# Patient Record
Sex: MALE | Race: BLACK OR AFRICAN AMERICAN | NOT HISPANIC OR LATINO | URBAN - METROPOLITAN AREA
[De-identification: names, ages, dates, MRNs, and addresses within clinical notes are randomized per-mention and may not be internally consistent; named-entity substitution may affect disease eponyms.]

---

## 2020-12-24 ENCOUNTER — INPATIENT (INPATIENT)
Facility: HOSPITAL | Age: 75
LOS: 19 days | Discharge: NURSING, REHAB OR LONG-TERM CARE | End: 2021-01-13
Attending: INTERNAL MEDICINE | Admitting: INTERNAL MEDICINE
Payer: MEDICARE

## 2020-12-24 VITALS
DIASTOLIC BLOOD PRESSURE: 70 MMHG | OXYGEN SATURATION: 96 % | SYSTOLIC BLOOD PRESSURE: 127 MMHG | RESPIRATION RATE: 20 BRPM | HEART RATE: 86 BPM | TEMPERATURE: 98 F

## 2020-12-24 DIAGNOSIS — R77.8 OTHER SPECIFIED ABNORMALITIES OF PLASMA PROTEINS: ICD-10-CM

## 2020-12-24 DIAGNOSIS — U07.1 COVID-19: ICD-10-CM

## 2020-12-24 DIAGNOSIS — E11.9 TYPE 2 DIABETES MELLITUS WITHOUT COMPLICATIONS: ICD-10-CM

## 2020-12-24 DIAGNOSIS — Z02.9 ENCOUNTER FOR ADMINISTRATIVE EXAMINATIONS, UNSPECIFIED: ICD-10-CM

## 2020-12-24 DIAGNOSIS — I10 ESSENTIAL (PRIMARY) HYPERTENSION: ICD-10-CM

## 2020-12-24 DIAGNOSIS — Z86.79 PERSONAL HISTORY OF OTHER DISEASES OF THE CIRCULATORY SYSTEM: ICD-10-CM

## 2020-12-24 DIAGNOSIS — Z29.9 ENCOUNTER FOR PROPHYLACTIC MEASURES, UNSPECIFIED: ICD-10-CM

## 2020-12-24 DIAGNOSIS — R41.0 DISORIENTATION, UNSPECIFIED: ICD-10-CM

## 2020-12-24 DIAGNOSIS — E78.5 HYPERLIPIDEMIA, UNSPECIFIED: ICD-10-CM

## 2020-12-24 DIAGNOSIS — J44.9 CHRONIC OBSTRUCTIVE PULMONARY DISEASE, UNSPECIFIED: ICD-10-CM

## 2020-12-24 DIAGNOSIS — D64.9 ANEMIA, UNSPECIFIED: ICD-10-CM

## 2020-12-24 LAB
ALBUMIN SERPL ELPH-MCNC: 3.5 G/DL — SIGNIFICANT CHANGE UP (ref 3.3–5)
ALP SERPL-CCNC: 155 U/L — HIGH (ref 40–120)
ALT FLD-CCNC: 115 U/L — HIGH (ref 4–41)
ANION GAP SERPL CALC-SCNC: 15 MMOL/L — HIGH (ref 7–14)
AST SERPL-CCNC: 76 U/L — HIGH (ref 4–40)
BASOPHILS # BLD AUTO: 0 K/UL — SIGNIFICANT CHANGE UP (ref 0–0.2)
BASOPHILS NFR BLD AUTO: 0 % — SIGNIFICANT CHANGE UP (ref 0–2)
BILIRUB SERPL-MCNC: 0.3 MG/DL — SIGNIFICANT CHANGE UP (ref 0.2–1.2)
BLOOD GAS VENOUS COMPREHENSIVE RESULT: SIGNIFICANT CHANGE UP
BUN SERPL-MCNC: 24 MG/DL — HIGH (ref 7–23)
CALCIUM SERPL-MCNC: 9 MG/DL — SIGNIFICANT CHANGE UP (ref 8.4–10.5)
CHLORIDE SERPL-SCNC: 96 MMOL/L — LOW (ref 98–107)
CK MB BLD-MCNC: 1.7 % — SIGNIFICANT CHANGE UP (ref 0–2.5)
CK MB CFR SERPL CALC: 1.5 NG/ML — SIGNIFICANT CHANGE UP
CK SERPL-CCNC: 88 U/L — SIGNIFICANT CHANGE UP (ref 30–200)
CO2 SERPL-SCNC: 25 MMOL/L — SIGNIFICANT CHANGE UP (ref 22–31)
CREAT SERPL-MCNC: 0.92 MG/DL — SIGNIFICANT CHANGE UP (ref 0.5–1.3)
CRP SERPL-MCNC: 95.8 MG/L — HIGH
D DIMER BLD IA.RAPID-MCNC: 550 NG/ML DDU — HIGH
EOSINOPHIL # BLD AUTO: 0 K/UL — SIGNIFICANT CHANGE UP (ref 0–0.5)
EOSINOPHIL NFR BLD AUTO: 0 % — SIGNIFICANT CHANGE UP (ref 0–6)
FERRITIN SERPL-MCNC: 1583 NG/ML — HIGH (ref 30–400)
GLUCOSE SERPL-MCNC: 152 MG/DL — HIGH (ref 70–99)
HCT VFR BLD CALC: 32.3 % — LOW (ref 39–50)
HGB BLD-MCNC: 9.9 G/DL — LOW (ref 13–17)
IANC: 8.2 K/UL — SIGNIFICANT CHANGE UP (ref 1.5–8.5)
LYMPHOCYTES # BLD AUTO: 0.21 K/UL — LOW (ref 1–3.3)
LYMPHOCYTES # BLD AUTO: 2.4 % — LOW (ref 13–44)
MCHC RBC-ENTMCNC: 21.3 PG — LOW (ref 27–34)
MCHC RBC-ENTMCNC: 30.7 GM/DL — LOW (ref 32–36)
MCV RBC AUTO: 69.6 FL — LOW (ref 80–100)
MONOCYTES # BLD AUTO: 0.07 K/UL — SIGNIFICANT CHANGE UP (ref 0–0.9)
MONOCYTES NFR BLD AUTO: 0.8 % — LOW (ref 2–14)
NEUTROPHILS # BLD AUTO: 8.41 K/UL — HIGH (ref 1.8–7.4)
NEUTROPHILS NFR BLD AUTO: 96.8 % — HIGH (ref 43–77)
NT-PROBNP SERPL-SCNC: 274 PG/ML — SIGNIFICANT CHANGE UP
NT-PROBNP SERPL-SCNC: 285 PG/ML — SIGNIFICANT CHANGE UP
PLATELET # BLD AUTO: 263 K/UL — SIGNIFICANT CHANGE UP (ref 150–400)
POTASSIUM SERPL-MCNC: 4.6 MMOL/L — SIGNIFICANT CHANGE UP (ref 3.5–5.3)
POTASSIUM SERPL-SCNC: 4.6 MMOL/L — SIGNIFICANT CHANGE UP (ref 3.5–5.3)
PROCALCITONIN SERPL-MCNC: 0.1 NG/ML — SIGNIFICANT CHANGE UP (ref 0.02–0.1)
PROT SERPL-MCNC: 7.2 G/DL — SIGNIFICANT CHANGE UP (ref 6–8.3)
RBC # BLD: 4.64 M/UL — SIGNIFICANT CHANGE UP (ref 4.2–5.8)
RBC # FLD: 19 % — HIGH (ref 10.3–14.5)
SARS-COV-2 RNA SPEC QL NAA+PROBE: DETECTED
SODIUM SERPL-SCNC: 136 MMOL/L — SIGNIFICANT CHANGE UP (ref 135–145)
TROPONIN T, HIGH SENSITIVITY RESULT: 46 NG/L — SIGNIFICANT CHANGE UP
TROPONIN T, HIGH SENSITIVITY RESULT: 56 NG/L — CRITICAL HIGH
WBC # BLD: 8.69 K/UL — SIGNIFICANT CHANGE UP (ref 3.8–10.5)
WBC # FLD AUTO: 8.69 K/UL — SIGNIFICANT CHANGE UP (ref 3.8–10.5)

## 2020-12-24 PROCEDURE — 99285 EMERGENCY DEPT VISIT HI MDM: CPT | Mod: CS

## 2020-12-24 PROCEDURE — 71045 X-RAY EXAM CHEST 1 VIEW: CPT | Mod: 26

## 2020-12-24 PROCEDURE — 71250 CT THORAX DX C-: CPT | Mod: 26

## 2020-12-24 RX ORDER — QUETIAPINE FUMARATE 200 MG/1
25 TABLET, FILM COATED ORAL DAILY
Refills: 0 | Status: DISCONTINUED | OUTPATIENT
Start: 2020-12-24 | End: 2021-01-13

## 2020-12-24 RX ORDER — BUDESONIDE AND FORMOTEROL FUMARATE DIHYDRATE 160; 4.5 UG/1; UG/1
2 AEROSOL RESPIRATORY (INHALATION)
Refills: 0 | Status: DISCONTINUED | OUTPATIENT
Start: 2020-12-24 | End: 2021-01-13

## 2020-12-24 RX ORDER — INSULIN LISPRO 100/ML
VIAL (ML) SUBCUTANEOUS AT BEDTIME
Refills: 0 | Status: DISCONTINUED | OUTPATIENT
Start: 2020-12-24 | End: 2021-01-13

## 2020-12-24 RX ORDER — METOPROLOL TARTRATE 50 MG
25 TABLET ORAL DAILY
Refills: 0 | Status: DISCONTINUED | OUTPATIENT
Start: 2020-12-24 | End: 2021-01-02

## 2020-12-24 RX ORDER — SENNA PLUS 8.6 MG/1
2 TABLET ORAL AT BEDTIME
Refills: 0 | Status: DISCONTINUED | OUTPATIENT
Start: 2020-12-24 | End: 2021-01-13

## 2020-12-24 RX ORDER — INFLUENZA VIRUS VACCINE 15; 15; 15; 15 UG/.5ML; UG/.5ML; UG/.5ML; UG/.5ML
0.5 SUSPENSION INTRAMUSCULAR ONCE
Refills: 0 | Status: DISCONTINUED | OUTPATIENT
Start: 2020-12-24 | End: 2020-12-25

## 2020-12-24 RX ORDER — LOSARTAN POTASSIUM 100 MG/1
25 TABLET, FILM COATED ORAL DAILY
Refills: 0 | Status: DISCONTINUED | OUTPATIENT
Start: 2020-12-24 | End: 2021-01-13

## 2020-12-24 RX ORDER — DEXTROSE 50 % IN WATER 50 %
15 SYRINGE (ML) INTRAVENOUS ONCE
Refills: 0 | Status: DISCONTINUED | OUTPATIENT
Start: 2020-12-24 | End: 2021-01-13

## 2020-12-24 RX ORDER — QUETIAPINE FUMARATE 200 MG/1
50 TABLET, FILM COATED ORAL AT BEDTIME
Refills: 0 | Status: DISCONTINUED | OUTPATIENT
Start: 2020-12-24 | End: 2021-01-13

## 2020-12-24 RX ORDER — SODIUM CHLORIDE 9 MG/ML
1000 INJECTION, SOLUTION INTRAVENOUS
Refills: 0 | Status: DISCONTINUED | OUTPATIENT
Start: 2020-12-24 | End: 2021-01-13

## 2020-12-24 RX ORDER — FUROSEMIDE 40 MG
40 TABLET ORAL DAILY
Refills: 0 | Status: DISCONTINUED | OUTPATIENT
Start: 2020-12-24 | End: 2021-01-13

## 2020-12-24 RX ORDER — SODIUM CHLORIDE 9 MG/ML
500 INJECTION INTRAMUSCULAR; INTRAVENOUS; SUBCUTANEOUS ONCE
Refills: 0 | Status: COMPLETED | OUTPATIENT
Start: 2020-12-24 | End: 2020-12-24

## 2020-12-24 RX ORDER — GLUCAGON INJECTION, SOLUTION 0.5 MG/.1ML
1 INJECTION, SOLUTION SUBCUTANEOUS ONCE
Refills: 0 | Status: DISCONTINUED | OUTPATIENT
Start: 2020-12-24 | End: 2021-01-13

## 2020-12-24 RX ORDER — ACETAMINOPHEN 500 MG
650 TABLET ORAL EVERY 4 HOURS
Refills: 0 | Status: DISCONTINUED | OUTPATIENT
Start: 2020-12-24 | End: 2021-01-02

## 2020-12-24 RX ORDER — FERROUS SULFATE 325(65) MG
325 TABLET ORAL DAILY
Refills: 0 | Status: DISCONTINUED | OUTPATIENT
Start: 2020-12-24 | End: 2021-01-13

## 2020-12-24 RX ORDER — ASCORBIC ACID 60 MG
250 TABLET,CHEWABLE ORAL DAILY
Refills: 0 | Status: DISCONTINUED | OUTPATIENT
Start: 2020-12-24 | End: 2021-01-13

## 2020-12-24 RX ORDER — LANOLIN ALCOHOL/MO/W.PET/CERES
3 CREAM (GRAM) TOPICAL AT BEDTIME
Refills: 0 | Status: DISCONTINUED | OUTPATIENT
Start: 2020-12-24 | End: 2021-01-13

## 2020-12-24 RX ORDER — DEXTROSE 50 % IN WATER 50 %
25 SYRINGE (ML) INTRAVENOUS ONCE
Refills: 0 | Status: DISCONTINUED | OUTPATIENT
Start: 2020-12-24 | End: 2021-01-13

## 2020-12-24 RX ORDER — REMDESIVIR 5 MG/ML
INJECTION INTRAVENOUS
Refills: 0 | Status: COMPLETED | OUTPATIENT
Start: 2020-12-24 | End: 2020-12-28

## 2020-12-24 RX ORDER — ASPIRIN/CALCIUM CARB/MAGNESIUM 324 MG
81 TABLET ORAL DAILY
Refills: 0 | Status: DISCONTINUED | OUTPATIENT
Start: 2020-12-24 | End: 2021-01-13

## 2020-12-24 RX ORDER — INSULIN LISPRO 100/ML
VIAL (ML) SUBCUTANEOUS
Refills: 0 | Status: DISCONTINUED | OUTPATIENT
Start: 2020-12-24 | End: 2021-01-13

## 2020-12-24 RX ORDER — REMDESIVIR 5 MG/ML
200 INJECTION INTRAVENOUS EVERY 24 HOURS
Refills: 0 | Status: COMPLETED | OUTPATIENT
Start: 2020-12-24 | End: 2020-12-24

## 2020-12-24 RX ORDER — FOLIC ACID 0.8 MG
1 TABLET ORAL DAILY
Refills: 0 | Status: DISCONTINUED | OUTPATIENT
Start: 2020-12-24 | End: 2021-01-13

## 2020-12-24 RX ORDER — DEXAMETHASONE 0.5 MG/5ML
6 ELIXIR ORAL ONCE
Refills: 0 | Status: COMPLETED | OUTPATIENT
Start: 2020-12-24 | End: 2020-12-24

## 2020-12-24 RX ORDER — INSULIN GLARGINE 100 [IU]/ML
20 INJECTION, SOLUTION SUBCUTANEOUS AT BEDTIME
Refills: 0 | Status: DISCONTINUED | OUTPATIENT
Start: 2020-12-24 | End: 2020-12-26

## 2020-12-24 RX ORDER — ATORVASTATIN CALCIUM 80 MG/1
80 TABLET, FILM COATED ORAL AT BEDTIME
Refills: 0 | Status: DISCONTINUED | OUTPATIENT
Start: 2020-12-24 | End: 2021-01-13

## 2020-12-24 RX ORDER — SODIUM CHLORIDE 9 MG/ML
50 INJECTION INTRAMUSCULAR; INTRAVENOUS; SUBCUTANEOUS ONCE
Refills: 0 | Status: DISCONTINUED | OUTPATIENT
Start: 2020-12-24 | End: 2020-12-24

## 2020-12-24 RX ORDER — ENOXAPARIN SODIUM 100 MG/ML
40 INJECTION SUBCUTANEOUS DAILY
Refills: 0 | Status: DISCONTINUED | OUTPATIENT
Start: 2020-12-24 | End: 2021-01-13

## 2020-12-24 RX ORDER — REMDESIVIR 5 MG/ML
100 INJECTION INTRAVENOUS EVERY 24 HOURS
Refills: 0 | Status: COMPLETED | OUTPATIENT
Start: 2020-12-25 | End: 2020-12-28

## 2020-12-24 RX ORDER — POLYETHYLENE GLYCOL 3350 17 G/17G
17 POWDER, FOR SOLUTION ORAL DAILY
Refills: 0 | Status: DISCONTINUED | OUTPATIENT
Start: 2020-12-24 | End: 2021-01-13

## 2020-12-24 RX ORDER — DEXTROSE 50 % IN WATER 50 %
12.5 SYRINGE (ML) INTRAVENOUS ONCE
Refills: 0 | Status: DISCONTINUED | OUTPATIENT
Start: 2020-12-24 | End: 2021-01-13

## 2020-12-24 RX ORDER — DEXAMETHASONE 0.5 MG/5ML
6 ELIXIR ORAL DAILY
Refills: 0 | Status: COMPLETED | OUTPATIENT
Start: 2020-12-25 | End: 2021-01-03

## 2020-12-24 RX ADMIN — ATORVASTATIN CALCIUM 80 MILLIGRAM(S): 80 TABLET, FILM COATED ORAL at 22:15

## 2020-12-24 RX ADMIN — SENNA PLUS 2 TABLET(S): 8.6 TABLET ORAL at 22:15

## 2020-12-24 RX ADMIN — REMDESIVIR 200 MILLIGRAM(S): 5 INJECTION INTRAVENOUS at 23:43

## 2020-12-24 RX ADMIN — SODIUM CHLORIDE 500 MILLILITER(S): 9 INJECTION INTRAMUSCULAR; INTRAVENOUS; SUBCUTANEOUS at 16:29

## 2020-12-24 RX ADMIN — Medication 6 MILLIGRAM(S): at 16:30

## 2020-12-24 RX ADMIN — SODIUM CHLORIDE 500 MILLILITER(S): 9 INJECTION INTRAMUSCULAR; INTRAVENOUS; SUBCUTANEOUS at 18:34

## 2020-12-24 RX ADMIN — INSULIN GLARGINE 20 UNIT(S): 100 INJECTION, SOLUTION SUBCUTANEOUS at 22:15

## 2020-12-24 RX ADMIN — QUETIAPINE FUMARATE 50 MILLIGRAM(S): 200 TABLET, FILM COATED ORAL at 22:15

## 2020-12-24 NOTE — ED PROVIDER NOTE - OBJECTIVE STATEMENT
75M PMH HTN, DM, Dementia BIBEMS from [facility] for AMS. Pt was recently hospitalized and intubated at [place] for prolonged duration for pneumonia/pulmonary fibrosis, had multiple negative covid swabs at that time. Recently he tested positive for COVID19 and today staff noted that he was increasingly altered and tachypneic so called EMS. 75M PMH HTN, DM, legally blind, Dementia? BIBEMS from [facility] for AMS. Pt was recently hospitalized and intubated at [facility] for prolonged duration for pneumonia/pulmonary fibrosis, had multiple negative covid swabs at that time. Recently he tested positive for COVID19 and today staff noted that he was increasingly altered and tachypneic so called EMS. Family reports first covid test was aprox 1 week ago but they were not notified of it immediately.

## 2020-12-24 NOTE — H&P ADULT - HISTORY OF PRESENT ILLNESS
75 y.o Male with PMHx HTN, DM2 ( on Insulin ) , legally Blind , Dementia , Pneumonia , HLD , recently Diagnosed Heart failure at [place], ARDS s/p Intubation 12/5 presents to [facility] from [facility] for Shortness of Breath and recent Positive COVID 19 infection. Pt is confused, AOX1-2 , Unable to obtain information due to patient being aggressive and confused likely 2/2 dementia. Spoke with Daughter (Sienna), who states that 6 weeks ago pt c/o of SOB , pt was admitted to [facility] for SOB. Pt was intubated 2/2 ARDS w/ Hypoxia, at the time of the hospitalization, Pt was swabbed x2 both times negative and negative Covid antibodies. Discharged from [facility] to [facility], Pt was tested positive for Covid and also per daughter Pt was discharged with home oxygen but [facility] was not providing  according to daughter. Pt presents to [facility] with increasingly altered , Tachypnea and hypoxic Spo2 89-90 on RA. Unable to obtain review of systems 2/2 currently mental state of the patient.      In ED Pt satting on 100% on 3L NC, VSS , EKG: NSR@89 w/ PAC Qtc 452  75 y.o Male with PMHx HTN, DM2 ( on Insulin ) , legally Blind , Dementia , Pneumonia, COPD , HLD , recently Diagnosed Heart failure [place], ARDS s/p Intubation 12/5 presents to [facility] from [facility] for Shortness of Breath and recent Positive COVID 19 infection. Pt is confused, AOX1-2 , Unable to obtain information due to patient being aggressive and confused likely 2/2 dementia. Spoke with Daughter (Sienna), who states that 6 weeks ago pt c/o of SOB , pt was admitted to [facility] for SOB. Pt was intubated 2/2 ARDS w/ Hypoxia, at the time of the hospitalization, Pt was swabbed x2 both times negative and negative Covid antibodies. Discharged from [facility] to [facility], Pt was tested positive for Covid and also per daughter Pt was discharged with home oxygen but [facility] was not providing  according to daughter. Pt presents to [facility] with increasingly altered , Tachypnea and hypoxic Spo2 89-90 on RA. Unable to obtain review of systems 2/2 currently mental state of the patient.      In ED Pt satting on 100% on 3L NC, VSS , EKG: NSR@89 w/ PAC Qtc 452  75 y.o Male with PMHx HTN, DM2 (on Insulin), legally Blind, Dementia (AAO status ranges from AAO x1-3), COPD (on prednisone), HLD, recently Diagnosed Heart failure at [place], and recent admission for ARDS s/p Intubation 12/5 presents to [facility] from [facility] for Shortness of Breath and recent Positive COVID 19 infection. Pt is confused, AOX1-2 at present, unable to obtain. Spoke with Daughter (Sienna), who states that 6 weeks ago pt c/o of SOB, pt was admitted to [facility] for SOB. There, pt was intubated 2/2 ARDS w/ Hypoxia, at the time of the hospitalization, Pt was swabbed x2 both times negative for COVID19 and had negative COVID19 antibodies. Discharged from [facility] to [facility]. At [facility] was tested positive for COVID19 (12/22). Was started on azithromycin but on 12/24 he was noted to be desaturating to 90% on 2L NC with worsening mental status and activity and was therefore sent to [facility]. Pt presents to [facility] with increasingly altered, tachypneic, and hypoxic Spo2 89-90 on RA. Unable to obtain review of systems 2/2 currently mental state of the patient.      In ED Pt satting on 100% on 3L NC, VSS , EKG: NSR@89 w/ PAC Qtc 452. His lab work showed anemia (chronic), neutrophilia without leukocytosis, elevated troponin with downtrend, transaminitis, and elevated inflammatory markers. His lactate was 2.3 and his RVP was positive for COVID19. He had a CT Chest that seems to show bilateral basilar peripheral opacities. He was given NS 500cc x1 and decadron 6mg IVP x1. He was admitted to medicine on telemetry.

## 2020-12-24 NOTE — ED ADULT NURSE NOTE - CHIEF COMPLAINT QUOTE
Patient brought to ER by EMS from [facility] for altered mental status, patient Covid +, O2 Sat is 90% 2 liters, Pt also has pneumonia as per staff at facility.

## 2020-12-24 NOTE — H&P ADULT - PROBLEM SELECTOR PLAN 5
- Pt takes 25 units of Lantus per documentations at bed time, Will give him 20units of Lantus but Pt's Daughter states that Pt and his girlfriend have not been managing medications well. Unsure if pt takes his home medications   - A1C  - ISS  - monitor FS  - diabetic diet - Continue statin  - lipid profile   - Low fat/ low cholesterol diet - H/H at preset  9.9/ 32.3  - No h/o Bleeding,  No Melena , Hemoptysis , hematochezia , epistaxis  - Will check reticulocyte count, Iron , Ferratin , TIBC, Vit B12 , Folic Acid  - Goal Hbg > 7.0  - Monitor Vitals signs for hemodynamic stability  - Continue monitoring - continue home inhalers  - continuous pulse ox  - 02 PRN   - consider pulm c/s  due to COPD and Pulmonary fibrosis   - Pt was on 20mg Prednisone  at home  - c/w Decadron while in patient - continue home inhalers  - continuous pulse ox  - 02 PRN   - consider pulm c/s  due to COPD and Pulmonary fibrosis (new diagnosis as per daughter)  - Pt was on 20mg Prednisone  at home, currently on dexamethasone so will hold prednisone  - c/w Decadron while in patient - Has a stage 1 sacral decub ulcer and incontinence associated dermatitis of his groin, wound care eval ordered

## 2020-12-24 NOTE — H&P ADULT - PROBLEM SELECTOR PLAN 4
- Continue Home BP meds w/ parameters   - monitor BP   - DASH/TLC diet - BNP: 246  - Pt takes lasix 40mg qd PO , c/w home medication   - Pt is not fluid overloaded   - monitor electrolytes   - daily weights   - strict I&Os  - fluid restriction   - low sodium intake   - TTE AM  - Chest Xray: Prelim: INTERPRETATION:  Faint bibasilar and left midlung hazy opacity, which is nonspecific and can be seen in multifocal infection as well as pulmonary edema. Follow up official report in AM. - continue home medications   - recently diagnosed with dementia  - Prior to admission to [place] pt was AOX3-4, currently Aox1-2  - consider [facility] c/s AM   - monitor - continue home medications   - recently diagnosed with dementia  - Prior to admission to [place] pt was AOX3-4, currently AOx1-3  - consider [facility] c/s AM   - monitor

## 2020-12-24 NOTE — ED ADULT NURSE NOTE - OBJECTIVE STATEMENT
Pt a&ox2 (confusion at times) PMH HTN, DM, Dementia BIBEMS from [facility] for AMS. Pt was recently hospitalized and intubated at [place] for prolonged duration of pneumonia/pulmonary fibrosis, had multiple negative covid swabs at that time. Recently he tested positive for COVID19 and today staff noted that he was increasingly altered and tachypneic so called EMS. Pt sating at 93% on RA, placed on 3L O2 VIA NC sating at 98%. 20g IVUS in RT AC by resident. Will monitor.

## 2020-12-24 NOTE — ED PROVIDER NOTE - CLINICAL SUMMARY MEDICAL DECISION MAKING FREE TEXT BOX
75M presents from NH for COVID19, pt mildly hypoxic and tachypneic, requiring supplemental O2. Will check labs, CXR, decadron. TBA for further management.

## 2020-12-24 NOTE — PATIENT PROFILE ADULT - VISION (WITH CORRECTIVE LENSES IF THE PATIENT USUALLY WEARS THEM):
patient is legally blind/Severely impaired: cannot locate objects without hearing or touching them or patient nonresponsive.

## 2020-12-24 NOTE — H&P ADULT - PROBLEM SELECTOR PLAN 8
DVT ppx: Lovenox   PT c/s - Continue Home BP meds w/ parameters   - monitor BP   - DASH/TLC diet - BNP: 246  - Pt takes Lasix 40mg qd PO , c/w home medication   - Pt is not fluid overloaded   - monitor electrolytes   - daily weights   - strict I&Os  - fluid restriction   - low sodium intake   - TTE AM  - Chest Xray: Prelim: INTERPRETATION:  Faint bibasilar and left midlung hazy opacity, which is nonspecific and can be seen in multifocal infection as well as pulmonary edema. Follow up official report in AM.

## 2020-12-24 NOTE — H&P ADULT - NSHPOUTPATIENTPROVIDERS_GEN_ALL_CORE
Dr. Ibis Villafana (PCP)  Dr. Castelan (ENT) , Pt sees ENT more often then PCP for his Chronic Sinusitis Dr. Ibis Hinson (PCP)  Dr. Castelan (ENT) , Pt sees ENT more often then PCP for his Chronic Sinusitis Dr. Ibis Hinson (PCP)  Dr. Castelan (ENT)

## 2020-12-24 NOTE — H&P ADULT - PROBLEM SELECTOR PROBLEM 7
Discharge planning issues Type 2 diabetes mellitus without complication, with long-term current use of insulin HLD (hyperlipidemia) H/O heart failure Anemia

## 2020-12-24 NOTE — H&P ADULT - ATTENDING COMMENTS
Patient seen and examined on 12/25/2020, case discussed with WILLIS Keyes. This is a 75M with history as above who presents from his NH with worsening hypoxia with recent diagnosis of COVID19 PNA. Here his lab work and imaging is more consistent with COVID19 PNA c/b acute hypoxic respiratory failure. Will c/w decadron, remdesivir (monitor LFTs), supplemental O2. Patient with MOLST form in chart, states he is Full Code. Other management as above.

## 2020-12-24 NOTE — H&P ADULT - PROBLEM SELECTOR PLAN 1
- COVID-19 viral syndrome detected 12/23  - Chest Xray: Prelim: INTERPRETATION:  Faint bibasilar and left midlung hazy opacity, which is nonspecific and can be seen in multifocal infection as well as pulmonary edema. Follow up official report in AM.  - Elevated Ferratin , LDH , CRP, Procalcitonin, repeat AM Ordered  - continue O2 NC, may use NRB  - PRN Tylenol for fever > 100.4   - Contact, airborne, droplet isolation precautions  - Monitor respiratory status closely  - Patient is at a High risk of decompensation at this time.  - MOLST Form IN chart , Pt is FULL CODE. - COVID-19 viral syndrome detected 12/23  - Chest Xray: Prelim: INTERPRETATION:  Faint bibasilar and left midlung hazy opacity, which is nonspecific and can be seen in multifocal infection as well as pulmonary edema. Follow up official report in AM.  - Elevated Ferratin , LDH , CRP, Procalcitonin, repeat AM Ordered  - continue O2 NC, may use NRB  - PRN Tylenol for fever > 100.4   - Contact, airborne, droplet isolation precautions  - Monitor respiratory status closely  - Patient is at a High risk of decompensation at this time.  - MOLST Form In chart , Pt is FULL CODE. - COVID-19 viral syndrome detected 12/23  - Chest Xray: Prelim: INTERPRETATION:  Faint bibasilar and left midlung hazy opacity, which is nonspecific and can be seen in multifocal infection as well as pulmonary edema. Follow up official report in AM.  - Elevated Ferratin , LDH , CRP, Procalcitonin, repeat AM Ordered  - continue O2 NC, may use NRB  - PRN Tylenol for fever > 100.4   - Contact, airborne, droplet isolation precautions  - Monitor respiratory status closely  - Patient is at a High risk of decompensation at this time.  - MOLST Form In chart , Pt is FULL CODE.  - c/w Decadron 9 more days s/p x1 dose in Decadron - COVID-19 viral syndrome detected 12/23  - Chest Xray: Prelim: INTERPRETATION:  Faint bibasilar and left midlung hazy opacity, which is nonspecific and can be seen in multifocal infection as well as pulmonary edema. Follow up official report in AM.  - Elevated Ferratin , LDH , CRP, Procalcitonin, repeat AM Ordered  - continue O2 NC, may use NRB  - PRN Tylenol for fever > 100.4   - Contact, airborne, droplet isolation precautions  - Monitor respiratory status closely  - Patient is at a High risk of decompensation at this time.  - MOLST Form In chart , Pt is FULL CODE.  - c/w Decadron 9 more days s/p x1 dose in Decadron  - Spoke with daughter agrees with the plan to continue with Remdesivir - COVID-19 viral syndrome detected 12/23  - Chest Xray: Prelim: INTERPRETATION:  Faint bibasilar and left midlung hazy opacity, which is nonspecific and can be seen in multifocal infection as well as pulmonary edema. Follow up official report in AM.  - Elevated Ferratin , LDH , CRP, Procalcitonin, repeat AM Ordered  - continue O2 NC, may use NRB  - PRN Tylenol for fever > 100.4   - Contact, airborne, droplet isolation precautions  - Monitor respiratory status closely  - Patient is at a High risk of decompensation at this time.  - MOLST Form In chart , Pt is FULL CODE.  - c/w Decadron 9 more days s/p x1 dose in Decadron  - Spoke with daughter agrees with the plan to continue with Remdesivir  - CT Chest Ordered - COVID-19 detected 12/22 initially, here positive also, hypoxic to 90% initially on 2L as per triage note, now satting in high 90s on 3L NC  - Chest Xray: Prelim: INTERPRETATION:  Faint bibasilar and left midlung hazy opacity, which is nonspecific and can be seen in multifocal infection as well as pulmonary edema. Follow up official report in AM.  - Elevated inflammatory markers (ferritin, CRP, ddimer), low procal  - continue supplemental O2, increase as needed  - PRN Tylenol for fever > 100.4   - Contact, airborne, droplet isolation precautions  - Monitor respiratory status closely  - MOLST Form In chart , Pt is FULL CODE.  - c/w Decadron 9 more days s/p x1 dose in Decadron in ED  - Spoke with daughter agrees with the plan to continue with Remdesivir, monitor LFTs, if uptrending then would need to stop remdesivir  - CT Chest Ordered

## 2020-12-24 NOTE — H&P ADULT - NSICDXPASTMEDICALHX_GEN_ALL_CORE_FT
PAST MEDICAL HISTORY:  H/O heart failure     History of acute respiratory distress syndrome (ARDS)     HLD (hyperlipidemia)     HTN (hypertension)     Pneumonia

## 2020-12-24 NOTE — H&P ADULT - NSHPSOURCEINFORD_GEN_ALL_CORE
Jamil Johansen called from [facility] and is asking that family may be considering Hospice care for pt and if so would Dr. Leesa Du sign off on those orders?    (doc, this is a Heather pt)    Please advise.   thanks Chart(s)/Patient

## 2020-12-24 NOTE — H&P ADULT - PROBLEM SELECTOR PROBLEM 3
HLD (hyperlipidemia) Confusion Type 2 diabetes mellitus without complication, with long-term current use of insulin

## 2020-12-24 NOTE — ED ADULT TRIAGE NOTE - CHIEF COMPLAINT QUOTE
Patient brought to ER by EMS from [facility] for altered mental status, patient Covid +, O2 Sat is 90% 2 liters, Patient brought to ER by EMS from [facility] for altered mental status, patient Covid +, O2 Sat is 90% 2 liters, Pt also has pneumonia as per staff at facility.

## 2020-12-24 NOTE — H&P ADULT - PROBLEM SELECTOR PLAN 9
1.  Name of PCP:   2.  PCP Contacted on Admission: [ ] Y    [x] N - admitted at night    3.  PCP contacted at Discharge: [ ] Y    [ ] N    [ ] N/A  4.  Post-Discharge Appointment Date and Location:  5.  Summary of Handoff given to PCP: DVT ppx: Lovenox   PT c/s DVT ppx: Lovenox once patients weight is in the system  PT c/s - Continue Home BP meds w/ parameters   - monitor BP   - DASH/TLC diet

## 2020-12-24 NOTE — H&P ADULT - PROBLEM SELECTOR PLAN 6
DVT ppx: Lovenox   PT c/s - Continue Home BP meds w/ parameters   - monitor BP   - DASH/TLC diet - BNP: 246  - Pt takes Lasix 40mg qd PO , c/w home medication   - Pt is not fluid overloaded   - monitor electrolytes   - daily weights   - strict I&Os  - fluid restriction   - low sodium intake   - TTE AM  - Chest Xray: Prelim: INTERPRETATION:  Faint bibasilar and left midlung hazy opacity, which is nonspecific and can be seen in multifocal infection as well as pulmonary edema. Follow up official report in AM. - H/H at preset  9.9/ 32.3  - No h/o Bleeding,  No Melena , Hemoptysis , hematochezia , epistaxis  - Will check reticulocyte count, Iron , Ferratin , TIBC, Vit B12 , Folic Acid  - Goal Hbg > 7.0  - Monitor Vitals signs for hemodynamic stability  - Continue monitoring - continue home inhalers  - continuous pulse ox  - 02 PRN   - consider pulm c/s  due to COPD and Pulmonary fibrosis (new diagnosis as per daughter)  - Pt was on 20mg Prednisone  at home, currently on dexamethasone so will hold prednisone  - c/w Decadron while in patient - continue home inhalers (spiriva as interchange for incruse)  - continuous pulse ox  - 02 PRN   - consider pulm c/s  due to COPD and Pulmonary fibrosis (new diagnosis as per daughter)  - Pt was on 20mg Prednisone  at home, currently on dexamethasone so will hold prednisone  - c/w Decadron while in patient

## 2020-12-24 NOTE — H&P ADULT - PROBLEM SELECTOR PLAN 2
- BNP: 246  - Pt is not fluid overloaded   - monitor electrolytes   - daily weights   - strict I&Os  - fluid restriction   - low sodium intake   - TTE AM  - Chest Xray: Prelim: INTERPRETATION:  Faint bibasilar and left midlung hazy opacity, which is nonspecific and can be seen in multifocal infection as well as pulmonary edema. Follow up official report in AM. - Trops 56 --> 46  - CK, CKMB negative x2  - currently chest pain free  - EKG: NSR@89 w/ PAC Qtc 452   - follow up cardiac Enzymes AM - Trops 56 --> 46, EKG without any ischemic changes, possibly a trop leak due to his COVID19 infection and hypoxia  - CK, CKMB negative x2  - currently chest pain free  - EKG: NSR@89 w/ PAC Qtc 452   - follow up cardiac Enzymes AM

## 2020-12-24 NOTE — ED ADULT NURSE REASSESSMENT NOTE - NS ED NURSE REASSESS COMMENT FT1
Pt received from day shift RN. Vitals as noted. Pt in no acute distress at this time. Respirations even and unlabored on 3L nasal cannula. Pt NSR on monitor. Comfort measures provided. Will continue to monitor.

## 2020-12-24 NOTE — H&P ADULT - PROBLEM SELECTOR PLAN 3
- Continue statin  - lipid profile   - Low fat/ low cholesterol diet - continue home medications   - recently diagnosed with dementia  - Prior to admission to [place] pt was AOX3-4, currently Aox1-2  - consider [facility] c/s AM   - monitor - Pt takes 25 units of Lantus per documentations at bed time, Will give him 20units of Lantus but Pt's Daughter states that Pt and his girlfriend have not been managing medications well. Unsure if pt takes his home medications   - A1C  - ISS  - monitor FS  - diabetic diet

## 2020-12-24 NOTE — H&P ADULT - NSHPLABSRESULTS_GEN_ALL_CORE
Labs:                        9.9    8.69  )-----------( 263      ( 24 Dec 2020 16:50 )             32.3     12-24    136  |  96<L>  |  24<H>  ----------------------------<  152<H>  4.6   |  25  |  0.92    Ca    9.0      24 Dec 2020 16:50    TPro  7.2  /  Alb  3.5  /  TBili  0.3  /  DBili  x   /  AST  76<H>  /  ALT  115<H>  /  AlkPhos  155<H>  12-24    LFTs: Alk Phos: 155 U/L<H> / AST: 76 U/L<H> / ALT: 115 U/L<H> / Albumin 3.5 g/dL / Bilirubin: 0.3 mg/dL  12-24-20 @ 20:43    Coagulation Factors:    EKG: NSR@89 w/ PAC Qtc 452     Cardiac Enzymes:   Trops 56  Creatine Kinase: 88 U/L [30 - 200]  12-24-20 @ 19:47  Creatine Kinase: 104 U/L [30 - 200]  12-24-20 @ 16:51    Serum Pro-Brain Natriuretic Peptide :  274 pg/mL  12-24-20 @ 19:47 , 285 pg/mL  12-24-20 @ 16:50     D-Dimer Assay: 550 ng/mL DDU<H>  12-24-20 @ 16:50    Blood Gas Venous: pH: 7.34 | HCO3: 25 | pCO2: 54 | pO2: <24 | Lactate: 2.3 12-24-20 @ 20:43    Chest Xray: Prelim: INTERPRETATION:  Faint bibasilar and left midlung hazy opacity, which is nonspecific and can be seen in multifocal infection as well as pulmonary edema. Follow up official report in AM. Labs:                        9.9    8.69  )-----------( 263      ( 24 Dec 2020 16:50 )             32.3     12-24    136  |  96<L>  |  24<H>  ----------------------------<  152<H>  4.6   |  25  |  0.92    Ca    9.0      24 Dec 2020 16:50    TPro  7.2  /  Alb  3.5  /  TBili  0.3  /  DBili  x   /  AST  76<H>  /  ALT  115<H>  /  AlkPhos  155<H>  12-24    LFTs: Alk Phos: 155 U/L<H> / AST: 76 U/L<H> / ALT: 115 U/L<H> / Albumin 3.5 g/dL / Bilirubin: 0.3 mg/dL  12-24-20 @ 20:43    Coagulation Factors:    EKG: NSR@89 w/ PAC Qtc 452, no significant ST-T wave changes    Cardiac Enzymes:   Trops 56  Creatine Kinase: 88 U/L [30 - 200]  12-24-20 @ 19:47  Creatine Kinase: 104 U/L [30 - 200]  12-24-20 @ 16:51    Serum Pro-Brain Natriuretic Peptide :  274 pg/mL  12-24-20 @ 19:47 , 285 pg/mL  12-24-20 @ 16:50     D-Dimer Assay: 550 ng/mL DDU<H>  12-24-20 @ 16:50    Blood Gas Venous: pH: 7.34 | HCO3: 25 | pCO2: 54 | pO2: <24 | Lactate: 2.3 12-24-20 @ 20:43    Chest Xray: Prelim: INTERPRETATION:  Faint bibasilar and left midlung hazy opacity, which is nonspecific and can be seen in multifocal infection as well as pulmonary edema. Follow up official report in AM. Labs:                        9.9    8.69  )-----------( 263      ( 24 Dec 2020 16:50 )             32.3   Complete Blood Count + Automated Diff (12.24.20 @ 16:50)   Mean Cell Volume: 69.6 fL   Mean Cell Hemoglobin: 21.3 pg   Mean Cell Hemoglobin Conc: 30.7 gm/dL   Complete Blood Count + Automated Diff (12.24.20 @ 16:50)   Auto Neutrophil #: 8.41 K/uL   Auto Neutrophil %: 96.8    12-24    136  |  96<L>  |  24<H>  ----------------------------<  152<H>  4.6   |  25  |  0.92    Ca    9.0      24 Dec 2020 16:50    TPro  7.2  /  Alb  3.5  /  TBili  0.3  /  DBili  x   /  AST  76<H>  /  ALT  115<H>  /  AlkPhos  155<H>  12-24  LFTs: Alk Phos: 155 U/L<H> / AST: 76 U/L<H> / ALT: 115 U/L<H> / Albumin 3.5 g/dL / Bilirubin: 0.3 mg/dL  12-24-20 @ 20:43    EKG: NSR@89 w/ PAC Qtc 452, no significant ST-T wave changes    Cardiac Enzymes:   Trops 56 -> 46  Creatine Kinase: 88 U/L [30 - 200]  12-24-20 @ 19:47  Creatine Kinase: 104 U/L [30 - 200]  12-24-20 @ 16:51    Serum Pro-Brain Natriuretic Peptide :  274 pg/mL  12-24-20 @ 19:47 , 285 pg/mL  12-24-20 @ 16:50     D-Dimer Assay: 550 ng/mL DDU<H>  12-24-20 @ 16:50    Blood Gas Venous: pH: 7.34 | HCO3: 25 | pCO2: 54 | pO2: <24 | Lactate: 2.3 12-24-20 @ 20:43    Chest Xray: Prelim: INTERPRETATION:  Faint bibasilar and left midlung hazy opacity, which is nonspecific and can be seen in multifocal infection as well as pulmonary edema. Follow up official report in AM.

## 2020-12-24 NOTE — ED ADULT NURSE NOTE - NSIMPLEMENTINTERV_GEN_ALL_ED
Implemented All Fall with Harm Risk Interventions:  [place] to call system. Call bell, personal items and telephone within reach. Instruct patient to call for assistance. Room bathroom lighting operational. Non-slip footwear when patient is off stretcher. Physically safe environment: no spills, clutter or unnecessary equipment. Stretcher in lowest position, wheels locked, appropriate side rails in place. Provide visual cue, wrist band, yellow gown, etc. Monitor gait and stability. Monitor for mental status changes and reorient to person, place, and time. Review medications for side effects contributing to fall risk. Reinforce activity limits and safety measures with patient and family. Provide visual clues: red socks.

## 2020-12-24 NOTE — H&P ADULT - NSHPSOCIALHISTORY_GEN_ALL_CORE
Tobacco Usage:  ( ) never smoked   (x ) former smoker  ( ) current smoker; Packs per day: 1 pack >30 yrs , quit 6 yrs ago  Alcohol Usage: (x ) none  ( ) occasional ( ) 2-3 times a week ( ) daily; Last drink:   Recreational drugs (x ) None  Lives with girlfriend and ambulates with out assistance, since the hospitalization pt is leaving at [facility] facility   Denies Recent travel Tobacco Usage:  ( ) never smoked   (x ) former smoker  ( ) current smoker; Packs per day: 1 pack >30 yrs , quit 6 yrs ago  Alcohol Usage: (x ) none  ( ) occasional ( ) 2-3 times a week ( ) daily; Last drink:   Recreational drugs (x ) None  Lives with girlfriend and ambulates with out assistance, since the hospitalization pt is staying at [facility] facility   Denies Recent travel

## 2020-12-24 NOTE — H&P ADULT - PROBLEM SELECTOR PROBLEM 5
Type 2 diabetes mellitus without complication, with long-term current use of insulin HLD (hyperlipidemia) Anemia COPD (chronic obstructive pulmonary disease) Dermatitis

## 2020-12-24 NOTE — H&P ADULT - PROBLEM SELECTOR PLAN 7
1.  Name of PCP:   2.  PCP Contacted on Admission: [ ] Y    [x] N - admitted at night    3.  PCP contacted at Discharge: [ ] Y    [ ] N    [ ] N/A  4.  Post-Discharge Appointment Date and Location:  5.  Summary of Handoff given to PCP: - Pt takes 25 units of Lantus per documentations at bed time, Will give him 20units of Lantus but Pt's Daughter states that Pt and his girlfriend have not been managing medications well. Unsure if pt takes his home medications   - A1C  - ISS  - monitor FS  - diabetic diet - Continue statin  - lipid profile   - Low fat/ low cholesterol diet - BNP: 246  - Pt takes Lasix 40mg qd PO , c/w home medication   - Pt is not fluid overloaded   - monitor electrolytes   - daily weights   - strict I&Os  - fluid restriction   - low sodium intake   - TTE AM  - Chest Xray: Prelim: INTERPRETATION:  Faint bibasilar and left midlung hazy opacity, which is nonspecific and can be seen in multifocal infection as well as pulmonary edema. Follow up official report in AM. - H/H at preset  9.9/ 32.3  - No h/o Bleeding,  No Melena , Hemoptysis , hematochezia , epistaxis  - Will check reticulocyte count, Iron , Ferratin , TIBC, Vit B12 , Folic Acid  - Goal Hbg > 7.0  - Monitor Vitals signs for hemodynamic stability  - Continue monitoring

## 2020-12-24 NOTE — ED PROVIDER NOTE - PROGRESS NOTE DETAILS
kurtis: pt was discharged from [facility] on wed 12/15. intubated at [facility] with neg for covid on repeat swabs, ab neg. double pneumonia for cause of intubation. baseline mental status with new onset hf, lung disease with pulm fibrosis possibly.  [phone]. Sienna

## 2020-12-24 NOTE — ED PROVIDER NOTE - PHYSICAL EXAMINATION
General: Well developed, well nourished  HEENT: Normocephalic and atraumatic, Trachea midline.   Cardiac: Normal S1 and S2. No MRG.  Pulmonary: increased WOB with tachypnea .   Abdominal: Soft, NTND  Neurologic: No focal sensory or motor deficits.  Musculoskeletal: No limited ROM.  Vascular: Warm and well perfused  Skin: Color appropriate for race.   Psychiatric: Appropriate mood and affect. No apparent risk to self or others.  Javier Maldonado M.D. PGY-3

## 2020-12-24 NOTE — H&P ADULT - ASSESSMENT
75 y.o Male with PMHx HTN, DM2 ( on Insulin ) , legally Blind , Dementia , Pneumonia , HLD , recently Diagnosed Heart failure at [facility], ARDS s/p Intubation 12/5 presents to [facility] from [facility] for Shortness of Breath and recent Positive COVID 19 infection. Pt is being admitted for Covid 19 infection. 75 y.o Male with PMHx HTN, DM2 ( on Insulin ) , legally Blind , Dementia , Pneumonia , COPD , HLD , recently Diagnosed Heart failure at [facility], ARDS s/p Intubation 12/5 presents to [facility] from [facility] for Shortness of Breath and recent Positive COVID 19 infection. Pt is being admitted for Covid 19 infection. This is a 75M with history as above who presents from [facility] with worsening hypoxia in setting of new COVID19 PNA.

## 2020-12-24 NOTE — H&P ADULT - NSHPPHYSICALEXAM_GEN_ALL_CORE
T(C): 36.8 (12-24-20 @ 19:53), Max: 36.9 (12-24-20 @ 14:34)  HR: 83 (12-24-20 @ 19:53) (60 - 86)  BP: 112/60 (12-24-20 @ 19:53) (112/60 - 135/65)  RR: 19 (12-24-20 @ 19:53) (19 - 20)  SpO2: 100% (12-24-20 @ 19:53) (96% - 100%)    Constitutional: NAD, well-developed, well-nourished  Ears, Nose, Mouth, and Throat: normal external ears and nose, normal hearing, moist oral mucosa  Eyes: normal conjunctiva, EOMI, PERRL  Neck: supple, no JVD  Respiratory: Clear to auscultation bilaterally. No wheezes, rales or rhonchi. Normal respiratory effort, speaking full sentience   Cardiovascular: RRR, no M/R/G, no edema, 2+ Peripheral Pulses  Gastrointestinal: soft, nontender, nondistended, +BS, no hernia  Skin: warm, dry, no rash  Neurologic: sensation grossly intact, CN grossly intact, non-focal exam  Musculoskeletal: no clubbing, no cyanosis, no joint swelling  Psychiatric: AOX1-2, appropriate mood, agitated and confused T(C): 36.8 (12-24-20 @ 19:53), Max: 36.9 (12-24-20 @ 14:34)  HR: 83 (12-24-20 @ 19:53) (60 - 86)  BP: 112/60 (12-24-20 @ 19:53) (112/60 - 135/65)  RR: 19 (12-24-20 @ 19:53) (19 - 20)  SpO2: 100% (12-24-20 @ 19:53) (96% - 100%)    Constitutional: NAD, well-developed, well-nourished  Ears, Nose, Mouth, and Throat: normal external ears and nose, normal hearing, moist oral mucosa  Eyes: normal conjunctiva, EOMI, PERRL  Neck: supple, no JVD  Respiratory: +b/l crackles, faint, no wheezing  Cardiovascular: RRR, no M/R/G, no edema, 2+ Peripheral Pulses  Gastrointestinal: soft, nontender, nondistended, +BS, no hernia  Skin: warm, dry, no rash  Neurologic: sensation grossly intact, CN grossly intact, non-focal exam  Musculoskeletal: no clubbing, no cyanosis, no joint swelling  Psychiatric: AOX1-3 (oriented to self, occasionally oriented to place and time also), appropriate mood, confused

## 2020-12-24 NOTE — H&P ADULT - PROBLEM SELECTOR PROBLEM 6
DVT prophylaxis HTN (hypertension) H/O heart failure Anemia COPD (chronic obstructive pulmonary disease)

## 2020-12-24 NOTE — H&P ADULT - PROBLEM SELECTOR PLAN 10
1.  Name of PCP:   2.  PCP Contacted on Admission: [ ] Y    [x] N - admitted at night    3.  PCP contacted at Discharge: [ ] Y    [ ] N    [ ] N/A  4.  Post-Discharge Appointment Date and Location:  5.  Summary of Handoff given to PCP: 1.  Name of PCP: Dr. Hinson  2.  PCP Contacted on Admission: [ ] Y    [x] N - admitted at night    3.  PCP contacted at Discharge: [ ] Y    [ ] N    [ ] N/A  4.  Post-Discharge Appointment Date and Location:  5.  Summary of Handoff given to PCP: DVT ppx: Lovenox once patients weight is in the system  PT c/s    1.  Name of PCP: Dr. Hinson  2.  PCP Contacted on Admission: [ ] Y    [x] N - admitted at night    3.  PCP contacted at Discharge: [ ] Y    [ ] N    [ ] N/A  4.  Post-Discharge Appointment Date and Location:  5.  Summary of Handoff given to PCP:

## 2020-12-25 DIAGNOSIS — L30.9 DERMATITIS, UNSPECIFIED: ICD-10-CM

## 2020-12-25 LAB
A1C WITH ESTIMATED AVERAGE GLUCOSE RESULT: 8.7 % — HIGH (ref 4–5.6)
BASOPHILS # BLD AUTO: 0 K/UL — SIGNIFICANT CHANGE UP (ref 0–0.2)
BASOPHILS NFR BLD AUTO: 0 % — SIGNIFICANT CHANGE UP (ref 0–2)
CRP SERPL-MCNC: 82 MG/L — HIGH
D DIMER BLD IA.RAPID-MCNC: 632 NG/ML DDU — HIGH
EOSINOPHIL # BLD AUTO: 0 K/UL — SIGNIFICANT CHANGE UP (ref 0–0.5)
EOSINOPHIL NFR BLD AUTO: 0 % — SIGNIFICANT CHANGE UP (ref 0–6)
ESTIMATED AVERAGE GLUCOSE: 203 MG/DL — HIGH (ref 68–114)
FERRITIN SERPL-MCNC: 1311 NG/ML — HIGH (ref 30–400)
FOLATE SERPL-MCNC: 8.1 NG/ML — SIGNIFICANT CHANGE UP (ref 3.1–17.5)
HAV IGM SER-ACNC: SIGNIFICANT CHANGE UP
HBV CORE IGM SER-ACNC: SIGNIFICANT CHANGE UP
HBV SURFACE AG SER-ACNC: SIGNIFICANT CHANGE UP
HCT VFR BLD CALC: 27.6 % — LOW (ref 39–50)
HCV AB S/CO SERPL IA: 0.14 S/CO — SIGNIFICANT CHANGE UP (ref 0–0.99)
HCV AB SERPL-IMP: SIGNIFICANT CHANGE UP
HGB BLD-MCNC: 8.7 G/DL — LOW (ref 13–17)
IANC: 4.63 K/UL — SIGNIFICANT CHANGE UP (ref 1.5–8.5)
IMM GRANULOCYTES NFR BLD AUTO: 0.2 % — SIGNIFICANT CHANGE UP (ref 0–1.5)
LACTATE SERPL-SCNC: 1.3 MMOL/L — SIGNIFICANT CHANGE UP (ref 0.5–2)
LYMPHOCYTES # BLD AUTO: 0.42 K/UL — LOW (ref 1–3.3)
LYMPHOCYTES # BLD AUTO: 7.9 % — LOW (ref 13–44)
MCHC RBC-ENTMCNC: 21.6 PG — LOW (ref 27–34)
MCHC RBC-ENTMCNC: 31.5 GM/DL — LOW (ref 32–36)
MCV RBC AUTO: 68.7 FL — LOW (ref 80–100)
MONOCYTES # BLD AUTO: 0.25 K/UL — SIGNIFICANT CHANGE UP (ref 0–0.9)
MONOCYTES NFR BLD AUTO: 4.7 % — SIGNIFICANT CHANGE UP (ref 2–14)
NEUTROPHILS # BLD AUTO: 4.63 K/UL — SIGNIFICANT CHANGE UP (ref 1.8–7.4)
NEUTROPHILS NFR BLD AUTO: 87.2 % — HIGH (ref 43–77)
NRBC # BLD: 0 /100 WBCS — SIGNIFICANT CHANGE UP
NRBC # FLD: 0 K/UL — SIGNIFICANT CHANGE UP
PLATELET # BLD AUTO: 243 K/UL — SIGNIFICANT CHANGE UP (ref 150–400)
RBC # BLD: 4.02 M/UL — LOW (ref 4.2–5.8)
RBC # BLD: 4.02 M/UL — LOW (ref 4.2–5.8)
RBC # FLD: 18.5 % — HIGH (ref 10.3–14.5)
RETICS #: 13 K/UL — LOW (ref 25–125)
RETICS/RBC NFR: 0.3 % — LOW (ref 0.5–2.5)
TROPONIN T, HIGH SENSITIVITY RESULT: 37 NG/L — SIGNIFICANT CHANGE UP
VIT B12 SERPL-MCNC: 942 PG/ML — HIGH (ref 200–900)
WBC # BLD: 5.31 K/UL — SIGNIFICANT CHANGE UP (ref 3.8–10.5)
WBC # FLD AUTO: 5.31 K/UL — SIGNIFICANT CHANGE UP (ref 3.8–10.5)

## 2020-12-25 PROCEDURE — 99223 1ST HOSP IP/OBS HIGH 75: CPT | Mod: CS

## 2020-12-25 PROCEDURE — 12345: CPT | Mod: NC

## 2020-12-25 RX ORDER — ALBUTEROL 90 UG/1
1 AEROSOL, METERED ORAL EVERY 4 HOURS
Refills: 0 | Status: DISCONTINUED | OUTPATIENT
Start: 2020-12-25 | End: 2021-01-13

## 2020-12-25 RX ORDER — SODIUM CHLORIDE 0.65 %
1 AEROSOL, SPRAY (ML) NASAL ONCE
Refills: 0 | Status: COMPLETED | OUTPATIENT
Start: 2020-12-25 | End: 2020-12-26

## 2020-12-25 RX ORDER — TIOTROPIUM BROMIDE 18 UG/1
1 CAPSULE ORAL; RESPIRATORY (INHALATION) DAILY
Refills: 0 | Status: DISCONTINUED | OUTPATIENT
Start: 2020-12-25 | End: 2021-01-13

## 2020-12-25 RX ORDER — HALOPERIDOL DECANOATE 100 MG/ML
1 INJECTION INTRAMUSCULAR ONCE
Refills: 0 | Status: COMPLETED | OUTPATIENT
Start: 2020-12-25 | End: 2020-12-25

## 2020-12-25 RX ORDER — ALBUTEROL 90 UG/1
2.5 AEROSOL, METERED ORAL EVERY 6 HOURS
Refills: 0 | Status: DISCONTINUED | OUTPATIENT
Start: 2020-12-25 | End: 2020-12-25

## 2020-12-25 RX ORDER — LANOLIN ALCOHOL/MO/W.PET/CERES
6 CREAM (GRAM) TOPICAL ONCE
Refills: 0 | Status: COMPLETED | OUTPATIENT
Start: 2020-12-25 | End: 2020-12-25

## 2020-12-25 RX ORDER — HALOPERIDOL DECANOATE 100 MG/ML
1 INJECTION INTRAMUSCULAR ONCE
Refills: 0 | Status: DISCONTINUED | OUTPATIENT
Start: 2020-12-25 | End: 2020-12-25

## 2020-12-25 RX ADMIN — Medication 1: at 18:11

## 2020-12-25 RX ADMIN — Medication 1 MILLIGRAM(S): at 11:57

## 2020-12-25 RX ADMIN — Medication 1 TABLET(S): at 05:32

## 2020-12-25 RX ADMIN — Medication 1: at 12:40

## 2020-12-25 RX ADMIN — Medication 40 MILLIGRAM(S): at 05:32

## 2020-12-25 RX ADMIN — BUDESONIDE AND FORMOTEROL FUMARATE DIHYDRATE 2 PUFF(S): 160; 4.5 AEROSOL RESPIRATORY (INHALATION) at 20:58

## 2020-12-25 RX ADMIN — ATORVASTATIN CALCIUM 80 MILLIGRAM(S): 80 TABLET, FILM COATED ORAL at 21:16

## 2020-12-25 RX ADMIN — REMDESIVIR 500 MILLIGRAM(S): 5 INJECTION INTRAVENOUS at 22:48

## 2020-12-25 RX ADMIN — Medication 6 MILLIGRAM(S): at 23:40

## 2020-12-25 RX ADMIN — Medication 1 TABLET(S): at 11:56

## 2020-12-25 RX ADMIN — Medication 25 MILLIGRAM(S): at 10:02

## 2020-12-25 RX ADMIN — SENNA PLUS 2 TABLET(S): 8.6 TABLET ORAL at 21:16

## 2020-12-25 RX ADMIN — QUETIAPINE FUMARATE 25 MILLIGRAM(S): 200 TABLET, FILM COATED ORAL at 11:58

## 2020-12-25 RX ADMIN — QUETIAPINE FUMARATE 50 MILLIGRAM(S): 200 TABLET, FILM COATED ORAL at 21:16

## 2020-12-25 RX ADMIN — BUDESONIDE AND FORMOTEROL FUMARATE DIHYDRATE 2 PUFF(S): 160; 4.5 AEROSOL RESPIRATORY (INHALATION) at 09:56

## 2020-12-25 RX ADMIN — ENOXAPARIN SODIUM 40 MILLIGRAM(S): 100 INJECTION SUBCUTANEOUS at 11:56

## 2020-12-25 RX ADMIN — Medication 1 TABLET(S): at 18:12

## 2020-12-25 RX ADMIN — TIOTROPIUM BROMIDE 1 CAPSULE(S): 18 CAPSULE ORAL; RESPIRATORY (INHALATION) at 09:55

## 2020-12-25 RX ADMIN — Medication 250 MILLIGRAM(S): at 11:56

## 2020-12-25 RX ADMIN — Medication 325 MILLIGRAM(S): at 11:58

## 2020-12-25 RX ADMIN — LOSARTAN POTASSIUM 25 MILLIGRAM(S): 100 TABLET, FILM COATED ORAL at 05:32

## 2020-12-25 RX ADMIN — POLYETHYLENE GLYCOL 3350 17 GRAM(S): 17 POWDER, FOR SOLUTION ORAL at 11:57

## 2020-12-25 RX ADMIN — Medication 81 MILLIGRAM(S): at 11:56

## 2020-12-25 RX ADMIN — HALOPERIDOL DECANOATE 1 MILLIGRAM(S): 100 INJECTION INTRAMUSCULAR at 22:46

## 2020-12-25 NOTE — PROGRESS NOTE ADULT - PROBLEM SELECTOR PLAN 3
- Pt takes 25 units of Lantus per documentations at bed time, Will give him 20units of Lantus but Pt's Daughter states that Pt and his girlfriend have not been managing medications well. Unsure if pt takes his home medications   - A1C  - ISS  - monitor FS  - diabetic diet

## 2020-12-25 NOTE — PROGRESS NOTE ADULT - PROBLEM SELECTOR PLAN 7
- H/H at preset  9.9/ 32.3  - No h/o Bleeding,  No Melena , Hemoptysis , hematochezia , epistaxis  - Will check reticulocyte count, Iron , Ferratin , TIBC, Vit B12 , Folic Acid  - Goal Hbg > 7.0  - Monitor Vitals signs for hemodynamic stability  - Continue monitoring

## 2020-12-25 NOTE — PROGRESS NOTE ADULT - ATTENDING COMMENTS
doing well on nc oxygen   c/w remdesivir doing well on nc oxygen   c/w remdesivir  mon lft doing well on nc oxygen   c/w remdesivir    called daughter Sienna at [phone] and updated  brother is Broderick sepulveda at [phone]

## 2020-12-25 NOTE — PROGRESS NOTE ADULT - PROBLEM SELECTOR PLAN 4
- continue home medications   - recently diagnosed with dementia  - Prior to admission to [facility] pt was AOX3-4, currently AOx1-3  - consider [facility] c/s AM   - monitor

## 2020-12-25 NOTE — PROGRESS NOTE ADULT - PROBLEM SELECTOR PLAN 6
- continue home inhalers (spiriva as interchange for incruse)  - continuous pulse ox  - 02 PRN   - consider pulm c/s  due to COPD and Pulmonary fibrosis (new diagnosis as per daughter)  - Pt was on 20mg Prednisone  at home, currently on dexamethasone so will hold prednisone  - c/w Decadron while in patient

## 2020-12-25 NOTE — PROGRESS NOTE ADULT - PROBLEM SELECTOR PLAN 5
- Has a stage 1 sacral decub ulcer and incontinence associated dermatitis of his groin, wound care eval ordered

## 2020-12-26 LAB
ALBUMIN SERPL ELPH-MCNC: 2.8 G/DL — LOW (ref 3.3–5)
ALBUMIN SERPL ELPH-MCNC: 3 G/DL — LOW (ref 3.3–5)
ALP SERPL-CCNC: 128 U/L — HIGH (ref 40–120)
ALP SERPL-CCNC: 135 U/L — HIGH (ref 40–120)
ALT FLD-CCNC: 68 U/L — HIGH (ref 4–41)
ALT FLD-CCNC: 71 U/L — HIGH (ref 4–41)
ANION GAP SERPL CALC-SCNC: 11 MMOL/L — SIGNIFICANT CHANGE UP (ref 7–14)
AST SERPL-CCNC: 35 U/L — SIGNIFICANT CHANGE UP (ref 4–40)
AST SERPL-CCNC: 37 U/L — SIGNIFICANT CHANGE UP (ref 4–40)
BILIRUB DIRECT SERPL-MCNC: <0.2 MG/DL — SIGNIFICANT CHANGE UP (ref 0–0.2)
BILIRUB DIRECT SERPL-MCNC: <0.2 MG/DL — SIGNIFICANT CHANGE UP (ref 0–0.2)
BILIRUB INDIRECT FLD-MCNC: >0 MG/DL — SIGNIFICANT CHANGE UP (ref 0–1)
BILIRUB INDIRECT FLD-MCNC: SIGNIFICANT CHANGE UP MG/DL (ref 0–1)
BILIRUB SERPL-MCNC: 0.2 MG/DL — SIGNIFICANT CHANGE UP (ref 0.2–1.2)
BILIRUB SERPL-MCNC: 0.2 MG/DL — SIGNIFICANT CHANGE UP (ref 0.2–1.2)
BUN SERPL-MCNC: 19 MG/DL — SIGNIFICANT CHANGE UP (ref 7–23)
CALCIUM SERPL-MCNC: 8.7 MG/DL — SIGNIFICANT CHANGE UP (ref 8.4–10.5)
CHLORIDE SERPL-SCNC: 103 MMOL/L — SIGNIFICANT CHANGE UP (ref 98–107)
CO2 SERPL-SCNC: 26 MMOL/L — SIGNIFICANT CHANGE UP (ref 22–31)
CREAT SERPL-MCNC: 0.76 MG/DL — SIGNIFICANT CHANGE UP (ref 0.5–1.3)
CREAT SERPL-MCNC: 0.76 MG/DL — SIGNIFICANT CHANGE UP (ref 0.5–1.3)
CRP SERPL-MCNC: 60.5 MG/L — HIGH
FERRITIN SERPL-MCNC: 1335 NG/ML — HIGH (ref 30–400)
GLUCOSE BLDC GLUCOMTR-MCNC: 132 MG/DL — HIGH (ref 70–99)
GLUCOSE BLDC GLUCOMTR-MCNC: 179 MG/DL — HIGH (ref 70–99)
GLUCOSE BLDC GLUCOMTR-MCNC: 195 MG/DL — HIGH (ref 70–99)
GLUCOSE SERPL-MCNC: 76 MG/DL — SIGNIFICANT CHANGE UP (ref 70–99)
HCT VFR BLD CALC: 31.2 % — LOW (ref 39–50)
HCV AB S/CO SERPL IA: 0.14 S/CO — SIGNIFICANT CHANGE UP (ref 0–0.99)
HCV AB SERPL-IMP: SIGNIFICANT CHANGE UP
HGB BLD-MCNC: 9.7 G/DL — LOW (ref 13–17)
MCHC RBC-ENTMCNC: 22 PG — LOW (ref 27–34)
MCHC RBC-ENTMCNC: 31.1 GM/DL — LOW (ref 32–36)
MCV RBC AUTO: 70.9 FL — LOW (ref 80–100)
NRBC # BLD: 0 /100 WBCS — SIGNIFICANT CHANGE UP
NRBC # FLD: 0 K/UL — SIGNIFICANT CHANGE UP
PLATELET # BLD AUTO: 338 K/UL — SIGNIFICANT CHANGE UP (ref 150–400)
POTASSIUM SERPL-MCNC: 3.8 MMOL/L — SIGNIFICANT CHANGE UP (ref 3.5–5.3)
POTASSIUM SERPL-SCNC: 3.8 MMOL/L — SIGNIFICANT CHANGE UP (ref 3.5–5.3)
PROT SERPL-MCNC: 6.5 G/DL — SIGNIFICANT CHANGE UP (ref 6–8.3)
PROT SERPL-MCNC: 6.8 G/DL — SIGNIFICANT CHANGE UP (ref 6–8.3)
RBC # BLD: 4.4 M/UL — SIGNIFICANT CHANGE UP (ref 4.2–5.8)
RBC # FLD: 18.6 % — HIGH (ref 10.3–14.5)
SODIUM SERPL-SCNC: 140 MMOL/L — SIGNIFICANT CHANGE UP (ref 135–145)
WBC # BLD: 7.42 K/UL — SIGNIFICANT CHANGE UP (ref 3.8–10.5)
WBC # FLD AUTO: 7.42 K/UL — SIGNIFICANT CHANGE UP (ref 3.8–10.5)

## 2020-12-26 RX ORDER — INSULIN GLARGINE 100 [IU]/ML
14 INJECTION, SOLUTION SUBCUTANEOUS ONCE
Refills: 0 | Status: COMPLETED | OUTPATIENT
Start: 2020-12-26 | End: 2020-12-26

## 2020-12-26 RX ORDER — INSULIN GLARGINE 100 [IU]/ML
16 INJECTION, SOLUTION SUBCUTANEOUS ONCE
Refills: 0 | Status: DISCONTINUED | OUTPATIENT
Start: 2020-12-26 | End: 2020-12-26

## 2020-12-26 RX ORDER — INSULIN GLARGINE 100 [IU]/ML
10 INJECTION, SOLUTION SUBCUTANEOUS AT BEDTIME
Refills: 0 | Status: DISCONTINUED | OUTPATIENT
Start: 2020-12-26 | End: 2020-12-28

## 2020-12-26 RX ADMIN — Medication 1: at 18:28

## 2020-12-26 RX ADMIN — Medication 1 TABLET(S): at 05:33

## 2020-12-26 RX ADMIN — REMDESIVIR 500 MILLIGRAM(S): 5 INJECTION INTRAVENOUS at 22:37

## 2020-12-26 RX ADMIN — ATORVASTATIN CALCIUM 80 MILLIGRAM(S): 80 TABLET, FILM COATED ORAL at 22:38

## 2020-12-26 RX ADMIN — LOSARTAN POTASSIUM 25 MILLIGRAM(S): 100 TABLET, FILM COATED ORAL at 05:33

## 2020-12-26 RX ADMIN — Medication 1 MILLIGRAM(S): at 12:12

## 2020-12-26 RX ADMIN — INSULIN GLARGINE 14 UNIT(S): 100 INJECTION, SOLUTION SUBCUTANEOUS at 00:53

## 2020-12-26 RX ADMIN — Medication 1 TABLET(S): at 12:12

## 2020-12-26 RX ADMIN — Medication 325 MILLIGRAM(S): at 12:13

## 2020-12-26 RX ADMIN — TIOTROPIUM BROMIDE 1 CAPSULE(S): 18 CAPSULE ORAL; RESPIRATORY (INHALATION) at 09:11

## 2020-12-26 RX ADMIN — Medication 25 MILLIGRAM(S): at 05:33

## 2020-12-26 RX ADMIN — Medication 40 MILLIGRAM(S): at 05:33

## 2020-12-26 RX ADMIN — Medication 1 TABLET(S): at 18:31

## 2020-12-26 RX ADMIN — QUETIAPINE FUMARATE 50 MILLIGRAM(S): 200 TABLET, FILM COATED ORAL at 22:36

## 2020-12-26 RX ADMIN — INSULIN GLARGINE 10 UNIT(S): 100 INJECTION, SOLUTION SUBCUTANEOUS at 22:54

## 2020-12-26 RX ADMIN — BUDESONIDE AND FORMOTEROL FUMARATE DIHYDRATE 2 PUFF(S): 160; 4.5 AEROSOL RESPIRATORY (INHALATION) at 22:36

## 2020-12-26 RX ADMIN — BUDESONIDE AND FORMOTEROL FUMARATE DIHYDRATE 2 PUFF(S): 160; 4.5 AEROSOL RESPIRATORY (INHALATION) at 09:11

## 2020-12-26 RX ADMIN — Medication 250 MILLIGRAM(S): at 12:12

## 2020-12-26 RX ADMIN — POLYETHYLENE GLYCOL 3350 17 GRAM(S): 17 POWDER, FOR SOLUTION ORAL at 18:31

## 2020-12-26 RX ADMIN — ENOXAPARIN SODIUM 40 MILLIGRAM(S): 100 INJECTION SUBCUTANEOUS at 12:12

## 2020-12-26 RX ADMIN — Medication 1 SPRAY(S): at 06:29

## 2020-12-26 RX ADMIN — QUETIAPINE FUMARATE 25 MILLIGRAM(S): 200 TABLET, FILM COATED ORAL at 12:13

## 2020-12-26 RX ADMIN — Medication 81 MILLIGRAM(S): at 12:13

## 2020-12-26 RX ADMIN — Medication 6 MILLIGRAM(S): at 05:33

## 2020-12-26 RX ADMIN — SENNA PLUS 2 TABLET(S): 8.6 TABLET ORAL at 22:36

## 2020-12-26 NOTE — PHYSICAL THERAPY INITIAL EVALUATION ADULT - PERTINENT HX OF CURRENT PROBLEM, REHAB EVAL
Patient is 75 year old male admitted with history of DM2, HTN, legally blind, dementia, COPD, heart failure, presents with shortness of breath and hypoxia.

## 2020-12-26 NOTE — PROGRESS NOTE ADULT - PROBLEM SELECTOR PLAN 3
AM glu running low  will reduce Lantus to 10 units qhs  HbA1C 8.7 consistent with uncontrolled DM Type 2   continue finger sticks with short acting insulin sliding scale  watch for hypoglycemia

## 2020-12-26 NOTE — PROGRESS NOTE ADULT - PROBLEM SELECTOR PLAN 6
no wheeze  will continue to monitor  continue dexamethasone   pulmonary help requested  likely no intervention required at this time

## 2020-12-27 DIAGNOSIS — U07.1 COVID-19: ICD-10-CM

## 2020-12-27 LAB
ALBUMIN SERPL ELPH-MCNC: 2.5 G/DL — LOW (ref 3.3–5)
ALP SERPL-CCNC: 117 U/L — SIGNIFICANT CHANGE UP (ref 40–120)
ALT FLD-CCNC: 49 U/L — HIGH (ref 4–41)
ANION GAP SERPL CALC-SCNC: 12 MMOL/L — SIGNIFICANT CHANGE UP (ref 7–14)
AST SERPL-CCNC: 23 U/L — SIGNIFICANT CHANGE UP (ref 4–40)
BILIRUB DIRECT SERPL-MCNC: <0.2 MG/DL — SIGNIFICANT CHANGE UP (ref 0–0.2)
BILIRUB INDIRECT FLD-MCNC: >0.1 MG/DL — SIGNIFICANT CHANGE UP (ref 0–1)
BILIRUB SERPL-MCNC: 0.3 MG/DL — SIGNIFICANT CHANGE UP (ref 0.2–1.2)
BUN SERPL-MCNC: 19 MG/DL — SIGNIFICANT CHANGE UP (ref 7–23)
CALCIUM SERPL-MCNC: 8.4 MG/DL — SIGNIFICANT CHANGE UP (ref 8.4–10.5)
CHLORIDE SERPL-SCNC: 100 MMOL/L — SIGNIFICANT CHANGE UP (ref 98–107)
CO2 SERPL-SCNC: 25 MMOL/L — SIGNIFICANT CHANGE UP (ref 22–31)
CREAT SERPL-MCNC: 0.7 MG/DL — SIGNIFICANT CHANGE UP (ref 0.5–1.3)
CREAT SERPL-MCNC: 0.72 MG/DL — SIGNIFICANT CHANGE UP (ref 0.5–1.3)
CRP SERPL-MCNC: 60.9 MG/L — HIGH
FERRITIN SERPL-MCNC: 1088 NG/ML — HIGH (ref 30–400)
FERRITIN SERPL-MCNC: 1115 NG/ML — HIGH (ref 30–400)
GLUCOSE BLDC GLUCOMTR-MCNC: 175 MG/DL — HIGH (ref 70–99)
GLUCOSE BLDC GLUCOMTR-MCNC: 234 MG/DL — HIGH (ref 70–99)
GLUCOSE BLDC GLUCOMTR-MCNC: 245 MG/DL — HIGH (ref 70–99)
GLUCOSE BLDC GLUCOMTR-MCNC: 269 MG/DL — HIGH (ref 70–99)
GLUCOSE SERPL-MCNC: 103 MG/DL — HIGH (ref 70–99)
HCT VFR BLD CALC: 29.9 % — LOW (ref 39–50)
HGB BLD-MCNC: 9.6 G/DL — LOW (ref 13–17)
IRON SATN MFR SERPL: 17 % — SIGNIFICANT CHANGE UP (ref 14–50)
IRON SATN MFR SERPL: 18 UG/DL — LOW (ref 45–165)
MCHC RBC-ENTMCNC: 22 PG — LOW (ref 27–34)
MCHC RBC-ENTMCNC: 32.1 GM/DL — SIGNIFICANT CHANGE UP (ref 32–36)
MCV RBC AUTO: 68.6 FL — LOW (ref 80–100)
NRBC # BLD: 0 /100 WBCS — SIGNIFICANT CHANGE UP
NRBC # FLD: 0 K/UL — SIGNIFICANT CHANGE UP
PLATELET # BLD AUTO: 303 K/UL — SIGNIFICANT CHANGE UP (ref 150–400)
POTASSIUM SERPL-MCNC: 3.6 MMOL/L — SIGNIFICANT CHANGE UP (ref 3.5–5.3)
POTASSIUM SERPL-SCNC: 3.6 MMOL/L — SIGNIFICANT CHANGE UP (ref 3.5–5.3)
PROT SERPL-MCNC: 6 G/DL — SIGNIFICANT CHANGE UP (ref 6–8.3)
RBC # BLD: 4.36 M/UL — SIGNIFICANT CHANGE UP (ref 4.2–5.8)
RBC # FLD: 18.6 % — HIGH (ref 10.3–14.5)
SODIUM SERPL-SCNC: 137 MMOL/L — SIGNIFICANT CHANGE UP (ref 135–145)
TIBC SERPL-MCNC: 109 UG/DL — LOW (ref 220–430)
UIBC SERPL-MCNC: 91 UG/DL — LOW (ref 110–370)
WBC # BLD: 5.34 K/UL — SIGNIFICANT CHANGE UP (ref 3.8–10.5)
WBC # FLD AUTO: 5.34 K/UL — SIGNIFICANT CHANGE UP (ref 3.8–10.5)

## 2020-12-27 RX ADMIN — Medication 250 MILLIGRAM(S): at 12:43

## 2020-12-27 RX ADMIN — Medication 1 TABLET(S): at 12:44

## 2020-12-27 RX ADMIN — BUDESONIDE AND FORMOTEROL FUMARATE DIHYDRATE 2 PUFF(S): 160; 4.5 AEROSOL RESPIRATORY (INHALATION) at 09:13

## 2020-12-27 RX ADMIN — Medication 40 MILLIGRAM(S): at 06:35

## 2020-12-27 RX ADMIN — Medication 1 MILLIGRAM(S): at 12:43

## 2020-12-27 RX ADMIN — Medication 25 MILLIGRAM(S): at 06:35

## 2020-12-27 RX ADMIN — Medication 3: at 13:11

## 2020-12-27 RX ADMIN — Medication 1 TABLET(S): at 06:35

## 2020-12-27 RX ADMIN — Medication 81 MILLIGRAM(S): at 12:44

## 2020-12-27 RX ADMIN — Medication 1 TABLET(S): at 18:22

## 2020-12-27 RX ADMIN — REMDESIVIR 500 MILLIGRAM(S): 5 INJECTION INTRAVENOUS at 21:12

## 2020-12-27 RX ADMIN — ENOXAPARIN SODIUM 40 MILLIGRAM(S): 100 INJECTION SUBCUTANEOUS at 12:44

## 2020-12-27 RX ADMIN — BUDESONIDE AND FORMOTEROL FUMARATE DIHYDRATE 2 PUFF(S): 160; 4.5 AEROSOL RESPIRATORY (INHALATION) at 21:12

## 2020-12-27 RX ADMIN — Medication 2: at 18:22

## 2020-12-27 RX ADMIN — Medication 1: at 09:13

## 2020-12-27 RX ADMIN — Medication 6 MILLIGRAM(S): at 06:35

## 2020-12-27 RX ADMIN — QUETIAPINE FUMARATE 25 MILLIGRAM(S): 200 TABLET, FILM COATED ORAL at 12:43

## 2020-12-27 RX ADMIN — ATORVASTATIN CALCIUM 80 MILLIGRAM(S): 80 TABLET, FILM COATED ORAL at 21:14

## 2020-12-27 RX ADMIN — Medication 325 MILLIGRAM(S): at 12:44

## 2020-12-27 RX ADMIN — SENNA PLUS 2 TABLET(S): 8.6 TABLET ORAL at 21:13

## 2020-12-27 RX ADMIN — INSULIN GLARGINE 10 UNIT(S): 100 INJECTION, SOLUTION SUBCUTANEOUS at 21:28

## 2020-12-27 RX ADMIN — QUETIAPINE FUMARATE 50 MILLIGRAM(S): 200 TABLET, FILM COATED ORAL at 21:16

## 2020-12-27 RX ADMIN — LOSARTAN POTASSIUM 25 MILLIGRAM(S): 100 TABLET, FILM COATED ORAL at 06:35

## 2020-12-27 RX ADMIN — TIOTROPIUM BROMIDE 1 CAPSULE(S): 18 CAPSULE ORAL; RESPIRATORY (INHALATION) at 09:13

## 2020-12-27 NOTE — CONSULT NOTE ADULT - PROBLEM SELECTOR RECOMMENDATION 2
Likely Type 2 demand mediated ischemia   remains without chest pain or shortness of breath at present time  ASA

## 2020-12-27 NOTE — PROGRESS NOTE ADULT - PROBLEM SELECTOR PLAN 4
resolved   admission encephalopathy likely secondary to COVID 19 infection   likely at baseline now  new diagnosis of dementia

## 2020-12-27 NOTE — SWALLOW BEDSIDE ASSESSMENT ADULT - COMMENTS
Per charting, the patient is a Per charting, the patient is a "75M with history as above who presents from [facility] with worsening hypoxia in setting of new COVID19 PNA. - multifocal pna."    CT CHEST 12/24: "Multifocal pneumonia. This all may represent: Pneumonia, superimposed other etiology not excluded."    Consult received and chart reviewed. Patient seen this morning for an assessment of swallow function, at which time he was awake/alert and orientedX2. The patient was able to follow directives, answer yes/no questions and communicate basic wants/needs.

## 2020-12-27 NOTE — CONSULT NOTE ADULT - PROBLEM SELECTOR RECOMMENDATION 9
Remdesivir and Decadron   Supplemental oxygen   PUlm and ID consulted  Lovenox daily   trend inflammatory markers

## 2020-12-27 NOTE — CONSULT NOTE ADULT - SUBJECTIVE AND OBJECTIVE BOX
Patient is a 75y old  Male who presents with a chief complaint of Shortness of Breath (26 Dec 2020 09:56)      HPI:  75 y.o Male with PMHx HTN, DM2 (on Insulin), legally Blind, Dementia (AAO status ranges from AAO x1-3), COPD (on prednisone), HLD, recently Diagnosed Heart failure at [place], and recent admission for ARDS s/p Intubation 12/5 presents to [facility] from [facility] for Shortness of Breath and recent Positive COVID 19 infection. Pt is confused, AOX1-2 at present, unable to obtain. Spoke with Daughter (Sienna), who states that 6 weeks ago pt c/o of SOB, pt was admitted to [facility] for SOB. There, pt was intubated 2/2 ARDS w/ Hypoxia, at the time of the hospitalization, Pt was swabbed x2 both times negative for COVID19 and had negative COVID19 antibodies. Discharged from [facility] to [facility]. At [facility] was tested positive for COVID19 (12/22). Was started on azithromycin but on 12/24 he was noted to be desaturating to 90% on 2L NC with worsening mental status and activity and was therefore sent to [facility]. Pt presents to [facility] with increasingly altered, tachypneic, and hypoxic Spo2 89-90 on RA. Unable to obtain review of systems 2/2 currently mental state of the patient.      In ED Pt satting on 100% on 3L NC, VSS , EKG: NSR@89 w/ PAC Qtc 452. His lab work showed anemia (chronic), neutrophilia without leukocytosis, elevated troponin with downtrend, transaminitis, and elevated inflammatory markers. His lactate was 2.3 and his RVP was positive for COVID19. He had a CT Chest that seems to show bilateral basilar peripheral opacities. He was given NS 500cc x1 and decadron 6mg IVP x1. He was admitted to medicine on telemetry.  (24 Dec 2020 20:24)    he is still confused : he is only on 1 L of oxygen : denies any SOB   the daughter ph call went to voice mail     spoke to her domestic partner:  He was a heavy smoker: He was told he has copd:   he was dced on oxygen : he was not  covid positive in [facility]: [facility]       ?FOLLOWING PRESENT  [ x] Hx of PE/DVT, [ ]x Hx COPD, [ x] Hx of Asthma, [ y] Hx of Hospitalization, [ ]x  Hx of BiPAP/CPAP use, [x ] Hx of SAUNDRA    Allergies    No Known Allergies    Intolerances        PAST MEDICAL & SURGICAL HISTORY:  HLD (hyperlipidemia)    HTN (hypertension)    H/O heart failure    Pneumonia    History of acute respiratory distress syndrome (ARDS)    No significant past surgical history        FAMILY HISTORY:  FH: HTN (hypertension)  mother        Social History: [ former smoker: heavy  ] TOBACCO                  [ unk  ] ETOH                                 [  unk ] IVDA/DRUGS    REVIEW OF SYSTEMS    david btain from pt   General:	    Skin/Breast:  	  Ophthalmologic:  	  ENMT:	    Respiratory and Thorax:  	  Cardiovascular:	    Gastrointestinal:	    Genitourinary:	    Musculoskeletal:	    Neurological:	    Psychiatric:	    Hematology/Lymphatics:	    Endocrine:	    Allergic/Immunologic:	    MEDICATIONS  (STANDING):  ascorbic acid 250 milliGRAM(s) Oral daily  aspirin  chewable 81 milliGRAM(s) Oral daily  atorvastatin 80 milliGRAM(s) Oral at bedtime  budesonide 160 MICROgram(s)/formoterol 4.5 MICROgram(s) Inhaler 2 Puff(s) Inhalation two times a day  calcium carbonate 1250 mG  + Vitamin D (OsCal 500 + D) 1 Tablet(s) Oral two times a day  dexAMETHasone  Injectable 6 milliGRAM(s) IV Push daily  dextrose 40% Gel 15 Gram(s) Oral once  dextrose 5%. 1000 milliLiter(s) (50 mL/Hr) IV Continuous <Continuous>  dextrose 5%. 1000 milliLiter(s) (100 mL/Hr) IV Continuous <Continuous>  dextrose 50% Injectable 25 Gram(s) IV Push once  dextrose 50% Injectable 12.5 Gram(s) IV Push once  dextrose 50% Injectable 25 Gram(s) IV Push once  enoxaparin Injectable 40 milliGRAM(s) SubCutaneous daily  ferrous    sulfate 325 milliGRAM(s) Oral daily  folic acid 1 milliGRAM(s) Oral daily  furosemide    Tablet 40 milliGRAM(s) Oral daily  glucagon  Injectable 1 milliGRAM(s) IntraMuscular once  insulin glargine Injectable (LANTUS) 10 Unit(s) SubCutaneous at bedtime  insulin lispro (ADMELOG) corrective regimen sliding scale   SubCutaneous three times a day before meals  insulin lispro (ADMELOG) corrective regimen sliding scale   SubCutaneous at bedtime  losartan 25 milliGRAM(s) Oral daily  metoprolol succinate ER 25 milliGRAM(s) Oral daily  multivitamin 1 Tablet(s) Oral daily  polyethylene glycol 3350 17 Gram(s) Oral daily  QUEtiapine 25 milliGRAM(s) Oral daily  QUEtiapine 50 milliGRAM(s) Oral at bedtime  remdesivir  IVPB 100 milliGRAM(s) IV Intermittent every 24 hours  remdesivir  IVPB   IV Intermittent   senna 2 Tablet(s) Oral at bedtime  tiotropium 18 MICROgram(s) Capsule 1 Capsule(s) Inhalation daily    MEDICATIONS  (PRN):  acetaminophen   Tablet .. 650 milliGRAM(s) Oral every 4 hours PRN Temp greater or equal to 38.5C (101.3F)  ALBUTerol    90 MICROgram(s) HFA Inhaler 1 Puff(s) Inhalation every 4 hours PRN Shortness of Breath and/or Wheezing  melatonin 3 milliGRAM(s) Oral at bedtime PRN Insomnia       Vital Signs Last 24 Hrs  T(C): 37.1 (26 Dec 2020 05:32), Max: 37.6 (25 Dec 2020 23:46)  T(F): 98.8 (26 Dec 2020 05:32), Max: 99.7 (25 Dec 2020 23:46)  HR: 97 (26 Dec 2020 05:32) (80 - 97)  BP: 135/65 (26 Dec 2020 05:32) (127/60 - 135/65)  BP(mean): --  RR: 18 (26 Dec 2020 05:32) (18 - 18)  SpO2: 94% (26 Dec 2020 05:32) (94% - 100%)        I&O's Summary    25 Dec 2020 07:01  -  26 Dec 2020 07:00  --------------------------------------------------------  IN: 460 mL / OUT: 0 mL / NET: 460 mL        Physical Exam:   GENERAL: NAD, well-groomed, well-developed  HEENT: TRISTON/   Atraumatic, Normocephalic  ENMT: No tonsillar erythema, exudates, or enlargement; Moist mucous membranes, Good dentition, No lesions  NECK: Supple, No JVD, Normal thyroid  CHEST/LUNG: no wheezing:   CVS: Regular rate and rhythm; No murmurs, rubs, or gallops  GI: : Soft, Nontender, Nondistended; Bowel sounds present  NERVOUS SYSTEM:  Alert & Oriented X1  EXTREMITIES:  2+ Peripheral Pulses, No clubbing, cyanosis, or edema  LYMPH: No lymphadenopathy noted  SKIN: No rashes or lesions  ENDOCRINOLOGY: No Thyromegaly  PSYCH: confused  Labs:  3.1<54<4>><24<<7.345>>3.1<<3><<4><<5<<<249>>  CARDIAC MARKERS ( 24 Dec 2020 19:47 )  x     / x     / 88 U/L / x     / 1.5 ng/mL  CARDIAC MARKERS ( 24 Dec 2020 16:51 )  x     / x     / 104 U/L / x     / 1.7 ng/mL                            9.7    7.42  )-----------( 338      ( 26 Dec 2020 06:58 )             31.2                         8.7    5.31  )-----------( 243      ( 25 Dec 2020 06:51 )             27.6                         9.9    8.69  )-----------( 263      ( 24 Dec 2020 16:50 )             32.3     12-26    140  |  103  |  19  ----------------------------<  76  3.8   |  26  |  0.76  12-24    136  |  96<L>  |  24<H>  ----------------------------<  152<H>  4.6   |  25  |  0.92    Ca    8.7      26 Dec 2020 06:58  Ca    9.0      24 Dec 2020 16:50    TPro  6.8  /  Alb  3.0<L>  /  TBili  0.2  /  DBili  <0.2  /  AST  37  /  ALT  71<H>  /  AlkPhos  135<H>  12-26  TPro  7.2  /  Alb  3.5  /  TBili  0.3  /  DBili  x   /  AST  76<H>  /  ALT  115<H>  /  AlkPhos  155<H>  12-24    CAPILLARY BLOOD GLUCOSE      POCT Blood Glucose.: 132 mg/dL (26 Dec 2020 12:06)  POCT Blood Glucose.: 93 mg/dL (26 Dec 2020 09:02)  POCT Blood Glucose.: 104 mg/dL (26 Dec 2020 00:09)  POCT Blood Glucose.: 98 mg/dL (25 Dec 2020 23:52)  POCT Blood Glucose.: 83 mg/dL (25 Dec 2020 23:35)  POCT Blood Glucose.: 79 mg/dL (25 Dec 2020 23:11)  POCT Blood Glucose.: 191 mg/dL (25 Dec 2020 18:07)    LIVER FUNCTIONS - ( 26 Dec 2020 06:58 )  Alb: 3.0 g/dL / Pro: 6.8 g/dL / ALK PHOS: 135 U/L / ALT: 71 U/L / AST: 37 U/L / GGT: x               D DImerLactate, Blood: 1.3 mmol/L (12-25 @ 06:51)    Procalcitonin, Serum: 0.10 ng/mL (12-24 @ 16:50)  D-Dimer Assay, Quantitative: 632 ng/mL DDU (12-25 @ 06:51)  D-Dimer Assay, Quantitative: 550 ng/mL DDU (12-24 @ 16:50)  Serum Pro-Brain Natriuretic Peptide: 274 pg/mL (12-24 @ 19:47)  Serum Pro-Brain Natriuretic Peptide: 285 pg/mL (12-24 @ 16:50)      Studies  Chest X-RAY  CT SCAN Chest   CT Abdomen  Venous Dopplers: LE:   Others    ra< from: CT Chest No Cont (12.24.20 @ 22:12) >  OCEDURE DATE:  Dec 24 2020         INTERPRETATION:  EXAMINATION: CT CHEST    CLINICAL INDICATION: Dyspnea.    TECHNIQUE: Noncontrast CT of the chest was obtained.    COMPARISON: None.    FINDINGS:    AIRWAYS AND LUNGS: The central tracheobronchial tree is patent.  Emphysema is present. The lungs are otherwise clear. Patchy bilateral lung opacities with predominantly lower lobe predominance    MEDIASTINUM AND PLEURA: There are no enlarged mediastinal, hilar or axillary lymph nodes. The visualized portion of the thyroid gland is unremarkable. There is no pleural effusion. There is no pneumothorax.    HEART AND VESSELS: There is cardiomegaly.  There are atherosclerotic calcifications of the aorta and coronary arteries. There is no pericardial effusion.    UPPER ABDOMEN: Images of the upper abdomen demonstrate no abnormality.    BONES AND SOFT TISSUES: The bones are unremarkable.  The soft tissues are unremarkable.    TUBES/LINES: None.    IMPRESSION:  Multifocal pneumonia. This all may represent: Pneumonia, superimposed other etiology not excluded.              ALISSA SMITH MD; Attending Radiologist  This document has been electronically signed. Dec 25 2020  8:21AM    < end of copied text >                
CHIEF COMPLAINT:    HISTORY OF PRESENT ILLNESS:  75 y.o Male with PMHx HTN, DM2 (on Insulin), legally Blind, Dementia (AAO status ranges from AAO x1-3), COPD (on prednisone), HLD, recently Diagnosed Heart failure at [place], and recent admission for ARDS s/p Intubation 12/5 presents to [facility] from [facility] for Shortness of Breath and recent Positive COVID 19 infection. Pt is confused, AOX1-2 at present, unable to obtain. Spoke with Daughter (Sienna), who states that 6 weeks ago pt c/o of SOB, pt was admitted to [facility] for SOB. There, pt was intubated 2/2 ARDS w/ Hypoxia, at the time of the hospitalization, Pt was swabbed x2 both times negative for COVID19 and had negative COVID19 antibodies. Discharged from [facility] to [facility]. At [facility] was tested positive for COVID19 (12/22). Was started on azithromycin but on 12/24 he was noted to be desaturating to 90% on 2L NC with worsening mental status and activity and was therefore sent to [facility]. Pt presents to [facility] with increasingly altered, tachypneic, and hypoxic Spo2 89-90 on RA. Unable to obtain review of systems 2/2 currently mental state of the patient.      In ED Pt satting on 100% on 3L NC, VSS , EKG: NSR@89 w/ PAC Qtc 452. His lab work showed anemia (chronic), neutrophilia without leukocytosis, elevated troponin with downtrend, transaminitis, and elevated inflammatory markers. His lactate was 2.3 and his RVP was positive for COVID19. He had a CT Chest that seems to show bilateral basilar peripheral opacities. He was given NS 500cc x1 and decadron 6mg IVP x1. He was admitted to medicine on telemetry.  (24 Dec 2020 20:24)    he is still confused : he is only on 1 L of oxygen : denies any SOB    PAST MEDICAL & SURGICAL HISTORY:  HLD (hyperlipidemia)    HTN (hypertension)    H/O heart failure    Pneumonia    History of acute respiratory distress syndrome (ARDS)    No significant past surgical history            MEDICATIONS:  aspirin  chewable 81 milliGRAM(s) Oral daily  enoxaparin Injectable 40 milliGRAM(s) SubCutaneous daily  furosemide    Tablet 40 milliGRAM(s) Oral daily  losartan 25 milliGRAM(s) Oral daily  metoprolol succinate ER 25 milliGRAM(s) Oral daily    remdesivir  IVPB 100 milliGRAM(s) IV Intermittent every 24 hours  remdesivir  IVPB   IV Intermittent     ALBUTerol    90 MICROgram(s) HFA Inhaler 1 Puff(s) Inhalation every 4 hours PRN  budesonide 160 MICROgram(s)/formoterol 4.5 MICROgram(s) Inhaler 2 Puff(s) Inhalation two times a day  tiotropium 18 MICROgram(s) Capsule 1 Capsule(s) Inhalation daily    acetaminophen   Tablet .. 650 milliGRAM(s) Oral every 4 hours PRN  melatonin 3 milliGRAM(s) Oral at bedtime PRN  QUEtiapine 25 milliGRAM(s) Oral daily  QUEtiapine 50 milliGRAM(s) Oral at bedtime    polyethylene glycol 3350 17 Gram(s) Oral daily  senna 2 Tablet(s) Oral at bedtime    atorvastatin 80 milliGRAM(s) Oral at bedtime  dexAMETHasone  Injectable 6 milliGRAM(s) IV Push daily  dextrose 40% Gel 15 Gram(s) Oral once  dextrose 50% Injectable 25 Gram(s) IV Push once  dextrose 50% Injectable 12.5 Gram(s) IV Push once  dextrose 50% Injectable 25 Gram(s) IV Push once  glucagon  Injectable 1 milliGRAM(s) IntraMuscular once  insulin glargine Injectable (LANTUS) 10 Unit(s) SubCutaneous at bedtime  insulin lispro (ADMELOG) corrective regimen sliding scale   SubCutaneous three times a day before meals  insulin lispro (ADMELOG) corrective regimen sliding scale   SubCutaneous at bedtime    ascorbic acid 250 milliGRAM(s) Oral daily  calcium carbonate 1250 mG  + Vitamin D (OsCal 500 + D) 1 Tablet(s) Oral two times a day  dextrose 5%. 1000 milliLiter(s) IV Continuous <Continuous>  dextrose 5%. 1000 milliLiter(s) IV Continuous <Continuous>  ferrous    sulfate 325 milliGRAM(s) Oral daily  folic acid 1 milliGRAM(s) Oral daily  multivitamin 1 Tablet(s) Oral daily      FAMILY HISTORY:  FH: HTN (hypertension)  mother        SOCIAL HISTORY:    [ ] Non-smoker  [ ] Smoker  [ ] Alcohol    Allergies    No Known Allergies    Intolerances    	    REVIEW OF SYSTEMS:  CONSTITUTIONAL: + fever, weight loss, or fatigue  EYES: No eye pain, visual disturbances, or discharge  ENMT:  No difficulty hearing, tinnitus, vertigo; No sinus or throat pain  NECK: No pain or stiffness  RESPIRATORY: + cough, wheezing, chills or hemoptysis; +Shortness of Breath  CARDIOVASCULAR: No chest pain, palpitations, passing out, dizziness, or leg swelling  GASTROINTESTINAL: No abdominal or epigastric pain. No nausea, vomiting, or hematemesis; No diarrhea or constipation. No melena or hematochezia.  GENITOURINARY: No dysuria, frequency, hematuria, or incontinence  NEUROLOGICAL: No headaches, memory loss, loss of strength, numbness, or tremors  SKIN: No itching, burning, rashes, or lesions   LYMPH Nodes: No enlarged glands  ENDOCRINE: No heat or cold intolerance; No hair loss  MUSCULOSKELETAL: No joint pain or swelling; No muscle, back, or extremity pain  PSYCHIATRIC: No depression, anxiety, mood swings, or difficulty sleeping  HEME/LYMPH: No easy bruising, or bleeding gums  ALLERY AND IMMUNOLOGIC: No hives or eczema	    [ ] All others negative	  [ ] Unable to obtain    PHYSICAL EXAM:  T(C): 36.3 (12-27-20 @ 10:02), Max: 37.1 (12-26-20 @ 14:36)  HR: 82 (12-27-20 @ 10:02) (68 - 98)  BP: 107/66 (12-27-20 @ 10:02) (107/66 - 128/67)  RR: 20 (12-27-20 @ 10:02) (18 - 20)  SpO2: 97% (12-27-20 @ 10:02) (97% - 97%)  Wt(kg): --  I&O's Summary      Appearance: NAD	  HEENT:  Dry oral mucosa, PERRL, EOMI	  Lymphatic: No lymphadenopathy  Cardiovascular: Normal S1 S2, No JVD, No murmurs, No edema  Respiratory: Decreased bs   Psychiatry: A & O x 3, Mood & affect appropriate  Gastrointestinal:  Soft, Non-tender, + BS	  Skin: No rashes, No ecchymoses, No cyanosis	  Neurologic: Non-focal  Extremities: Normal range of motion, No clubbing, cyanosis or edema  Vascular: Peripheral pulses palpable 2+ bilaterally    TELEMETRY: 	    ECG: NSR non specific stt changes   RADIOLOGY:  < from: Xray Chest 1 View- PORTABLE-Urgent (12.24.20 @ 17:14) >    EXAM:  XR CHEST PORTABLE URGENT 1V        PROCEDURE DATE:  Dec 24 2020         INTERPRETATION:  CLINICAL INFORMATION: Shortness of breath, cough, fever.    TECHNIQUE: Frontal radiograph of the chest.    COMPARISON: No comparison available.    FINDINGS:  Faint bibasilar and left midlung hazy opacity. No pleural effusion or pneumothorax.  The heart is normal in size.  The visualized osseous structures are unremarkable.    IMPRESSION:  Faint bibasilar and left midlung hazy opacity, which is nonspecific and can be seen in multifocal infection as well as pulmonary edema.          < end of copied text >  < from: CT Chest No Cont (12.24.20 @ 22:12) >    EXAM:  CT CHEST        PROCEDURE DATE:  Dec 24 2020         INTERPRETATION:  EXAMINATION: CT CHEST    CLINICAL INDICATION: Dyspnea.    TECHNIQUE: Noncontrast CT of the chest was obtained.    COMPARISON: None.    FINDINGS:    AIRWAYS AND LUNGS: The central tracheobronchial tree is patent.  Emphysema is present. The lungs are otherwise clear. Patchy bilateral lung opacities with predominantly lower lobe predominance    MEDIASTINUM AND PLEURA: There are no enlarged mediastinal, hilar or axillary lymph nodes. The visualized portion of the thyroid gland is unremarkable. There is no pleural effusion. There is no pneumothorax.    HEART AND VESSELS: There is cardiomegaly.  There are atherosclerotic calcifications of the aorta and coronary arteries. There is no pericardial effusion.    UPPER ABDOMEN: Images of the upper abdomen demonstrate no abnormality.    BONES AND SOFT TISSUES: The bones are unremarkable.  The soft tissues are unremarkable.    TUBES/LINES: None.    IMPRESSION:  Multifocal pneumonia. This all may represent: Pneumonia, superimposed other etiology not excluded.        < end of copied text >    OTHER: 	  	  LABS:	 	    CARDIAC MARKERS:      Troponin T, High Sensitivity Result: 37: Rapid changes upward or downward in high-sensitivity troponin levels Troponin T, High Sensitivity Result: 46: Rapid changes upward or downward in high-sensitivity troponin levels                             9.6    5.34  )-----------( 303      ( 27 Dec 2020 07:53 )             29.9     12-27    137  |  100  |  19  ----------------------------<  103<H>  3.6   |  25  |  0.70    Ca    8.4      27 Dec 2020 07:53    TPro  6.0  /  Alb  2.5<L>  /  TBili  0.3  /  DBili  <0.2  /  AST  23  /  ALT  49<H>  /  AlkPhos  117  12-27    proBNP:   Lipid Profile:   HgA1c:   TSH:

## 2020-12-27 NOTE — SWALLOW BEDSIDE ASSESSMENT ADULT - SWALLOW EVAL: DIAGNOSIS
1. The patient demonstrated functional oral management of puree, nectar thick liquid and thin liquid textures marked by adequate bolus collection, transfer and posterior transport. 2. Mild oral dysphagia for mechanical soft marked by prolonged manipulation/increased mastication time resulting in delayed bolus collection, transfer and posterior transport. Mild lingual residue noted subsequent to deglutition which cleared with a liquid wash. Note: patient unable to bite into regular solid trial. 3. Pharyngeal skills for all consistencies trialed characterized by + hyolaryngeal elevation and initiation of pharyngeal swallow trigger upon digital palpation without evidence of airway penetration. 4. Recommend Mechanical soft with thin liquids + aspiration precautions.

## 2020-12-27 NOTE — CONSULT NOTE ADULT - ASSESSMENT
75M with history as above who presents from [facility] with worsening hypoxia in setting of new COVID19 PNA.
This is a 75M with history as above who presents from [facility] with worsening hypoxia in setting of new COVID19 PNA.     Covid pneumonia:  COPD:  DM  Anemia:   HTN :   Dermatitis:       12/26:    Covid pneumonia: on rem and dexa:requiring only 1 L of oxygen : vbg on adm showed mild hypercarbic resp fialure: also has copd: His inflammatory markers are high and d dimer has slighlty uptrended:need to be follwed up daily   COPD: not wheezing: cont BD: monitor for increasing co2: abg IN am   DM: mONITOR AND CONTROL  Anemia: MONITOR H/H  HTN : CONTROLLED  Dermatitis: PER pmd  dvt PROPAHYXLIS

## 2020-12-27 NOTE — PROGRESS NOTE ADULT - PROBLEM SELECTOR PLAN 3
AM glu 175 today  will increase Lantus to 14 units qhs  continue finger sticks with short acting insulin sliding scale  watch for hypoglycemia

## 2020-12-27 NOTE — PROGRESS NOTE ADULT - PROBLEM SELECTOR PLAN 10
continue enoxaparin
continue enoxaparin
DVT ppx: Lovenox once patients weight is in the system  PT c/s    1.  Name of PCP: Dr. Hinson  2.  PCP Contacted on Admission: [ ] Y    [x] N - admitted at night    3.  PCP contacted at Discharge: [ ] Y    [ ] N    [ ] N/A  4.  Post-Discharge Appointment Date and Location:  5.  Summary of Handoff given to PCP:

## 2020-12-28 LAB
ANION GAP SERPL CALC-SCNC: 10 MMOL/L — SIGNIFICANT CHANGE UP (ref 7–14)
BASE EXCESS BLDV CALC-SCNC: 3.9 MMOL/L — HIGH (ref -3–2)
BUN SERPL-MCNC: 19 MG/DL — SIGNIFICANT CHANGE UP (ref 7–23)
CALCIUM SERPL-MCNC: 8.3 MG/DL — LOW (ref 8.4–10.5)
CHLORIDE SERPL-SCNC: 102 MMOL/L — SIGNIFICANT CHANGE UP (ref 98–107)
CO2 SERPL-SCNC: 26 MMOL/L — SIGNIFICANT CHANGE UP (ref 22–31)
CREAT SERPL-MCNC: 0.64 MG/DL — SIGNIFICANT CHANGE UP (ref 0.5–1.3)
CRP SERPL-MCNC: 38.6 MG/L — HIGH
FERRITIN SERPL-MCNC: 1024 NG/ML — HIGH (ref 30–400)
GLUCOSE BLDC GLUCOMTR-MCNC: 156 MG/DL — HIGH (ref 70–99)
GLUCOSE BLDC GLUCOMTR-MCNC: 229 MG/DL — HIGH (ref 70–99)
GLUCOSE BLDC GLUCOMTR-MCNC: 261 MG/DL — HIGH (ref 70–99)
GLUCOSE BLDC GLUCOMTR-MCNC: 273 MG/DL — HIGH (ref 70–99)
GLUCOSE SERPL-MCNC: 146 MG/DL — HIGH (ref 70–99)
HCO3 BLDV-SCNC: 28 MMOL/L — HIGH (ref 20–27)
HCT VFR BLD CALC: 30.4 % — LOW (ref 39–50)
HGB BLD-MCNC: 9.6 G/DL — LOW (ref 13–17)
LDH SERPL L TO P-CCNC: 426 U/L — HIGH (ref 135–225)
MAGNESIUM SERPL-MCNC: 1.7 MG/DL — SIGNIFICANT CHANGE UP (ref 1.6–2.6)
MCHC RBC-ENTMCNC: 21.5 PG — LOW (ref 27–34)
MCHC RBC-ENTMCNC: 31.6 GM/DL — LOW (ref 32–36)
MCV RBC AUTO: 68.2 FL — LOW (ref 80–100)
NRBC # BLD: 0 /100 WBCS — SIGNIFICANT CHANGE UP
NRBC # FLD: 0 K/UL — SIGNIFICANT CHANGE UP
PCO2 BLDV: 38 MMHG — LOW (ref 41–51)
PH BLDV: 7.47 — HIGH (ref 7.32–7.43)
PLATELET # BLD AUTO: 349 K/UL — SIGNIFICANT CHANGE UP (ref 150–400)
PO2 BLDV: 44 MMHG — HIGH (ref 35–40)
POTASSIUM SERPL-MCNC: 3.6 MMOL/L — SIGNIFICANT CHANGE UP (ref 3.5–5.3)
POTASSIUM SERPL-SCNC: 3.6 MMOL/L — SIGNIFICANT CHANGE UP (ref 3.5–5.3)
PROCALCITONIN SERPL-MCNC: 0.02 NG/ML — SIGNIFICANT CHANGE UP (ref 0.02–0.1)
RBC # BLD: 4.46 M/UL — SIGNIFICANT CHANGE UP (ref 4.2–5.8)
RBC # FLD: 18.7 % — HIGH (ref 10.3–14.5)
SAO2 % BLDV: 75.4 % — SIGNIFICANT CHANGE UP (ref 60–85)
SODIUM SERPL-SCNC: 138 MMOL/L — SIGNIFICANT CHANGE UP (ref 135–145)
WBC # BLD: 7.51 K/UL — SIGNIFICANT CHANGE UP (ref 3.8–10.5)
WBC # FLD AUTO: 7.51 K/UL — SIGNIFICANT CHANGE UP (ref 3.8–10.5)

## 2020-12-28 RX ORDER — MAGNESIUM SULFATE 500 MG/ML
1 VIAL (ML) INJECTION ONCE
Refills: 0 | Status: COMPLETED | OUTPATIENT
Start: 2020-12-28 | End: 2020-12-28

## 2020-12-28 RX ORDER — INSULIN GLARGINE 100 [IU]/ML
14 INJECTION, SOLUTION SUBCUTANEOUS AT BEDTIME
Refills: 0 | Status: DISCONTINUED | OUTPATIENT
Start: 2020-12-28 | End: 2021-01-03

## 2020-12-28 RX ORDER — POTASSIUM CHLORIDE 20 MEQ
40 PACKET (EA) ORAL ONCE
Refills: 0 | Status: COMPLETED | OUTPATIENT
Start: 2020-12-28 | End: 2020-12-28

## 2020-12-28 RX ADMIN — Medication 81 MILLIGRAM(S): at 11:34

## 2020-12-28 RX ADMIN — Medication 325 MILLIGRAM(S): at 11:44

## 2020-12-28 RX ADMIN — REMDESIVIR 500 MILLIGRAM(S): 5 INJECTION INTRAVENOUS at 22:36

## 2020-12-28 RX ADMIN — Medication 25 MILLIGRAM(S): at 06:09

## 2020-12-28 RX ADMIN — BUDESONIDE AND FORMOTEROL FUMARATE DIHYDRATE 2 PUFF(S): 160; 4.5 AEROSOL RESPIRATORY (INHALATION) at 11:28

## 2020-12-28 RX ADMIN — Medication 1 MILLIGRAM(S): at 11:35

## 2020-12-28 RX ADMIN — INSULIN GLARGINE 14 UNIT(S): 100 INJECTION, SOLUTION SUBCUTANEOUS at 22:21

## 2020-12-28 RX ADMIN — Medication 40 MILLIGRAM(S): at 06:09

## 2020-12-28 RX ADMIN — Medication 250 MILLIGRAM(S): at 11:34

## 2020-12-28 RX ADMIN — Medication 3: at 17:33

## 2020-12-28 RX ADMIN — Medication 100 GRAM(S): at 11:26

## 2020-12-28 RX ADMIN — Medication 1 TABLET(S): at 06:09

## 2020-12-28 RX ADMIN — Medication 6 MILLIGRAM(S): at 06:09

## 2020-12-28 RX ADMIN — ENOXAPARIN SODIUM 40 MILLIGRAM(S): 100 INJECTION SUBCUTANEOUS at 11:35

## 2020-12-28 RX ADMIN — BUDESONIDE AND FORMOTEROL FUMARATE DIHYDRATE 2 PUFF(S): 160; 4.5 AEROSOL RESPIRATORY (INHALATION) at 22:44

## 2020-12-28 RX ADMIN — Medication 40 MILLIEQUIVALENT(S): at 11:30

## 2020-12-28 RX ADMIN — Medication 1: at 07:41

## 2020-12-28 RX ADMIN — Medication 3: at 13:23

## 2020-12-28 RX ADMIN — POLYETHYLENE GLYCOL 3350 17 GRAM(S): 17 POWDER, FOR SOLUTION ORAL at 11:36

## 2020-12-28 RX ADMIN — Medication 1 TABLET(S): at 11:36

## 2020-12-28 RX ADMIN — Medication 1 TABLET(S): at 17:32

## 2020-12-28 RX ADMIN — TIOTROPIUM BROMIDE 1 CAPSULE(S): 18 CAPSULE ORAL; RESPIRATORY (INHALATION) at 11:28

## 2020-12-28 RX ADMIN — QUETIAPINE FUMARATE 50 MILLIGRAM(S): 200 TABLET, FILM COATED ORAL at 22:37

## 2020-12-28 RX ADMIN — LOSARTAN POTASSIUM 25 MILLIGRAM(S): 100 TABLET, FILM COATED ORAL at 06:09

## 2020-12-28 RX ADMIN — ATORVASTATIN CALCIUM 80 MILLIGRAM(S): 80 TABLET, FILM COATED ORAL at 22:37

## 2020-12-28 RX ADMIN — QUETIAPINE FUMARATE 25 MILLIGRAM(S): 200 TABLET, FILM COATED ORAL at 11:36

## 2020-12-28 NOTE — PROGRESS NOTE ADULT - PROBLEM SELECTOR PLAN 3
AM glu still high  will increase Lantus to 14 units qhs  continue finger sticks with short acting insulin sliding scale

## 2020-12-29 LAB
ALBUMIN SERPL ELPH-MCNC: 2.8 G/DL — LOW (ref 3.3–5)
ALP SERPL-CCNC: 125 U/L — HIGH (ref 40–120)
ALT FLD-CCNC: 42 U/L — HIGH (ref 4–41)
ANION GAP SERPL CALC-SCNC: 10 MMOL/L — SIGNIFICANT CHANGE UP (ref 7–14)
APPEARANCE UR: ABNORMAL
AST SERPL-CCNC: 43 U/L — HIGH (ref 4–40)
BACTERIA # UR AUTO: ABNORMAL
BASOPHILS # BLD AUTO: 0 K/UL — SIGNIFICANT CHANGE UP (ref 0–0.2)
BASOPHILS NFR BLD AUTO: 0 % — SIGNIFICANT CHANGE UP (ref 0–2)
BILIRUB SERPL-MCNC: 0.3 MG/DL — SIGNIFICANT CHANGE UP (ref 0.2–1.2)
BILIRUB UR-MCNC: NEGATIVE — SIGNIFICANT CHANGE UP
BUN SERPL-MCNC: 22 MG/DL — SIGNIFICANT CHANGE UP (ref 7–23)
CALCIUM SERPL-MCNC: 8.7 MG/DL — SIGNIFICANT CHANGE UP (ref 8.4–10.5)
CHLORIDE SERPL-SCNC: 100 MMOL/L — SIGNIFICANT CHANGE UP (ref 98–107)
CO2 SERPL-SCNC: 27 MMOL/L — SIGNIFICANT CHANGE UP (ref 22–31)
COLOR SPEC: ABNORMAL
CREAT SERPL-MCNC: 0.63 MG/DL — SIGNIFICANT CHANGE UP (ref 0.5–1.3)
D DIMER BLD IA.RAPID-MCNC: 645 NG/ML DDU — HIGH
DIFF PNL FLD: ABNORMAL
EOSINOPHIL # BLD AUTO: 0.03 K/UL — SIGNIFICANT CHANGE UP (ref 0–0.5)
EOSINOPHIL NFR BLD AUTO: 0.4 % — SIGNIFICANT CHANGE UP (ref 0–6)
GLUCOSE BLDC GLUCOMTR-MCNC: 133 MG/DL — HIGH (ref 70–99)
GLUCOSE BLDC GLUCOMTR-MCNC: 186 MG/DL — HIGH (ref 70–99)
GLUCOSE BLDC GLUCOMTR-MCNC: 192 MG/DL — HIGH (ref 70–99)
GLUCOSE BLDC GLUCOMTR-MCNC: 233 MG/DL — HIGH (ref 70–99)
GLUCOSE SERPL-MCNC: 119 MG/DL — HIGH (ref 70–99)
GLUCOSE UR QL: NEGATIVE — SIGNIFICANT CHANGE UP
HCT VFR BLD CALC: 31.6 % — LOW (ref 39–50)
HGB BLD-MCNC: 9.8 G/DL — LOW (ref 13–17)
IANC: 5.47 K/UL — SIGNIFICANT CHANGE UP (ref 1.5–8.5)
IMM GRANULOCYTES NFR BLD AUTO: 0.6 % — SIGNIFICANT CHANGE UP (ref 0–1.5)
KETONES UR-MCNC: NEGATIVE — SIGNIFICANT CHANGE UP
LEUKOCYTE ESTERASE UR-ACNC: ABNORMAL
LYMPHOCYTES # BLD AUTO: 1.21 K/UL — SIGNIFICANT CHANGE UP (ref 1–3.3)
LYMPHOCYTES # BLD AUTO: 16.8 % — SIGNIFICANT CHANGE UP (ref 13–44)
MAGNESIUM SERPL-MCNC: 2 MG/DL — SIGNIFICANT CHANGE UP (ref 1.6–2.6)
MCHC RBC-ENTMCNC: 21.5 PG — LOW (ref 27–34)
MCHC RBC-ENTMCNC: 31 GM/DL — LOW (ref 32–36)
MCV RBC AUTO: 69.5 FL — LOW (ref 80–100)
MONOCYTES # BLD AUTO: 0.46 K/UL — SIGNIFICANT CHANGE UP (ref 0–0.9)
MONOCYTES NFR BLD AUTO: 6.4 % — SIGNIFICANT CHANGE UP (ref 2–14)
NEUTROPHILS # BLD AUTO: 5.47 K/UL — SIGNIFICANT CHANGE UP (ref 1.8–7.4)
NEUTROPHILS NFR BLD AUTO: 75.8 % — SIGNIFICANT CHANGE UP (ref 43–77)
NITRITE UR-MCNC: POSITIVE
NRBC # BLD: 0 /100 WBCS — SIGNIFICANT CHANGE UP
NRBC # FLD: 0 K/UL — SIGNIFICANT CHANGE UP
PH UR: 8 — SIGNIFICANT CHANGE UP (ref 5–8)
PLATELET # BLD AUTO: 381 K/UL — SIGNIFICANT CHANGE UP (ref 150–400)
POTASSIUM SERPL-MCNC: 5.1 MMOL/L — SIGNIFICANT CHANGE UP (ref 3.5–5.3)
POTASSIUM SERPL-SCNC: 5.1 MMOL/L — SIGNIFICANT CHANGE UP (ref 3.5–5.3)
PROT SERPL-MCNC: 6.4 G/DL — SIGNIFICANT CHANGE UP (ref 6–8.3)
PROT UR-MCNC: ABNORMAL
RBC # BLD: 4.55 M/UL — SIGNIFICANT CHANGE UP (ref 4.2–5.8)
RBC # FLD: 18.9 % — HIGH (ref 10.3–14.5)
RBC CASTS # UR COMP ASSIST: SIGNIFICANT CHANGE UP /HPF (ref 0–4)
SARS-COV-2 RNA SPEC QL NAA+PROBE: DETECTED
SODIUM SERPL-SCNC: 137 MMOL/L — SIGNIFICANT CHANGE UP (ref 135–145)
SP GR SPEC: 1.02 — SIGNIFICANT CHANGE UP (ref 1.01–1.02)
TRI-PHOS CRY UR QL COMP ASSIST: ABNORMAL
UROBILINOGEN FLD QL: SIGNIFICANT CHANGE UP
WBC # BLD: 7.21 K/UL — SIGNIFICANT CHANGE UP (ref 3.8–10.5)
WBC # FLD AUTO: 7.21 K/UL — SIGNIFICANT CHANGE UP (ref 3.8–10.5)
WBC UR QL: >50 /HPF — SIGNIFICANT CHANGE UP (ref 0–5)

## 2020-12-29 RX ORDER — ACETAMINOPHEN 500 MG
650 TABLET ORAL ONCE
Refills: 0 | Status: COMPLETED | OUTPATIENT
Start: 2020-12-29 | End: 2020-12-29

## 2020-12-29 RX ORDER — SODIUM CHLORIDE 0.65 %
1 AEROSOL, SPRAY (ML) NASAL THREE TIMES A DAY
Refills: 0 | Status: DISCONTINUED | OUTPATIENT
Start: 2020-12-29 | End: 2021-01-13

## 2020-12-29 RX ADMIN — Medication 250 MILLIGRAM(S): at 11:33

## 2020-12-29 RX ADMIN — LOSARTAN POTASSIUM 25 MILLIGRAM(S): 100 TABLET, FILM COATED ORAL at 05:31

## 2020-12-29 RX ADMIN — Medication 1: at 17:10

## 2020-12-29 RX ADMIN — TIOTROPIUM BROMIDE 1 CAPSULE(S): 18 CAPSULE ORAL; RESPIRATORY (INHALATION) at 09:22

## 2020-12-29 RX ADMIN — INSULIN GLARGINE 14 UNIT(S): 100 INJECTION, SOLUTION SUBCUTANEOUS at 22:24

## 2020-12-29 RX ADMIN — Medication 2: at 11:53

## 2020-12-29 RX ADMIN — Medication 1 TABLET(S): at 17:10

## 2020-12-29 RX ADMIN — Medication 1 TABLET(S): at 11:32

## 2020-12-29 RX ADMIN — Medication 40 MILLIGRAM(S): at 05:32

## 2020-12-29 RX ADMIN — POLYETHYLENE GLYCOL 3350 17 GRAM(S): 17 POWDER, FOR SOLUTION ORAL at 11:30

## 2020-12-29 RX ADMIN — Medication 81 MILLIGRAM(S): at 11:32

## 2020-12-29 RX ADMIN — BUDESONIDE AND FORMOTEROL FUMARATE DIHYDRATE 2 PUFF(S): 160; 4.5 AEROSOL RESPIRATORY (INHALATION) at 22:26

## 2020-12-29 RX ADMIN — QUETIAPINE FUMARATE 25 MILLIGRAM(S): 200 TABLET, FILM COATED ORAL at 11:34

## 2020-12-29 RX ADMIN — Medication 1 SPRAY(S): at 13:42

## 2020-12-29 RX ADMIN — Medication 6 MILLIGRAM(S): at 05:32

## 2020-12-29 RX ADMIN — Medication 0: at 22:24

## 2020-12-29 RX ADMIN — QUETIAPINE FUMARATE 50 MILLIGRAM(S): 200 TABLET, FILM COATED ORAL at 22:24

## 2020-12-29 RX ADMIN — Medication 325 MILLIGRAM(S): at 11:32

## 2020-12-29 RX ADMIN — Medication 1 SPRAY(S): at 22:23

## 2020-12-29 RX ADMIN — ATORVASTATIN CALCIUM 80 MILLIGRAM(S): 80 TABLET, FILM COATED ORAL at 22:23

## 2020-12-29 RX ADMIN — Medication 650 MILLIGRAM(S): at 19:51

## 2020-12-29 RX ADMIN — BUDESONIDE AND FORMOTEROL FUMARATE DIHYDRATE 2 PUFF(S): 160; 4.5 AEROSOL RESPIRATORY (INHALATION) at 09:22

## 2020-12-29 RX ADMIN — Medication 1 TABLET(S): at 05:31

## 2020-12-29 RX ADMIN — Medication 25 MILLIGRAM(S): at 05:31

## 2020-12-29 RX ADMIN — ENOXAPARIN SODIUM 40 MILLIGRAM(S): 100 INJECTION SUBCUTANEOUS at 11:31

## 2020-12-29 RX ADMIN — Medication 1 MILLIGRAM(S): at 11:32

## 2020-12-29 NOTE — PROGRESS NOTE ADULT - PROBLEM SELECTOR PLAN 3
AM glu improved   will continue Lantus 14 units qhs  watch for hypoglycemia   continue finger sticks with short acting insulin sliding scale

## 2020-12-30 LAB
GLUCOSE BLDC GLUCOMTR-MCNC: 109 MG/DL — HIGH (ref 70–99)
GLUCOSE BLDC GLUCOMTR-MCNC: 181 MG/DL — HIGH (ref 70–99)
GLUCOSE BLDC GLUCOMTR-MCNC: 80 MG/DL — SIGNIFICANT CHANGE UP (ref 70–99)
GLUCOSE BLDC GLUCOMTR-MCNC: 95 MG/DL — SIGNIFICANT CHANGE UP (ref 70–99)

## 2020-12-30 RX ORDER — HALOPERIDOL DECANOATE 100 MG/ML
1 INJECTION INTRAMUSCULAR ONCE
Refills: 0 | Status: COMPLETED | OUTPATIENT
Start: 2020-12-30 | End: 2020-12-30

## 2020-12-30 RX ADMIN — Medication 1 TABLET(S): at 17:02

## 2020-12-30 RX ADMIN — Medication 81 MILLIGRAM(S): at 12:41

## 2020-12-30 RX ADMIN — SENNA PLUS 2 TABLET(S): 8.6 TABLET ORAL at 21:08

## 2020-12-30 RX ADMIN — Medication 1 MILLIGRAM(S): at 12:40

## 2020-12-30 RX ADMIN — BUDESONIDE AND FORMOTEROL FUMARATE DIHYDRATE 2 PUFF(S): 160; 4.5 AEROSOL RESPIRATORY (INHALATION) at 21:07

## 2020-12-30 RX ADMIN — TIOTROPIUM BROMIDE 1 CAPSULE(S): 18 CAPSULE ORAL; RESPIRATORY (INHALATION) at 10:30

## 2020-12-30 RX ADMIN — Medication 3 MILLIGRAM(S): at 22:38

## 2020-12-30 RX ADMIN — Medication 1 TABLET(S): at 05:36

## 2020-12-30 RX ADMIN — Medication 650 MILLIGRAM(S): at 00:20

## 2020-12-30 RX ADMIN — ENOXAPARIN SODIUM 40 MILLIGRAM(S): 100 INJECTION SUBCUTANEOUS at 12:41

## 2020-12-30 RX ADMIN — BUDESONIDE AND FORMOTEROL FUMARATE DIHYDRATE 2 PUFF(S): 160; 4.5 AEROSOL RESPIRATORY (INHALATION) at 10:30

## 2020-12-30 RX ADMIN — Medication 1 SPRAY(S): at 12:42

## 2020-12-30 RX ADMIN — QUETIAPINE FUMARATE 25 MILLIGRAM(S): 200 TABLET, FILM COATED ORAL at 12:41

## 2020-12-30 RX ADMIN — Medication 1 TABLET(S): at 12:41

## 2020-12-30 RX ADMIN — ATORVASTATIN CALCIUM 80 MILLIGRAM(S): 80 TABLET, FILM COATED ORAL at 21:07

## 2020-12-30 RX ADMIN — HALOPERIDOL DECANOATE 1 MILLIGRAM(S): 100 INJECTION INTRAMUSCULAR at 03:00

## 2020-12-30 RX ADMIN — Medication 1 SPRAY(S): at 05:36

## 2020-12-30 RX ADMIN — Medication 250 MILLIGRAM(S): at 12:40

## 2020-12-30 RX ADMIN — INSULIN GLARGINE 14 UNIT(S): 100 INJECTION, SOLUTION SUBCUTANEOUS at 21:08

## 2020-12-30 RX ADMIN — Medication 40 MILLIGRAM(S): at 05:36

## 2020-12-30 RX ADMIN — LOSARTAN POTASSIUM 25 MILLIGRAM(S): 100 TABLET, FILM COATED ORAL at 05:36

## 2020-12-30 RX ADMIN — Medication 25 MILLIGRAM(S): at 05:36

## 2020-12-30 RX ADMIN — QUETIAPINE FUMARATE 50 MILLIGRAM(S): 200 TABLET, FILM COATED ORAL at 21:07

## 2020-12-30 RX ADMIN — Medication 6 MILLIGRAM(S): at 05:36

## 2020-12-30 RX ADMIN — Medication 1 SPRAY(S): at 21:09

## 2020-12-30 RX ADMIN — Medication 325 MILLIGRAM(S): at 12:41

## 2020-12-30 NOTE — PROGRESS NOTE ADULT - PROBLEM SELECTOR PLAN 9
continue enoxaparin
acceptable  continue home meds with hold parameters
acceptable  continue home meds with hold parameters
continue enoxaparin
continue enoxaparin
- Continue Home BP meds w/ parameters   - monitor BP   - DASH/TLC diet

## 2020-12-30 NOTE — PROGRESS NOTE ADULT - PROBLEM SELECTOR PROBLEM 9
HTN (hypertension)
HTN (hypertension)
DVT prophylaxis
HTN (hypertension)

## 2020-12-30 NOTE — PROGRESS NOTE ADULT - PROBLEM SELECTOR PLAN 3
continue Lantus 14 units qhs  watch for hypoglycemia   continue finger sticks with short acting insulin sliding scale

## 2020-12-31 LAB
GLUCOSE BLDC GLUCOMTR-MCNC: 156 MG/DL — HIGH (ref 70–99)
GLUCOSE BLDC GLUCOMTR-MCNC: 159 MG/DL — HIGH (ref 70–99)
GLUCOSE BLDC GLUCOMTR-MCNC: 200 MG/DL — HIGH (ref 70–99)
GLUCOSE BLDC GLUCOMTR-MCNC: 225 MG/DL — HIGH (ref 70–99)

## 2020-12-31 RX ORDER — OXYMETAZOLINE HYDROCHLORIDE 0.5 MG/ML
1 SPRAY NASAL EVERY 12 HOURS
Refills: 0 | Status: COMPLETED | OUTPATIENT
Start: 2020-12-31 | End: 2021-01-03

## 2020-12-31 RX ORDER — HALOPERIDOL DECANOATE 100 MG/ML
1 INJECTION INTRAMUSCULAR ONCE
Refills: 0 | Status: COMPLETED | OUTPATIENT
Start: 2020-12-31 | End: 2020-12-31

## 2020-12-31 RX ORDER — OXYMETAZOLINE HYDROCHLORIDE 0.5 MG/ML
1 SPRAY NASAL ONCE
Refills: 0 | Status: COMPLETED | OUTPATIENT
Start: 2020-12-31 | End: 2020-12-31

## 2020-12-31 RX ORDER — OXYMETAZOLINE HYDROCHLORIDE 0.5 MG/ML
SPRAY NASAL
Refills: 0 | Status: DISCONTINUED | OUTPATIENT
Start: 2020-12-31 | End: 2020-12-31

## 2020-12-31 RX ORDER — OXYMETAZOLINE HYDROCHLORIDE 0.5 MG/ML
1 SPRAY NASAL ONCE
Refills: 0 | Status: DISCONTINUED | OUTPATIENT
Start: 2020-12-31 | End: 2020-12-31

## 2020-12-31 RX ORDER — OXYMETAZOLINE HYDROCHLORIDE 0.5 MG/ML
SPRAY NASAL
Refills: 0 | Status: COMPLETED | OUTPATIENT
Start: 2020-12-31 | End: 2021-01-03

## 2020-12-31 RX ADMIN — SENNA PLUS 2 TABLET(S): 8.6 TABLET ORAL at 21:19

## 2020-12-31 RX ADMIN — Medication 1 TABLET(S): at 06:12

## 2020-12-31 RX ADMIN — ENOXAPARIN SODIUM 40 MILLIGRAM(S): 100 INJECTION SUBCUTANEOUS at 12:22

## 2020-12-31 RX ADMIN — BUDESONIDE AND FORMOTEROL FUMARATE DIHYDRATE 2 PUFF(S): 160; 4.5 AEROSOL RESPIRATORY (INHALATION) at 11:09

## 2020-12-31 RX ADMIN — Medication 1 TABLET(S): at 12:22

## 2020-12-31 RX ADMIN — Medication 25 MILLIGRAM(S): at 06:12

## 2020-12-31 RX ADMIN — ATORVASTATIN CALCIUM 80 MILLIGRAM(S): 80 TABLET, FILM COATED ORAL at 21:16

## 2020-12-31 RX ADMIN — Medication 1 SPRAY(S): at 12:24

## 2020-12-31 RX ADMIN — LOSARTAN POTASSIUM 25 MILLIGRAM(S): 100 TABLET, FILM COATED ORAL at 06:12

## 2020-12-31 RX ADMIN — Medication 250 MILLIGRAM(S): at 12:21

## 2020-12-31 RX ADMIN — OXYMETAZOLINE HYDROCHLORIDE 1 SPRAY(S): 0.5 SPRAY NASAL at 17:08

## 2020-12-31 RX ADMIN — POLYETHYLENE GLYCOL 3350 17 GRAM(S): 17 POWDER, FOR SOLUTION ORAL at 12:22

## 2020-12-31 RX ADMIN — Medication 1 MILLIGRAM(S): at 12:22

## 2020-12-31 RX ADMIN — Medication 2: at 17:08

## 2020-12-31 RX ADMIN — INSULIN GLARGINE 14 UNIT(S): 100 INJECTION, SOLUTION SUBCUTANEOUS at 21:21

## 2020-12-31 RX ADMIN — Medication 325 MILLIGRAM(S): at 12:21

## 2020-12-31 RX ADMIN — Medication 6 MILLIGRAM(S): at 06:12

## 2020-12-31 RX ADMIN — Medication 1 TABLET(S): at 17:08

## 2020-12-31 RX ADMIN — Medication 81 MILLIGRAM(S): at 12:21

## 2020-12-31 RX ADMIN — Medication 3 MILLIGRAM(S): at 21:20

## 2020-12-31 RX ADMIN — QUETIAPINE FUMARATE 25 MILLIGRAM(S): 200 TABLET, FILM COATED ORAL at 12:22

## 2020-12-31 RX ADMIN — Medication 40 MILLIGRAM(S): at 06:12

## 2020-12-31 RX ADMIN — OXYMETAZOLINE HYDROCHLORIDE 1 SPRAY(S): 0.5 SPRAY NASAL at 13:14

## 2020-12-31 RX ADMIN — HALOPERIDOL DECANOATE 1 MILLIGRAM(S): 100 INJECTION INTRAMUSCULAR at 03:11

## 2020-12-31 RX ADMIN — TIOTROPIUM BROMIDE 1 CAPSULE(S): 18 CAPSULE ORAL; RESPIRATORY (INHALATION) at 12:25

## 2020-12-31 RX ADMIN — Medication 1: at 12:23

## 2020-12-31 RX ADMIN — Medication 1 SPRAY(S): at 06:12

## 2020-12-31 RX ADMIN — BUDESONIDE AND FORMOTEROL FUMARATE DIHYDRATE 2 PUFF(S): 160; 4.5 AEROSOL RESPIRATORY (INHALATION) at 21:14

## 2020-12-31 RX ADMIN — QUETIAPINE FUMARATE 50 MILLIGRAM(S): 200 TABLET, FILM COATED ORAL at 21:18

## 2020-12-31 RX ADMIN — Medication 1 SPRAY(S): at 21:20

## 2020-12-31 NOTE — PROGRESS NOTE ADULT - PROBLEM SELECTOR PLAN 3
continue Lantus 14 units qhs  watch for hypoglycemia   continue finger sticks with short acting insulin sliding scale  reduce Lantus after completion of dexamethasone

## 2021-01-01 LAB
GLUCOSE BLDC GLUCOMTR-MCNC: 141 MG/DL — HIGH (ref 70–99)
GLUCOSE BLDC GLUCOMTR-MCNC: 218 MG/DL — HIGH (ref 70–99)
GLUCOSE BLDC GLUCOMTR-MCNC: 230 MG/DL — HIGH (ref 70–99)
GLUCOSE BLDC GLUCOMTR-MCNC: 288 MG/DL — HIGH (ref 70–99)
SARS-COV-2 RNA SPEC QL NAA+PROBE: DETECTED

## 2021-01-01 RX ORDER — METOPROLOL TARTRATE 50 MG
12.5 TABLET ORAL
Refills: 0 | Status: DISCONTINUED | OUTPATIENT
Start: 2021-01-01 | End: 2021-01-13

## 2021-01-01 RX ADMIN — Medication 1 SPRAY(S): at 21:22

## 2021-01-01 RX ADMIN — QUETIAPINE FUMARATE 50 MILLIGRAM(S): 200 TABLET, FILM COATED ORAL at 21:22

## 2021-01-01 RX ADMIN — ENOXAPARIN SODIUM 40 MILLIGRAM(S): 100 INJECTION SUBCUTANEOUS at 12:04

## 2021-01-01 RX ADMIN — Medication 1 MILLIGRAM(S): at 12:05

## 2021-01-01 RX ADMIN — BUDESONIDE AND FORMOTEROL FUMARATE DIHYDRATE 2 PUFF(S): 160; 4.5 AEROSOL RESPIRATORY (INHALATION) at 08:49

## 2021-01-01 RX ADMIN — Medication 325 MILLIGRAM(S): at 12:05

## 2021-01-01 RX ADMIN — Medication 1 TABLET(S): at 17:20

## 2021-01-01 RX ADMIN — QUETIAPINE FUMARATE 25 MILLIGRAM(S): 200 TABLET, FILM COATED ORAL at 12:05

## 2021-01-01 RX ADMIN — SENNA PLUS 2 TABLET(S): 8.6 TABLET ORAL at 21:22

## 2021-01-01 RX ADMIN — Medication 250 MILLIGRAM(S): at 12:04

## 2021-01-01 RX ADMIN — Medication 3: at 17:20

## 2021-01-01 RX ADMIN — Medication 40 MILLIGRAM(S): at 06:33

## 2021-01-01 RX ADMIN — OXYMETAZOLINE HYDROCHLORIDE 1 SPRAY(S): 0.5 SPRAY NASAL at 06:33

## 2021-01-01 RX ADMIN — OXYMETAZOLINE HYDROCHLORIDE 1 SPRAY(S): 0.5 SPRAY NASAL at 17:21

## 2021-01-01 RX ADMIN — Medication 1 TABLET(S): at 06:32

## 2021-01-01 RX ADMIN — TIOTROPIUM BROMIDE 1 CAPSULE(S): 18 CAPSULE ORAL; RESPIRATORY (INHALATION) at 12:03

## 2021-01-01 RX ADMIN — Medication 1 SPRAY(S): at 12:06

## 2021-01-01 RX ADMIN — BUDESONIDE AND FORMOTEROL FUMARATE DIHYDRATE 2 PUFF(S): 160; 4.5 AEROSOL RESPIRATORY (INHALATION) at 21:22

## 2021-01-01 RX ADMIN — INSULIN GLARGINE 14 UNIT(S): 100 INJECTION, SOLUTION SUBCUTANEOUS at 22:42

## 2021-01-01 RX ADMIN — LOSARTAN POTASSIUM 25 MILLIGRAM(S): 100 TABLET, FILM COATED ORAL at 06:34

## 2021-01-01 RX ADMIN — Medication 25 MILLIGRAM(S): at 06:34

## 2021-01-01 RX ADMIN — Medication 2: at 12:22

## 2021-01-01 RX ADMIN — Medication 6 MILLIGRAM(S): at 06:32

## 2021-01-01 RX ADMIN — Medication 1 SPRAY(S): at 06:34

## 2021-01-01 RX ADMIN — POLYETHYLENE GLYCOL 3350 17 GRAM(S): 17 POWDER, FOR SOLUTION ORAL at 12:04

## 2021-01-01 RX ADMIN — Medication 1 TABLET(S): at 12:05

## 2021-01-01 RX ADMIN — ATORVASTATIN CALCIUM 80 MILLIGRAM(S): 80 TABLET, FILM COATED ORAL at 21:22

## 2021-01-01 RX ADMIN — Medication 81 MILLIGRAM(S): at 12:04

## 2021-01-02 DIAGNOSIS — R82.90 UNSPECIFIED ABNORMAL FINDINGS IN URINE: ICD-10-CM

## 2021-01-02 LAB
GLUCOSE BLDC GLUCOMTR-MCNC: 164 MG/DL — HIGH (ref 70–99)
GLUCOSE BLDC GLUCOMTR-MCNC: 240 MG/DL — HIGH (ref 70–99)
GLUCOSE BLDC GLUCOMTR-MCNC: 242 MG/DL — HIGH (ref 70–99)
GLUCOSE BLDC GLUCOMTR-MCNC: 244 MG/DL — HIGH (ref 70–99)

## 2021-01-02 RX ORDER — FLUTICASONE PROPIONATE 50 MCG
1 SPRAY, SUSPENSION NASAL
Refills: 0 | Status: DISCONTINUED | OUTPATIENT
Start: 2021-01-02 | End: 2021-01-13

## 2021-01-02 RX ORDER — ACETAMINOPHEN 500 MG
650 TABLET ORAL EVERY 4 HOURS
Refills: 0 | Status: DISCONTINUED | OUTPATIENT
Start: 2021-01-02 | End: 2021-01-13

## 2021-01-02 RX ADMIN — QUETIAPINE FUMARATE 25 MILLIGRAM(S): 200 TABLET, FILM COATED ORAL at 11:40

## 2021-01-02 RX ADMIN — INSULIN GLARGINE 14 UNIT(S): 100 INJECTION, SOLUTION SUBCUTANEOUS at 21:47

## 2021-01-02 RX ADMIN — Medication 1 MILLIGRAM(S): at 11:41

## 2021-01-02 RX ADMIN — Medication 1 TABLET(S): at 06:10

## 2021-01-02 RX ADMIN — Medication 1 SPRAY(S): at 17:28

## 2021-01-02 RX ADMIN — Medication 1: at 07:36

## 2021-01-02 RX ADMIN — BUDESONIDE AND FORMOTEROL FUMARATE DIHYDRATE 2 PUFF(S): 160; 4.5 AEROSOL RESPIRATORY (INHALATION) at 09:27

## 2021-01-02 RX ADMIN — Medication 81 MILLIGRAM(S): at 11:40

## 2021-01-02 RX ADMIN — Medication 1 SPRAY(S): at 06:10

## 2021-01-02 RX ADMIN — Medication 2: at 11:49

## 2021-01-02 RX ADMIN — Medication 2: at 17:28

## 2021-01-02 RX ADMIN — Medication 1 SPRAY(S): at 21:47

## 2021-01-02 RX ADMIN — Medication 1 SPRAY(S): at 11:42

## 2021-01-02 RX ADMIN — Medication 6 MILLIGRAM(S): at 06:10

## 2021-01-02 RX ADMIN — ATORVASTATIN CALCIUM 80 MILLIGRAM(S): 80 TABLET, FILM COATED ORAL at 21:49

## 2021-01-02 RX ADMIN — Medication 325 MILLIGRAM(S): at 11:41

## 2021-01-02 RX ADMIN — Medication 40 MILLIGRAM(S): at 09:26

## 2021-01-02 RX ADMIN — Medication 1 TABLET(S): at 11:40

## 2021-01-02 RX ADMIN — TIOTROPIUM BROMIDE 1 CAPSULE(S): 18 CAPSULE ORAL; RESPIRATORY (INHALATION) at 11:40

## 2021-01-02 RX ADMIN — POLYETHYLENE GLYCOL 3350 17 GRAM(S): 17 POWDER, FOR SOLUTION ORAL at 11:42

## 2021-01-02 RX ADMIN — Medication 1 TABLET(S): at 17:28

## 2021-01-02 RX ADMIN — BUDESONIDE AND FORMOTEROL FUMARATE DIHYDRATE 2 PUFF(S): 160; 4.5 AEROSOL RESPIRATORY (INHALATION) at 21:48

## 2021-01-02 RX ADMIN — OXYMETAZOLINE HYDROCHLORIDE 1 SPRAY(S): 0.5 SPRAY NASAL at 17:28

## 2021-01-02 RX ADMIN — LOSARTAN POTASSIUM 25 MILLIGRAM(S): 100 TABLET, FILM COATED ORAL at 09:26

## 2021-01-02 RX ADMIN — SENNA PLUS 2 TABLET(S): 8.6 TABLET ORAL at 21:48

## 2021-01-02 RX ADMIN — Medication 250 MILLIGRAM(S): at 11:41

## 2021-01-02 RX ADMIN — Medication 12.5 MILLIGRAM(S): at 17:28

## 2021-01-02 RX ADMIN — ENOXAPARIN SODIUM 40 MILLIGRAM(S): 100 INJECTION SUBCUTANEOUS at 11:41

## 2021-01-02 RX ADMIN — QUETIAPINE FUMARATE 50 MILLIGRAM(S): 200 TABLET, FILM COATED ORAL at 21:49

## 2021-01-02 NOTE — PROGRESS NOTE ADULT - PROBLEM SELECTOR PLAN 8
acceptable  continue home meds with hold parameters
appears euvolemic  will defer recommendations to cardiology
acceptable  continue home meds with hold parameters
acceptable  continue home meds with hold parameters
- BNP: 246  - Pt takes Lasix 40mg qd PO , c/w home medication   - Pt is not fluid overloaded   - monitor electrolytes   - daily weights   - strict I&Os  - fluid restriction   - low sodium intake   - TTE AM  - Chest Xray: Prelim: INTERPRETATION:  Faint bibasilar and left midlung hazy opacity, which is nonspecific and can be seen in multifocal infection as well as pulmonary edema. Follow up official report in AM.
euvolemic   continue to monitor  no  intervention required at this time   - monitor electrolytes   - daily weights   - strict I&Os  - fluid restriction   - low sodium intake   - TTE AM  - Chest Xray: Prelim: INTERPRETATION:  Faint bibasilar and left midlung hazy opacity, which is nonspecific and can be seen in multifocal infection as well as pulmonary edema. Follow up official report in AM.
continue enoxaparin

## 2021-01-02 NOTE — PROGRESS NOTE ADULT - PROBLEM SELECTOR PROBLEM 3
Type 2 diabetes mellitus without complication, with long-term current use of insulin Abnormal urinalysis

## 2021-01-02 NOTE — PROGRESS NOTE ADULT - PROBLEM SELECTOR PROBLEM 8
HTN (hypertension)
HTN (hypertension)
H/O heart failure
HTN (hypertension)
H/O heart failure
H/O heart failure
DVT prophylaxis

## 2021-01-02 NOTE — PROGRESS NOTE ADULT - PROBLEM SELECTOR PLAN 3
continue finger sticks with short acting insulin sliding scale  reduce Lantus after completion of dexamethasone if needed will send urine cultures

## 2021-01-02 NOTE — PROGRESS NOTE ADULT - PROBLEM SELECTOR PLAN 4
now resolved continue finger sticks with short acting insulin sliding scale  reduce Lantus after completion of dexamethasone if needed

## 2021-01-03 LAB
ALBUMIN SERPL ELPH-MCNC: 3 G/DL — LOW (ref 3.3–5)
ALP SERPL-CCNC: 123 U/L — HIGH (ref 40–120)
ALT FLD-CCNC: 29 U/L — SIGNIFICANT CHANGE UP (ref 4–41)
ANION GAP SERPL CALC-SCNC: 11 MMOL/L — SIGNIFICANT CHANGE UP (ref 7–14)
AST SERPL-CCNC: 17 U/L — SIGNIFICANT CHANGE UP (ref 4–40)
BILIRUB SERPL-MCNC: 0.3 MG/DL — SIGNIFICANT CHANGE UP (ref 0.2–1.2)
BUN SERPL-MCNC: 26 MG/DL — HIGH (ref 7–23)
CALCIUM SERPL-MCNC: 8.9 MG/DL — SIGNIFICANT CHANGE UP (ref 8.4–10.5)
CHLORIDE SERPL-SCNC: 103 MMOL/L — SIGNIFICANT CHANGE UP (ref 98–107)
CO2 SERPL-SCNC: 25 MMOL/L — SIGNIFICANT CHANGE UP (ref 22–31)
CREAT SERPL-MCNC: 0.74 MG/DL — SIGNIFICANT CHANGE UP (ref 0.5–1.3)
GLUCOSE BLDC GLUCOMTR-MCNC: 124 MG/DL — HIGH (ref 70–99)
GLUCOSE BLDC GLUCOMTR-MCNC: 132 MG/DL — HIGH (ref 70–99)
GLUCOSE BLDC GLUCOMTR-MCNC: 211 MG/DL — HIGH (ref 70–99)
GLUCOSE BLDC GLUCOMTR-MCNC: 81 MG/DL — SIGNIFICANT CHANGE UP (ref 70–99)
GLUCOSE SERPL-MCNC: 131 MG/DL — HIGH (ref 70–99)
HCT VFR BLD CALC: 32.6 % — LOW (ref 39–50)
HGB BLD-MCNC: 9.8 G/DL — LOW (ref 13–17)
MCHC RBC-ENTMCNC: 21.3 PG — LOW (ref 27–34)
MCHC RBC-ENTMCNC: 30.1 GM/DL — LOW (ref 32–36)
MCV RBC AUTO: 70.9 FL — LOW (ref 80–100)
NRBC # BLD: 0 /100 WBCS — SIGNIFICANT CHANGE UP
NRBC # FLD: 0 K/UL — SIGNIFICANT CHANGE UP
PLATELET # BLD AUTO: 406 K/UL — HIGH (ref 150–400)
POTASSIUM SERPL-MCNC: 3.9 MMOL/L — SIGNIFICANT CHANGE UP (ref 3.5–5.3)
POTASSIUM SERPL-SCNC: 3.9 MMOL/L — SIGNIFICANT CHANGE UP (ref 3.5–5.3)
PROT SERPL-MCNC: 6 G/DL — SIGNIFICANT CHANGE UP (ref 6–8.3)
RBC # BLD: 4.6 M/UL — SIGNIFICANT CHANGE UP (ref 4.2–5.8)
RBC # FLD: 19.9 % — HIGH (ref 10.3–14.5)
SARS-COV-2 RNA SPEC QL NAA+PROBE: DETECTED
SODIUM SERPL-SCNC: 139 MMOL/L — SIGNIFICANT CHANGE UP (ref 135–145)
WBC # BLD: 9.68 K/UL — SIGNIFICANT CHANGE UP (ref 3.8–10.5)
WBC # FLD AUTO: 9.68 K/UL — SIGNIFICANT CHANGE UP (ref 3.8–10.5)

## 2021-01-03 RX ORDER — INSULIN GLARGINE 100 [IU]/ML
10 INJECTION, SOLUTION SUBCUTANEOUS AT BEDTIME
Refills: 0 | Status: DISCONTINUED | OUTPATIENT
Start: 2021-01-03 | End: 2021-01-13

## 2021-01-03 RX ADMIN — INSULIN GLARGINE 10 UNIT(S): 100 INJECTION, SOLUTION SUBCUTANEOUS at 21:47

## 2021-01-03 RX ADMIN — Medication 1 TABLET(S): at 17:29

## 2021-01-03 RX ADMIN — ATORVASTATIN CALCIUM 80 MILLIGRAM(S): 80 TABLET, FILM COATED ORAL at 21:45

## 2021-01-03 RX ADMIN — BUDESONIDE AND FORMOTEROL FUMARATE DIHYDRATE 2 PUFF(S): 160; 4.5 AEROSOL RESPIRATORY (INHALATION) at 21:46

## 2021-01-03 RX ADMIN — Medication 6 MILLIGRAM(S): at 07:13

## 2021-01-03 RX ADMIN — Medication 1 TABLET(S): at 07:12

## 2021-01-03 RX ADMIN — Medication 1 SPRAY(S): at 07:15

## 2021-01-03 RX ADMIN — OXYMETAZOLINE HYDROCHLORIDE 1 SPRAY(S): 0.5 SPRAY NASAL at 07:15

## 2021-01-03 RX ADMIN — POLYETHYLENE GLYCOL 3350 17 GRAM(S): 17 POWDER, FOR SOLUTION ORAL at 12:01

## 2021-01-03 RX ADMIN — Medication 1 SPRAY(S): at 17:30

## 2021-01-03 RX ADMIN — Medication 12.5 MILLIGRAM(S): at 07:12

## 2021-01-03 RX ADMIN — ENOXAPARIN SODIUM 40 MILLIGRAM(S): 100 INJECTION SUBCUTANEOUS at 12:00

## 2021-01-03 RX ADMIN — TIOTROPIUM BROMIDE 1 CAPSULE(S): 18 CAPSULE ORAL; RESPIRATORY (INHALATION) at 09:27

## 2021-01-03 RX ADMIN — Medication 1 SPRAY(S): at 21:48

## 2021-01-03 RX ADMIN — QUETIAPINE FUMARATE 50 MILLIGRAM(S): 200 TABLET, FILM COATED ORAL at 21:47

## 2021-01-03 RX ADMIN — Medication 3 MILLIGRAM(S): at 21:46

## 2021-01-03 RX ADMIN — QUETIAPINE FUMARATE 25 MILLIGRAM(S): 200 TABLET, FILM COATED ORAL at 12:01

## 2021-01-03 RX ADMIN — Medication 1 SPRAY(S): at 07:22

## 2021-01-03 RX ADMIN — Medication 40 MILLIGRAM(S): at 07:14

## 2021-01-03 RX ADMIN — Medication 1 SPRAY(S): at 14:35

## 2021-01-03 RX ADMIN — BUDESONIDE AND FORMOTEROL FUMARATE DIHYDRATE 2 PUFF(S): 160; 4.5 AEROSOL RESPIRATORY (INHALATION) at 09:26

## 2021-01-03 RX ADMIN — Medication 1 MILLIGRAM(S): at 12:01

## 2021-01-03 RX ADMIN — Medication 1 TABLET(S): at 12:00

## 2021-01-03 RX ADMIN — Medication 325 MILLIGRAM(S): at 12:01

## 2021-01-03 RX ADMIN — LOSARTAN POTASSIUM 25 MILLIGRAM(S): 100 TABLET, FILM COATED ORAL at 07:14

## 2021-01-03 RX ADMIN — Medication 81 MILLIGRAM(S): at 12:00

## 2021-01-03 RX ADMIN — Medication 12.5 MILLIGRAM(S): at 17:29

## 2021-01-03 RX ADMIN — Medication 250 MILLIGRAM(S): at 12:00

## 2021-01-03 NOTE — PROGRESS NOTE ADULT - PROBLEM SELECTOR PLAN 4
continue finger sticks with short acting insulin sliding scale  reduce Lantus to 10 units as off dexamethasone

## 2021-01-04 ENCOUNTER — TRANSCRIPTION ENCOUNTER (OUTPATIENT)
Age: 76
End: 2021-01-04

## 2021-01-04 LAB
GLUCOSE BLDC GLUCOMTR-MCNC: 135 MG/DL — HIGH (ref 70–99)
GLUCOSE BLDC GLUCOMTR-MCNC: 150 MG/DL — HIGH (ref 70–99)
GLUCOSE BLDC GLUCOMTR-MCNC: 162 MG/DL — HIGH (ref 70–99)
GLUCOSE BLDC GLUCOMTR-MCNC: 208 MG/DL — HIGH (ref 70–99)

## 2021-01-04 RX ORDER — CEFTRIAXONE 500 MG/1
1000 INJECTION, POWDER, FOR SOLUTION INTRAMUSCULAR; INTRAVENOUS EVERY 24 HOURS
Refills: 0 | Status: DISCONTINUED | OUTPATIENT
Start: 2021-01-04 | End: 2021-01-09

## 2021-01-04 RX ADMIN — Medication 1 SPRAY(S): at 15:18

## 2021-01-04 RX ADMIN — Medication 1 MILLIGRAM(S): at 11:51

## 2021-01-04 RX ADMIN — INSULIN GLARGINE 10 UNIT(S): 100 INJECTION, SOLUTION SUBCUTANEOUS at 21:08

## 2021-01-04 RX ADMIN — BUDESONIDE AND FORMOTEROL FUMARATE DIHYDRATE 2 PUFF(S): 160; 4.5 AEROSOL RESPIRATORY (INHALATION) at 09:27

## 2021-01-04 RX ADMIN — Medication 1 TABLET(S): at 18:08

## 2021-01-04 RX ADMIN — Medication 1 SPRAY(S): at 18:09

## 2021-01-04 RX ADMIN — ATORVASTATIN CALCIUM 80 MILLIGRAM(S): 80 TABLET, FILM COATED ORAL at 21:08

## 2021-01-04 RX ADMIN — Medication 250 MILLIGRAM(S): at 11:50

## 2021-01-04 RX ADMIN — TIOTROPIUM BROMIDE 1 CAPSULE(S): 18 CAPSULE ORAL; RESPIRATORY (INHALATION) at 09:27

## 2021-01-04 RX ADMIN — QUETIAPINE FUMARATE 50 MILLIGRAM(S): 200 TABLET, FILM COATED ORAL at 21:08

## 2021-01-04 RX ADMIN — Medication 3 MILLIGRAM(S): at 21:08

## 2021-01-04 RX ADMIN — Medication 325 MILLIGRAM(S): at 11:51

## 2021-01-04 RX ADMIN — Medication 1 TABLET(S): at 06:14

## 2021-01-04 RX ADMIN — ENOXAPARIN SODIUM 40 MILLIGRAM(S): 100 INJECTION SUBCUTANEOUS at 11:51

## 2021-01-04 RX ADMIN — Medication 1 TABLET(S): at 11:51

## 2021-01-04 RX ADMIN — CEFTRIAXONE 100 MILLIGRAM(S): 500 INJECTION, POWDER, FOR SOLUTION INTRAMUSCULAR; INTRAVENOUS at 11:52

## 2021-01-04 RX ADMIN — LOSARTAN POTASSIUM 25 MILLIGRAM(S): 100 TABLET, FILM COATED ORAL at 06:19

## 2021-01-04 RX ADMIN — Medication 1 SPRAY(S): at 06:14

## 2021-01-04 RX ADMIN — Medication 1 SPRAY(S): at 06:16

## 2021-01-04 RX ADMIN — Medication 12.5 MILLIGRAM(S): at 18:09

## 2021-01-04 RX ADMIN — Medication 81 MILLIGRAM(S): at 11:50

## 2021-01-04 RX ADMIN — QUETIAPINE FUMARATE 25 MILLIGRAM(S): 200 TABLET, FILM COATED ORAL at 11:51

## 2021-01-04 RX ADMIN — Medication 12.5 MILLIGRAM(S): at 06:17

## 2021-01-04 RX ADMIN — BUDESONIDE AND FORMOTEROL FUMARATE DIHYDRATE 2 PUFF(S): 160; 4.5 AEROSOL RESPIRATORY (INHALATION) at 21:08

## 2021-01-04 RX ADMIN — Medication 40 MILLIGRAM(S): at 06:15

## 2021-01-04 NOTE — DISCHARGE NOTE PROVIDER - NSDCCPCAREPLAN_GEN_ALL_CORE_FT
PRINCIPAL DISCHARGE DIAGNOSIS  Diagnosis: Suspected 2019-nCoV infection  Assessment and Plan of Treatment: You have been diagnosed with the COVID-19 virus during your hospital stay. You must self quarantine to complete a 14 day time period.  Monitor for fevers, shortness of breath and cough primarily.  Monitor your temperature daily to not any changes and increases.    It has been determined that you no longer need hospitalization and can recover while remaining in self-quarantine at home. You should follow the prevention steps below until a healthcare provider or local or state health department says you can return to your normal activities.  1. You should restrict activities outside your home, except for getting medical care.  2. Do not go to work, school, or public areas.  3. Avoid using public transportation, ride-sharing, or taxis.  4. Separate yourself from other people and animals in your home.  5. Call ahead before visiting your doctor.  6. Wear a facemask.  7. Cover your coughs and sneezes.  8. Clean your hands often.  9. Avoid sharing personal household items.  10. Clean all “high-touch” surfaces everyday.  11. Monitor your symptoms.  If you have a medical emergency and need to call 911, notify the dispatch personnel that you have COVID-19 If possible, put on a facemask before emergency medical services arrive.  12. Stopping home isolation.  Patients with confirmed COVID-19 should remain under home isolation precautions for 14 days since the positive COVID-19 test and until the risk of secondary transmission to others is thought to be low. The decision to discontinue home isolation precautions should be made on a case-by-case basis, in consultation with healthcare providers and state and local health departments.  Your [facility] Department of Health can be reached at [phone] for further information about COVID-19.        SECONDARY DISCHARGE DIAGNOSES  Diagnosis: Type 2 diabetes mellitus without complication, with long-term current use of insulin  Assessment and Plan of Treatment: Continue your medication regimen and a consistent carbohydrate diet (Meaning eating the same amount of carbohydrates at the same time each day). Monitor blood glucose levels throughout the day before meals and at bedtime. Record blood sugars and bring to outpatient providers appointment in order to be reviewed by your doctor for management modifications. If your sugars are more than 400 or less than 70 you should contact your PCP immediately. Monitor for signs/symptoms of low blood glucose, such as, dizziness, altered mental status, or cool/clammy skin. In addition, monitor for signs/symptoms of high blood glucose, such as, feeling hot, dry, fatigued, or with increased thirst/urination. Make regular podiatry appointments in order to have feet checked for wounds and uncontrolled toe nail growth to prevent infections, as well as, appointments with an ophthalmologist to monitor your vision.      Diagnosis: H/O heart failure  Assessment and Plan of Treatment: Low salt diet, fluid restriction to 1500 ml daily, monitor your fluid intake and weight daily, exercise as tolerated 30 minutes daily, and follow up with your physician within 1 to 2 weeks.      Diagnosis: HLD (hyperlipidemia)  Assessment and Plan of Treatment: Continue cholesterol control medications. Continue a low salt/cholesterol diet. Follow up with your primary care doctor within 1 week of discharge for further management and monitoring of lipid and cholesterol panels. Please call to make an appointment      Diagnosis: COPD (chronic obstructive pulmonary disease)  Assessment and Plan of Treatment: Smoking cessation, continue current medications as prescribed. Follow up with your routine physician's appointments.

## 2021-01-04 NOTE — PROGRESS NOTE ADULT - PROBLEM SELECTOR PLAN 4
continue finger sticks with short acting insulin sliding scale  continue Lantus to 10 units as off dexamethasone

## 2021-01-04 NOTE — DISCHARGE NOTE PROVIDER - HOSPITAL COURSE
75y M PMHx HTN, DM2 (on Insulin), legally Blind, Dementia, COPD, HLD , recently Diagnosed HF at [facility], ARDS s/p Intubation 12/5 presents to [facility] from [facility] for SOB. + COVID PNA: s/p Remdesivir (12/24-12/28), dexamethasone (12/24-1/3)      COVID  CXR: Faint bibasilar and left midlung hazy opacity, which is nonspecific and can be seen in multifocal infection as well as pulmonary edema.  CT chest: Multifocal pneumonia.  Remdesivir (12/24-12/28), dexamethasone (12/24-1/3)  DVT ppx: Lovenox 40 daily    Elevated troponin  Trops 56 --> 46, CK, CKMB negative x2  currently chest pain free  EKG: NSR@89 w/ PAC Qtc 452   Cards following-  Likely Type 2 demand mediated ischemia   remains without chest pain or shortness of breath at present time. Given ectopy on Tele, added metoprolol 12.5 bid.     Dysuria  Abnormal UA, F/u ucx. Monitor off abx per Dr. Saleem    Type 2 diabetes mellitus   A1C 8.7    Confusion- resolved likely 2/2 COVID  recently diagnosed with dementia    COPD  continue home inhalers  on 20mg Prednisone  at home, c/w Decadron while in patient.     Anemia.   - H/H at preset  9.9/ 32.3  - No h/o Bleeding,  No Melena , Hemoptysis , hematochezia , epistaxis  iron studies noted  not really consistent with iron def.     H/O heart failure.    BNP: 246  Pt takes Lasix 40mg qd PO , c/w home medication   TTE outpatient    Dispo:    On_________, case was discussed with __________, patient is medically cleared and optimized for discharge today. All medications were reviewed with attending, and sent to mutually agreed upon pharmacy.       75y M PMHx HTN, DM2 (on Insulin), legally Blind, Dementia, COPD, HLD , [facility] Diagn[facility], ARDS s/p Intubation 12/5 presents to [facility] from NH for SOB. + COVID PNA: s/p Remdesivir (12/24-12/28), dexamethasone (12/24-1/3)    Hospital course:  Pt admitted with COVID. CXR: nonspecific faint bibasilar and left midlung hazy opacity. CT chest: Multifocal pneumonia. Remdesivir (12/24-12/28), dexamethasone (12/24-1/3). DVT ppx: Lovenox 40 daily    Elevated troponin- Trops 56 --> 46, CK, CKMB negative x2. Currently chest pain free. EKG: NSR@89 w/ PAC Qtc 452. Cards following and stated likely Type 2 demand mediated ischemia. Given ectopy on Tele, added metoprolol 12.5 bid.     Dysuria/ UTI - completed Ceftriaxone on 1/8     Type 2 diabetes mellitus - A1C 8.7    Confusion- resolved likely 2/2 COVID. Recently diagnosed with dementia    COPD- Pt will continue home inhalers. Pt DOES NOT TAKE PREDNISONE AT HOME (Confirmed with pt and pts wife).     H/O heart failure- BNP: 246. Pt takes Lasix 40mg qd PO , c/w home medication. TTE outpatient    Case discussed with Dr. Saleem, pt medically stable for discharge to rehab.

## 2021-01-04 NOTE — DISCHARGE NOTE PROVIDER - NSDCMRMEDTOKEN_GEN_ALL_CORE_FT
aspirin 81 mg oral tablet: orally once a day  Azithromycin 5 Day Dose Pack 250 mg oral tablet:   Colace 100 mg oral capsule: 3 cap(s) orally once a day (at bedtime)  Cozaar 25 mg oral tablet: 1 tab(s) orally once a day  DuoNeb 0.5 mg-2.5 mg/3 mL inhalation solution: 3 milliliter(s) inhaled 4 times a day, As Needed  ferrous sulfate 324 mg (65 mg elemental iron) oral delayed release tablet: 1 tab(s) orally once a day  fluticasone 50 mcg/inh inhalation powder: 1 puff(s) inhaled 2 times a day  folic acid 1 mg oral tablet: 1 tab(s) orally once a day  HumaLOG 100 units/mL injectable solution: Sliding scale  Incruse Ellipta 62.5 mcg/inh inhalation powder: 1 puff(s) inhaled every 24 hours  Lantus 100 units/mL subcutaneous solution: 25 unit(s) subcutaneous once a day (at bedtime)  Lasix 40 mg oral tablet: 1 tab(s) orally once a day  Lipitor 80 mg oral tablet: 1 tab(s) orally once a day  melatonin 1 mg oral tablet: 1 tab(s) orally once a day (at bedtime)  Metoprolol Succinate ER 25 mg oral tablet, extended release: 1 tab(s) orally once a day  Multiple Vitamins oral tablet: 1 tab(s) orally once a day  nystatin 100,000 units/g topical powder: Apply topically to affected area 2 times a day  Oyster Shell Calcium with Vitamin D 250 mg-125 intl units (3.125 mcg) oral tablet: 1 tab(s) orally 2 times a day  polyethylene glycol 3350 oral powder for reconstitution: orally once a day  predniSONE 20 mg oral tablet: 1 tab(s) orally once a day  Senna 8.6 mg oral tablet: 2 tab(s) orally once a day (at bedtime)  SEROquel 25 mg oral tablet: 1 tab(s) orally once a day  SEROquel 50 mg oral tablet: 1 tab(s) orally once a day (at bedtime)  Symbicort 160 mcg-4.5 mcg/inh inhalation aerosol: 2 puff(s) inhaled 2 times a day  Vitamin C 250 mg oral tablet: 1 tab(s) orally once a day   albuterol 90 mcg/inh inhalation aerosol: 1 puff(s) inhaled every 4 hours, As needed, Shortness of Breath and/or Wheezing  aspirin 81 mg oral tablet: orally once a day  Colace 100 mg oral capsule: 3 cap(s) orally once a day (at bedtime)  Cozaar 25 mg oral tablet: 1 tab(s) orally once a day  DuoNeb 0.5 mg-2.5 mg/3 mL inhalation solution: 3 milliliter(s) inhaled 4 times a day, As Needed  enoxaparin: 40 milligram(s) subcutaneous once a day  ferrous sulfate 324 mg (65 mg elemental iron) oral delayed release tablet: 1 tab(s) orally once a day  fluticasone 50 mcg/inh inhalation powder: 1 puff(s) inhaled 2 times a day  fluticasone 50 mcg/inh nasal spray: 1 spray(s) nasal 2 times a day  folic acid 1 mg oral tablet: 1 tab(s) orally once a day  Incruse Ellipta 62.5 mcg/inh inhalation powder: 1 puff(s) inhaled every 24 hours  insulin glargine: 10 unit(s) subcutaneous once a day (at bedtime)  Lasix 40 mg oral tablet: 1 tab(s) orally once a day  Lipitor 80 mg oral tablet: 1 tab(s) orally once a day  melatonin 1 mg oral tablet: 1 tab(s) orally once a day (at bedtime)  Metoprolol Succinate ER 25 mg oral tablet, extended release: 1 tab(s) orally once a day  Multiple Vitamins oral tablet: 1 tab(s) orally once a day  nystatin 100,000 units/g topical powder: Apply topically to affected area 2 times a day  Oyster Shell Calcium with Vitamin D 250 mg-125 intl units (3.125 mcg) oral tablet: 1 tab(s) orally 2 times a day  polyethylene glycol 3350 oral powder for reconstitution: orally once a day  Senna 8.6 mg oral tablet: 2 tab(s) orally once a day (at bedtime), As Needed  SEROquel 25 mg oral tablet: 1 tab(s) orally once a day  SEROquel 50 mg oral tablet: 1 tab(s) orally once a day (at bedtime)  Symbicort 160 mcg-4.5 mcg/inh inhalation aerosol: 2 puff(s) inhaled 2 times a day  Vitamin C 250 mg oral tablet: 1 tab(s) orally once a day

## 2021-01-04 NOTE — DIETITIAN INITIAL EVALUATION ADULT. - OTHER INFO
Unable to conduct a face to face interview or nutrition-focused physical exam due to limited contact restrictions related to Pt's medical condition and isolation precautions. Collateral information obtained from chart review. Unable to assess PO intake although it was noted in flow sheets that Pt's nutritional intake was adequate. No GI distress (nausea/vomiting/diarrhea/constipation) noted per chart. No chewing or swallowing difficulties at this time as per chart. Unable to assess wt history.  Pt has T2DM and is on inulin. Pt has a Stage 1 sacral pressure injury and incontinence associated dermatitis of the groin. Pt would benefit from 1 packet of No Carb Pro Source to increase his protein intake and help heal skin injuries.

## 2021-01-04 NOTE — DISCHARGE NOTE PROVIDER - NSDCFUADDINST_GEN_ALL_CORE_FT
You were found to be positive COVID 19 virus (diagnosed on    ). Please follow full instructions listed in your  paperwork. Please self quarantine at home for 14 days from discharge and stay 6 feet away minimum from other individuals and animals in your home. Do not go to work, school, public areas. Do not use public transportation, ride-sharing, or taxis. Restrict outside activity except for medical care.  Please call ahead if you have an appointment with your doctor to inform them of your COVID positive status. Always wear a facemask at home. Cover your sneezes and coughs, and wash hands with soap and water for at least 20 seconds frequently. Avoid sharing personal household items. Clean all "high-touch" surfaces where you contact frequently such as counters and doorknobs frequently.     It has been determined that you no longer need hospitalization and can recover while remaining in self-quarantine at home. You should follow the prevention steps below until a healthcare provider or local or state health department says you can return to your normal activities. Your [place] Department of Health can be reached at [phone] for further information about COVID-19.      If you have any worsening breathing, faster breathing or trouble with breathing call 911 immediately or your healthcare provider ahead of time; If possible wear a facemask before being seen by medical personel.  Take tylenol for your fevers.

## 2021-01-04 NOTE — CHART NOTE - NSCHARTNOTEFT_GEN_A_CORE
Spoke with son, Broderick Sifuentes and discussed transfer of patient to [facility] rehab facility that is accepting COVID+ patients  Mr Sifuentes would like to speak with his sister in regards to authorizing this transfer that is scheduled for tmw 1/5/21  Notified SW of the discussion via voice mail and that Mr Sifuentes would like a call from  to learn more about the [facility] rehab facility.      Sade Jose, BARNEY  Pager [phone]

## 2021-01-04 NOTE — DISCHARGE NOTE PROVIDER - CARE PROVIDER_API CALL
NOEMI DEWEY  Internal Medicine  [address] [place] [address]  Phone: [phone]  Fax: [phone]  Established Patient  Follow Up Time: 1 week

## 2021-01-04 NOTE — DIETITIAN INITIAL EVALUATION ADULT. - OBTAIN CURRENT WEIGHT
December 18, 2019          RE:   Pearl Cobos  [address] [place] [address]     This is a reminder that Pearl has appointments with Audiology and ENT on 12/31/19 at 9:45am.  Pearl will need to have her NECK XRAY done at least 3 days before this appointment.  This scan is performed in the radiology department on the first floor of the main clinic building.  Walk-ins are welcome for this type of scan, no appointment is needed.  Please call with any questions or concerns.     **Please be sure to update contact information at your next appointment as it appears the phone numbers listed in Pearl's chart are no longer current.     Thank you!          SIGNATURE: __________________________________________ 12/18/19                                            [facility]IA[facility]  [address] [address]  Phone: [phone]  Fax: [phone]  
yes

## 2021-01-05 LAB
-  AMIKACIN: SIGNIFICANT CHANGE UP
-  AMOXICILLIN/CLAVULANIC ACID: SIGNIFICANT CHANGE UP
-  AMPICILLIN/SULBACTAM: SIGNIFICANT CHANGE UP
-  AMPICILLIN: SIGNIFICANT CHANGE UP
-  AZTREONAM: SIGNIFICANT CHANGE UP
-  CEFAZOLIN: SIGNIFICANT CHANGE UP
-  CEFEPIME: SIGNIFICANT CHANGE UP
-  CEFOXITIN: SIGNIFICANT CHANGE UP
-  CEFTRIAXONE: SIGNIFICANT CHANGE UP
-  CIPROFLOXACIN: SIGNIFICANT CHANGE UP
-  ERTAPENEM: SIGNIFICANT CHANGE UP
-  GENTAMICIN: SIGNIFICANT CHANGE UP
-  LEVOFLOXACIN: SIGNIFICANT CHANGE UP
-  MEROPENEM: SIGNIFICANT CHANGE UP
-  NITROFURANTOIN: SIGNIFICANT CHANGE UP
-  PIPERACILLIN/TAZOBACTAM: SIGNIFICANT CHANGE UP
-  TOBRAMYCIN: SIGNIFICANT CHANGE UP
-  TRIMETHOPRIM/SULFAMETHOXAZOLE: SIGNIFICANT CHANGE UP
CULTURE RESULTS: SIGNIFICANT CHANGE UP
GLUCOSE BLDC GLUCOMTR-MCNC: 121 MG/DL — HIGH (ref 70–99)
GLUCOSE BLDC GLUCOMTR-MCNC: 185 MG/DL — HIGH (ref 70–99)
GLUCOSE BLDC GLUCOMTR-MCNC: 215 MG/DL — HIGH (ref 70–99)
METHOD TYPE: SIGNIFICANT CHANGE UP
ORGANISM # SPEC MICROSCOPIC CNT: SIGNIFICANT CHANGE UP
ORGANISM # SPEC MICROSCOPIC CNT: SIGNIFICANT CHANGE UP
SPECIMEN SOURCE: SIGNIFICANT CHANGE UP

## 2021-01-05 RX ORDER — INSULIN LISPRO 100/ML
0 VIAL (ML) SUBCUTANEOUS
Qty: 0 | Refills: 0 | DISCHARGE

## 2021-01-05 RX ORDER — AZITHROMYCIN 500 MG/1
0 TABLET, FILM COATED ORAL
Qty: 0 | Refills: 0 | DISCHARGE

## 2021-01-05 RX ORDER — SENNA PLUS 8.6 MG/1
2 TABLET ORAL
Qty: 0 | Refills: 0 | DISCHARGE

## 2021-01-05 RX ORDER — FLUTICASONE PROPIONATE 50 MCG
1 SPRAY, SUSPENSION NASAL
Qty: 0 | Refills: 0 | DISCHARGE
Start: 2021-01-05

## 2021-01-05 RX ORDER — INSULIN GLARGINE 100 [IU]/ML
10 INJECTION, SOLUTION SUBCUTANEOUS
Qty: 0 | Refills: 0 | DISCHARGE
Start: 2021-01-05

## 2021-01-05 RX ORDER — ENOXAPARIN SODIUM 100 MG/ML
40 INJECTION SUBCUTANEOUS
Qty: 0 | Refills: 0 | DISCHARGE
Start: 2021-01-05

## 2021-01-05 RX ORDER — INSULIN GLARGINE 100 [IU]/ML
25 INJECTION, SOLUTION SUBCUTANEOUS
Qty: 0 | Refills: 0 | DISCHARGE

## 2021-01-05 RX ORDER — ALBUTEROL 90 UG/1
1 AEROSOL, METERED ORAL
Qty: 0 | Refills: 0 | DISCHARGE
Start: 2021-01-05

## 2021-01-05 RX ADMIN — Medication 1 SPRAY(S): at 06:20

## 2021-01-05 RX ADMIN — Medication 1 MILLIGRAM(S): at 12:34

## 2021-01-05 RX ADMIN — QUETIAPINE FUMARATE 50 MILLIGRAM(S): 200 TABLET, FILM COATED ORAL at 22:11

## 2021-01-05 RX ADMIN — SENNA PLUS 2 TABLET(S): 8.6 TABLET ORAL at 22:10

## 2021-01-05 RX ADMIN — Medication 1 TABLET(S): at 18:00

## 2021-01-05 RX ADMIN — Medication 1 SPRAY(S): at 22:10

## 2021-01-05 RX ADMIN — ENOXAPARIN SODIUM 40 MILLIGRAM(S): 100 INJECTION SUBCUTANEOUS at 12:36

## 2021-01-05 RX ADMIN — Medication 12.5 MILLIGRAM(S): at 06:19

## 2021-01-05 RX ADMIN — Medication 1 SPRAY(S): at 12:37

## 2021-01-05 RX ADMIN — INSULIN GLARGINE 10 UNIT(S): 100 INJECTION, SOLUTION SUBCUTANEOUS at 22:31

## 2021-01-05 RX ADMIN — BUDESONIDE AND FORMOTEROL FUMARATE DIHYDRATE 2 PUFF(S): 160; 4.5 AEROSOL RESPIRATORY (INHALATION) at 12:35

## 2021-01-05 RX ADMIN — CEFTRIAXONE 100 MILLIGRAM(S): 500 INJECTION, POWDER, FOR SOLUTION INTRAMUSCULAR; INTRAVENOUS at 12:48

## 2021-01-05 RX ADMIN — POLYETHYLENE GLYCOL 3350 17 GRAM(S): 17 POWDER, FOR SOLUTION ORAL at 12:33

## 2021-01-05 RX ADMIN — BUDESONIDE AND FORMOTEROL FUMARATE DIHYDRATE 2 PUFF(S): 160; 4.5 AEROSOL RESPIRATORY (INHALATION) at 22:10

## 2021-01-05 RX ADMIN — Medication 250 MILLIGRAM(S): at 12:34

## 2021-01-05 RX ADMIN — Medication 81 MILLIGRAM(S): at 12:35

## 2021-01-05 RX ADMIN — Medication 1 SPRAY(S): at 18:00

## 2021-01-05 RX ADMIN — Medication 2: at 12:36

## 2021-01-05 RX ADMIN — Medication 325 MILLIGRAM(S): at 12:34

## 2021-01-05 RX ADMIN — ATORVASTATIN CALCIUM 80 MILLIGRAM(S): 80 TABLET, FILM COATED ORAL at 22:10

## 2021-01-05 RX ADMIN — Medication 12.5 MILLIGRAM(S): at 18:01

## 2021-01-05 RX ADMIN — Medication 40 MILLIGRAM(S): at 06:19

## 2021-01-05 RX ADMIN — Medication 1: at 18:00

## 2021-01-05 RX ADMIN — LOSARTAN POTASSIUM 25 MILLIGRAM(S): 100 TABLET, FILM COATED ORAL at 06:19

## 2021-01-05 RX ADMIN — TIOTROPIUM BROMIDE 1 CAPSULE(S): 18 CAPSULE ORAL; RESPIRATORY (INHALATION) at 12:34

## 2021-01-05 RX ADMIN — Medication 1 TABLET(S): at 06:19

## 2021-01-05 RX ADMIN — Medication 1 TABLET(S): at 12:34

## 2021-01-05 RX ADMIN — Medication 3 MILLIGRAM(S): at 22:10

## 2021-01-05 RX ADMIN — QUETIAPINE FUMARATE 25 MILLIGRAM(S): 200 TABLET, FILM COATED ORAL at 12:35

## 2021-01-05 NOTE — PROGRESS NOTE ADULT - PROBLEM SELECTOR PLAN 6
Pt tolerated injection of solumedrol 125mg to left ventrogluteal without difficulty; no adverse reaction noted   acceptable  continue home meds with hold parameters

## 2021-01-06 LAB
GLUCOSE BLDC GLUCOMTR-MCNC: 133 MG/DL — HIGH (ref 70–99)
GLUCOSE BLDC GLUCOMTR-MCNC: 138 MG/DL — HIGH (ref 70–99)
GLUCOSE BLDC GLUCOMTR-MCNC: 172 MG/DL — HIGH (ref 70–99)
GLUCOSE BLDC GLUCOMTR-MCNC: 195 MG/DL — HIGH (ref 70–99)
SARS-COV-2 RNA SPEC QL NAA+PROBE: DETECTED

## 2021-01-06 RX ADMIN — Medication 325 MILLIGRAM(S): at 12:50

## 2021-01-06 RX ADMIN — CEFTRIAXONE 100 MILLIGRAM(S): 500 INJECTION, POWDER, FOR SOLUTION INTRAMUSCULAR; INTRAVENOUS at 13:56

## 2021-01-06 RX ADMIN — POLYETHYLENE GLYCOL 3350 17 GRAM(S): 17 POWDER, FOR SOLUTION ORAL at 12:51

## 2021-01-06 RX ADMIN — Medication 1 MILLIGRAM(S): at 12:50

## 2021-01-06 RX ADMIN — Medication 12.5 MILLIGRAM(S): at 18:12

## 2021-01-06 RX ADMIN — Medication 40 MILLIGRAM(S): at 12:50

## 2021-01-06 RX ADMIN — Medication 1: at 12:33

## 2021-01-06 RX ADMIN — Medication 1: at 18:04

## 2021-01-06 RX ADMIN — SENNA PLUS 2 TABLET(S): 8.6 TABLET ORAL at 21:05

## 2021-01-06 RX ADMIN — INSULIN GLARGINE 10 UNIT(S): 100 INJECTION, SOLUTION SUBCUTANEOUS at 21:45

## 2021-01-06 RX ADMIN — Medication 1 TABLET(S): at 06:50

## 2021-01-06 RX ADMIN — Medication 81 MILLIGRAM(S): at 12:50

## 2021-01-06 RX ADMIN — Medication 1 TABLET(S): at 12:50

## 2021-01-06 RX ADMIN — Medication 1 SPRAY(S): at 21:07

## 2021-01-06 RX ADMIN — LOSARTAN POTASSIUM 25 MILLIGRAM(S): 100 TABLET, FILM COATED ORAL at 12:52

## 2021-01-06 RX ADMIN — ATORVASTATIN CALCIUM 80 MILLIGRAM(S): 80 TABLET, FILM COATED ORAL at 21:06

## 2021-01-06 RX ADMIN — Medication 1 SPRAY(S): at 12:51

## 2021-01-06 RX ADMIN — Medication 1 SPRAY(S): at 06:50

## 2021-01-06 RX ADMIN — Medication 1 SPRAY(S): at 18:08

## 2021-01-06 RX ADMIN — QUETIAPINE FUMARATE 50 MILLIGRAM(S): 200 TABLET, FILM COATED ORAL at 21:07

## 2021-01-06 RX ADMIN — Medication 250 MILLIGRAM(S): at 12:50

## 2021-01-06 RX ADMIN — Medication 1 TABLET(S): at 18:08

## 2021-01-06 RX ADMIN — BUDESONIDE AND FORMOTEROL FUMARATE DIHYDRATE 2 PUFF(S): 160; 4.5 AEROSOL RESPIRATORY (INHALATION) at 21:05

## 2021-01-06 RX ADMIN — QUETIAPINE FUMARATE 25 MILLIGRAM(S): 200 TABLET, FILM COATED ORAL at 12:50

## 2021-01-06 RX ADMIN — ENOXAPARIN SODIUM 40 MILLIGRAM(S): 100 INJECTION SUBCUTANEOUS at 12:50

## 2021-01-07 LAB
GLUCOSE BLDC GLUCOMTR-MCNC: 148 MG/DL — HIGH (ref 70–99)
GLUCOSE BLDC GLUCOMTR-MCNC: 176 MG/DL — HIGH (ref 70–99)
GLUCOSE BLDC GLUCOMTR-MCNC: 193 MG/DL — HIGH (ref 70–99)
GLUCOSE BLDC GLUCOMTR-MCNC: 214 MG/DL — HIGH (ref 70–99)

## 2021-01-07 RX ADMIN — Medication 1 SPRAY(S): at 16:54

## 2021-01-07 RX ADMIN — Medication 1 MILLIGRAM(S): at 11:48

## 2021-01-07 RX ADMIN — Medication 40 MILLIGRAM(S): at 07:02

## 2021-01-07 RX ADMIN — Medication 1 SPRAY(S): at 07:01

## 2021-01-07 RX ADMIN — Medication 81 MILLIGRAM(S): at 11:49

## 2021-01-07 RX ADMIN — Medication 1 TABLET(S): at 11:48

## 2021-01-07 RX ADMIN — CEFTRIAXONE 100 MILLIGRAM(S): 500 INJECTION, POWDER, FOR SOLUTION INTRAMUSCULAR; INTRAVENOUS at 11:46

## 2021-01-07 RX ADMIN — INSULIN GLARGINE 10 UNIT(S): 100 INJECTION, SOLUTION SUBCUTANEOUS at 22:13

## 2021-01-07 RX ADMIN — Medication 250 MILLIGRAM(S): at 11:49

## 2021-01-07 RX ADMIN — Medication 1 TABLET(S): at 16:53

## 2021-01-07 RX ADMIN — QUETIAPINE FUMARATE 25 MILLIGRAM(S): 200 TABLET, FILM COATED ORAL at 11:49

## 2021-01-07 RX ADMIN — Medication 1 TABLET(S): at 07:00

## 2021-01-07 RX ADMIN — LOSARTAN POTASSIUM 25 MILLIGRAM(S): 100 TABLET, FILM COATED ORAL at 07:16

## 2021-01-07 RX ADMIN — Medication 2: at 16:52

## 2021-01-07 RX ADMIN — Medication 1 SPRAY(S): at 07:00

## 2021-01-07 RX ADMIN — POLYETHYLENE GLYCOL 3350 17 GRAM(S): 17 POWDER, FOR SOLUTION ORAL at 11:48

## 2021-01-07 RX ADMIN — BUDESONIDE AND FORMOTEROL FUMARATE DIHYDRATE 2 PUFF(S): 160; 4.5 AEROSOL RESPIRATORY (INHALATION) at 20:59

## 2021-01-07 RX ADMIN — Medication 1: at 11:51

## 2021-01-07 RX ADMIN — ENOXAPARIN SODIUM 40 MILLIGRAM(S): 100 INJECTION SUBCUTANEOUS at 11:47

## 2021-01-07 RX ADMIN — Medication 1 SPRAY(S): at 20:59

## 2021-01-07 RX ADMIN — BUDESONIDE AND FORMOTEROL FUMARATE DIHYDRATE 2 PUFF(S): 160; 4.5 AEROSOL RESPIRATORY (INHALATION) at 08:56

## 2021-01-07 RX ADMIN — Medication 1 SPRAY(S): at 12:37

## 2021-01-07 RX ADMIN — Medication 325 MILLIGRAM(S): at 11:48

## 2021-01-07 RX ADMIN — TIOTROPIUM BROMIDE 1 CAPSULE(S): 18 CAPSULE ORAL; RESPIRATORY (INHALATION) at 09:04

## 2021-01-07 RX ADMIN — Medication 12.5 MILLIGRAM(S): at 16:54

## 2021-01-07 RX ADMIN — QUETIAPINE FUMARATE 50 MILLIGRAM(S): 200 TABLET, FILM COATED ORAL at 22:13

## 2021-01-07 RX ADMIN — SENNA PLUS 2 TABLET(S): 8.6 TABLET ORAL at 20:59

## 2021-01-07 RX ADMIN — Medication 12.5 MILLIGRAM(S): at 07:16

## 2021-01-07 RX ADMIN — ATORVASTATIN CALCIUM 80 MILLIGRAM(S): 80 TABLET, FILM COATED ORAL at 20:59

## 2021-01-08 DIAGNOSIS — N39.0 URINARY TRACT INFECTION, SITE NOT SPECIFIED: ICD-10-CM

## 2021-01-08 LAB
ANION GAP SERPL CALC-SCNC: 14 MMOL/L — SIGNIFICANT CHANGE UP (ref 7–14)
BUN SERPL-MCNC: 11 MG/DL — SIGNIFICANT CHANGE UP (ref 7–23)
CALCIUM SERPL-MCNC: 8.7 MG/DL — SIGNIFICANT CHANGE UP (ref 8.4–10.5)
CHLORIDE SERPL-SCNC: 99 MMOL/L — SIGNIFICANT CHANGE UP (ref 98–107)
CO2 SERPL-SCNC: 23 MMOL/L — SIGNIFICANT CHANGE UP (ref 22–31)
CREAT SERPL-MCNC: 0.65 MG/DL — SIGNIFICANT CHANGE UP (ref 0.5–1.3)
GLUCOSE BLDC GLUCOMTR-MCNC: 116 MG/DL — HIGH (ref 70–99)
GLUCOSE BLDC GLUCOMTR-MCNC: 140 MG/DL — HIGH (ref 70–99)
GLUCOSE BLDC GLUCOMTR-MCNC: 155 MG/DL — HIGH (ref 70–99)
GLUCOSE BLDC GLUCOMTR-MCNC: 201 MG/DL — HIGH (ref 70–99)
GLUCOSE SERPL-MCNC: 116 MG/DL — HIGH (ref 70–99)
HCT VFR BLD CALC: 28 % — LOW (ref 39–50)
HGB BLD-MCNC: 8.7 G/DL — LOW (ref 13–17)
MCHC RBC-ENTMCNC: 21.7 PG — LOW (ref 27–34)
MCHC RBC-ENTMCNC: 31.1 GM/DL — LOW (ref 32–36)
MCV RBC AUTO: 69.8 FL — LOW (ref 80–100)
NRBC # BLD: 0 /100 WBCS — SIGNIFICANT CHANGE UP
NRBC # FLD: 0 K/UL — SIGNIFICANT CHANGE UP
PLATELET # BLD AUTO: 244 K/UL — SIGNIFICANT CHANGE UP (ref 150–400)
POTASSIUM SERPL-MCNC: 3.9 MMOL/L — SIGNIFICANT CHANGE UP (ref 3.5–5.3)
POTASSIUM SERPL-SCNC: 3.9 MMOL/L — SIGNIFICANT CHANGE UP (ref 3.5–5.3)
RBC # BLD: 4.01 M/UL — LOW (ref 4.2–5.8)
RBC # FLD: 19.9 % — HIGH (ref 10.3–14.5)
SODIUM SERPL-SCNC: 136 MMOL/L — SIGNIFICANT CHANGE UP (ref 135–145)
WBC # BLD: 13.73 K/UL — HIGH (ref 3.8–10.5)
WBC # FLD AUTO: 13.73 K/UL — HIGH (ref 3.8–10.5)

## 2021-01-08 PROCEDURE — 71045 X-RAY EXAM CHEST 1 VIEW: CPT | Mod: 26

## 2021-01-08 RX ADMIN — Medication 1 SPRAY(S): at 16:12

## 2021-01-08 RX ADMIN — Medication 325 MILLIGRAM(S): at 10:25

## 2021-01-08 RX ADMIN — Medication 12.5 MILLIGRAM(S): at 16:13

## 2021-01-08 RX ADMIN — ENOXAPARIN SODIUM 40 MILLIGRAM(S): 100 INJECTION SUBCUTANEOUS at 10:27

## 2021-01-08 RX ADMIN — LOSARTAN POTASSIUM 25 MILLIGRAM(S): 100 TABLET, FILM COATED ORAL at 04:37

## 2021-01-08 RX ADMIN — Medication 1 SPRAY(S): at 10:24

## 2021-01-08 RX ADMIN — BUDESONIDE AND FORMOTEROL FUMARATE DIHYDRATE 2 PUFF(S): 160; 4.5 AEROSOL RESPIRATORY (INHALATION) at 09:16

## 2021-01-08 RX ADMIN — QUETIAPINE FUMARATE 25 MILLIGRAM(S): 200 TABLET, FILM COATED ORAL at 10:25

## 2021-01-08 RX ADMIN — Medication 81 MILLIGRAM(S): at 10:25

## 2021-01-08 RX ADMIN — Medication 250 MILLIGRAM(S): at 10:25

## 2021-01-08 RX ADMIN — Medication 1 TABLET(S): at 10:26

## 2021-01-08 RX ADMIN — Medication 1 MILLIGRAM(S): at 10:25

## 2021-01-08 RX ADMIN — Medication 1 TABLET(S): at 16:13

## 2021-01-08 RX ADMIN — ATORVASTATIN CALCIUM 80 MILLIGRAM(S): 80 TABLET, FILM COATED ORAL at 23:40

## 2021-01-08 RX ADMIN — CEFTRIAXONE 100 MILLIGRAM(S): 500 INJECTION, POWDER, FOR SOLUTION INTRAMUSCULAR; INTRAVENOUS at 10:26

## 2021-01-08 RX ADMIN — Medication 1 SPRAY(S): at 23:35

## 2021-01-08 RX ADMIN — BUDESONIDE AND FORMOTEROL FUMARATE DIHYDRATE 2 PUFF(S): 160; 4.5 AEROSOL RESPIRATORY (INHALATION) at 23:34

## 2021-01-08 RX ADMIN — SENNA PLUS 2 TABLET(S): 8.6 TABLET ORAL at 23:35

## 2021-01-08 RX ADMIN — TIOTROPIUM BROMIDE 1 CAPSULE(S): 18 CAPSULE ORAL; RESPIRATORY (INHALATION) at 09:16

## 2021-01-08 RX ADMIN — Medication 40 MILLIGRAM(S): at 04:37

## 2021-01-08 RX ADMIN — INSULIN GLARGINE 10 UNIT(S): 100 INJECTION, SOLUTION SUBCUTANEOUS at 23:33

## 2021-01-08 RX ADMIN — Medication 1 SPRAY(S): at 04:37

## 2021-01-08 RX ADMIN — Medication 1 TABLET(S): at 04:37

## 2021-01-08 RX ADMIN — Medication 2: at 12:02

## 2021-01-08 NOTE — PROGRESS NOTE ADULT - PROBLEM SELECTOR PLAN 3
continue finger sticks with short acting insulin sliding scale  continue Lantus 10 units qhs  finger sticks acceptable

## 2021-01-09 LAB
GLUCOSE BLDC GLUCOMTR-MCNC: 125 MG/DL — HIGH (ref 70–99)
GLUCOSE BLDC GLUCOMTR-MCNC: 178 MG/DL — HIGH (ref 70–99)
GLUCOSE BLDC GLUCOMTR-MCNC: 211 MG/DL — HIGH (ref 70–99)
GLUCOSE BLDC GLUCOMTR-MCNC: 226 MG/DL — HIGH (ref 70–99)

## 2021-01-09 RX ADMIN — Medication 325 MILLIGRAM(S): at 12:00

## 2021-01-09 RX ADMIN — Medication 1 SPRAY(S): at 21:34

## 2021-01-09 RX ADMIN — Medication 3 MILLIGRAM(S): at 21:53

## 2021-01-09 RX ADMIN — TIOTROPIUM BROMIDE 1 CAPSULE(S): 18 CAPSULE ORAL; RESPIRATORY (INHALATION) at 09:35

## 2021-01-09 RX ADMIN — QUETIAPINE FUMARATE 50 MILLIGRAM(S): 200 TABLET, FILM COATED ORAL at 21:52

## 2021-01-09 RX ADMIN — Medication 1 SPRAY(S): at 17:47

## 2021-01-09 RX ADMIN — Medication 1 TABLET(S): at 06:02

## 2021-01-09 RX ADMIN — Medication 1 MILLIGRAM(S): at 11:59

## 2021-01-09 RX ADMIN — QUETIAPINE FUMARATE 50 MILLIGRAM(S): 200 TABLET, FILM COATED ORAL at 00:33

## 2021-01-09 RX ADMIN — ENOXAPARIN SODIUM 40 MILLIGRAM(S): 100 INJECTION SUBCUTANEOUS at 12:00

## 2021-01-09 RX ADMIN — Medication 1 TABLET(S): at 11:59

## 2021-01-09 RX ADMIN — INSULIN GLARGINE 10 UNIT(S): 100 INJECTION, SOLUTION SUBCUTANEOUS at 22:55

## 2021-01-09 RX ADMIN — Medication 1 SPRAY(S): at 13:32

## 2021-01-09 RX ADMIN — POLYETHYLENE GLYCOL 3350 17 GRAM(S): 17 POWDER, FOR SOLUTION ORAL at 12:00

## 2021-01-09 RX ADMIN — LOSARTAN POTASSIUM 25 MILLIGRAM(S): 100 TABLET, FILM COATED ORAL at 12:02

## 2021-01-09 RX ADMIN — BUDESONIDE AND FORMOTEROL FUMARATE DIHYDRATE 2 PUFF(S): 160; 4.5 AEROSOL RESPIRATORY (INHALATION) at 09:13

## 2021-01-09 RX ADMIN — QUETIAPINE FUMARATE 25 MILLIGRAM(S): 200 TABLET, FILM COATED ORAL at 12:01

## 2021-01-09 RX ADMIN — BUDESONIDE AND FORMOTEROL FUMARATE DIHYDRATE 2 PUFF(S): 160; 4.5 AEROSOL RESPIRATORY (INHALATION) at 21:52

## 2021-01-09 RX ADMIN — Medication 1 SPRAY(S): at 06:02

## 2021-01-09 RX ADMIN — Medication 2: at 12:18

## 2021-01-09 RX ADMIN — Medication 1: at 17:41

## 2021-01-09 RX ADMIN — Medication 250 MILLIGRAM(S): at 11:59

## 2021-01-09 RX ADMIN — Medication 40 MILLIGRAM(S): at 06:02

## 2021-01-09 RX ADMIN — Medication 81 MILLIGRAM(S): at 12:00

## 2021-01-09 RX ADMIN — Medication 1 TABLET(S): at 17:47

## 2021-01-09 RX ADMIN — ATORVASTATIN CALCIUM 80 MILLIGRAM(S): 80 TABLET, FILM COATED ORAL at 21:52

## 2021-01-09 NOTE — PROVIDER CONTACT NOTE (OTHER) - RECOMMENDATIONS
Provider to follow up,
Contact provider, hold morning BP medications and continue to monitor for developing s/s.
Contact provider, hold morning lopressor and continue to monitor for developing s/s.
Contact provider and continue to monitor for developing s/s.
Haldol
Hold second dose of haldol. Continue to monitor.
Continue to monitor.

## 2021-01-09 NOTE — PROVIDER CONTACT NOTE (OTHER) - ACTION/TREATMENT ORDERED:
Administer melatonin for agitation, hold haldol. Continue to monitor.
Reschedule losartan and furosemide.
No orders made by provider at this time.
Provider made aware. Morning BP medications to be rescheuled for later time. Will continue to monitor for developing s/s.
Administer Haldol as per orders. Continue to monitor.
ACP notified. Continue to monitor.
No treatment needed, Will continue to monitor
Provider made aware. Morning lopressor to be rescheduled for later time. Will continue to monitor for developing s/s.
Provider made aware. No further interventions at this time. Will continue to monitor for developing s/s.

## 2021-01-09 NOTE — PROVIDER CONTACT NOTE (OTHER) - REASON
BP 99/49
Patient had 3 beats of v tach.
Patient had 4 beats of V-Tach
/53
Pt /58
pt HR dipped to 44 momentarily
Patient's oral temp is 99.5F. Unable to obtain rectal at this time due to agitation.
Pt /52
Pt /57

## 2021-01-09 NOTE — PROVIDER CONTACT NOTE (OTHER) - SITUATION
patient had 7 beats of V-tach, no distress noted, patient denies complaints, VS wnl, PA made aware, K+ and mag replacements, will continue to monitor
Pt /52
Pt /57
BP 99/49
Tele tech called to report that pt had 4 beats of V-Tach.
pt HR dipped to 44 momentarily then immediately returned to mid 60s.
Patient /53. Patient has standing orders of losartan and furosemide with hold parameters for SBP <100.
Patient's oral temp is 99.5F. Unable to obtain rectal at this time due to agitation.
Pt /58
Patient had 3 beats of v tach.

## 2021-01-09 NOTE — PROVIDER CONTACT NOTE (OTHER) - DATE AND TIME:
28-Dec-2020
02-Jan-2021 06:00
25-Dec-2020 22:35
24-Dec-2020 23:20
25-Dec-2020 23:20
29-Dec-2020 06:35
01-Jan-2021 01:18
07-Jan-2021 21:15
08-Jan-2021 04:33
09-Jan-2021 06:05

## 2021-01-09 NOTE — PROVIDER CONTACT NOTE (OTHER) - BACKGROUND
75 year old male admitted for Covid-19. PMH of HLD, HTN and dementia.
75 year old male admitted for covid19.
Pt is a 75 yr old male w/ pmhx of HTN, HLD and heart failure p/w shortness of breath
Pt is a 75 yr old male w/ pmhx of HTN, HLD and heart failure p/w shortness of breath
Patient admitted due to covid, PMH of HTN and heart failure
Pt is a 75 yr old male w/ pmhx of HTN, HLD and heart failure p/w shortness of breath
75 year old male admitted for Covid-19. PMH of HLD, HTN and dementia.

## 2021-01-09 NOTE — PROVIDER CONTACT NOTE (OTHER) - NAME OF MD/NP/PA/DO NOTIFIED:
?   Goldie Ashley   #[id number]
Provider Mitchel Campuzano
Laura Garcia, ACP
Provider Mitchel Campuzano
ACP, [place] [address], [id number]
WILLIS Cordova
Laura Garcia, ACP
Radha Suero
Provider Mitchel Campuzano
WILLIS Bolden

## 2021-01-09 NOTE — PROVIDER CONTACT NOTE (OTHER) - ASSESSMENT
Patient resting in bed with no s/s of distress.
pt made no complaints. Pt sleeping at time of event, No s/s of distress noted at this time.
Patient asymptomatic - denies lightheadedness and dizziness.
Patient asymptomatic, resting comfortably. No distress noted, no complaint of pain
Patient had 3 beats of v tach. Patient A&Ox1- confused and agitated. Denies chest pain and pain.
Pt is AxO 2/3 at baseline. Other vital signs stable. Pt denies headache, dizziness, chest pain and shortness of breath. Pt denies any other s/s. Pt appears to be resting comfortably in bed.
Pt is AxO 2 at baseline. Other vital signs stable. Pt denies headache, dizziness, chest pain and shortness of breath. Pt denies any other s/s. Pt appears to be resting comfortably in bed.
Pt is AxO 2 at baseline. Other vital signs stable. Pt denies headache, dizziness, chest pain and shortness of breath. Pt denies any other s/s. Pt appears to be resting comfortably in bed.
Patient's oral temp is 99.5F. Unable to obtain rectal at this time due to agitation. Patient is A&Ox1 and agitated. Patient is attempting to get out of bed and getting combative.

## 2021-01-10 LAB
BASOPHILS # BLD AUTO: 0.03 K/UL — SIGNIFICANT CHANGE UP (ref 0–0.2)
BASOPHILS NFR BLD AUTO: 0.3 % — SIGNIFICANT CHANGE UP (ref 0–2)
EOSINOPHIL # BLD AUTO: 0.19 K/UL — SIGNIFICANT CHANGE UP (ref 0–0.5)
EOSINOPHIL NFR BLD AUTO: 2 % — SIGNIFICANT CHANGE UP (ref 0–6)
GLUCOSE BLDC GLUCOMTR-MCNC: 127 MG/DL — HIGH (ref 70–99)
GLUCOSE BLDC GLUCOMTR-MCNC: 156 MG/DL — HIGH (ref 70–99)
GLUCOSE BLDC GLUCOMTR-MCNC: 186 MG/DL — HIGH (ref 70–99)
GLUCOSE BLDC GLUCOMTR-MCNC: 189 MG/DL — HIGH (ref 70–99)
HCT VFR BLD CALC: 29.4 % — LOW (ref 39–50)
HGB BLD-MCNC: 9.1 G/DL — LOW (ref 13–17)
IANC: 7.59 K/UL — SIGNIFICANT CHANGE UP (ref 1.5–8.5)
IMM GRANULOCYTES NFR BLD AUTO: 0.5 % — SIGNIFICANT CHANGE UP (ref 0–1.5)
LYMPHOCYTES # BLD AUTO: 0.96 K/UL — LOW (ref 1–3.3)
LYMPHOCYTES # BLD AUTO: 9.9 % — LOW (ref 13–44)
MCHC RBC-ENTMCNC: 21.7 PG — LOW (ref 27–34)
MCHC RBC-ENTMCNC: 31 GM/DL — LOW (ref 32–36)
MCV RBC AUTO: 70 FL — LOW (ref 80–100)
MONOCYTES # BLD AUTO: 0.81 K/UL — SIGNIFICANT CHANGE UP (ref 0–0.9)
MONOCYTES NFR BLD AUTO: 8.4 % — SIGNIFICANT CHANGE UP (ref 2–14)
NEUTROPHILS # BLD AUTO: 7.66 K/UL — HIGH (ref 1.8–7.4)
NEUTROPHILS NFR BLD AUTO: 78.9 % — HIGH (ref 43–77)
NRBC # BLD: 0 /100 WBCS — SIGNIFICANT CHANGE UP
NRBC # FLD: 0 K/UL — SIGNIFICANT CHANGE UP
PLATELET # BLD AUTO: 274 K/UL — SIGNIFICANT CHANGE UP (ref 150–400)
RBC # BLD: 4.2 M/UL — SIGNIFICANT CHANGE UP (ref 4.2–5.8)
RBC # FLD: 19.7 % — HIGH (ref 10.3–14.5)
SARS-COV-2 RNA SPEC QL NAA+PROBE: DETECTED
WBC # BLD: 9.7 K/UL — SIGNIFICANT CHANGE UP (ref 3.8–10.5)
WBC # FLD AUTO: 9.7 K/UL — SIGNIFICANT CHANGE UP (ref 3.8–10.5)

## 2021-01-10 RX ADMIN — INSULIN GLARGINE 10 UNIT(S): 100 INJECTION, SOLUTION SUBCUTANEOUS at 23:03

## 2021-01-10 RX ADMIN — Medication 1 MILLIGRAM(S): at 12:18

## 2021-01-10 RX ADMIN — LOSARTAN POTASSIUM 25 MILLIGRAM(S): 100 TABLET, FILM COATED ORAL at 06:21

## 2021-01-10 RX ADMIN — Medication 1 TABLET(S): at 12:16

## 2021-01-10 RX ADMIN — Medication 1 SPRAY(S): at 22:18

## 2021-01-10 RX ADMIN — Medication 1: at 17:25

## 2021-01-10 RX ADMIN — Medication 3 MILLIGRAM(S): at 22:19

## 2021-01-10 RX ADMIN — Medication 1 TABLET(S): at 17:20

## 2021-01-10 RX ADMIN — Medication 1 SPRAY(S): at 17:20

## 2021-01-10 RX ADMIN — ATORVASTATIN CALCIUM 80 MILLIGRAM(S): 80 TABLET, FILM COATED ORAL at 22:17

## 2021-01-10 RX ADMIN — Medication 1 TABLET(S): at 06:18

## 2021-01-10 RX ADMIN — Medication 250 MILLIGRAM(S): at 12:16

## 2021-01-10 RX ADMIN — Medication 1 SPRAY(S): at 06:15

## 2021-01-10 RX ADMIN — BUDESONIDE AND FORMOTEROL FUMARATE DIHYDRATE 2 PUFF(S): 160; 4.5 AEROSOL RESPIRATORY (INHALATION) at 23:02

## 2021-01-10 RX ADMIN — Medication 1: at 11:58

## 2021-01-10 RX ADMIN — Medication 40 MILLIGRAM(S): at 06:19

## 2021-01-10 RX ADMIN — BUDESONIDE AND FORMOTEROL FUMARATE DIHYDRATE 2 PUFF(S): 160; 4.5 AEROSOL RESPIRATORY (INHALATION) at 22:16

## 2021-01-10 RX ADMIN — QUETIAPINE FUMARATE 25 MILLIGRAM(S): 200 TABLET, FILM COATED ORAL at 12:19

## 2021-01-10 RX ADMIN — QUETIAPINE FUMARATE 50 MILLIGRAM(S): 200 TABLET, FILM COATED ORAL at 22:18

## 2021-01-10 RX ADMIN — Medication 1 SPRAY(S): at 06:19

## 2021-01-10 RX ADMIN — Medication 325 MILLIGRAM(S): at 12:17

## 2021-01-10 RX ADMIN — Medication 12.5 MILLIGRAM(S): at 17:20

## 2021-01-10 RX ADMIN — TIOTROPIUM BROMIDE 1 CAPSULE(S): 18 CAPSULE ORAL; RESPIRATORY (INHALATION) at 09:30

## 2021-01-10 RX ADMIN — POLYETHYLENE GLYCOL 3350 17 GRAM(S): 17 POWDER, FOR SOLUTION ORAL at 12:18

## 2021-01-10 RX ADMIN — ENOXAPARIN SODIUM 40 MILLIGRAM(S): 100 INJECTION SUBCUTANEOUS at 12:18

## 2021-01-10 RX ADMIN — BUDESONIDE AND FORMOTEROL FUMARATE DIHYDRATE 2 PUFF(S): 160; 4.5 AEROSOL RESPIRATORY (INHALATION) at 09:28

## 2021-01-10 RX ADMIN — Medication 81 MILLIGRAM(S): at 12:18

## 2021-01-10 RX ADMIN — Medication 1 SPRAY(S): at 12:19

## 2021-01-11 LAB
GLUCOSE BLDC GLUCOMTR-MCNC: 124 MG/DL — HIGH (ref 70–99)
GLUCOSE BLDC GLUCOMTR-MCNC: 128 MG/DL — HIGH (ref 70–99)
GLUCOSE BLDC GLUCOMTR-MCNC: 160 MG/DL — HIGH (ref 70–99)
GLUCOSE BLDC GLUCOMTR-MCNC: 174 MG/DL — HIGH (ref 70–99)

## 2021-01-11 RX ADMIN — Medication 325 MILLIGRAM(S): at 12:11

## 2021-01-11 RX ADMIN — ATORVASTATIN CALCIUM 80 MILLIGRAM(S): 80 TABLET, FILM COATED ORAL at 20:58

## 2021-01-11 RX ADMIN — Medication 1 SPRAY(S): at 05:23

## 2021-01-11 RX ADMIN — Medication 12.5 MILLIGRAM(S): at 18:24

## 2021-01-11 RX ADMIN — Medication 1 TABLET(S): at 12:11

## 2021-01-11 RX ADMIN — QUETIAPINE FUMARATE 25 MILLIGRAM(S): 200 TABLET, FILM COATED ORAL at 12:11

## 2021-01-11 RX ADMIN — Medication 1 TABLET(S): at 18:21

## 2021-01-11 RX ADMIN — Medication 40 MILLIGRAM(S): at 05:15

## 2021-01-11 RX ADMIN — BUDESONIDE AND FORMOTEROL FUMARATE DIHYDRATE 2 PUFF(S): 160; 4.5 AEROSOL RESPIRATORY (INHALATION) at 20:58

## 2021-01-11 RX ADMIN — Medication 81 MILLIGRAM(S): at 12:11

## 2021-01-11 RX ADMIN — TIOTROPIUM BROMIDE 1 CAPSULE(S): 18 CAPSULE ORAL; RESPIRATORY (INHALATION) at 10:00

## 2021-01-11 RX ADMIN — Medication 1 MILLIGRAM(S): at 12:12

## 2021-01-11 RX ADMIN — Medication 1: at 12:10

## 2021-01-11 RX ADMIN — BUDESONIDE AND FORMOTEROL FUMARATE DIHYDRATE 2 PUFF(S): 160; 4.5 AEROSOL RESPIRATORY (INHALATION) at 09:58

## 2021-01-11 RX ADMIN — QUETIAPINE FUMARATE 50 MILLIGRAM(S): 200 TABLET, FILM COATED ORAL at 20:59

## 2021-01-11 RX ADMIN — Medication 250 MILLIGRAM(S): at 12:12

## 2021-01-11 RX ADMIN — Medication 1 SPRAY(S): at 12:12

## 2021-01-11 RX ADMIN — POLYETHYLENE GLYCOL 3350 17 GRAM(S): 17 POWDER, FOR SOLUTION ORAL at 12:11

## 2021-01-11 RX ADMIN — ENOXAPARIN SODIUM 40 MILLIGRAM(S): 100 INJECTION SUBCUTANEOUS at 12:12

## 2021-01-11 RX ADMIN — Medication 1 SPRAY(S): at 18:25

## 2021-01-11 RX ADMIN — Medication 3 MILLIGRAM(S): at 21:00

## 2021-01-11 RX ADMIN — LOSARTAN POTASSIUM 25 MILLIGRAM(S): 100 TABLET, FILM COATED ORAL at 05:15

## 2021-01-11 RX ADMIN — SENNA PLUS 2 TABLET(S): 8.6 TABLET ORAL at 20:59

## 2021-01-11 RX ADMIN — Medication 1 TABLET(S): at 05:15

## 2021-01-11 RX ADMIN — INSULIN GLARGINE 10 UNIT(S): 100 INJECTION, SOLUTION SUBCUTANEOUS at 22:16

## 2021-01-12 LAB
GLUCOSE BLDC GLUCOMTR-MCNC: 104 MG/DL — HIGH (ref 70–99)
GLUCOSE BLDC GLUCOMTR-MCNC: 130 MG/DL — HIGH (ref 70–99)
GLUCOSE BLDC GLUCOMTR-MCNC: 152 MG/DL — HIGH (ref 70–99)
GLUCOSE BLDC GLUCOMTR-MCNC: 185 MG/DL — HIGH (ref 70–99)

## 2021-01-12 RX ADMIN — POLYETHYLENE GLYCOL 3350 17 GRAM(S): 17 POWDER, FOR SOLUTION ORAL at 12:39

## 2021-01-12 RX ADMIN — Medication 250 MILLIGRAM(S): at 12:41

## 2021-01-12 RX ADMIN — Medication 1 TABLET(S): at 06:58

## 2021-01-12 RX ADMIN — Medication 81 MILLIGRAM(S): at 12:41

## 2021-01-12 RX ADMIN — Medication 1 SPRAY(S): at 17:51

## 2021-01-12 RX ADMIN — INSULIN GLARGINE 10 UNIT(S): 100 INJECTION, SOLUTION SUBCUTANEOUS at 22:57

## 2021-01-12 RX ADMIN — Medication 1 SPRAY(S): at 12:41

## 2021-01-12 RX ADMIN — Medication 12.5 MILLIGRAM(S): at 07:15

## 2021-01-12 RX ADMIN — Medication 40 MILLIGRAM(S): at 06:58

## 2021-01-12 RX ADMIN — Medication 1: at 12:38

## 2021-01-12 RX ADMIN — BUDESONIDE AND FORMOTEROL FUMARATE DIHYDRATE 2 PUFF(S): 160; 4.5 AEROSOL RESPIRATORY (INHALATION) at 08:57

## 2021-01-12 RX ADMIN — Medication 1 SPRAY(S): at 21:06

## 2021-01-12 RX ADMIN — Medication 1 MILLIGRAM(S): at 12:40

## 2021-01-12 RX ADMIN — LOSARTAN POTASSIUM 25 MILLIGRAM(S): 100 TABLET, FILM COATED ORAL at 06:59

## 2021-01-12 RX ADMIN — Medication 1 TABLET(S): at 17:51

## 2021-01-12 RX ADMIN — Medication 1 TABLET(S): at 12:40

## 2021-01-12 RX ADMIN — TIOTROPIUM BROMIDE 1 CAPSULE(S): 18 CAPSULE ORAL; RESPIRATORY (INHALATION) at 08:57

## 2021-01-12 RX ADMIN — Medication 1: at 17:49

## 2021-01-12 RX ADMIN — Medication 10 MILLIGRAM(S): at 12:46

## 2021-01-12 RX ADMIN — ATORVASTATIN CALCIUM 80 MILLIGRAM(S): 80 TABLET, FILM COATED ORAL at 21:06

## 2021-01-12 RX ADMIN — ENOXAPARIN SODIUM 40 MILLIGRAM(S): 100 INJECTION SUBCUTANEOUS at 12:42

## 2021-01-12 RX ADMIN — Medication 325 MILLIGRAM(S): at 12:39

## 2021-01-12 RX ADMIN — BUDESONIDE AND FORMOTEROL FUMARATE DIHYDRATE 2 PUFF(S): 160; 4.5 AEROSOL RESPIRATORY (INHALATION) at 21:05

## 2021-01-12 RX ADMIN — QUETIAPINE FUMARATE 50 MILLIGRAM(S): 200 TABLET, FILM COATED ORAL at 21:06

## 2021-01-12 RX ADMIN — QUETIAPINE FUMARATE 25 MILLIGRAM(S): 200 TABLET, FILM COATED ORAL at 12:39

## 2021-01-13 ENCOUNTER — TRANSCRIPTION ENCOUNTER (OUTPATIENT)
Age: 76
End: 2021-01-13

## 2021-01-13 VITALS
RESPIRATION RATE: 18 BRPM | SYSTOLIC BLOOD PRESSURE: 102 MMHG | OXYGEN SATURATION: 100 % | DIASTOLIC BLOOD PRESSURE: 61 MMHG | TEMPERATURE: 99 F | HEART RATE: 88 BPM

## 2021-01-13 LAB
GLUCOSE BLDC GLUCOMTR-MCNC: 156 MG/DL — HIGH (ref 70–99)
GLUCOSE BLDC GLUCOMTR-MCNC: 157 MG/DL — HIGH (ref 70–99)
GLUCOSE BLDC GLUCOMTR-MCNC: 81 MG/DL — SIGNIFICANT CHANGE UP (ref 70–99)

## 2021-01-13 RX ADMIN — Medication 1 TABLET(S): at 05:40

## 2021-01-13 RX ADMIN — Medication 1 SPRAY(S): at 05:39

## 2021-01-13 RX ADMIN — POLYETHYLENE GLYCOL 3350 17 GRAM(S): 17 POWDER, FOR SOLUTION ORAL at 12:22

## 2021-01-13 RX ADMIN — Medication 1 SPRAY(S): at 13:11

## 2021-01-13 RX ADMIN — QUETIAPINE FUMARATE 25 MILLIGRAM(S): 200 TABLET, FILM COATED ORAL at 12:21

## 2021-01-13 RX ADMIN — LOSARTAN POTASSIUM 25 MILLIGRAM(S): 100 TABLET, FILM COATED ORAL at 05:40

## 2021-01-13 RX ADMIN — Medication 1 TABLET(S): at 17:45

## 2021-01-13 RX ADMIN — Medication 1 SPRAY(S): at 05:40

## 2021-01-13 RX ADMIN — Medication 1 MILLIGRAM(S): at 12:21

## 2021-01-13 RX ADMIN — Medication 1 SPRAY(S): at 18:47

## 2021-01-13 RX ADMIN — TIOTROPIUM BROMIDE 1 CAPSULE(S): 18 CAPSULE ORAL; RESPIRATORY (INHALATION) at 09:29

## 2021-01-13 RX ADMIN — Medication 40 MILLIGRAM(S): at 05:40

## 2021-01-13 RX ADMIN — Medication 12.5 MILLIGRAM(S): at 05:40

## 2021-01-13 RX ADMIN — Medication 325 MILLIGRAM(S): at 12:21

## 2021-01-13 RX ADMIN — Medication 1 TABLET(S): at 12:21

## 2021-01-13 RX ADMIN — BUDESONIDE AND FORMOTEROL FUMARATE DIHYDRATE 2 PUFF(S): 160; 4.5 AEROSOL RESPIRATORY (INHALATION) at 09:30

## 2021-01-13 RX ADMIN — Medication 1: at 17:45

## 2021-01-13 RX ADMIN — Medication 250 MILLIGRAM(S): at 12:21

## 2021-01-13 RX ADMIN — Medication 1: at 12:21

## 2021-01-13 NOTE — DISCHARGE NOTE NURSING/CASE MANAGEMENT/SOCIAL WORK - NSDPFAC_GEN_ALL_CORE
[facility], [address] [place] [address], P:[phone] ext:[phone] [facility], [address] [place] [address], P:[phone]

## 2021-01-13 NOTE — PROGRESS NOTE ADULT - PROBLEM SELECTOR PROBLEM 1
COVID-19
Pneumonia due to COVID-19 virus
COVID-19
Pneumonia due to COVID-19 virus
Pneumonia due to COVID-19 virus
COVID-19
Pneumonia due to COVID-19 virus
COVID-19
Pneumonia due to COVID-19 virus
COVID-19
Pneumonia due to COVID-19 virus
COVID-19
Pneumonia due to COVID-19 virus
COVID-19
COVID-19
Pneumonia due to COVID-19 virus

## 2021-01-13 NOTE — PROGRESS NOTE ADULT - REASON FOR ADMISSION

## 2021-01-13 NOTE — PROGRESS NOTE ADULT - ASSESSMENT
75M with history as above who presents from [facility] with worsening hypoxia in setting of new COVID19 PNA.
75M with history as above who presents from [facility] with worsening hypoxia in setting of new COVID19 PNA.
75M with history as above who presents from [facility] with worsening hypoxia in setting of new COVID19 PNA.
This is a 75M with history as above who presents from [facility] with worsening hypoxia in setting of new COVID19 PNA.     Covid pneumonia:  COPD:  DM  Anemia:   HTN :   Dermatitis:       12/26:    Covid pneumonia: on rem and dexa:requiring only 1 L of oxygen : vbg on adm showed mild hypercarbic resp fialure: also has copd: His inflammatory markers are high and d dimer has slighlty uptrended:need to be follwed up daily   COPD: not wheezing: cont BD: monitor for increasing co2: abg IN am   DM: mONITOR AND CONTROL  Anemia: MONITOR H/H  HTN : CONTROLLED  Dermatitis: PER pmd  dvt PROPAHYXLIS    12/28:    Covid pneumonia: clinically better  more alert and awake and oriented: on rem and dexa: oN ROOM AIR  COPD: not wheezing: cont BD: monitor for increasing co2: no abg today l; check VBG in am    DM: mONITOR AND CONTROL  Anemia: MONITOR H/H  HTN : CONTROLLED: AWAITING ECHO   Dermatitis: PER pmd  dvt PROPAHYXLIS  DW NP      12/29:    Covid pneumonia: clinically better  more alert and awake and oriented: on rem and dexa: on room air today btu wants to use oxygen for comfort: : finish rx for covid according to protocol:Hs LFTS are getting little worse: and d d darrick has increased mildly:   COPD: not wheezing: cont BD: monitor for increasing co2: no abg today l; yesterdays his VBG was Ok without any retention of pco2:    DM: monitor AND CONTROL  Anemia: MONITOR H/H  HTN : CONTROLLED: AWAITING ECHO   Dermatitis: PER pmd  dvt PROPAHYXLIS  DW team    12/30:  Covid pneumonia: clinically better  more alert and awake and oriented: fnished rem and on dexa: on room air today but wants to use oxygen for comfort: now oxygen decreased to 0: : and d d darrick has increased mildly: follow LFTs : erre increased till yesterday   COPD: not wheezinng: doing pretty good:  DM: monitor AND CONTROL  Anemia: MONITOR H/H  HTN : CONTROLLED: AWAITING ECHO   Dermatitis: PER pmd  dvt PROPAHYXLIS  DW team    12/31:  Covid pneumonia: clinically better  more alert and awake and oriented: on room air: add afrin:  off oxygen   COPD: not wheezing doing pretty good:  DM: monitor AND CONTROL  Anemia: MONITOR H/H  HTN : CONTROLLED: AWAITING ECHO   Dermatitis: PER pmd  dvt Prophylaxis  DW team    1/1/21:    Covid pneumonia: clinically better  more alert and awake and oriented: on room air: add afrin:  off oxygen : doing much better  COPD: not wheezing doing pretty good:  DM: monitor AND CONTROL  Anemia: MONITOR H/H  HTN : CONTROLLED: : has demand ischemia: per cardiology   Dermatitis: PER pmd  dvt Prophylaxis  DW rn    1/3/21:    Covid pneumonia: clinically better  more alert and awake and oriented: on room air: awaiting negative covid   COPD: not wheezing doing pretty good:cont Symbicort   DM: monitor AND CONTROL  Anemia: MONITOR H/H  HTN : CONTROLLED: : has demand ischemia: per cardiology : no chest pain  Dermatitis: PER pmd  dvt Prophylaxis  DW DORIAN Ly    1/4/21    Covid pneumonia: clinically better  more alert and awake and oriented: on 4l today  ? why : rpt covid is positive still: check d dimer  today  : there is no reason he should be on oxygen : whole night hewas on room air with good o2 sao2: dc leighton samsongen and recheck sao2:  MARGOTH saulon:   COPD: not wheezing doing pretty good: cont Symbicort   DM: monitor AND CONTROL  Anemia: MONITOR H/H  HTN : CONTROLLED: : has demand ischemia: per cardiology : no chest pain  Dermatitis: PER pmd  dvt Prophylaxis  MARGOTH Jimenez    1/5/21:      Covid pneumonia: clinically better  more alert and awake and oriented: on room air today : seems to be doing well: still covid positive   COPD: not wheezing doing pretty good: cont Symbicort   DM: monitor AND CONTROL  Anemia: MONITOR H/H  HTN : CONTROLLED: : has demand ischemia: per cardiology : no chest pain  Dermatitis: PER pmd  UTI: ON CEFRTIAONE: P MIRABILIS   dvt Prophylaxis  MARGOTH Ordaz    1/6/21:  Covid pneumonia: clinically better  more alert and awake and oriented: on room air today : seems to be doing well: still covid positive: no evetsn opverngiht: remained stable pulm wise:    COPD: not wheezing doing pretty good: cont Symbicort   DM: monitor AND CONTROL  Anemia: MONITOR H/H  HTN : CONTROLLED: : has demand ischemia: per cardiology : no chest pain  Dermatitis: PER pmd  UTI: ON CEFTRIAXONE P MIRABILIS   dvt Prophylaxis  DW PMD        
This is a 75M with history as above who presents from [facility] with worsening hypoxia in setting of new COVID19 PNA. - multifocal pna 
This is a 75M with history as above who presents from [facility] with worsening hypoxia in setting of new COVID19 PNA.     Covid pneumonia:  COPD:  DM  Anemia:   HTN :   Dermatitis:       12/26:    Covid pneumonia: on rem and dexa:requiring only 1 L of oxygen : vbg on adm showed mild hypercarbic resp fialure: also has copd: His inflammatory markers are high and d dimer has slighlty uptrended:need to be follwed up daily   COPD: not wheezing: cont BD: monitor for increasing co2: abg IN am   DM: mONITOR AND CONTROL  Anemia: MONITOR H/H  HTN : CONTROLLED  Dermatitis: PER pmd  dvt PROPAHYXLIS    12/27:    Covid pneumonia: clinically better  more alert and awake and oriented: on rem and dexa: oN ROOM AIR  COPD: not wheezing: cont BD: monitor for increasing co2: no abg today l; check VBG in am    DM: mONITOR AND CONTROL  Anemia: MONITOR H/H  HTN : CONTROLLED: AWAITING ECHO   Dermatitis: PER pmd  dvt PROPVLAD JUSTIN NP  
This is a 75M with history as above who presents from [facility] with worsening hypoxia in setting of new COVID19 PNA.     Covid pneumonia:  COPD:  DM  Anemia:   HTN :   Dermatitis:       12/26:    Covid pneumonia: on rem and dexa:requiring only 1 L of oxygen : vbg on adm showed mild hypercarbic resp fialure: also has copd: His inflammatory markers are high and d dimer has slighlty uptrended:need to be follwed up daily   COPD: not wheezing: cont BD: monitor for increasing co2: abg IN am   DM: mONITOR AND CONTROL  Anemia: MONITOR H/H  HTN : CONTROLLED  Dermatitis: PER pmd  dvt PROPAHYXLIS    12/28:    Covid pneumonia: clinically better  more alert and awake and oriented: on rem and dexa: oN ROOM AIR  COPD: not wheezing: cont BD: monitor for increasing co2: no abg today l; check VBG in am    DM: mONITOR AND CONTROL  Anemia: MONITOR H/H  HTN : CONTROLLED: AWAITING ECHO   Dermatitis: PER pmd  dvt PROPAHYXLIS  DW NP      12/29:    Covid pneumonia: clinically better  more alert and awake and oriented: on rem and dexa: on room air today btu wants to use oxygen for comfort: : finish rx for covid according to protocol:Hs LFTS are getting little worse: and d d darrick has increased mildly:   COPD: not wheezing: cont BD: monitor for increasing co2: no abg today l; yesterdays his VBG was Ok without any retention of pco2:    DM: monitor AND CONTROL  Anemia: MONITOR H/H  HTN : CONTROLLED: AWAITING ECHO   Dermatitis: PER pmd  dvt PROPAHYXLIS  DW team    12/30:  Covid pneumonia: clinically better  more alert and awake and oriented: fnished rem and on dexa: on room air today but wants to use oxygen for comfort: now oxygen decreased to 0: : and d d darrick has increased mildly: follow LFTs : erre increased till yesterday   COPD: not wheezinng: doing pretty good:  DM: monitor AND CONTROL  Anemia: MONITOR H/H  HTN : CONTROLLED: AWAITING ECHO   Dermatitis: PER pmd  dvt PROPAHYXLIS  DW team    12/31:  Covid pneumonia: clinically better  more alert and awake and oriented: on room air: add afrin:  off oxygen   COPD: not wheezing doing pretty good:  DM: monitor AND CONTROL  Anemia: MONITOR H/H  HTN : CONTROLLED: AWAITING ECHO   Dermatitis: PER pmd  dvt Prophylaxis  DW team    1/1/21:    Covid pneumonia: clinically better  more alert and awake and oriented: on room air: add afrin:  off oxygen : doing much better  COPD: not wheezing doing pretty good:  DM: monitor AND CONTROL  Anemia: MONITOR H/H  HTN : CONTROLLED: : has demand ischemia: per cardiology   Dermatitis: PER pmd  dvt Prophylaxis  MARGOTH rn    1/3/21:    Covid pneumonia: clinically better  more alert and awake and oriented: on room air: awaiting negative covid   COPD: not wheezing doing pretty good:cont Symbicort   DM: monitor AND CONTROL  Anemia: MONITOR H/H  HTN : CONTROLLED: : has demand ischemia: per cardiology : no chest pain  Dermatitis: PER pmd  dvt Prophylaxis  MARGOTH Ly    1/4/21    Covid pneumonia: clinically better  more alert and awake and oriented: on 4l today  ? why : rpt covid is positive still: check d dimer  today  : there is no reason he should be on oxygen : whole night hewas on room air with good o2 sao2: dc leighton xygen and recheck sao2:  MARGOTH saulon:   COPD: not wheezing doing pretty good: cont Symbicort   DM: monitor AND CONTROL  Anemia: MONITOR H/H  HTN : CONTROLLED: : has demand ischemia: per cardiology : no chest pain  Dermatitis: PER pmd  dvt Prophylaxis  MARGOTH Jimenez    1/5/21:      Covid pneumonia: clinically better  more alert and awake and oriented: on room air today : seems to be doing well: still covid positive   COPD: not wheezing doing pretty good: cont Symbicort   DM: monitor AND CONTROL  Anemia: MONITOR H/H  HTN : CONTROLLED: : has demand ischemia: per cardiology : no chest pain  Dermatitis: PER pmd  UTI: ON CEFRTIAONE: P MIRABILIS   dvt Prophylaxis  MARGOTH Ordaz    1/6/21:  Covid pneumonia: clinically better  more alert and awake and oriented: on room air today : seems to be doing well: still covid positive: no evetsn opverngiht: remained stable pulm wise:    COPD: not wheezing doing pretty good: cont Symbicort   DM: monitor AND CONTROL  Anemia: MONITOR H/H  HTN : CONTROLLED: : has demand ischemia: per cardiology : no chest pain  Dermatitis: PER pmd  UTI: ON CEFTRIAXONE P MIRABILIS   dvt Prophylaxis  DW PMD      1/7/21:    Covid pneumonia: clinically better  more alert and awake and oriented: on room air today : seems to be doing well: still covid positive: no events overnight remained stable pulm wise:  still positive on 6th : awaiting negative covid   COPD: not wheezing doing pretty good: cont Symbicort   DM: monitor AND CONTROL  Anemia: MONITOR H/H  HTN : CONTROLLED: : has demand ischemia: per cardiology : no chest pain  Dermatitis: PER pmd  UTI: ON CEFTRIAXONE P MIRABILIS   dvt Prophylaxis    1/8/21:    Covid pneumonia: clinically better  more alert and awake and oriented: on 2L of oxygen  : seems to be doing well: still covid positive: no events overnight remained stable pulm wise:  still positive on 6th : awaiting negative covid   COPD: not wheezing doing pretty good: cont Symbicort   DM: monitor AND CONTROL  Anemia: MONITOR H/H  HTN : CONTROLLED: : has demand ischemia: per cardiology : no chest pain  Dermatitis: PER pmd  UTI: ON CEFTRIAXONE P MIRABILIS   dvt     DW PMD    1/9/21:    Covid pneumonia: clinically better  more alert and awake and oriented: on 2L of oxygen-o2 sao2 100%  : seems to be doing well: still covid positive: no events overnight remained stable pulm wise:  still positive on 6th : awaiting negative covid : recheck check covid   COPD: not wheezing doing pretty good: cont Symbicort   DM: Controlled  Anemia: MONITOR H/H  HTN : CONTROLLED: : has demand ischemia: per cardiology : no chest pain  Dermatitis: PER pmd  UTI: Finished ceftriaxone:   dvt     MARGOTH Knapp    
This is a 75M with history as above who presents from [facility] with worsening hypoxia in setting of new COVID19 PNA now resolved 
This is a 75M with history as above who presents from [facility] with worsening hypoxia in setting of new COVID19 PNA now resolved 
This is a 75M with history as above who presents from [facility] with worsening hypoxia in setting of new COVID19 PNA now resolved 
This is a 75M with history as above who presents from [facility] with worsening hypoxia in setting of new COVID19 PNA.     Covid pneumonia:  COPD:  DM  Anemia:   HTN :   Dermatitis:       12/26:    Covid pneumonia: on rem and dexa:requiring only 1 L of oxygen : vbg on adm showed mild hypercarbic resp fialure: also has copd: His inflammatory markers are high and d dimer has slighlty uptrended:need to be follwed up daily   COPD: not wheezing: cont BD: monitor for increasing co2: abg IN am   DM: mONITOR AND CONTROL  Anemia: MONITOR H/H  HTN : CONTROLLED  Dermatitis: PER pmd  dvt PROPAHYXLIS    12/28:    Covid pneumonia: clinically better  more alert and awake and oriented: on rem and dexa: oN ROOM AIR  COPD: not wheezing: cont BD: monitor for increasing co2: no abg today l; check VBG in am    DM: mONITOR AND CONTROL  Anemia: MONITOR H/H  HTN : CONTROLLED: AWAITING ECHO   Dermatitis: PER pmd  dvt PROPAHYXLIS  DW NP      12/29:    Covid pneumonia: clinically better  more alert and awake and oriented: on rem and dexa: on room air today btu wants to use oxygen for comfort: : finish rx for covid according to protocol:Hs LFTS are getting little worse: and d d darrick has increased mildly:   COPD: not wheezing: cont BD: monitor for increasing co2: no abg today l; yesterdays his VBG was Ok without any retention of pco2:    DM: monitor AND CONTROL  Anemia: MONITOR H/H  HTN : CONTROLLED: AWAITING ECHO   Dermatitis: PER pmd  dvt PROPAHYXLIS  DW team    12/30:  Covid pneumonia: clinically better  more alert and awake and oriented: fnished rem and on dexa: on room air today but wants to use oxygen for comfort: now oxygen decreased to 0: : and d d darrick has increased mildly: follow LFTs : erre increased till yesterday   COPD: not wheezinng: doing pretty good:  DM: monitor AND CONTROL  Anemia: MONITOR H/H  HTN : CONTROLLED: AWAITING ECHO   Dermatitis: PER pmd  dvt PROPAHYXLIS  DW team    12/31:  Covid pneumonia: clinically better  more alert and awake and oriented: on room air: add afrin:  off oxygen   COPD: not wheezing doing pretty good:  DM: monitor AND CONTROL  Anemia: MONITOR H/H  HTN : CONTROLLED: AWAITING ECHO   Dermatitis: PER pmd  dvt Prophylaxis  DW team      
This is a 75M with history as above who presents from [facility] with worsening hypoxia in setting of new COVID19 PNA.     Covid pneumonia:  COPD:  DM  Anemia:   HTN :   Dermatitis:       12/26:    Covid pneumonia: on rem and dexa:requiring only 1 L of oxygen : vbg on adm showed mild hypercarbic resp fialure: also has copd: His inflammatory markers are high and d dimer has slighlty uptrended:need to be follwed up daily   COPD: not wheezing: cont BD: monitor for increasing co2: abg IN am   DM: mONITOR AND CONTROL  Anemia: MONITOR H/H  HTN : CONTROLLED  Dermatitis: PER pmd  dvt PROPAHYXLIS    12/28:    Covid pneumonia: clinically better  more alert and awake and oriented: on rem and dexa: oN ROOM AIR  COPD: not wheezing: cont BD: monitor for increasing co2: no abg today l; check VBG in am    DM: mONITOR AND CONTROL  Anemia: MONITOR H/H  HTN : CONTROLLED: AWAITING ECHO   Dermatitis: PER pmd  dvt PROPAHYXLIS  DW NP      12/29:    Covid pneumonia: clinically better  more alert and awake and oriented: on rem and dexa: on room air today btu wants to use oxygen for comfort: : finish rx for covid according to protocol:Hs LFTS are getting little worse: and d d darrick has increased mildly:   COPD: not wheezing: cont BD: monitor for increasing co2: no abg today l; yesterdays his VBG was Ok without any retention of pco2:    DM: monitor AND CONTROL  Anemia: MONITOR H/H  HTN : CONTROLLED: AWAITING ECHO   Dermatitis: PER pmd  dvt PROPAHYXLIS  DW team    12/30:  Covid pneumonia: clinically better  more alert and awake and oriented: fnished rem and on dexa: on room air today but wants to use oxygen for comfort: now oxygen decreased to 0: : and d d darrick has increased mildly: follow LFTs : erre increased till yesterday   COPD: not wheezinng: doing pretty good:  DM: monitor AND CONTROL  Anemia: MONITOR H/H  HTN : CONTROLLED: AWAITING ECHO   Dermatitis: PER pmd  dvt PROPAHYXLIS  DW team    12/31:  Covid pneumonia: clinically better  more alert and awake and oriented: on room air: add afrin:  off oxygen   COPD: not wheezing doing pretty good:  DM: monitor AND CONTROL  Anemia: MONITOR H/H  HTN : CONTROLLED: AWAITING ECHO   Dermatitis: PER pmd  dvt Prophylaxis  DW team    1/1/21:    Covid pneumonia: clinically better  more alert and awake and oriented: on room air: add afrin:  off oxygen : doing much better  COPD: not wheezing doing pretty good:  DM: monitor AND CONTROL  Anemia: MONITOR H/H  HTN : CONTROLLED: : has demand ischemia: per cardiology   Dermatitis: PER pmd  dvt Prophylaxis  MARGOTH rn    1/3/21:    Covid pneumonia: clinically better  more alert and awake and oriented: on room air: awaiting negative covid   COPD: not wheezing doing pretty good:cont Symbicort   DM: monitor AND CONTROL  Anemia: MONITOR H/H  HTN : CONTROLLED: : has demand ischemia: per cardiology : no chest pain  Dermatitis: PER pmd  dvt Prophylaxis  MARGOTH Ly    1/4/21    Covid pneumonia: clinically better  more alert and awake and oriented: on 4l today  ? why : rpt covid is positive still: check d dimer  today  : there is no reason he should be on oxygen : whole night hewas on room air with good o2 sao2: dc leighton xygen and recheck sao2:  MARGOTH saulon:   COPD: not wheezing doing pretty good: cont Symbicort   DM: monitor AND CONTROL  Anemia: MONITOR H/H  HTN : CONTROLLED: : has demand ischemia: per cardiology : no chest pain  Dermatitis: PER pmd  dvt Prophylaxis  MARGOTH Jimenez    1/5/21:      Covid pneumonia: clinically better  more alert and awake and oriented: on room air today : seems to be doing well: still covid positive   COPD: not wheezing doing pretty good: cont Symbicort   DM: monitor AND CONTROL  Anemia: MONITOR H/H  HTN : CONTROLLED: : has demand ischemia: per cardiology : no chest pain  Dermatitis: PER pmd  UTI: ON CEFRTIAONE: P MIRABILIS   dvt Prophylaxis  MARGOTH Ordaz    1/6/21:  Covid pneumonia: clinically better  more alert and awake and oriented: on room air today : seems to be doing well: still covid positive: no evetsn opverngiht: remained stable pulm wise:    COPD: not wheezing doing pretty good: cont Symbicort   DM: monitor AND CONTROL  Anemia: MONITOR H/H  HTN : CONTROLLED: : has demand ischemia: per cardiology : no chest pain  Dermatitis: PER pmd  UTI: ON CEFTRIAXONE P MIRABILIS   dvt Prophylaxis  DW PMD      1/7/21:    Covid pneumonia: clinically better  more alert and awake and oriented: on room air today : seems to be doing well: still covid positive: no events overnight remained stable pulm wise:  still positive on 6th : awaiting negative covid   COPD: not wheezing doing pretty good: cont Symbicort   DM: monitor AND CONTROL  Anemia: MONITOR H/H  HTN : CONTROLLED: : has demand ischemia: per cardiology : no chest pain  Dermatitis: PER pmd  UTI: ON CEFTRIAXONE P MIRABILIS   dvt Prophylaxis    1/8/21:    Covid pneumonia: clinically better  more alert and awake and oriented: on 2L of oxygen  : seems to be doing well: still covid positive: no events overnight remained stable pulm wise:  still positive on 6th : awaiting negative covid   COPD: not wheezing doing pretty good: cont Symbicort   DM: monitor AND CONTROL  Anemia: MONITOR H/H  HTN : CONTROLLED: : has demand ischemia: per cardiology : no chest pain  Dermatitis: PER pmd  UTI: ON CEFTRIAXONE P MIRABILIS   dvt     DW PMD    1/9/21:    Covid pneumonia: clinically better  more alert and awake and oriented: on 2L of oxygen-o2 sao2 100%  : seems to be doing well: still covid positive: no events overnight remained stable pulm wise:  still positive on 6th : awaiting negative covid : recheck check covid   COPD: not wheezing doing pretty good: cont Symbicort   DM: Controlled  Anemia: MONITOR H/H  HTN : CONTROLLED: : has demand ischemia: per cardiology : no chest pain  Dermatitis: PER pmd  UTI: Finished ceftriaxone:   dvt      Aria    1/10/21'  Covid pneumonia: clinically better  more alert and awake and oriented: on 2L of oxygen-o2 sao2 100%  : seems to be doing well: still covid positive: no events overnight remained stable pulm wise:  still positive on 6th :   COPD: not wheezing doing pretty good: cont Symbicort   DM: Controlled  Anemia: MONITOR H/H  HTN : CONTROLLED: : has demand ischemia: per cardiology : no chest pain  Dermatitis: PER pmd  UTI: Finished ceftriaxone:   dvt     [facility]: await repeat covid to be negative:  he looks stable    1/11/21:  Covid pneumonia: clinically better  more alert and awake and oriented: on 2L of oxygen-o2 sao2 100%  : seems to be doing well: still covid positive: no events overnight remained stable pulm wise:  still positive on 10th :   COPD: not wheezing doing pretty good: cont Symbicort   DM: Controlled  Anemia: MONITOR H/H  HTN : CONTROLLED: : has demand ischemia: per cardiology : no chest pain  Dermatitis: PER pmd  UTI: Finished ceftriaxone:   dvt     DW pmd await repeat covid to be negative:  he looks stable    1/12:    Covid pneumonia: clinically better  more alert and awake and oriented: on 2L of oxygen-o2 sao2 100%  : seems to be doing well: still covid positive: no events overnight remained stable pulm wise:  still positive on 10th :   COPD: not wheezing doing pretty good: cont Symbicort   DM: Controlled  Anemia: MONITOR H/H  HTN : CONTROLLED: : has demand ischemia: per cardiology : no chest pain  Dermatitis: PER pmd  UTI: Finished ceftriaxone:   dvt     DW pmd         
This is a 75M with history as above who presents from [facility] with worsening hypoxia in setting of new COVID19 PNA.     Covid pneumonia:  COPD:  DM  Anemia:   HTN :   Dermatitis:       12/26:    Covid pneumonia: on rem and dexa:requiring only 1 L of oxygen : vbg on adm showed mild hypercarbic resp fialure: also has copd: His inflammatory markers are high and d dimer has slighlty uptrended:need to be follwed up daily   COPD: not wheezing: cont BD: monitor for increasing co2: abg IN am   DM: mONITOR AND CONTROL  Anemia: MONITOR H/H  HTN : CONTROLLED  Dermatitis: PER pmd  dvt PROPAHYXLIS    12/28:    Covid pneumonia: clinically better  more alert and awake and oriented: on rem and dexa: oN ROOM AIR  COPD: not wheezing: cont BD: monitor for increasing co2: no abg today l; check VBG in am    DM: mONITOR AND CONTROL  Anemia: MONITOR H/H  HTN : CONTROLLED: AWAITING ECHO   Dermatitis: PER pmd  dvt PROPAHYXLIS  DW NP      12/29:    Covid pneumonia: clinically better  more alert and awake and oriented: on rem and dexa: on room air today btu wants to use oxygen for comfort: : finish rx for covid according to protocol:Hs LFTS are getting little worse: and d d darrick has increased mildly:   COPD: not wheezing: cont BD: monitor for increasing co2: no abg today l; yesterdays his VBG was Ok without any retention of pco2:    DM: monitor AND CONTROL  Anemia: MONITOR H/H  HTN : CONTROLLED: AWAITING ECHO   Dermatitis: PER pmd  dvt PROPAHYXLIS  DW team    12/30:  Covid pneumonia: clinically better  more alert and awake and oriented: fnished rem and on dexa: on room air today but wants to use oxygen for comfort: now oxygen decreased to 0: : and d d darrick has increased mildly: follow LFTs : erre increased till yesterday   COPD: not wheezinng: doing pretty good:  DM: monitor AND CONTROL  Anemia: MONITOR H/H  HTN : CONTROLLED: AWAITING ECHO   Dermatitis: PER pmd  dvt PROPAHYXLIS  DW team    12/31:  Covid pneumonia: clinically better  more alert and awake and oriented: on room air: add afrin:  off oxygen   COPD: not wheezing doing pretty good:  DM: monitor AND CONTROL  Anemia: MONITOR H/H  HTN : CONTROLLED: AWAITING ECHO   Dermatitis: PER pmd  dvt Prophylaxis  MARGOTH team    1/1/21:    Covid pneumonia: clinically better  more alert and awake and oriented: on room air: add afrin:  off oxygen : doing much better  COPD: not wheezing doing pretty good:  DM: monitor AND CONTROL  Anemia: MONITOR H/H  HTN : CONTROLLED: : has demand ischemia: per cardiology   Dermatitis: PER pmd  dvt Prophylaxis  DW rn    1/3/21:    Covid pneumonia: clinically better  more alert and awake and oriented: on room air: awaiting negative covid   COPD: not wheezing doing pretty good:cont Symbicort   DM: monitor AND CONTROL  Anemia: MONITOR H/H  HTN : CONTROLLED: : has demand ischemia: per cardiology : no chest pain  Dermatitis: PER pmd  dvt Prophylaxis  MARGOTH Ly    1/4/21    Covid pneumonia: clinically better  more alert and awake and oriented: on 4l today  ? why : rpt covid is positive still: check d dimer  today  : there is no reason he should be on oxygen : whole night hewas on room air with good o2 sao2: dc leighton xygen and recheck sao2:  MARGOTH saulon:   COPD: not wheezing doing pretty good: cont Symbicort   DM: monitor AND CONTROL  Anemia: MONITOR H/H  HTN : CONTROLLED: : has demand ischemia: per cardiology : no chest pain  Dermatitis: PER pmd  dvt Prophylaxis  MARGOTH Jimenez    1/5/21:      Covid pneumonia: clinically better  more alert and awake and oriented: on room air today : seems to be doing well: still covid positive   COPD: not wheezing doing pretty good: cont Symbicort   DM: monitor AND CONTROL  Anemia: MONITOR H/H  HTN : CONTROLLED: : has demand ischemia: per cardiology : no chest pain  Dermatitis: PER pmd  UTI: ON CEFRTIAONE: P MIRABILIS   dvt Prophylaxis  MARGOTH Ordaz    
This is a 75M with history as above who presents from [facility] with worsening hypoxia in setting of new COVID19 PNA
75M with history as above who presents from [facility] with worsening hypoxia in setting of new COVID19 PNA.
75M with history as above who presents from [facility] with worsening hypoxia in setting of new COVID19 PNA.
This is a 75M with history as above who presents from [facility] with worsening hypoxia in setting of new COVID19 PNA.     Covid pneumonia:  COPD:  DM  Anemia:   HTN :   Dermatitis:       12/26:    Covid pneumonia: on rem and dexa:requiring only 1 L of oxygen : vbg on adm showed mild hypercarbic resp fialure: also has copd: His inflammatory markers are high and d dimer has slighlty uptrended:need to be follwed up daily   COPD: not wheezing: cont BD: monitor for increasing co2: abg IN am   DM: mONITOR AND CONTROL  Anemia: MONITOR H/H  HTN : CONTROLLED  Dermatitis: PER pmd  dvt PROPAHYXLIS    12/28:    Covid pneumonia: clinically better  more alert and awake and oriented: on rem and dexa: oN ROOM AIR  COPD: not wheezing: cont BD: monitor for increasing co2: no abg today l; check VBG in am    DM: mONITOR AND CONTROL  Anemia: MONITOR H/H  HTN : CONTROLLED: AWAITING ECHO   Dermatitis: PER pmd  dvt PROPAHYXLIS  DW NP      12/29:    Covid pneumonia: clinically better  more alert and awake and oriented: on rem and dexa: on room air today btu wants to use oxygen for comfort: : finish rx for covid according to protocol:Hs LFTS are getting little worse: and d d darrick has increased mildly:   COPD: not wheezing: cont BD: monitor for increasing co2: no abg today l; yesterdays his VBG was Ok without any retention of pco2:    DM: monitor AND CONTROL  Anemia: MONITOR H/H  HTN : CONTROLLED: AWAITING ECHO   Dermatitis: PER pmd  dvt PROPAHYXLIS  DW team    12/30:  Covid pneumonia: clinically better  more alert and awake and oriented: fnished rem and on dexa: on room air today but wants to use oxygen for comfort: now oxygen decreased to 0: : and d d darrick has increased mildly: follow LFTs : erre increased till yesterday   COPD: not wheezinng: doing pretty good:  DM: monitor AND CONTROL  Anemia: MONITOR H/H  HTN : CONTROLLED: AWAITING ECHO   Dermatitis: PER pmd  dvt PROPAHYXLIS  DW team    12/31:  Covid pneumonia: clinically better  more alert and awake and oriented: on room air: add afrin:  off oxygen   COPD: not wheezing doing pretty good:  DM: monitor AND CONTROL  Anemia: MONITOR H/H  HTN : CONTROLLED: AWAITING ECHO   Dermatitis: PER pmd  dvt Prophylaxis  DW team    1/1/21:    Covid pneumonia: clinically better  more alert and awake and oriented: on room air: add afrin:  off oxygen : doing much better  COPD: not wheezing doing pretty good:  DM: monitor AND CONTROL  Anemia: MONITOR H/H  HTN : CONTROLLED: : has demand ischemia: per cardiology   Dermatitis: PER pmd  dvt Prophylaxis  MARGOTH rn      
This is a 75M with history as above who presents from [facility] with worsening hypoxia in setting of new COVID19 PNA.     Covid pneumonia:  COPD:  DM  Anemia:   HTN :   Dermatitis:       12/26:    Covid pneumonia: on rem and dexa:requiring only 1 L of oxygen : vbg on adm showed mild hypercarbic resp fialure: also has copd: His inflammatory markers are high and d dimer has slighlty uptrended:need to be follwed up daily   COPD: not wheezing: cont BD: monitor for increasing co2: abg IN am   DM: mONITOR AND CONTROL  Anemia: MONITOR H/H  HTN : CONTROLLED  Dermatitis: PER pmd  dvt PROPAHYXLIS    12/28:    Covid pneumonia: clinically better  more alert and awake and oriented: on rem and dexa: oN ROOM AIR  COPD: not wheezing: cont BD: monitor for increasing co2: no abg today l; check VBG in am    DM: mONITOR AND CONTROL  Anemia: MONITOR H/H  HTN : CONTROLLED: AWAITING ECHO   Dermatitis: PER pmd  dvt PROPAHYXLIS  DW NP      12/29:    Covid pneumonia: clinically better  more alert and awake and oriented: on rem and dexa: on room air today btu wants to use oxygen for comfort: : finish rx for covid according to protocol:Hs LFTS are getting little worse: and d d darrick has increased mildly:   COPD: not wheezing: cont BD: monitor for increasing co2: no abg today l; yesterdays his VBG was Ok without any retention of pco2:    DM: monitor AND CONTROL  Anemia: MONITOR H/H  HTN : CONTROLLED: AWAITING ECHO   Dermatitis: PER pmd  dvt PROPAHYXLIS  DW team    12/30:  Covid pneumonia: clinically better  more alert and awake and oriented: fnished rem and on dexa: on room air today but wants to use oxygen for comfort: now oxygen decreased to 0: : and d d darrick has increased mildly: follow LFTs : erre increased till yesterday   COPD: not wheezinng: doing pretty good:  DM: monitor AND CONTROL  Anemia: MONITOR H/H  HTN : CONTROLLED: AWAITING ECHO   Dermatitis: PER pmd  dvt PROPAHYXLIS  DW team    12/31:  Covid pneumonia: clinically better  more alert and awake and oriented: on room air: add afrin:  off oxygen   COPD: not wheezing doing pretty good:  DM: monitor AND CONTROL  Anemia: MONITOR H/H  HTN : CONTROLLED: AWAITING ECHO   Dermatitis: PER pmd  dvt Prophylaxis  DW team    1/1/21:    Covid pneumonia: clinically better  more alert and awake and oriented: on room air: add afrin:  off oxygen : doing much better  COPD: not wheezing doing pretty good:  DM: monitor AND CONTROL  Anemia: MONITOR H/H  HTN : CONTROLLED: : has demand ischemia: per cardiology   Dermatitis: PER pmd  dvt Prophylaxis  MARGOTH rn    1/3/21:    Covid pneumonia: clinically better  more alert and awake and oriented: on room air: awaiting negative covid   COPD: not wheezing doing pretty good:cont Symbicort   DM: monitor AND CONTROL  Anemia: MONITOR H/H  HTN : CONTROLLED: : has demand ischemia: per cardiology : no chest pain  Dermatitis: PER pmd  dvt Prophylaxis  MARGOTH Ly    1/4/21    Covid pneumonia: clinically better  more alert and awake and oriented: on 4l today  ? why : rpt covid is positive still: check d dimer  today  : there is no reason he should be on oxygen : whole night hewas on room air with good o2 sao2: dc leighton xygen and recheck sao2:  MARGOTH saulon:   COPD: not wheezing doing pretty good: cont Symbicort   DM: monitor AND CONTROL  Anemia: MONITOR H/H  HTN : CONTROLLED: : has demand ischemia: per cardiology : no chest pain  Dermatitis: PER pmd  dvt Prophylaxis  MARGOTH Jimenez    1/5/21:      Covid pneumonia: clinically better  more alert and awake and oriented: on room air today : seems to be doing well: still covid positive   COPD: not wheezing doing pretty good: cont Symbicort   DM: monitor AND CONTROL  Anemia: MONITOR H/H  HTN : CONTROLLED: : has demand ischemia: per cardiology : no chest pain  Dermatitis: PER pmd  UTI: ON CEFRTIAONE: P MIRABILIS   dvt Prophylaxis  MARGOTH Ordaz    1/6/21:  Covid pneumonia: clinically better  more alert and awake and oriented: on room air today : seems to be doing well: still covid positive: no evetsn opverngiht: remained stable pulm wise:    COPD: not wheezing doing pretty good: cont Symbicort   DM: monitor AND CONTROL  Anemia: MONITOR H/H  HTN : CONTROLLED: : has demand ischemia: per cardiology : no chest pain  Dermatitis: PER pmd  UTI: ON CEFTRIAXONE P MIRABILIS   dvt Prophylaxis  DW PMD      1/7/21:    Covid pneumonia: clinically better  more alert and awake and oriented: on room air today : seems to be doing well: still covid positive: no events overnight remained stable pulm wise:  still positive on 6th : awaiting negative covid   COPD: not wheezing doing pretty good: cont Symbicort   DM: monitor AND CONTROL  Anemia: MONITOR H/H  HTN : CONTROLLED: : has demand ischemia: per cardiology : no chest pain  Dermatitis: PER pmd  UTI: ON CEFTRIAXONE P MIRABILIS   dvt Prophylaxis    1/8/21:    Covid pneumonia: clinically better  more alert and awake and oriented: on 2L of oxygen  : seems to be doing well: still covid positive: no events overnight remained stable pulm wise:  still positive on 6th : awaiting negative covid   COPD: not wheezing doing pretty good: cont Symbicort   DM: monitor AND CONTROL  Anemia: MONITOR H/H  HTN : CONTROLLED: : has demand ischemia: per cardiology : no chest pain  Dermatitis: PER pmd  UTI: ON CEFTRIAXONE P MIRABILIS   dvt     DW PMD    1/9/21:    Covid pneumonia: clinically better  more alert and awake and oriented: on 2L of oxygen-o2 sao2 100%  : seems to be doing well: still covid positive: no events overnight remained stable pulm wise:  still positive on 6th : awaiting negative covid : recheck check covid   COPD: not wheezing doing pretty good: cont Symbicort   DM: Controlled  Anemia: MONITOR H/H  HTN : CONTROLLED: : has demand ischemia: per cardiology : no chest pain  Dermatitis: PER pmd  UTI: Finished ceftriaxone:   dvt      Aria    1/10/21'  Covid pneumonia: clinically better  more alert and awake and oriented: on 2L of oxygen-o2 sao2 100%  : seems to be doing well: still covid positive: no events overnight remained stable pulm wise:  still positive on 6th :   COPD: not wheezing doing pretty good: cont Symbicort   DM: Controlled  Anemia: MONITOR H/H  HTN : CONTROLLED: : has demand ischemia: per cardiology : no chest pain  Dermatitis: PER pmd  UTI: Finished ceftriaxone:   dvt     [facility]: await repeat covid to be negative:  he looks stable    1/11/21:  Covid pneumonia: clinically better  more alert and awake and oriented: on 2L of oxygen-o2 sao2 100%  : seems to be doing well: still covid positive: no events overnight remained stable pulm wise:  still positive on 10th :   COPD: not wheezing doing pretty good: cont Symbicort   DM: Controlled  Anemia: MONITOR H/H  HTN : CONTROLLED: : has demand ischemia: per cardiology : no chest pain  Dermatitis: PER pmd  UTI: Finished ceftriaxone:   dvt     DW pmd await repeat covid to be negative:  he looks stable    1/12:    Covid pneumonia: clinically better  more alert and awake and oriented: on 2L of oxygen-o2 sao2 100%  : seems to be doing well: still covid positive: no events overnight remained stable pulm wise:  still positive on 10th :   COPD: not wheezing doing pretty good: cont Symbicort   DM: Controlled  Anemia: MONITOR H/H  HTN : CONTROLLED: : has demand ischemia: per cardiology : no chest pain  Dermatitis: PER pmd  UTI: Finished ceftriaxone:   dvt     DW pmd     1/13:    Covid pneumonia: clinically better  more alert and awake and oriented: on 2L of oxygen-o2 sao2 100%  : seems to be doing well: still covid positive: no events overnight remained stable pulm wise:  still positive on 10th :   COPD: not wheezing doing pretty good: cont Symbicort   DM: Controlled  Anemia: MONITOR H/H  HTN : CONTROLLED: : has demand ischemia: per cardiology : no chest pain  Dermatitis: PER pmd  UTI: Finished ceftriaxone:   dvt     DW pmd : awaiting negative covid testing        
This is a 75M with history as above who presents from [facility] with worsening hypoxia in setting of new COVID19 PNA.     Covid pneumonia:  COPD:  DM  Anemia:   HTN :   Dermatitis:       12/26:    Covid pneumonia: on rem and dexa:requiring only 1 L of oxygen : vbg on adm showed mild hypercarbic resp fialure: also has copd: His inflammatory markers are high and d dimer has slighlty uptrended:need to be follwed up daily   COPD: not wheezing: cont BD: monitor for increasing co2: abg IN am   DM: mONITOR AND CONTROL  Anemia: MONITOR H/H  HTN : CONTROLLED  Dermatitis: PER pmd  dvt PROPAHYXLIS    12/28:    Covid pneumonia: clinically better  more alert and awake and oriented: on rem and dexa: oN ROOM AIR  COPD: not wheezing: cont BD: monitor for increasing co2: no abg today l; check VBG in am    DM: mONITOR AND CONTROL  Anemia: MONITOR H/H  HTN : CONTROLLED: AWAITING ECHO   Dermatitis: PER pmd  dvt PROPAHYXLIS  DW NP      12/29:    Covid pneumonia: clinically better  more alert and awake and oriented: on rem and dexa: on room air today btu wants to use oxygen for comfort: : finish rx for covid according to protocol:Hs LFTS are getting little worse: and d d darrick has increased mildly:   COPD: not wheezing: cont BD: monitor for increasing co2: no abg today l; yesterdays his VBG was Ok without any retention of pco2:    DM: monitor AND CONTROL  Anemia: MONITOR H/H  HTN : CONTROLLED: AWAITING ECHO   Dermatitis: PER pmd  dvt PROPAHYXLIS  DW team    12/30:  Covid pneumonia: clinically better  more alert and awake and oriented: fnished rem and on dexa: on room air today but wants to use oxygen for comfort: now oxygen decreased to 0: : and d d darrick has increased mildly: follow LFTs : erre increased till yesterday   COPD: not wheezinng: doing pretty good:  DM: monitor AND CONTROL  Anemia: MONITOR H/H  HTN : CONTROLLED: AWAITING ECHO   Dermatitis: PER pmd  dvt PROPAHYXLIS  DW team    12/31:  Covid pneumonia: clinically better  more alert and awake and oriented: on room air: add afrin:  off oxygen   COPD: not wheezing doing pretty good:  DM: monitor AND CONTROL  Anemia: MONITOR H/H  HTN : CONTROLLED: AWAITING ECHO   Dermatitis: PER pmd  dvt Prophylaxis  DW team    1/1/21:    Covid pneumonia: clinically better  more alert and awake and oriented: on room air: add afrin:  off oxygen : doing much better  COPD: not wheezing doing pretty good:  DM: monitor AND CONTROL  Anemia: MONITOR H/H  HTN : CONTROLLED: : has demand ischemia: per cardiology   Dermatitis: PER pmd  dvt Prophylaxis  MARGOTH rn    1/3/21:    Covid pneumonia: clinically better  more alert and awake and oriented: on room air: awaiting negative covid   COPD: not wheezing doing pretty good:cont Symbicort   DM: monitor AND CONTROL  Anemia: MONITOR H/H  HTN : CONTROLLED: : has demand ischemia: per cardiology : no chest pain  Dermatitis: PER pmd  dvt Prophylaxis  MARGOTH Ly    1/4/21    Covid pneumonia: clinically better  more alert and awake and oriented: on 4l today  ? why : rpt covid is positive still: check d dimer  today  : there is no reason he should be on oxygen : whole night hewas on room air with good o2 sao2: dc leighton xygen and recheck sao2:  MARGOTH saulon:   COPD: not wheezing doing pretty good: cont Symbicort   DM: monitor AND CONTROL  Anemia: MONITOR H/H  HTN : CONTROLLED: : has demand ischemia: per cardiology : no chest pain  Dermatitis: PER pmd  dvt Prophylaxis  MARGOTH Jimenez    1/5/21:      Covid pneumonia: clinically better  more alert and awake and oriented: on room air today : seems to be doing well: still covid positive   COPD: not wheezing doing pretty good: cont Symbicort   DM: monitor AND CONTROL  Anemia: MONITOR H/H  HTN : CONTROLLED: : has demand ischemia: per cardiology : no chest pain  Dermatitis: PER pmd  UTI: ON CEFRTIAONE: P MIRABILIS   dvt Prophylaxis  MARGOTH Ordaz    1/6/21:  Covid pneumonia: clinically better  more alert and awake and oriented: on room air today : seems to be doing well: still covid positive: no evetsn opverngiht: remained stable pulm wise:    COPD: not wheezing doing pretty good: cont Symbicort   DM: monitor AND CONTROL  Anemia: MONITOR H/H  HTN : CONTROLLED: : has demand ischemia: per cardiology : no chest pain  Dermatitis: PER pmd  UTI: ON CEFTRIAXONE P MIRABILIS   dvt Prophylaxis  DW PMD      1/7/21:    Covid pneumonia: clinically better  more alert and awake and oriented: on room air today : seems to be doing well: still covid positive: no events overnight remained stable pulm wise:  still positive on 6th : awaiting negative covid   COPD: not wheezing doing pretty good: cont Symbicort   DM: monitor AND CONTROL  Anemia: MONITOR H/H  HTN : CONTROLLED: : has demand ischemia: per cardiology : no chest pain  Dermatitis: PER pmd  UTI: ON CEFTRIAXONE P MIRABILIS   dvt Prophylaxis    1/8/21:    Covid pneumonia: clinically better  more alert and awake and oriented: on 2L of oxygen  : seems to be doing well: still covid positive: no events overnight remained stable pulm wise:  still positive on 6th : awaiting negative covid   COPD: not wheezing doing pretty good: cont Symbicort   DM: monitor AND CONTROL  Anemia: MONITOR H/H  HTN : CONTROLLED: : has demand ischemia: per cardiology : no chest pain  Dermatitis: PER pmd  UTI: ON CEFTRIAXONE P MIRABILIS   dvt     DW PMD    1/9/21:    Covid pneumonia: clinically better  more alert and awake and oriented: on 2L of oxygen-o2 sao2 100%  : seems to be doing well: still covid positive: no events overnight remained stable pulm wise:  still positive on 6th : awaiting negative covid : recheck check covid   COPD: not wheezing doing pretty good: cont Symbicort   DM: Controlled  Anemia: MONITOR H/H  HTN : CONTROLLED: : has demand ischemia: per cardiology : no chest pain  Dermatitis: PER pmd  UTI: Finished ceftriaxone:   dvt     DW Aria    1/10/21'  Covid pneumonia: clinically better  more alert and awake and oriented: on 2L of oxygen-o2 sao2 100%  : seems to be doing well: still covid positive: no events overnight remained stable pulm wise:  still positive on 6th :   COPD: not wheezing doing pretty good: cont Symbicort   DM: Controlled  Anemia: MONITOR H/H  HTN : CONTROLLED: : has demand ischemia: per cardiology : no chest pain  Dermatitis: PER pmd  UTI: Finished ceftriaxone:   dvt     DW Aria: await repeat covid to be negative:  he looks stable      
This is a 75M with history as above who presents from [facility] with worsening hypoxia in setting of new COVID19 PNA.     Covid pneumonia:  COPD:  DM  Anemia:   HTN :   Dermatitis:       12/26:    Covid pneumonia: on rem and dexa:requiring only 1 L of oxygen : vbg on adm showed mild hypercarbic resp fialure: also has copd: His inflammatory markers are high and d dimer has slighlty uptrended:need to be follwed up daily   COPD: not wheezing: cont BD: monitor for increasing co2: abg IN am   DM: mONITOR AND CONTROL  Anemia: MONITOR H/H  HTN : CONTROLLED  Dermatitis: PER pmd  dvt PROPAHYXLIS    12/28:    Covid pneumonia: clinically better  more alert and awake and oriented: on rem and dexa: oN ROOM AIR  COPD: not wheezing: cont BD: monitor for increasing co2: no abg today l; check VBG in am    DM: mONITOR AND CONTROL  Anemia: MONITOR H/H  HTN : CONTROLLED: AWAITING ECHO   Dermatitis: PER pmd  dvt PROPAHYXLIS  DW NP      12/29:    Covid pneumonia: clinically better  more alert and awake and oriented: on rem and dexa: on room air today btu wants to use oxygen for comfort: : finish rx for covid according to protocol:Hs LFTS are getting little worse: and d d darrick has increased mildly:   COPD: not wheezing: cont BD: monitor for increasing co2: no abg today l; yesterdays his VBG was Ok without any retention of pco2:    DM: monitor AND CONTROL  Anemia: MONITOR H/H  HTN : CONTROLLED: AWAITING ECHO   Dermatitis: PER pmd  dvt PROPAHYXLIS  DW team    12/30:  Covid pneumonia: clinically better  more alert and awake and oriented: fnished rem and on dexa: on room air today but wants to use oxygen for comfort: now oxygen decreased to 0: : and d d darrick has increased mildly: follow LFTs : erre increased till yesterday   COPD: not wheezinng: doing pretty good:  DM: monitor AND CONTROL  Anemia: MONITOR H/H  HTN : CONTROLLED: AWAITING ECHO   Dermatitis: PER pmd  dvt PROPAHYXLIS  DW team    12/31:  Covid pneumonia: clinically better  more alert and awake and oriented: on room air: add afrin:  off oxygen   COPD: not wheezing doing pretty good:  DM: monitor AND CONTROL  Anemia: MONITOR H/H  HTN : CONTROLLED: AWAITING ECHO   Dermatitis: PER pmd  dvt Prophylaxis  DW team    1/1/21:    Covid pneumonia: clinically better  more alert and awake and oriented: on room air: add afrin:  off oxygen : doing much better  COPD: not wheezing doing pretty good:  DM: monitor AND CONTROL  Anemia: MONITOR H/H  HTN : CONTROLLED: : has demand ischemia: per cardiology   Dermatitis: PER pmd  dvt Prophylaxis  MARGOTH rn    1/3/21:    Covid pneumonia: clinically better  more alert and awake and oriented: on room air: awaiting negative covid   COPD: not wheezing doing pretty good:cont Symbicort   DM: monitor AND CONTROL  Anemia: MONITOR H/H  HTN : CONTROLLED: : has demand ischemia: per cardiology : no chest pain  Dermatitis: PER pmd  dvt Prophylaxis  MARGOTH Ly      
This is a 75M with history as above who presents from [facility] with worsening hypoxia in setting of new COVID19 PNA.     Covid pneumonia:  COPD:  DM  Anemia:   HTN :   Dermatitis:       12/26:    Covid pneumonia: on rem and dexa:requiring only 1 L of oxygen : vbg on adm showed mild hypercarbic resp fialure: also has copd: His inflammatory markers are high and d dimer has slighlty uptrended:need to be follwed up daily   COPD: not wheezing: cont BD: monitor for increasing co2: abg IN am   DM: mONITOR AND CONTROL  Anemia: MONITOR H/H  HTN : CONTROLLED  Dermatitis: PER pmd  dvt PROPAHYXLIS    12/28:    Covid pneumonia: clinically better  more alert and awake and oriented: on rem and dexa: oN ROOM AIR  COPD: not wheezing: cont BD: monitor for increasing co2: no abg today l; check VBG in am    DM: mONITOR AND CONTROL  Anemia: MONITOR H/H  HTN : CONTROLLED: AWAITING ECHO   Dermatitis: PER pmd  dvt PROPAHYXLIS  DW NP      12/29:    Covid pneumonia: clinically better  more alert and awake and oriented: on rem and dexa: on room air today btu wants to use oxygen for comfort: : finish rx for covid according to protocol:Hs LFTS are getting little worse: and d d darrick has increased mildly:   COPD: not wheezing: cont BD: monitor for increasing co2: no abg today l; yesterdays his VBG was Ok without any retention of pco2:    DM: monitor AND CONTROL  Anemia: MONITOR H/H  HTN : CONTROLLED: AWAITING ECHO   Dermatitis: PER pmd  dvt PROPAHYXLIS  DW team    12/30:  Covid pneumonia: clinically better  more alert and awake and oriented: fnished rem and on dexa: on room air today but wants to use oxygen for comfort: now oxygen decreased to 0: : and d d darrick has increased mildly: follow LFTs : erre increased till yesterday   COPD: not wheezinng: doing pretty good:  DM: monitor AND CONTROL  Anemia: MONITOR H/H  HTN : CONTROLLED: AWAITING ECHO   Dermatitis: PER pmd  dvt PROPAHYXLIS  DW team    12/31:  Covid pneumonia: clinically better  more alert and awake and oriented: on room air: add afrin:  off oxygen   COPD: not wheezing doing pretty good:  DM: monitor AND CONTROL  Anemia: MONITOR H/H  HTN : CONTROLLED: AWAITING ECHO   Dermatitis: PER pmd  dvt Prophylaxis  DW team    1/1/21:    Covid pneumonia: clinically better  more alert and awake and oriented: on room air: add afrin:  off oxygen : doing much better  COPD: not wheezing doing pretty good:  DM: monitor AND CONTROL  Anemia: MONITOR H/H  HTN : CONTROLLED: : has demand ischemia: per cardiology   Dermatitis: PER pmd  dvt Prophylaxis  MARGOTH rn    1/3/21:    Covid pneumonia: clinically better  more alert and awake and oriented: on room air: awaiting negative covid   COPD: not wheezing doing pretty good:cont Symbicort   DM: monitor AND CONTROL  Anemia: MONITOR H/H  HTN : CONTROLLED: : has demand ischemia: per cardiology : no chest pain  Dermatitis: PER pmd  dvt Prophylaxis  MARGOTH Ly    1/4/21    Covid pneumonia: clinically better  more alert and awake and oriented: on 4l today  ? why : rpt covid is positive still: check d dimer  today  : there is no reason he should be on oxygen : whole night hewas on room air with good o2 sao2: dc leighton xygen and recheck sao2:  MARGOTH saulon:   COPD: not wheezing doing pretty good: cont Symbicort   DM: monitor AND CONTROL  Anemia: MONITOR H/H  HTN : CONTROLLED: : has demand ischemia: per cardiology : no chest pain  Dermatitis: PER pmd  dvt Prophylaxis  MARGOTH Jimenez    1/5/21:      Covid pneumonia: clinically better  more alert and awake and oriented: on room air today : seems to be doing well: still covid positive   COPD: not wheezing doing pretty good: cont Symbicort   DM: monitor AND CONTROL  Anemia: MONITOR H/H  HTN : CONTROLLED: : has demand ischemia: per cardiology : no chest pain  Dermatitis: PER pmd  UTI: ON CEFRTIAONE: P MIRABILIS   dvt Prophylaxis  MARGOTH Ordaz    1/6/21:  Covid pneumonia: clinically better  more alert and awake and oriented: on room air today : seems to be doing well: still covid positive: no evetsn opverngiht: remained stable pulm wise:    COPD: not wheezing doing pretty good: cont Symbicort   DM: monitor AND CONTROL  Anemia: MONITOR H/H  HTN : CONTROLLED: : has demand ischemia: per cardiology : no chest pain  Dermatitis: PER pmd  UTI: ON CEFTRIAXONE P MIRABILIS   dvt Prophylaxis  DW PMD      1/7/21:    Covid pneumonia: clinically better  more alert and awake and oriented: on room air today : seems to be doing well: still covid positive: no events overnight remained stable pulm wise:  still positive on 6th : awaiting negative covid   COPD: not wheezing doing pretty good: cont Symbicort   DM: monitor AND CONTROL  Anemia: MONITOR H/H  HTN : CONTROLLED: : has demand ischemia: per cardiology : no chest pain  Dermatitis: PER pmd  UTI: ON CEFTRIAXONE P MIRABILIS   dvt Prophylaxis    1/8/21:    Covid pneumonia: clinically better  more alert and awake and oriented: on 2L of oxygen  : seems to be doing well: still covid positive: no events overnight remained stable pulm wise:  still positive on 6th : awaiting negative covid   COPD: not wheezing doing pretty good: cont Symbicort   DM: monitor AND CONTROL  Anemia: MONITOR H/H  HTN : CONTROLLED: : has demand ischemia: per cardiology : no chest pain  Dermatitis: PER pmd  UTI: ON CEFTRIAXONE P MIRABILIS   dvt     DW PMD    1/9/21:    Covid pneumonia: clinically better  more alert and awake and oriented: on 2L of oxygen-o2 sao2 100%  : seems to be doing well: still covid positive: no events overnight remained stable pulm wise:  still positive on 6th : awaiting negative covid : recheck check covid   COPD: not wheezing doing pretty good: cont Symbicort   DM: Controlled  Anemia: MONITOR H/H  HTN : CONTROLLED: : has demand ischemia: per cardiology : no chest pain  Dermatitis: PER pmd  UTI: Finished ceftriaxone:   dvt      Aria    1/10/21'  Covid pneumonia: clinically better  more alert and awake and oriented: on 2L of oxygen-o2 sao2 100%  : seems to be doing well: still covid positive: no events overnight remained stable pulm wise:  still positive on 6th :   COPD: not wheezing doing pretty good: cont Symbicort   DM: Controlled  Anemia: MONITOR H/H  HTN : CONTROLLED: : has demand ischemia: per cardiology : no chest pain  Dermatitis: PER pmd  UTI: Finished ceftriaxone:   dvt     [facility]: await repeat covid to be negative:  he looks stable    1/11/21:  Covid pneumonia: clinically better  more alert and awake and oriented: on 2L of oxygen-o2 sao2 100%  : seems to be doing well: still covid positive: no events overnight remained stable pulm wise:  still positive on 10th :   COPD: not wheezing doing pretty good: cont Symbicort   DM: Controlled  Anemia: MONITOR H/H  HTN : CONTROLLED: : has demand ischemia: per cardiology : no chest pain  Dermatitis: PER pmd  UTI: Finished ceftriaxone:   dvt     DW pmd await repeat covid to be negative:  he looks stable        
This is a 75M with history as above who presents from [facility] with worsening hypoxia in setting of new COVID19 PNA.     Covid pneumonia:  COPD:  DM  Anemia:   HTN :   Dermatitis:       12/26:    Covid pneumonia: on rem and dexa:requiring only 1 L of oxygen : vbg on adm showed mild hypercarbic resp fialure: also has copd: His inflammatory markers are high and d dimer has slighlty uptrended:need to be follwed up daily   COPD: not wheezing: cont BD: monitor for increasing co2: abg IN am   DM: mONITOR AND CONTROL  Anemia: MONITOR H/H  HTN : CONTROLLED  Dermatitis: PER pmd  dvt PROPAHYXLIS    12/28:    Covid pneumonia: clinically better  more alert and awake and oriented: on rem and dexa: oN ROOM AIR  COPD: not wheezing: cont BD: monitor for increasing co2: no abg today l; check VBG in am    DM: mONITOR AND CONTROL  Anemia: MONITOR H/H  HTN : CONTROLLED: AWAITING ECHO   Dermatitis: PER pmd  dvt PROPAHYXLIS  MARGOTH NP      12/29:    Covid pneumonia: clinically better  more alert and awake and oriented: on rem and dexa: on room air today btu wants to use oxygen for comfort: : finish rx for covid according to protocol:Hs LFTS are getting little worse: and d d darrick has increased mildly:   COPD: not wheezing: cont BD: monitor for increasing co2: no abg today l; yesterdays his VBG was Ok without any retention of pco2:    DM: monitor AND CONTROL  Anemia: MONITOR H/H  HTN : CONTROLLED: AWAITING ECHO   Dermatitis: PER pmd  dvt PROPAHYXLIS  MARGOTH team    12/30:  Covid pneumonia: clinically better  more alert and awake and oriented: fnished rem and on dexa: on room air today but wants to use oxygen for comfort: now oxygen decreased to 0: : and d d darrick has increased mildly: follow LFTs : erre increased till yesterday   COPD: not wheezinng: doing pretty good:  DM: monitor AND CONTROL  Anemia: MONITOR H/H  HTN : CONTROLLED: AWAITING ECHO   Dermatitis: PER pmd  dvt PROPAHYXLIS  MARGOTH team    
75M with history as above who presents from [facility] with worsening hypoxia in setting of new COVID19 PNA.
This is a 75M with history as above who presents from [facility] with worsening hypoxia in setting of new COVID19 PNA.     Covid pneumonia:  COPD:  DM  Anemia:   HTN :   Dermatitis:       12/26:    Covid pneumonia: on rem and dexa:requiring only 1 L of oxygen : vbg on adm showed mild hypercarbic resp fialure: also has copd: His inflammatory markers are high and d dimer has slighlty uptrended:need to be follwed up daily   COPD: not wheezing: cont BD: monitor for increasing co2: abg IN am   DM: mONITOR AND CONTROL  Anemia: MONITOR H/H  HTN : CONTROLLED  Dermatitis: PER pmd  dvt PROPAHYXLIS    12/28:    Covid pneumonia: clinically better  more alert and awake and oriented: on rem and dexa: oN ROOM AIR  COPD: not wheezing: cont BD: monitor for increasing co2: no abg today l; check VBG in am    DM: mONITOR AND CONTROL  Anemia: MONITOR H/H  HTN : CONTROLLED: AWAITING ECHO   Dermatitis: PER pmd  dvt PROPAHYXCELINAS  MARGOTH NP      12/29:    Covid pneumonia: clinically better  more alert and awake and oriented: on rem and dexa: on room air today btu wants to use oxygen for comfort: : finish rx for covid according to protocol:Hs LFTS are getting little worse: and d d darrick has increased mildly:   COPD: not wheezing: cont BD: monitor for increasing co2: no abg today l; yesterdays his VBG was Ok without any retention of pco2:    DM: monitor AND CONTROL  Anemia: MONITOR H/H  HTN : CONTROLLED: AWAITING ECHO   Dermatitis: PER pmd  dvt PROPAHYXLIS  MARGOTH team  
This is a 75M with history as above who presents from [facility] with worsening hypoxia in setting of new COVID19 PNA.     Covid pneumonia:  COPD:  DM  Anemia:   HTN :   Dermatitis:       12/26:    Covid pneumonia: on rem and dexa:requiring only 1 L of oxygen : vbg on adm showed mild hypercarbic resp fialure: also has copd: His inflammatory markers are high and d dimer has slighlty uptrended:need to be follwed up daily   COPD: not wheezing: cont BD: monitor for increasing co2: abg IN am   DM: mONITOR AND CONTROL  Anemia: MONITOR H/H  HTN : CONTROLLED  Dermatitis: PER pmd  dvt PROPAHYXLIS    12/28:    Covid pneumonia: clinically better  more alert and awake and oriented: on rem and dexa: oN ROOM AIR  COPD: not wheezing: cont BD: monitor for increasing co2: no abg today l; check VBG in am    DM: mONITOR AND CONTROL  Anemia: MONITOR H/H  HTN : CONTROLLED: AWAITING ECHO   Dermatitis: PER pmd  dvt PROPAHYXLIS  DW NP      12/29:    Covid pneumonia: clinically better  more alert and awake and oriented: on rem and dexa: on room air today btu wants to use oxygen for comfort: : finish rx for covid according to protocol:Hs LFTS are getting little worse: and d d darrick has increased mildly:   COPD: not wheezing: cont BD: monitor for increasing co2: no abg today l; yesterdays his VBG was Ok without any retention of pco2:    DM: monitor AND CONTROL  Anemia: MONITOR H/H  HTN : CONTROLLED: AWAITING ECHO   Dermatitis: PER pmd  dvt PROPAHYXLIS  DW team    12/30:  Covid pneumonia: clinically better  more alert and awake and oriented: fnished rem and on dexa: on room air today but wants to use oxygen for comfort: now oxygen decreased to 0: : and d d darrick has increased mildly: follow LFTs : erre increased till yesterday   COPD: not wheezinng: doing pretty good:  DM: monitor AND CONTROL  Anemia: MONITOR H/H  HTN : CONTROLLED: AWAITING ECHO   Dermatitis: PER pmd  dvt PROPAHYXLIS  DW team    12/31:  Covid pneumonia: clinically better  more alert and awake and oriented: on room air: add afrin:  off oxygen   COPD: not wheezing doing pretty good:  DM: monitor AND CONTROL  Anemia: MONITOR H/H  HTN : CONTROLLED: AWAITING ECHO   Dermatitis: PER pmd  dvt Prophylaxis  DW team    1/1/21:    Covid pneumonia: clinically better  more alert and awake and oriented: on room air: add afrin:  off oxygen : doing much better  COPD: not wheezing doing pretty good:  DM: monitor AND CONTROL  Anemia: MONITOR H/H  HTN : CONTROLLED: : has demand ischemia: per cardiology   Dermatitis: PER pmd  dvt Prophylaxis  MARGOTH rn    1/3/21:    Covid pneumonia: clinically better  more alert and awake and oriented: on room air: awaiting negative covid   COPD: not wheezing doing pretty good:cont Symbicort   DM: monitor AND CONTROL  Anemia: MONITOR H/H  HTN : CONTROLLED: : has demand ischemia: per cardiology : no chest pain  Dermatitis: PER pmd  dvt Prophylaxis  MARGOTH Ly    1/4/21    Covid pneumonia: clinically better  more alert and awake and oriented: on 4l today  ? why : rpt covid is positive still: check d dimer  today  : there is no reason he should be on oxygen : whole night hewas on room air with good o2 sao2: dc leighton xygen and recheck sao2:  MARGOTH saulon:   COPD: not wheezing doing pretty good: cont Symbicort   DM: monitor AND CONTROL  Anemia: MONITOR H/H  HTN : CONTROLLED: : has demand ischemia: per cardiology : no chest pain  Dermatitis: PER pmd  dvt Prophylaxis  MARGOTH Jimenez    1/5/21:      Covid pneumonia: clinically better  more alert and awake and oriented: on room air today : seems to be doing well: still covid positive   COPD: not wheezing doing pretty good: cont Symbicort   DM: monitor AND CONTROL  Anemia: MONITOR H/H  HTN : CONTROLLED: : has demand ischemia: per cardiology : no chest pain  Dermatitis: PER pmd  UTI: ON CEFRTIAONE: P MIRABILIS   dvt Prophylaxis  MARGOTH Ordaz    1/6/21:  Covid pneumonia: clinically better  more alert and awake and oriented: on room air today : seems to be doing well: still covid positive: no evetsn opverngiht: remained stable pulm wise:    COPD: not wheezing doing pretty good: cont Symbicort   DM: monitor AND CONTROL  Anemia: MONITOR H/H  HTN : CONTROLLED: : has demand ischemia: per cardiology : no chest pain  Dermatitis: PER pmd  UTI: ON CEFTRIAXONE P MIRABILIS   dvt Prophylaxis  DW PMD      1/7/21:    Covid pneumonia: clinically better  more alert and awake and oriented: on room air today : seems to be doing well: still covid positive: no events overnight remained stable pulm wise:  still positive on 6th : awaiting negative covid   COPD: not wheezing doing pretty good: cont Symbicort   DM: monitor AND CONTROL  Anemia: MONITOR H/H  HTN : CONTROLLED: : has demand ischemia: per cardiology : no chest pain  Dermatitis: PER pmd  UTI: ON CEFTRIAXONE P MIRABILIS   dvt Prophylaxis    1/8/21:    Covid pneumonia: clinically better  more alert and awake and oriented: on 2L of oxygen  : seems to be doing well: still covid positive: no events overnight remained stable pulm wise:  still positive on 6th : awaiting negative covid   COPD: not wheezing doing pretty good: cont Symbicort   DM: monitor AND CONTROL  Anemia: MONITOR H/H  HTN : CONTROLLED: : has demand ischemia: per cardiology : no chest pain  Dermatitis: PER pmd  UTI: ON CEFTRIAXONE P MIRABILIS   dvt     DW PMD      
This is a 75M with history as above who presents from [facility] with worsening hypoxia in setting of new COVID19 PNA now resolved 
This is a 75M with history as above who presents from [facility] with worsening hypoxia in setting of new COVID19 PNA.     Covid pneumonia:  COPD:  DM  Anemia:   HTN :   Dermatitis:       12/26:    Covid pneumonia: on rem and dexa:requiring only 1 L of oxygen : vbg on adm showed mild hypercarbic resp fialure: also has copd: His inflammatory markers are high and d dimer has slighlty uptrended:need to be follwed up daily   COPD: not wheezing: cont BD: monitor for increasing co2: abg IN am   DM: mONITOR AND CONTROL  Anemia: MONITOR H/H  HTN : CONTROLLED  Dermatitis: PER pmd  dvt PROPAHYXLIS    12/28:    Covid pneumonia: clinically better  more alert and awake and oriented: on rem and dexa: oN ROOM AIR  COPD: not wheezing: cont BD: monitor for increasing co2: no abg today l; check VBG in am    DM: mONITOR AND CONTROL  Anemia: MONITOR H/H  HTN : CONTROLLED: AWAITING ECHO   Dermatitis: PER pmd  dvt PROPAHYXLIS  DW NP      12/29:    Covid pneumonia: clinically better  more alert and awake and oriented: on rem and dexa: on room air today btu wants to use oxygen for comfort: : finish rx for covid according to protocol:Hs LFTS are getting little worse: and d d darrick has increased mildly:   COPD: not wheezing: cont BD: monitor for increasing co2: no abg today l; yesterdays his VBG was Ok without any retention of pco2:    DM: monitor AND CONTROL  Anemia: MONITOR H/H  HTN : CONTROLLED: AWAITING ECHO   Dermatitis: PER pmd  dvt PROPAHYXLIS  DW team    12/30:  Covid pneumonia: clinically better  more alert and awake and oriented: fnished rem and on dexa: on room air today but wants to use oxygen for comfort: now oxygen decreased to 0: : and d d darrick has increased mildly: follow LFTs : erre increased till yesterday   COPD: not wheezinng: doing pretty good:  DM: monitor AND CONTROL  Anemia: MONITOR H/H  HTN : CONTROLLED: AWAITING ECHO   Dermatitis: PER pmd  dvt PROPAHYXLIS  DW team    12/31:  Covid pneumonia: clinically better  more alert and awake and oriented: on room air: add afrin:  off oxygen   COPD: not wheezing doing pretty good:  DM: monitor AND CONTROL  Anemia: MONITOR H/H  HTN : CONTROLLED: AWAITING ECHO   Dermatitis: PER pmd  dvt Prophylaxis  DW team    1/1/21:    Covid pneumonia: clinically better  more alert and awake and oriented: on room air: add afrin:  off oxygen : doing much better  COPD: not wheezing doing pretty good:  DM: monitor AND CONTROL  Anemia: MONITOR H/H  HTN : CONTROLLED: : has demand ischemia: per cardiology   Dermatitis: PER pmd  dvt Prophylaxis  MARGOTH rn    1/3/21:    Covid pneumonia: clinically better  more alert and awake and oriented: on room air: awaiting negative covid   COPD: not wheezing doing pretty good:cont Symbicort   DM: monitor AND CONTROL  Anemia: MONITOR H/H  HTN : CONTROLLED: : has demand ischemia: per cardiology : no chest pain  Dermatitis: PER pmd  dvt Prophylaxis  MARGOTH Ly    1/4/21    Covid pneumonia: clinically better  more alert and awake and oriented: on 4l today  ? why : rpt covid is positive still: check d dimer  today  : there is no reason he should be on oxygen : whole night hewas on room air with good o2 sao2: dc o xygen and recheck sao2:  MARGOTH mirza:   COPD: not wheezing doing pretty good: cont Symbicort   DM: monitor AND CONTROL  Anemia: MONITOR H/H  HTN : CONTROLLED: : has demand ischemia: per cardiology : no chest pain  Dermatitis: PER pmd  dvt Prophylaxis  MARGOTH Jimenez      
This is a 75M with history as above who presents from [facility] with worsening hypoxia in setting of new COVID19 PNA now resolved 
This is a 75M with history as above who presents from [facility] with worsening hypoxia in setting of new COVID19 PNA.     Covid pneumonia:  COPD:  DM  Anemia:   HTN :   Dermatitis:       12/26:    Covid pneumonia: on rem and dexa:requiring only 1 L of oxygen : vbg on adm showed mild hypercarbic resp fialure: also has copd: His inflammatory markers are high and d dimer has slighlty uptrended:need to be follwed up daily   COPD: not wheezing: cont BD: monitor for increasing co2: abg IN am   DM: mONITOR AND CONTROL  Anemia: MONITOR H/H  HTN : CONTROLLED  Dermatitis: PER pmd  dvt PROPAHYXLIS    12/28:    Covid pneumonia: clinically better  more alert and awake and oriented: on rem and dexa: oN ROOM AIR  COPD: not wheezing: cont BD: monitor for increasing co2: no abg today l; check VBG in am    DM: mONITOR AND CONTROL  Anemia: MONITOR H/H  HTN : CONTROLLED: AWAITING ECHO   Dermatitis: PER pmd  dvt PROPAHYXLIS  DW NP      12/29:    Covid pneumonia: clinically better  more alert and awake and oriented: on rem and dexa: on room air today btu wants to use oxygen for comfort: : finish rx for covid according to protocol:Hs LFTS are getting little worse: and d d darrick has increased mildly:   COPD: not wheezing: cont BD: monitor for increasing co2: no abg today l; yesterdays his VBG was Ok without any retention of pco2:    DM: monitor AND CONTROL  Anemia: MONITOR H/H  HTN : CONTROLLED: AWAITING ECHO   Dermatitis: PER pmd  dvt PROPAHYXLIS  DW team    12/30:  Covid pneumonia: clinically better  more alert and awake and oriented: fnished rem and on dexa: on room air today but wants to use oxygen for comfort: now oxygen decreased to 0: : and d d darrick has increased mildly: follow LFTs : erre increased till yesterday   COPD: not wheezinng: doing pretty good:  DM: monitor AND CONTROL  Anemia: MONITOR H/H  HTN : CONTROLLED: AWAITING ECHO   Dermatitis: PER pmd  dvt PROPAHYXLIS  DW team    12/31:  Covid pneumonia: clinically better  more alert and awake and oriented: on room air: add afrin:  off oxygen   COPD: not wheezing doing pretty good:  DM: monitor AND CONTROL  Anemia: MONITOR H/H  HTN : CONTROLLED: AWAITING ECHO   Dermatitis: PER pmd  dvt Prophylaxis  DW team    1/1/21:    Covid pneumonia: clinically better  more alert and awake and oriented: on room air: add afrin:  off oxygen : doing much better  COPD: not wheezing doing pretty good:  DM: monitor AND CONTROL  Anemia: MONITOR H/H  HTN : CONTROLLED: : has demand ischemia: per cardiology   Dermatitis: PER pmd  dvt Prophylaxis  MARGOTH rn    1/3/21:    Covid pneumonia: clinically better  more alert and awake and oriented: on room air: awaiting negative covid   COPD: not wheezing doing pretty good:cont Symbicort   DM: monitor AND CONTROL  Anemia: MONITOR H/H  HTN : CONTROLLED: : has demand ischemia: per cardiology : no chest pain  Dermatitis: PER pmd  dvt Prophylaxis  MARGOTH Ly    1/4/21    Covid pneumonia: clinically better  more alert and awake and oriented: on 4l today  ? why : rpt covid is positive still: check d dimer  today  : there is no reason he should be on oxygen : whole night hewas on room air with good o2 sao2: dc leighton xygen and recheck sao2:  MARGOTH saulon:   COPD: not wheezing doing pretty good: cont Symbicort   DM: monitor AND CONTROL  Anemia: MONITOR H/H  HTN : CONTROLLED: : has demand ischemia: per cardiology : no chest pain  Dermatitis: PER pmd  dvt Prophylaxis  MARGOTH Jimenez    1/5/21:      Covid pneumonia: clinically better  more alert and awake and oriented: on room air today : seems to be doing well: still covid positive   COPD: not wheezing doing pretty good: cont Symbicort   DM: monitor AND CONTROL  Anemia: MONITOR H/H  HTN : CONTROLLED: : has demand ischemia: per cardiology : no chest pain  Dermatitis: PER pmd  UTI: ON CEFRTIAONE: P MIRABILIS   dvt Prophylaxis  MARGOTH Ordaz    1/6/21:  Covid pneumonia: clinically better  more alert and awake and oriented: on room air today : seems to be doing well: still covid positive: no evetsn opverngiht: remained stable pulm wise:    COPD: not wheezing doing pretty good: cont Symbicort   DM: monitor AND CONTROL  Anemia: MONITOR H/H  HTN : CONTROLLED: : has demand ischemia: per cardiology : no chest pain  Dermatitis: PER pmd  UTI: ON CEFTRIAXONE P MIRABILIS   dvt Prophylaxis  DW PMD      1/7/21:    Covid pneumonia: clinically better  more alert and awake and oriented: on room air today : seems to be doing well: still covid positive: no events overnight remained stable pulm wise:  still positive on 6th : awaiting negative covid   COPD: not wheezing doing pretty good: cont Symbicort   DM: monitor AND CONTROL  Anemia: MONITOR H/H  HTN : CONTROLLED: : has demand ischemia: per cardiology : no chest pain  Dermatitis: PER pmd  UTI: ON CEFTRIAXONE P MIRABILIS   dvt Prophylaxis  DW PMD      
This is a 75M with history as above who presents from [facility] with worsening hypoxia in setting of new COVID19 PNA
75M with history as above who presents from [facility] with worsening hypoxia in setting of new COVID19 PNA.
This is a 75M with history as above who presents from [facility] with worsening hypoxia in setting of new COVID19 PNA now resolved 
This is a 75M with history as above who presents from [facility] with worsening hypoxia in setting of new COVID19 PNA now resolved 
This is a 75M with history as above who presents from [facility] with worsening hypoxia in setting of new COVID19 PNA
This is a 75M with history as above who presents from [facility] with worsening hypoxia in setting of new COVID19 PNA now resolved 
75M with history as above who presents from [facility] with worsening hypoxia in setting of new COVID19 PNA.
This is a 75M with history as above who presents from [facility] with worsening hypoxia in setting of new COVID19 PNA. - multifocal pna 
75M with history as above who presents from [facility] with worsening hypoxia in setting of new COVID19 PNA.
75M with history as above who presents from [facility] with worsening hypoxia in setting of new COVID19 PNA.
75M with history as above who presents from [facility] with worsening hypoxia in setting of new COVID19 PNA.
This is a 75M with history as above who presents from [facility] with worsening hypoxia in setting of new COVID19 PNA
75M with history as above who presents from [facility] with worsening hypoxia in setting of new COVID19 PNA.
75M with history as above who presents from [facility] with worsening hypoxia in setting of new COVID19 PNA.
75M with history as above who presents from [facility] with worsening hypoxia in setting of new COVID19 PNA.
This is a 75M with history as above who presents from [facility] with worsening hypoxia in setting of new COVID19 PNA now resolved 
This is a 75M with history as above who presents from [facility] with worsening hypoxia in setting of new COVID19 PNA. - multifocal pna 
This is a 75M with history as above who presents from [facility] with worsening hypoxia in setting of new COVID19 PNA now resolved 
This is a 75M with history as above who presents from [facility] with worsening hypoxia in setting of new COVID19 PNA now resolved 
This is a 75M with history as above who presents from [facility] with worsening hypoxia in setting of new COVID19 PNA. - multifocal pna 
This is a 75M with history as above who presents from [facility] with worsening hypoxia in setting of new COVID19 PNA now resolved

## 2021-01-13 NOTE — PROGRESS NOTE ADULT - PROBLEM SELECTOR PLAN 2
Likely Type 2 demand mediated ischemia   remains without chest pain or shortness of breath at present time  ASA.  cont  metoprolol 12.5 bid
Likely Type 2 demand mediated ischemia   remains without chest pain or shortness of breath at present time  ASA.  cont  metoprolol 12.5 bid
completed antibiotics   repeat WBC normal
resolved   repeat CBC pending
Likely Type 2 demand mediated ischemia   remains without chest pain or shortness of breath at present time  ASA.  Given ectopy on Tele, will add metoprolol 12.5 bid
cardiology help appreciated   no intervention required at this time
Likely Type 2 demand mediated ischemia   remains without chest pain or shortness of breath at present time  ASA.  cont  metoprolol 12.5 bid
continue metoprolol 12.5 BID  cardiology help appreciated
- Trops 56 --> 46, EKG without any ischemic changes, possibly a trop leak due to his COVID19 infection and hypoxia  - CK, CKMB negative x2  - currently chest pain free  - EKG: NSR@89 w/ PAC Qtc 452   - follow up cardiac Enzymes AM
Likely Type 2 demand mediated ischemia   remains without chest pain or shortness of breath at present time  ASA.
cardiology help requested Dr Toussaint to see  will continue to monitor closely  chest pain free
continue metoprolol 12.5 BID while inpatient  cardiology help appreciated
resolved   repeat WBC normal
Likely Type 2 demand mediated ischemia   remains without chest pain or shortness of breath at present time  ASA.  cont  metoprolol 12.5 bid
Likely Type 2 demand mediated ischemia   remains without chest pain or shortness of breath at present time  ASA.
Likely Type 2 demand mediated ischemia   remains without chest pain or shortness of breath at present time  ASA.  cont  metoprolol 12.5 bid
chest pain free  no intervention required at this time
Likely Type 2 demand mediated ischemia   remains without chest pain or shortness of breath at present time  ASA.
continue metoprolol 12.5 BID while inpatient  cardiology help appreciated
Likely Type 2 demand mediated ischemia   remains without chest pain or shortness of breath at present time  ASA.
Likely Type 2 demand mediated ischemia   remains without chest pain or shortness of breath at present time  ASA.  cont  metoprolol 12.5 bid
continue metoprolol 12.5 BID while inpatient  cardiology help appreciated
Likely Type 2 demand mediated ischemia   remains without chest pain or shortness of breath at present time  ASA.  cont  metoprolol 12.5 bid
chest pain free  metoprolol changed to 12.5 BID  continue to follow cardiology recommendations
resolved   repeat WBC normal   no further intervention required at this time
Likely Type 2 demand mediated ischemia   remains without chest pain or shortness of breath at present time  ASA.  cont  metoprolol 12.5 bid
Likely Type 2 demand mediated ischemia   remains without chest pain or shortness of breath at present time  ASA.  cont  metoprolol 12.5 bid
completed ceftriaxone IV   unclear etiology of leukocytosis  CXR negative pneumonia   discussed with pulmonary  will repeat CBC in AM
cardiology help appreciated   no intervention required at this time
chest pain free  continue metoprolol 12.5 BID  continue to follow cardiology recommendations
resolved  ceftriaxone IV day 5/5 today  will discontinue  unclear etiology of leukocytosis  CXR   discussed with pulmonary
chest pain free  4 beats WCT overnight   discussed with cardiology   defer recommendations to cardiology
cardiology help appreciated   will continue to monitor closely  no intervention required at this time
unclear etiology  will continue to monitor  cardiology help requested  will continue to monitor closely

## 2021-01-13 NOTE — DISCHARGE NOTE NURSING/CASE MANAGEMENT/SOCIAL WORK - PATIENT PORTAL LINK FT
You can access the FollowMyHealth Patient Portal offered by [facility] by registering at the following website: [email or web address]. By joining [facility]’s FollowMyHealth portal, you will also be able to view your health information using other applications (apps) compatible with our system.

## 2021-01-13 NOTE — DISCHARGE NOTE NURSING/CASE MANAGEMENT/SOCIAL WORK - NSDCCRNAME_GEN_ALL_CORE_FT
[facility], [address] [place] [address], P:[phone] ext:2008 [facility], [address] [place] [address], P:[phone]

## 2021-01-13 NOTE — PROGRESS NOTE ADULT - PROVIDER SPECIALTY LIST ADULT
Hospitalist
Pulmonology
Cardiology
Pulmonology
Cardiology
Cardiology
Pulmonology
Internal Medicine
Internal Medicine
Cardiology
Internal Medicine
Cardiology
Cardiology
Internal Medicine
Cardiology
Internal Medicine
Internal Medicine
Cardiology
Internal Medicine
Cardiology
Internal Medicine

## 2021-01-13 NOTE — PROGRESS NOTE ADULT - PROBLEM SELECTOR PLAN 1
Clinically better   Off Remdesivir and cont Decadron   Supplemental oxygen   PUlm and ID consulted  Lovenox daily   Monitor LFTs  trend inflammatory markers.
continue Remdesivir and dexamethasone  day 3 today  doing well controlled;  pulmonary help requested  will attempt to titrate O2 to zero
Clinically better   Off Remdesivir and cont Decadron   Supplemental oxygen   PUlm and ID consulted  Lovenox daily   Monitor LFTs  trend inflammatory markers.
resolving   stable off dexamethasone   continue Flonase 1 spray BID bilaterally  NO need for supplemental O2  awaiting negative COVID 19 PCR
stable  await COVID PCR negative status
Clinically better   Off Remdesivir and cont Decadron   Supplemental oxygen   PUlm and ID consulted  Lovenox daily   Monitor LFTs  trend inflammatory markers.
continue Remdesivir and dexamethasone  day 4 today  pulmonary help appreciated   discontinue O2 as sat > 95% on 1L nasal cannula
Clinically better   Off Remdesivir and cont Decadron   Supplemental oxygen   PUlm and ID consulted  Lovenox daily   Monitor LFTs  trend inflammatory markers.
await COVID PCR negative status  stable
await COVID PCR negative status  stable
resolving  continue to monitor
Clinically better   Off Remdesivir and cont Decadron   Supplemental oxygen   PUlm and ID consulted  Lovenox daily   Monitor LFTs  trend inflammatory markers.
Clinically better   Off Remdesivir and cont Decadron   Supplemental oxygen   PUlm and ID consulted  Lovenox daily   Monitor LFTs  trend inflammatory markers.
resolving   dexamethasone day 8 today  complete 10 days
Clinically better   Off Remdesivir and cont Decadron   Supplemental oxygen   PUlm and ID consulted  Lovenox daily   Monitor LFTs  trend inflammatory markers.
resolving  continue to monitor  remains COVID PCR positive
Clinically better   Off Remdesivir and cont Decadron   Supplemental oxygen   PUlm and ID consulted  Lovenox daily   Monitor LFTs  trend inflammatory markers.
Remdesivir and Decadron   Supplemental oxygen   PUlm and ID consulted  Lovenox daily   trend inflammatory markers.
await COVID PCR negative status  stable
completed Remdesivir  dexamethasone day 7 today  pulmonary help appreciated
resolving   stable off dexamethasone   continue Flonase 1 spray BID bilaterally
stable  remains COVID PCR positive  await COVID PCR negative status
resolving  continue to monitor
- COVID-19 detected 12/22 initially, here positive also, hypoxic to 90% initially on 2L as per triage note, now satting in high 90s on 3L NC  - Chest Xray: Prelim: INTERPRETATION:  Faint bibasilar and left midlung hazy opacity, which is nonspecific and can be seen in multifocal infection as well as pulmonary edema. Follow up official report in AM.  - Elevated inflammatory markers (ferritin, CRP, ddimer), low procal  - continue supplemental O2, increase as needed  - PRN Tylenol for fever > 100.4   - Contact, airborne, droplet isolation precautions  - Monitor respiratory status closely  - MOLST Form In chart , Pt is FULL CODE.  - c/w Decadron 9 more days s/p x1 dose in Decadron in ED  - c/w Remdesivir, monitor LFTs, if uptrending then would need to stop remdesivir  - CT Chest Ordered
resolving   dexamethasone day 9 today  complete 10 days
stable  await COVID PCR negative status
resolving   complete dexamethasone today  O2 sat maintained off O2 nasal cannula   add Flonase 1 spray BID bilaterally as patient is habituated with Afrin   will attempt to wean off
completed Remdesivir  dexamethasone day 6 today  pulmonary help appreciated  doing well patient desires to stay on 1L O2 nasal cannula for comfort
Remdesivir and dexamethasone day 5 today  pulmonary help appreciated  discontinue Remdesivir after today

## 2021-01-13 NOTE — PROGRESS NOTE ADULT - SUBJECTIVE AND OBJECTIVE BOX
Date of Service: 01-08-21 @ 12:27    Patient is a 75y old  Male who presents with a chief complaint of Shortness of Breath (08 Jan 2021 11:42)      Any change in ROS: doing ok : alert and awke:  getting PT:  on 2 L at 100% oxygen     MEDICATIONS  (STANDING):  ascorbic acid 250 milliGRAM(s) Oral daily  aspirin  chewable 81 milliGRAM(s) Oral daily  atorvastatin 80 milliGRAM(s) Oral at bedtime  budesonide 160 MICROgram(s)/formoterol 4.5 MICROgram(s) Inhaler 2 Puff(s) Inhalation two times a day  calcium carbonate 1250 mG  + Vitamin D (OsCal 500 + D) 1 Tablet(s) Oral two times a day  cefTRIAXone   IVPB 1000 milliGRAM(s) IV Intermittent every 24 hours  dextrose 40% Gel 15 Gram(s) Oral once  dextrose 5%. 1000 milliLiter(s) (50 mL/Hr) IV Continuous <Continuous>  dextrose 5%. 1000 milliLiter(s) (100 mL/Hr) IV Continuous <Continuous>  dextrose 50% Injectable 25 Gram(s) IV Push once  dextrose 50% Injectable 12.5 Gram(s) IV Push once  dextrose 50% Injectable 25 Gram(s) IV Push once  enoxaparin Injectable 40 milliGRAM(s) SubCutaneous daily  ferrous    sulfate 325 milliGRAM(s) Oral daily  fluticasone propionate 50 MICROgram(s)/spray Nasal Spray 1 Spray(s) Both Nostrils two times a day  folic acid 1 milliGRAM(s) Oral daily  furosemide    Tablet 40 milliGRAM(s) Oral daily  glucagon  Injectable 1 milliGRAM(s) IntraMuscular once  insulin glargine Injectable (LANTUS) 10 Unit(s) SubCutaneous at bedtime  insulin lispro (ADMELOG) corrective regimen sliding scale   SubCutaneous three times a day before meals  insulin lispro (ADMELOG) corrective regimen sliding scale   SubCutaneous at bedtime  losartan 25 milliGRAM(s) Oral daily  metoprolol tartrate 12.5 milliGRAM(s) Oral two times a day  multivitamin 1 Tablet(s) Oral daily  polyethylene glycol 3350 17 Gram(s) Oral daily  QUEtiapine 25 milliGRAM(s) Oral daily  QUEtiapine 50 milliGRAM(s) Oral at bedtime  senna 2 Tablet(s) Oral at bedtime  sodium chloride 0.65% Nasal 1 Spray(s) Both Nostrils three times a day  tiotropium 18 MICROgram(s) Capsule 1 Capsule(s) Inhalation daily    MEDICATIONS  (PRN):  acetaminophen   Tablet .. 650 milliGRAM(s) Oral every 4 hours PRN Temp greater or equal to 38C (100.4F), Mild Pain (1 - 3), Moderate Pain (4 - 6)  ALBUTerol    90 MICROgram(s) HFA Inhaler 1 Puff(s) Inhalation every 4 hours PRN Shortness of Breath and/or Wheezing  melatonin 3 milliGRAM(s) Oral at bedtime PRN Insomnia    Vital Signs Last 24 Hrs  T(C): 37.2 (08 Jan 2021 09:15), Max: 37.2 (07 Jan 2021 20:55)  T(F): 99 (08 Jan 2021 09:15), Max: 99 (08 Jan 2021 09:15)  HR: 77 (08 Jan 2021 09:15) (77 - 92)  BP: 104/50 (08 Jan 2021 09:15) (104/50 - 126/78)  BP(mean): --  RR: 18 (08 Jan 2021 09:15) (17 - 18)  SpO2: 100% (08 Jan 2021 09:15) (97% - 100%)    I&O's Summary        Physical Exam:   GENERAL: NAD, well-groomed, well-developed  HEENT: TRISTON/   Atraumatic, Normocephalic  ENMT: No tonsillar erythema, exudates, or enlargement; Moist mucous membranes, Good dentition, No lesions  NECK: Supple, No JVD, Normal thyroid  CHEST/LUNG: Clear to auscultaion  CVS: Regular rate and rhythm; No murmurs, rubs, or gallops  GI: : Soft, Nontender, Nondistended; Bowel sounds present  NERVOUS SYSTEM:  Alert & Awake  EXTREMITIES:  -edema  LYMPH: No lymphadenopathy noted  SKIN: No rashes or lesions  ENDOCRINOLOGY: No Thyromegaly  PSYCH: Calm+demented    Labs:                              8.7    13.73 )-----------( 244      ( 08 Jan 2021 09:50 )             28.0     01-08    136  |  99  |  11  ----------------------------<  116<H>  3.9   |  23  |  0.65    Ca    8.7      08 Jan 2021 09:50      CAPILLARY BLOOD GLUCOSE      POCT Blood Glucose.: 201 mg/dL (08 Jan 2021 11:59)  POCT Blood Glucose.: 140 mg/dL (08 Jan 2021 09:07)  POCT Blood Glucose.: 176 mg/dL (07 Jan 2021 21:52)  POCT Blood Glucose.: 214 mg/dL (07 Jan 2021 16:48)  r< from: CT Chest No Cont (12.24.20 @ 22:12) >  DINGS:    AIRWAYS AND LUNGS: The central tracheobronchial tree is patent.  Emphysema is present. The lungs are otherwise clear. Patchy bilateral lung opacities with predominantly lower lobe predominance    MEDIASTINUM AND PLEURA: There are no enlarged mediastinal, hilar or axillary lymph nodes. The visualized portion of the thyroid gland is unremarkable. There is no pleural effusion. There is no pneumothorax.    HEART AND VESSELS: There is cardiomegaly.  There are atherosclerotic calcifications of the aorta and coronary arteries. There is no pericardial effusion.    UPPER ABDOMEN: Images of the upper abdomen demonstrate no abnormality.    BONES AND SOFT TISSUES: The bones are unremarkable.  The soft tissues are unremarkable.    TUBES/LINES: None.    IMPRESSION:  Multifocal pneumonia. This all may represent: Pneumonia, superimposed other etiology not excluded.              ALISSA SMITH MD; Attending Radiologist  This document has been electronically signed. Dec 25 2020  8:21AM    < end of copied text >                  RECENT CULTURES:  01-02 @ 16:43 .Urine Clean Catch (Midstream)   KYARA      Proteus mirabilis  Proteus mirabilis     >100,000 CFU/ml Proteus mirabilis          RESPIRATORY CULTURES:          Studies  Chest X-RAY  CT SCAN Chest   Venous Dopplers: LE:   CT Abdomen  Others              
    Patient is a 75y old  Male who presents with a chief complaint of Shortness of Breath (03 Jan 2021 10:56)      Any change in ROS: doing ok : on 4 L of oxygen today : no events overnight       MEDICATIONS  (STANDING):  ascorbic acid 250 milliGRAM(s) Oral daily  aspirin  chewable 81 milliGRAM(s) Oral daily  atorvastatin 80 milliGRAM(s) Oral at bedtime  budesonide 160 MICROgram(s)/formoterol 4.5 MICROgram(s) Inhaler 2 Puff(s) Inhalation two times a day  calcium carbonate 1250 mG  + Vitamin D (OsCal 500 + D) 1 Tablet(s) Oral two times a day  cefTRIAXone   IVPB 1000 milliGRAM(s) IV Intermittent every 24 hours  dextrose 40% Gel 15 Gram(s) Oral once  dextrose 5%. 1000 milliLiter(s) (50 mL/Hr) IV Continuous <Continuous>  dextrose 5%. 1000 milliLiter(s) (100 mL/Hr) IV Continuous <Continuous>  dextrose 50% Injectable 25 Gram(s) IV Push once  dextrose 50% Injectable 12.5 Gram(s) IV Push once  dextrose 50% Injectable 25 Gram(s) IV Push once  enoxaparin Injectable 40 milliGRAM(s) SubCutaneous daily  ferrous    sulfate 325 milliGRAM(s) Oral daily  fluticasone propionate 50 MICROgram(s)/spray Nasal Spray 1 Spray(s) Both Nostrils two times a day  folic acid 1 milliGRAM(s) Oral daily  furosemide    Tablet 40 milliGRAM(s) Oral daily  glucagon  Injectable 1 milliGRAM(s) IntraMuscular once  insulin glargine Injectable (LANTUS) 10 Unit(s) SubCutaneous at bedtime  insulin lispro (ADMELOG) corrective regimen sliding scale   SubCutaneous three times a day before meals  insulin lispro (ADMELOG) corrective regimen sliding scale   SubCutaneous at bedtime  losartan 25 milliGRAM(s) Oral daily  metoprolol tartrate 12.5 milliGRAM(s) Oral two times a day  multivitamin 1 Tablet(s) Oral daily  polyethylene glycol 3350 17 Gram(s) Oral daily  QUEtiapine 50 milliGRAM(s) Oral at bedtime  QUEtiapine 25 milliGRAM(s) Oral daily  senna 2 Tablet(s) Oral at bedtime  sodium chloride 0.65% Nasal 1 Spray(s) Both Nostrils three times a day  tiotropium 18 MICROgram(s) Capsule 1 Capsule(s) Inhalation daily    MEDICATIONS  (PRN):  acetaminophen   Tablet .. 650 milliGRAM(s) Oral every 4 hours PRN Temp greater or equal to 38C (100.4F), Mild Pain (1 - 3), Moderate Pain (4 - 6)  ALBUTerol    90 MICROgram(s) HFA Inhaler 1 Puff(s) Inhalation every 4 hours PRN Shortness of Breath and/or Wheezing  melatonin 3 milliGRAM(s) Oral at bedtime PRN Insomnia    Vital Signs Last 24 Hrs  T(C): 36.8 (04 Jan 2021 09:24), Max: 37.1 (03 Jan 2021 17:27)  T(F): 98.3 (04 Jan 2021 09:24), Max: 98.8 (03 Jan 2021 17:27)  HR: 68 (04 Jan 2021 09:24) (60 - 74)  BP: 115/66 (04 Jan 2021 09:24) (108/61 - 150/69)  BP(mean): --  RR: 18 (04 Jan 2021 09:24) (16 - 18)  SpO2: 100% (04 Jan 2021 09:24) (96% - 100%)    I&O's Summary        Physical Exam:   GENERAL: NAD, well-groomed, well-developed  HEENT: TRISTON/   Atraumatic, Normocephalic  ENMT: No tonsillar erythema, exudates, or enlargement; Moist mucous membranes, Good dentition, No lesions  NECK: Supple, No JVD, Normal thyroid  CHEST/LUNG: Clear to auscultaion  CVS: Regular rate and rhythm; No murmurs, rubs, or gallops  GI: : Soft, Nontender, Nondistended; Bowel sounds present  NERVOUS SYSTEM:  Alert & awake  EXTREMITIES: - edema  LYMPH: No lymphadenopathy noted  SKIN: No rashes or lesions  ENDOCRINOLOGY: No Thyromegaly  PSYCH: calm    Labs:                              9.8    9.68  )-----------( 406      ( 03 Jan 2021 07:22 )             32.6     01-03    139  |  103  |  26<H>  ----------------------------<  131<H>  3.9   |  25  |  0.74    Ca    8.9      03 Jan 2021 07:22    TPro  6.0  /  Alb  3.0<L>  /  TBili  0.3  /  DBili  x   /  AST  17  /  ALT  29  /  AlkPhos  123<H>  01-03    CAPILLARY BLOOD GLUCOSE      POCT Blood Glucose.: 211 mg/dL (03 Jan 2021 21:34)  POCT Blood Glucose.: 132 mg/dL (03 Jan 2021 17:15)  POCT Blood Glucose.: 81 mg/dL (03 Jan 2021 11:41)      LIVER FUNCTIONS - ( 03 Jan 2021 07:22 )  Alb: 3.0 g/dL / Pro: 6.0 g/dL / ALK PHOS: 123 U/L / ALT: 29 U/L / AST: 17 U/L / GGT: x               D-Dimer Assay, Quantitative: 645 ng/mL DDU (12-29 @ 06:17)        RECENT CULTURES:  01-02 @ 21:47 .Urine Clean Catch (Midstream)       < from: CT Chest No Cont (12.24.20 @ 22:12) >  AIRWAYS AND LUNGS: The central tracheobronchial tree is patent.  Emphysema is present. The lungs are otherwise clear. Patchy bilateral lung opacities with predominantly lower lobe predominance    MEDIASTINUM AND PLEURA: There are no enlarged mediastinal, hilar or axillary lymph nodes. The visualized portion of the thyroid gland is unremarkable. There is no pleural effusion. There is no pneumothorax.    HEART AND VESSELS: There is cardiomegaly.  There are atherosclerotic calcifications of the aorta and coronary arteries. There is no pericardial effusion.    UPPER ABDOMEN: Images of the upper abdomen demonstrate no abnormality.    BONES AND SOFT TISSUES: The bones are unremarkable.  The soft tissues are unremarkable.    TUBES/LINES: None.    IMPRESSION:  Multifocal pneumonia. This all may represent: Pneumonia, superimposed other etiology not excluded.              ALISSA SMITH MD; Attending Radiologist  This document has been electronically signed. Dec 25 2020  8:21AM    < end of copied text >           >100,000 CFU/ml Proteus mirabilis          RESPIRATORY CULTURES:          Studies  Chest X-RAY  CT SCAN Chest   Venous Dopplers: LE:   CT Abdomen  Others    < from: CT Chest No Cont (12.24.20 @ 22:12) >  EXAM:  CT CHEST        PROCEDURE DATE:  Dec 24 2020         INTERPRETATION:  EXAMINATION: CT CHEST    CLINICAL INDICATION: Dyspnea.    TECHNIQUE: Noncontrast CT of the chest was obtained.    COMPARISON: None.    FINDINGS:    AIRWAYS AND LUNGS: The central tracheobronchial tree is patent.  Emphysema is present. The lungs are otherwise clear. Patchy bilateral lung opacities with predominantly lower lobe predominance    MEDIASTINUM AND PLEURA: There are no enlarged mediastinal, hilar or axillary lymph nodes. The visualized portion of the thyroid gland is unremarkable. There is no pleural effusion. There is no pneumothorax.    HEART AND VESSELS: There is cardiomegaly.  There are atherosclerotic calcifications of the aorta and coronary arteries. There is no pericardial effusion.    UPPER ABDOMEN: Images of the upper abdomen demonstrate no abnormality.    BONES AND SOFT TISSUES: The bones are unremarkable.  The soft tissues are unremarkable.    TUBES/LINES: None.    IMPRESSION:  Multifocal pneumonia. This all may represent: Pneumonia, superimposed other etiology not excluded.              ALISSA SMITH MD; Attending Radiologist  This document has been electronically signed. Dec 25 2020  8:21AM    < end of copied text >      r< from: CT Chest No Cont (12.24.20 @ 22:12) >  WAYS AND LUNGS: The central tracheobronchial tree is patent.  Emphysema is present. The lungs are otherwise clear. Patchy bilateral lung opacities with predominantly lower lobe predominance    MEDIASTINUM AND PLEURA: There are no enlarged mediastinal, hilar or axillary lymph nodes. The visualized portion of the thyroid gland is unremarkable. There is no pleural effusion. There is no pneumothorax.    HEART AND VESSELS: There is cardiomegaly.  There are atherosclerotic calcifications of the aorta and coronary arteries. There is no pericardial effusion.    UPPER ABDOMEN: Images of the upper abdomen demonstrate no abnormality.    BONES AND SOFT TISSUES: The bones are unremarkable.  The soft tissues are unremarkable.    TUBES/LINES: None.    IMPRESSION:  Multifocal pneumonia. This all may represent: Pneumonia, superimposed other etiology not excluded.              ALISSA SMITH MD; Attending Radiologist  This document has been electronically signed. Dec 25 2020  8:21AM    < end of copied text >        
    Patient is a 75y old  Male who presents with a chief complaint of Shortness of Breath (29 Dec 2020 10:21)      Any change in ROS: Doing same: no overnight evetn s:       MEDICATIONS  (STANDING):  ascorbic acid 250 milliGRAM(s) Oral daily  aspirin  chewable 81 milliGRAM(s) Oral daily  atorvastatin 80 milliGRAM(s) Oral at bedtime  budesonide 160 MICROgram(s)/formoterol 4.5 MICROgram(s) Inhaler 2 Puff(s) Inhalation two times a day  calcium carbonate 1250 mG  + Vitamin D (OsCal 500 + D) 1 Tablet(s) Oral two times a day  dexAMETHasone  Injectable 6 milliGRAM(s) IV Push daily  dextrose 40% Gel 15 Gram(s) Oral once  dextrose 5%. 1000 milliLiter(s) (50 mL/Hr) IV Continuous <Continuous>  dextrose 5%. 1000 milliLiter(s) (100 mL/Hr) IV Continuous <Continuous>  dextrose 50% Injectable 25 Gram(s) IV Push once  dextrose 50% Injectable 12.5 Gram(s) IV Push once  dextrose 50% Injectable 25 Gram(s) IV Push once  enoxaparin Injectable 40 milliGRAM(s) SubCutaneous daily  ferrous    sulfate 325 milliGRAM(s) Oral daily  folic acid 1 milliGRAM(s) Oral daily  furosemide    Tablet 40 milliGRAM(s) Oral daily  glucagon  Injectable 1 milliGRAM(s) IntraMuscular once  insulin glargine Injectable (LANTUS) 14 Unit(s) SubCutaneous at bedtime  insulin lispro (ADMELOG) corrective regimen sliding scale   SubCutaneous three times a day before meals  insulin lispro (ADMELOG) corrective regimen sliding scale   SubCutaneous at bedtime  losartan 25 milliGRAM(s) Oral daily  metoprolol succinate ER 25 milliGRAM(s) Oral daily  multivitamin 1 Tablet(s) Oral daily  polyethylene glycol 3350 17 Gram(s) Oral daily  QUEtiapine 25 milliGRAM(s) Oral daily  QUEtiapine 50 milliGRAM(s) Oral at bedtime  senna 2 Tablet(s) Oral at bedtime  tiotropium 18 MICROgram(s) Capsule 1 Capsule(s) Inhalation daily    MEDICATIONS  (PRN):  acetaminophen   Tablet .. 650 milliGRAM(s) Oral every 4 hours PRN Temp greater or equal to 38.5C (101.3F)  ALBUTerol    90 MICROgram(s) HFA Inhaler 1 Puff(s) Inhalation every 4 hours PRN Shortness of Breath and/or Wheezing  melatonin 3 milliGRAM(s) Oral at bedtime PRN Insomnia    Vital Signs Last 24 Hrs  T(C): 36.7 (29 Dec 2020 05:29), Max: 36.9 (28 Dec 2020 20:35)  T(F): 98.1 (29 Dec 2020 05:29), Max: 98.4 (28 Dec 2020 20:35)  HR: 62 (29 Dec 2020 05:29) (62 - 78)  BP: 119/66 (29 Dec 2020 05:29) (118/62 - 121/76)  BP(mean): --  RR: 18 (29 Dec 2020 05:29) (16 - 18)  SpO2: 100% (29 Dec 2020 05:29) (100% - 100%)    I&O's Summary    29 Dec 2020 07:01  -  29 Dec 2020 10:56  --------------------------------------------------------  IN: 300 mL / OUT: 300 mL / NET: 0 mL          Physical Exam:   GENERAL: NAD, well-groomed, well-developed  HEENT: TRISTON/   Atraumatic, Normocephalic  ENMT: No tonsillar erythema, exudates, or enlargement; Moist mucous membranes, Good dentition, No lesions  NECK: Supple, No JVD, Normal thyroid  CHEST/LUNG: Clear to auscultaion, ; No rales, rhonchi, wheezing, or rubs  CVS: Regular rate and rhythm; No murmurs, rubs, or gallops  GI: : Soft, Nontender, Nondistended; Bowel sounds present  NERVOUS SYSTEM:  Alert & awake  EXTREMITIES: -edema  LYMPH: No lymphadenopathy noted  SKIN: No rashes or lesions  ENDOCRINOLOGY: No Thyromegaly  PSYCH: Appropriate    Labs:  28, 25                            9.8    7.21  )-----------( 381      ( 29 Dec 2020 06:17 )             31.6                         9.6    7.51  )-----------( 349      ( 28 Dec 2020 06:47 )             30.4                         9.6    5.34  )-----------( 303      ( 27 Dec 2020 07:53 )             29.9                         9.7    7.42  )-----------( 338      ( 26 Dec 2020 06:58 )             31.2     12-29    137  |  100  |  22  ----------------------------<  119<H>  5.1   |  27  |  0.63  12-28    138  |  102  |  19  ----------------------------<  146<H>  3.6   |  26  |  0.64  12-27    137  |  100  |  19  ----------------------------<  103<H>  3.6   |  25  |  0.70  12-26    140  |  103  |  19  ----------------------------<  76  3.8   |  26  |  0.76    Ca    8.7      29 Dec 2020 06:17  Ca    8.3<L>      28 Dec 2020 06:47  Mg     2.0     12-29  Mg     1.7     12-28    TPro  6.4  /  Alb  2.8<L>  /  TBili  0.3  /  DBili  x   /  AST  43<H>  /  ALT  42<H>  /  AlkPhos  125<H>  12-29  TPro  6.0  /  Alb  2.5<L>  /  TBili  0.3  /  DBili  <0.2  /  AST  23  /  ALT  49<H>  /  AlkPhos  117  12-27  TPro  6.8  /  Alb  3.0<L>  /  TBili  0.2  /  DBili  <0.2  /  AST  37  /  ALT  71<H>  /  AlkPhos  135<H>  12-26    CAPILLARY BLOOD GLUCOSE      POCT Blood Glucose.: 133 mg/dL (29 Dec 2020 08:09)  POCT Blood Glucose.: 229 mg/dL (28 Dec 2020 21:29)  POCT Blood Glucose.: 261 mg/dL (28 Dec 2020 17:29)  POCT Blood Glucose.: 273 mg/dL (28 Dec 2020 12:24)      LIVER FUNCTIONS - ( 29 Dec 2020 06:17 )  Alb: 2.8 g/dL / Pro: 6.4 g/dL / ALK PHOS: 125 U/L / ALT: 42 U/L / AST: 43 U/L / GGT: x               D-Dimer Assay, Quantitative: 645 ng/mL DDU (12-29 @ 06:17)  D-Dimer Assay, Quantitative: 632 ng/mL DDU (12-25 @ 06:51)  D-Dimer Assay, Quantitative: 550 ng/mL DDU (12-24 @ 16:50)  Procalcitonin, Serum: 0.02 ng/mL (12-28 @ 06:58)        RECENT CULTURES:    < from: CT Chest No Cont (12.24.20 @ 22:12) >    CLINICAL INDICATION: Dyspnea.    TECHNIQUE: Noncontrast CT of the chest was obtained.    COMPARISON: None.    FINDINGS:    AIRWAYS AND LUNGS: The central tracheobronchial tree is patent.  Emphysema is present. The lungs are otherwise clear. Patchy bilateral lung opacities with predominantly lower lobe predominance    MEDIASTINUM AND PLEURA: There are no enlarged mediastinal, hilar or axillary lymph nodes. The visualized portion of the thyroid gland is unremarkable. There is no pleural effusion. There is no pneumothorax.    HEART AND VESSELS: There is cardiomegaly.  There are atherosclerotic calcifications of the aorta and coronary arteries. There is no pericardial effusion.    UPPER ABDOMEN: Images of the upper abdomen demonstrate no abnormality.    BONES AND SOFT TISSUES: The bones are unremarkable.  The soft tissues are unremarkable.    TUBES/LINES: None.    IMPRESSION:  Multifocal pneumonia. This all may represent: Pneumonia, superimposed other etiology not excluded.              ALISSA SMITH MD; Attending Radiologist  This document has been electronically signed. Dec 25 2020  8:21AM    < end of copied text >      RESPIRATORY CULTURES:          Studies  Chest X-RAY  CT SCAN Chest   Venous Dopplers: LE:   CT Abdomen  Others              
    Patient is a 75y old  Male who presents with a chief complaint of Shortness of Breath (29 Dec 2020 12:56)      Any change in ROS: Doing ok : insistes on using oxygen: now decreased to zero    MEDICATIONS  (STANDING):  ascorbic acid 250 milliGRAM(s) Oral daily  aspirin  chewable 81 milliGRAM(s) Oral daily  atorvastatin 80 milliGRAM(s) Oral at bedtime  budesonide 160 MICROgram(s)/formoterol 4.5 MICROgram(s) Inhaler 2 Puff(s) Inhalation two times a day  calcium carbonate 1250 mG  + Vitamin D (OsCal 500 + D) 1 Tablet(s) Oral two times a day  dexAMETHasone  Injectable 6 milliGRAM(s) IV Push daily  dextrose 40% Gel 15 Gram(s) Oral once  dextrose 5%. 1000 milliLiter(s) (50 mL/Hr) IV Continuous <Continuous>  dextrose 5%. 1000 milliLiter(s) (100 mL/Hr) IV Continuous <Continuous>  dextrose 50% Injectable 25 Gram(s) IV Push once  dextrose 50% Injectable 12.5 Gram(s) IV Push once  dextrose 50% Injectable 25 Gram(s) IV Push once  enoxaparin Injectable 40 milliGRAM(s) SubCutaneous daily  ferrous    sulfate 325 milliGRAM(s) Oral daily  folic acid 1 milliGRAM(s) Oral daily  furosemide    Tablet 40 milliGRAM(s) Oral daily  glucagon  Injectable 1 milliGRAM(s) IntraMuscular once  insulin glargine Injectable (LANTUS) 14 Unit(s) SubCutaneous at bedtime  insulin lispro (ADMELOG) corrective regimen sliding scale   SubCutaneous three times a day before meals  insulin lispro (ADMELOG) corrective regimen sliding scale   SubCutaneous at bedtime  losartan 25 milliGRAM(s) Oral daily  metoprolol succinate ER 25 milliGRAM(s) Oral daily  multivitamin 1 Tablet(s) Oral daily  polyethylene glycol 3350 17 Gram(s) Oral daily  QUEtiapine 25 milliGRAM(s) Oral daily  QUEtiapine 50 milliGRAM(s) Oral at bedtime  senna 2 Tablet(s) Oral at bedtime  sodium chloride 0.65% Nasal 1 Spray(s) Both Nostrils three times a day  tiotropium 18 MICROgram(s) Capsule 1 Capsule(s) Inhalation daily    MEDICATIONS  (PRN):  acetaminophen   Tablet .. 650 milliGRAM(s) Oral every 4 hours PRN Temp greater or equal to 38.5C (101.3F)  ALBUTerol    90 MICROgram(s) HFA Inhaler 1 Puff(s) Inhalation every 4 hours PRN Shortness of Breath and/or Wheezing  melatonin 3 milliGRAM(s) Oral at bedtime PRN Insomnia    Vital Signs Last 24 Hrs  T(C): 36.7 (30 Dec 2020 05:34), Max: 36.9 (29 Dec 2020 20:35)  T(F): 98 (30 Dec 2020 05:34), Max: 98.4 (29 Dec 2020 20:35)  HR: 64 (30 Dec 2020 05:34) (64 - 75)  BP: 110/60 (30 Dec 2020 05:34) (108/52 - 112/61)  BP(mean): --  RR: 18 (30 Dec 2020 05:34) (17 - 18)  SpO2: 99% (30 Dec 2020 05:34) (99% - 100%)    I&O's Summary    29 Dec 2020 07:01  -  30 Dec 2020 07:00  --------------------------------------------------------  IN: 600 mL / OUT: 700 mL / NET: -100 mL          Physical Exam:   GENERAL: NAD, well-groomed, well-developed  HEENT: TRISTON/   Atraumatic, Normocephalic  ENMT: No tonsillar erythema, exudates, or enlargement; Moist mucous membranes, Good dentition, No lesions  NECK: Supple, No JVD, Normal thyroid  CHEST/LUNG: Clear to auscultaion  CVS: Regular rate and rhythm; No murmurs, rubs, or gallops  GI: : Soft, Nontender, Nondistended; Bowel sounds present  NERVOUS SYSTEM:  Alert & awake  EXTREMITIES: -edema  LYMPH: No lymphadenopathy noted  SKIN: No rashes or lesions  ENDOCRINOLOGY: No Thyromegaly  PSYCH: Appropriate    Labs:  28, 25                            9.8    7.21  )-----------( 381      ( 29 Dec 2020 06:17 )             31.6                         9.6    7.51  )-----------( 349      ( 28 Dec 2020 06:47 )             30.4                         9.6    5.34  )-----------( 303      ( 27 Dec 2020 07:53 )             29.9         137  |  100  |  22  ----------------------------<  119<H>  5.1   |  27  |  0.63      138  |  102  |  19  ----------------------------<  146<H>  3.6   |  26  |  0.64      137  |  100  |  19  ----------------------------<  103<H>  3.6   |  25  |  0.70    Ca    8.7      29 Dec 2020 06:17  Mg     2.0         TPro  6.4  /  Alb  2.8<L>  /  TBili  0.3  /  DBili  x   /  AST  43<H>  /  ALT  42<H>  /  AlkPhos  125<H>    TPro  6.0  /  Alb  2.5<L>  /  TBili  0.3  /  DBili  <0.2  /  AST  23  /  ALT  49<H>  /  AlkPhos  117      CAPILLARY BLOOD GLUCOSE      POCT Blood Glucose.: 109 mg/dL (30 Dec 2020 07:22)  POCT Blood Glucose.: 186 mg/dL (29 Dec 2020 21:49)  POCT Blood Glucose.: 192 mg/dL (29 Dec 2020 16:53)  POCT Blood Glucose.: 233 mg/dL (29 Dec 2020 11:48)      LIVER FUNCTIONS - ( 29 Dec 2020 06:17 )  Alb: 2.8 g/dL / Pro: 6.4 g/dL / ALK PHOS: 125 U/L / ALT: 42 U/L / AST: 43 U/L / GGT: x             Urinalysis Basic - ( 29 Dec 2020 23:22 )    Color: Light Orange / Appearance: Turbid / S.021 / pH: x  Gluc: x / Ketone: Negative  / Bili: Negative / Urobili: <2 mg/dL   Blood: x / Protein: 100 mg/dL / Nitrite: Positive   Leuk Esterase: Large / RBC: 25-50 /HPF / WBC >50 /HPF   Sq Epi: x / Non Sq Epi: x / Bacteria: Many      D-Dimer Assay, Quantitative: 645 ng/mL DDU ( @ 06:17)  D-Dimer Assay, Quantitative: 632 ng/mL DDU ( @ 06:51)  D-Dimer Assay, Quantitative: 550 ng/mL DDU ( @ 16:50)  Procalcitonin, Serum: 0.02 ng/mL ( @ 06:58)        RECENT CULTURES:      r< from: CT Chest No Cont (20 @ 22:12) >    COMPARISON: None.    FINDINGS:    AIRWAYS AND LUNGS: The central tracheobronchial tree is patent.  Emphysema is present. The lungs are otherwise clear. Patchy bilateral lung opacities with predominantly lower lobe predominance    MEDIASTINUM AND PLEURA: There are no enlarged mediastinal, hilar or axillary lymph nodes. The visualized portion of the thyroid gland is unremarkable. There is no pleural effusion. There is no pneumothorax.    HEART AND VESSELS: There is cardiomegaly.  There are atherosclerotic calcifications of the aorta and coronary arteries. There is no pericardial effusion.    UPPER ABDOMEN: Images of the upper abdomen demonstrate no abnormality.    BONES AND SOFT TISSUES: The bones are unremarkable.  The soft tissues are unremarkable.    TUBES/LINES: None.    IMPRESSION:  Multifocal pneumonia. This all may represent: Pneumonia, superimposed other etiology not excluded.              ALISSA SMITH MD; Attending Radiologist  This document has been electronically signed. Dec 25 2020  8:21AM    < end of copied text >  < from: CT Chest No Cont (20 @ 22:12) >    COMPARISON: None.    FINDINGS:    AIRWAYS AND LUNGS: The central tracheobronchial tree is patent.  Emphysema is present. The lungs are otherwise clear. Patchy bilateral lung opacities with predominantly lower lobe predominance    MEDIASTINUM AND PLEURA: There are no enlarged mediastinal, hilar or axillary lymph nodes. The visualized portion of the thyroid gland is unremarkable. There is no pleural effusion. There is no pneumothorax.    HEART AND VESSELS: There is cardiomegaly.  There are atherosclerotic calcifications of the aorta and coronary arteries. There is no pericardial effusion.    UPPER ABDOMEN: Images of the upper abdomen demonstrate no abnormality.    BONES AND SOFT TISSUES: The bones are unremarkable.  The soft tissues are unremarkable.    TUBES/LINES: None.    IMPRESSION:  Multifocal pneumonia. This all may represent: Pneumonia, superimposed other etiology not excluded.              ALISSA SMITH MD; Attending Radiologist  This document has been electronically signed. Dec 25 2020  8:21AM    < end of copied text >    RESPIRATORY CULTURES:          Studies  Chest X-RAY  CT SCAN Chest   Venous Dopplers: LE:   CT Abdomen  Others              
    Patient is a 75y old  Male who presents with a chief complaint of Shortness of Breath (03 Jan 2021 10:20)      Any change in ROS: Doing pretty good: on room air:       MEDICATIONS  (STANDING):  ascorbic acid 250 milliGRAM(s) Oral daily  aspirin  chewable 81 milliGRAM(s) Oral daily  atorvastatin 80 milliGRAM(s) Oral at bedtime  budesonide 160 MICROgram(s)/formoterol 4.5 MICROgram(s) Inhaler 2 Puff(s) Inhalation two times a day  calcium carbonate 1250 mG  + Vitamin D (OsCal 500 + D) 1 Tablet(s) Oral two times a day  dextrose 40% Gel 15 Gram(s) Oral once  dextrose 5%. 1000 milliLiter(s) (50 mL/Hr) IV Continuous <Continuous>  dextrose 5%. 1000 milliLiter(s) (100 mL/Hr) IV Continuous <Continuous>  dextrose 50% Injectable 25 Gram(s) IV Push once  dextrose 50% Injectable 12.5 Gram(s) IV Push once  dextrose 50% Injectable 25 Gram(s) IV Push once  enoxaparin Injectable 40 milliGRAM(s) SubCutaneous daily  ferrous    sulfate 325 milliGRAM(s) Oral daily  fluticasone propionate 50 MICROgram(s)/spray Nasal Spray 1 Spray(s) Both Nostrils two times a day  folic acid 1 milliGRAM(s) Oral daily  furosemide    Tablet 40 milliGRAM(s) Oral daily  glucagon  Injectable 1 milliGRAM(s) IntraMuscular once  insulin glargine Injectable (LANTUS) 10 Unit(s) SubCutaneous at bedtime  insulin lispro (ADMELOG) corrective regimen sliding scale   SubCutaneous three times a day before meals  insulin lispro (ADMELOG) corrective regimen sliding scale   SubCutaneous at bedtime  losartan 25 milliGRAM(s) Oral daily  metoprolol tartrate 12.5 milliGRAM(s) Oral two times a day  multivitamin 1 Tablet(s) Oral daily  polyethylene glycol 3350 17 Gram(s) Oral daily  QUEtiapine 50 milliGRAM(s) Oral at bedtime  QUEtiapine 25 milliGRAM(s) Oral daily  senna 2 Tablet(s) Oral at bedtime  sodium chloride 0.65% Nasal 1 Spray(s) Both Nostrils three times a day  tiotropium 18 MICROgram(s) Capsule 1 Capsule(s) Inhalation daily    MEDICATIONS  (PRN):  acetaminophen   Tablet .. 650 milliGRAM(s) Oral every 4 hours PRN Temp greater or equal to 38C (100.4F), Mild Pain (1 - 3), Moderate Pain (4 - 6)  ALBUTerol    90 MICROgram(s) HFA Inhaler 1 Puff(s) Inhalation every 4 hours PRN Shortness of Breath and/or Wheezing  melatonin 3 milliGRAM(s) Oral at bedtime PRN Insomnia    Vital Signs Last 24 Hrs  T(C): 37 (03 Jan 2021 09:25), Max: 37 (03 Jan 2021 09:25)  T(F): 98.6 (03 Jan 2021 09:25), Max: 98.6 (03 Jan 2021 09:25)  HR: 69 (03 Jan 2021 09:25) (69 - 80)  BP: 123/73 (03 Jan 2021 09:25) (109/50 - 125/63)  BP(mean): --  RR: 18 (03 Jan 2021 09:25) (18 - 19)  SpO2: 98% (03 Jan 2021 09:25) (98% - 99%)    I&O's Summary        Physical Exam:   GENERAL: NAD, well-groomed, well-developed  HEENT: TRISTON/   Atraumatic, Normocephalic  ENMT: No tonsillar erythema, exudates, or enlargement; Moist mucous membranes, Good dentition, No lesions  NECK: Supple, No JVD, Normal thyroid  CHEST/LUNG: Clear to auscultaion, ; No rales, rhonchi, wheezing, or rubs  CVS: Regular rate and rhythm; No murmurs, rubs, or gallops  GI: : Soft, Nontender, Nondistended; Bowel sounds present  NERVOUS SYSTEM:  Alert & awake: on room air  EXTREMITIES:  - edema  LYMPH: No lymphadenopathy noted  SKIN: No rashes or lesions  ENDOCRINOLOGY: No Thyromegaly  PSYCH: calm    Labs:  28                            9.8    9.68  )-----------( 406      ( 03 Jan 2021 07:22 )             32.6     01-03    139  |  103  |  26<H>  ----------------------------<  131<H>  3.9   |  25  |  0.74    Ca    8.9      03 Jan 2021 07:22    TPro  6.0  /  Alb  3.0<L>  /  TBili  0.3  /  DBili  x   /  AST  17  /  ALT  29  /  AlkPhos  123<H>  01-03    CAPILLARY BLOOD GLUCOSE  240 (02 Jan 2021 21:45)      POCT Blood Glucose.: 124 mg/dL (03 Jan 2021 07:45)  POCT Blood Glucose.: 240 mg/dL (02 Jan 2021 21:36)  POCT Blood Glucose.: 242 mg/dL (02 Jan 2021 17:12)  POCT Blood Glucose.: 244 mg/dL (02 Jan 2021 11:47)      LIVER FUNCTIONS - ( 03 Jan 2021 07:22 )  Alb: 3.0 g/dL / Pro: 6.0 g/dL / ALK PHOS: 123 U/L / ALT: 29 U/L / AST: 17 U/L / GGT: x               D-Dimer Assay, Quantitative: 645 ng/mL DDU (12-29 @ 06:17)        RECENT CULTURES:        RESPIRATORY CULTURES:    < from: CT Chest No Cont (12.24.20 @ 22:12) >    INTERPRETATION:  EXAMINATION: CT CHEST    CLINICAL INDICATION: Dyspnea.    TECHNIQUE: Noncontrast CT of the chest was obtained.    COMPARISON: None.    FINDINGS:    AIRWAYS AND LUNGS: The central tracheobronchial tree is patent.  Emphysema is present. The lungs are otherwise clear. Patchy bilateral lung opacities with predominantly lower lobe predominance    MEDIASTINUM AND PLEURA: There are no enlarged mediastinal, hilar or axillary lymph nodes. The visualized portion of the thyroid gland is unremarkable. There is no pleural effusion. There is no pneumothorax.    HEART AND VESSELS: There is cardiomegaly.  There are atherosclerotic calcifications of the aorta and coronary arteries. There is no pericardial effusion.    UPPER ABDOMEN: Images of the upper abdomen demonstrate no abnormality.    BONES AND SOFT TISSUES: The bones are unremarkable.  The soft tissues are unremarkable.    TUBES/LINES: None.    IMPRESSION:  Multifocal pneumonia. This all may represent: Pneumonia, superimposed other etiology not excluded.              ALISSA SMITH MD; Attending Radiologist  This document has been electronically signed. Dec 25 2020  8:21AM    < end of copied text >        Studies  Chest X-RAY  CT SCAN Chest   Venous Dopplers: LE:   CT Abdomen  Others              
Date of Service: 01-07-21 @ 10:43    Patient is a 75y old  Male who presents with a chief complaint of Shortness of Breath (07 Jan 2021 10:03)      Any change in ROS:     MEDICATIONS  (STANDING):  ascorbic acid 250 milliGRAM(s) Oral daily  aspirin  chewable 81 milliGRAM(s) Oral daily  atorvastatin 80 milliGRAM(s) Oral at bedtime  budesonide 160 MICROgram(s)/formoterol 4.5 MICROgram(s) Inhaler 2 Puff(s) Inhalation two times a day  calcium carbonate 1250 mG  + Vitamin D (OsCal 500 + D) 1 Tablet(s) Oral two times a day  cefTRIAXone   IVPB 1000 milliGRAM(s) IV Intermittent every 24 hours  dextrose 40% Gel 15 Gram(s) Oral once  dextrose 5%. 1000 milliLiter(s) (50 mL/Hr) IV Continuous <Continuous>  dextrose 5%. 1000 milliLiter(s) (100 mL/Hr) IV Continuous <Continuous>  dextrose 50% Injectable 25 Gram(s) IV Push once  dextrose 50% Injectable 12.5 Gram(s) IV Push once  dextrose 50% Injectable 25 Gram(s) IV Push once  enoxaparin Injectable 40 milliGRAM(s) SubCutaneous daily  ferrous    sulfate 325 milliGRAM(s) Oral daily  fluticasone propionate 50 MICROgram(s)/spray Nasal Spray 1 Spray(s) Both Nostrils two times a day  folic acid 1 milliGRAM(s) Oral daily  furosemide    Tablet 40 milliGRAM(s) Oral daily  glucagon  Injectable 1 milliGRAM(s) IntraMuscular once  insulin glargine Injectable (LANTUS) 10 Unit(s) SubCutaneous at bedtime  insulin lispro (ADMELOG) corrective regimen sliding scale   SubCutaneous three times a day before meals  insulin lispro (ADMELOG) corrective regimen sliding scale   SubCutaneous at bedtime  losartan 25 milliGRAM(s) Oral daily  metoprolol tartrate 12.5 milliGRAM(s) Oral two times a day  multivitamin 1 Tablet(s) Oral daily  polyethylene glycol 3350 17 Gram(s) Oral daily  QUEtiapine 25 milliGRAM(s) Oral daily  QUEtiapine 50 milliGRAM(s) Oral at bedtime  senna 2 Tablet(s) Oral at bedtime  sodium chloride 0.65% Nasal 1 Spray(s) Both Nostrils three times a day  tiotropium 18 MICROgram(s) Capsule 1 Capsule(s) Inhalation daily    MEDICATIONS  (PRN):  acetaminophen   Tablet .. 650 milliGRAM(s) Oral every 4 hours PRN Temp greater or equal to 38C (100.4F), Mild Pain (1 - 3), Moderate Pain (4 - 6)  ALBUTerol    90 MICROgram(s) HFA Inhaler 1 Puff(s) Inhalation every 4 hours PRN Shortness of Breath and/or Wheezing  melatonin 3 milliGRAM(s) Oral at bedtime PRN Insomnia    Vital Signs Last 24 Hrs  T(C): 36.8 (07 Jan 2021 06:45), Max: 37.5 (06 Jan 2021 13:00)  T(F): 98.2 (07 Jan 2021 06:45), Max: 99.5 (06 Jan 2021 13:00)  HR: 95 (07 Jan 2021 06:45) (92 - 95)  BP: 124/70 (07 Jan 2021 06:45) (114/65 - 129/99)  BP(mean): --  RR: 18 (07 Jan 2021 06:45) (18 - 18)  SpO2: 98% (07 Jan 2021 06:45) (98% - 100%)    I&O's Summary        Physical Exam:   GENERAL: NAD, well-groomed, well-developed  HEENT: TRISTON/   Atraumatic, Normocephalic  ENMT: No tonsillar erythema, exudates, or enlargement; Moist mucous membranes, Good dentition, No lesions  NECK: Supple, No JVD, Normal thyroid  CHEST/LUNG: Clear to auscultaion, ; No rales, rhonchi, wheezing, or rubs  CVS: Regular rate and rhythm; No murmurs, rubs, or gallops  GI: : Soft, Nontender, Nondistended; Bowel sounds present  NERVOUS SYSTEM:  Alert & Oriented X2  EXTREMITIES:  2+ Peripheral Pulses, No clubbing, cyanosis, or edema  LYMPH: No lymphadenopathy noted  SKIN: No rashes or lesions  ENDOCRINOLOGY: No Thyromegaly  PSYCH: dementia     Labs:                CAPILLARY BLOOD GLUCOSE      POCT Blood Glucose.: 148 mg/dL (07 Jan 2021 08:11)  POCT Blood Glucose.: 138 mg/dL (06 Jan 2021 21:23)  POCT Blood Glucose.: 172 mg/dL (06 Jan 2021 17:04)  POCT Blood Glucose.: 195 mg/dL (06 Jan 2021 11:57)            < from: CT Chest No Cont (12.24.20 @ 22:12) >    AIRWAYS AND LUNGS: The central tracheobronchial tree is patent.  Emphysema is present. The lungs are otherwise clear. Patchy bilateral lung opacities with predominantly lower lobe predominance    MEDIASTINUM AND PLEURA: There are no enlarged mediastinal, hilar or axillary lymph nodes. The visualized portion of the thyroid gland is unremarkable. There is no pleural effusion. There is no pneumothorax.    HEART AND VESSELS: There is cardiomegaly.  There are atherosclerotic calcifications of the aorta and coronary arteries. There is no pericardial effusion.    UPPER ABDOMEN: Images of the upper abdomen demonstrate no abnormality.    BONES AND SOFT TISSUES: The bones are unremarkable.  The soft tissues are unremarkable.    TUBES/LINES: None.    IMPRESSION:  Multifocal pneumonia. This all may represent: Pneumonia, superimposed other etiology not excluded.              ALISSA SMITH MD; Attending Radiologist  This document has been electronically signed. Dec 25 2020  8:21AM    < end of copied text >        RECENT CULTURES:  01-02 @ 16:43 .Urine Clean Catch (Midstream)   KYARA      Proteus mirabilis  Proteus mirabilis     >100,000 CFU/ml Proteus mirabilis          RESPIRATORY CULTURES:          Studies  Chest X-RAY  CT SCAN Chest   Venous Dopplers: LE:   CT Abdomen  Others              
Subjective: Patient seen and examined. No new events except as noted.     REVIEW OF SYSTEMS:    CONSTITUTIONAL: No weakness, fevers or chills  EYES/ENT: No visual changes;  No vertigo or throat pain   NECK: No pain or stiffness  RESPIRATORY: No cough, wheezing, hemoptysis; No shortness of breath  CARDIOVASCULAR: No chest pain or palpitations  GASTROINTESTINAL: No abdominal or epigastric pain. No nausea, vomiting, or hematemesis; No diarrhea or constipation. No melena or hematochezia.  GENITOURINARY: No dysuria, frequency or hematuria  NEUROLOGICAL: No numbness or weakness  SKIN: No itching, burning, rashes, or lesions   All other review of systems is negative unless indicated above.    MEDICATIONS:  MEDICATIONS  (STANDING):  ascorbic acid 250 milliGRAM(s) Oral daily  aspirin  chewable 81 milliGRAM(s) Oral daily  atorvastatin 80 milliGRAM(s) Oral at bedtime  budesonide 160 MICROgram(s)/formoterol 4.5 MICROgram(s) Inhaler 2 Puff(s) Inhalation two times a day  calcium carbonate 1250 mG  + Vitamin D (OsCal 500 + D) 1 Tablet(s) Oral two times a day  cefTRIAXone   IVPB 1000 milliGRAM(s) IV Intermittent every 24 hours  dextrose 40% Gel 15 Gram(s) Oral once  dextrose 5%. 1000 milliLiter(s) (50 mL/Hr) IV Continuous <Continuous>  dextrose 5%. 1000 milliLiter(s) (100 mL/Hr) IV Continuous <Continuous>  dextrose 50% Injectable 25 Gram(s) IV Push once  dextrose 50% Injectable 12.5 Gram(s) IV Push once  dextrose 50% Injectable 25 Gram(s) IV Push once  enoxaparin Injectable 40 milliGRAM(s) SubCutaneous daily  ferrous    sulfate 325 milliGRAM(s) Oral daily  fluticasone propionate 50 MICROgram(s)/spray Nasal Spray 1 Spray(s) Both Nostrils two times a day  folic acid 1 milliGRAM(s) Oral daily  furosemide    Tablet 40 milliGRAM(s) Oral daily  glucagon  Injectable 1 milliGRAM(s) IntraMuscular once  insulin glargine Injectable (LANTUS) 10 Unit(s) SubCutaneous at bedtime  insulin lispro (ADMELOG) corrective regimen sliding scale   SubCutaneous three times a day before meals  insulin lispro (ADMELOG) corrective regimen sliding scale   SubCutaneous at bedtime  losartan 25 milliGRAM(s) Oral daily  metoprolol tartrate 12.5 milliGRAM(s) Oral two times a day  multivitamin 1 Tablet(s) Oral daily  polyethylene glycol 3350 17 Gram(s) Oral daily  QUEtiapine 25 milliGRAM(s) Oral daily  QUEtiapine 50 milliGRAM(s) Oral at bedtime  senna 2 Tablet(s) Oral at bedtime  sodium chloride 0.65% Nasal 1 Spray(s) Both Nostrils three times a day  tiotropium 18 MICROgram(s) Capsule 1 Capsule(s) Inhalation daily      PHYSICAL EXAM:  T(C): 37.5 (01-06-21 @ 13:00), Max: 37.5 (01-06-21 @ 13:00)  HR: 95 (01-06-21 @ 18:18) (91 - 95)  BP: 129/99 (01-06-21 @ 18:18) (97/60 - 129/99)  RR: 18 (01-06-21 @ 13:00) (18 - 19)  SpO2: 100% (01-06-21 @ 13:00) (95% - 100%)  Wt(kg): --  I&O's Summary        Appearance: Normal	  HEENT:   Normal oral mucosa, PERRL, EOMI	  Lymphatic: No lymphadenopathy , no edema  Cardiovascular: Normal S1 S2, No JVD, No murmurs , Peripheral pulses palpable 2+ bilaterally  Respiratory: Lungs clear to auscultation, normal effort 	  Gastrointestinal:  Soft, Non-tender, + BS	  Skin: No rashes, No ecchymoses, No cyanosis, warm to touch  Musculoskeletal: Normal range of motion, normal strength  Psychiatry:  Mood & affect appropriate  Ext: No edema      LABS:    CARDIAC MARKERS:                  proBNP:   Lipid Profile:   HgA1c:   TSH:             TELEMETRY: 	    ECG:  	  RADIOLOGY:   DIAGNOSTIC TESTING:  [ ] Echocardiogram:  [ ]  Catheterization:  [ ] Stress Test:    OTHER: 	          
    Patient is a 75y old  Male who presents with a chief complaint of Shortness of Breath (01 Jan 2021 10:43)      Any change in ROS: Doing ok : no SOB : off oxygen : some confusion    MEDICATIONS  (STANDING):  ascorbic acid 250 milliGRAM(s) Oral daily  aspirin  chewable 81 milliGRAM(s) Oral daily  atorvastatin 80 milliGRAM(s) Oral at bedtime  budesonide 160 MICROgram(s)/formoterol 4.5 MICROgram(s) Inhaler 2 Puff(s) Inhalation two times a day  calcium carbonate 1250 mG  + Vitamin D (OsCal 500 + D) 1 Tablet(s) Oral two times a day  dexAMETHasone  Injectable 6 milliGRAM(s) IV Push daily  dextrose 40% Gel 15 Gram(s) Oral once  dextrose 5%. 1000 milliLiter(s) (50 mL/Hr) IV Continuous <Continuous>  dextrose 5%. 1000 milliLiter(s) (100 mL/Hr) IV Continuous <Continuous>  dextrose 50% Injectable 25 Gram(s) IV Push once  dextrose 50% Injectable 12.5 Gram(s) IV Push once  dextrose 50% Injectable 25 Gram(s) IV Push once  enoxaparin Injectable 40 milliGRAM(s) SubCutaneous daily  ferrous    sulfate 325 milliGRAM(s) Oral daily  folic acid 1 milliGRAM(s) Oral daily  furosemide    Tablet 40 milliGRAM(s) Oral daily  glucagon  Injectable 1 milliGRAM(s) IntraMuscular once  insulin glargine Injectable (LANTUS) 14 Unit(s) SubCutaneous at bedtime  insulin lispro (ADMELOG) corrective regimen sliding scale   SubCutaneous three times a day before meals  insulin lispro (ADMELOG) corrective regimen sliding scale   SubCutaneous at bedtime  losartan 25 milliGRAM(s) Oral daily  metoprolol succinate ER 25 milliGRAM(s) Oral daily  multivitamin 1 Tablet(s) Oral daily  oxymetazoline 0.05% Nasal Spray      oxymetazoline 0.05% Nasal Spray 1 Spray(s) Both Nostrils every 12 hours  polyethylene glycol 3350 17 Gram(s) Oral daily  QUEtiapine 25 milliGRAM(s) Oral daily  QUEtiapine 50 milliGRAM(s) Oral at bedtime  senna 2 Tablet(s) Oral at bedtime  sodium chloride 0.65% Nasal 1 Spray(s) Both Nostrils three times a day  tiotropium 18 MICROgram(s) Capsule 1 Capsule(s) Inhalation daily    MEDICATIONS  (PRN):  acetaminophen   Tablet .. 650 milliGRAM(s) Oral every 4 hours PRN Temp greater or equal to 38.5C (101.3F)  ALBUTerol    90 MICROgram(s) HFA Inhaler 1 Puff(s) Inhalation every 4 hours PRN Shortness of Breath and/or Wheezing  melatonin 3 milliGRAM(s) Oral at bedtime PRN Insomnia    Vital Signs Last 24 Hrs  T(C): 36.3 (01 Jan 2021 06:29), Max: 36.5 (31 Dec 2020 21:05)  T(F): 97.3 (01 Jan 2021 06:29), Max: 97.7 (31 Dec 2020 21:05)  HR: 91 (01 Jan 2021 06:29) (75 - 99)  BP: 118/70 (01 Jan 2021 06:29) (116/68 - 127/66)  BP(mean): --  RR: 17 (01 Jan 2021 06:29) (17 - 19)  SpO2: 95% (01 Jan 2021 06:29) (93% - 95%)    I&O's Summary    31 Dec 2020 07:01  -  01 Jan 2021 07:00  --------------------------------------------------------  IN: 0 mL / OUT: 425 mL / NET: -425 mL          Physical Exam:   GENERAL: NAD, well-groomed, well-developed  HEENT: TRISTON/   Atraumatic, Normocephalic  ENMT: No tonsillar erythema, exudates, or enlargement; Moist mucous membranes, Good dentition, No lesions  NECK: Supple, No JVD, Normal thyroid  CHEST/LUNG: Clear to auscultaion  CVS: Regular rate and rhythm; No murmurs, rubs, or gallops  GI: : Soft, Nontender, Nondistended; Bowel sounds present  NERVOUS SYSTEM:  Alert & awake  EXTREMITIES:  2-edema  LYMPH: No lymphadenopathy noted  SKIN: No rashes or lesions  ENDOCRINOLOGY: No Thyromegaly  PSYCH: calm+    Labs:  28                            9.8    7.21  )-----------( 381      ( 29 Dec 2020 06:17 )             31.6     12-29    137  |  100  |  22  ----------------------------<  119<H>  5.1   |  27  |  0.63      TPro  6.4  /  Alb  2.8<L>  /  TBili  0.3  /  DBili  x   /  AST  43<H>  /  ALT  42<H>  /  AlkPhos  125<H>  12-29    CAPILLARY BLOOD GLUCOSE      POCT Blood Glucose.: 141 mg/dL (01 Jan 2021 08:48)  POCT Blood Glucose.: 156 mg/dL (31 Dec 2020 21:13)  POCT Blood Glucose.: 225 mg/dL (31 Dec 2020 16:46)  POCT Blood Glucose.: 200 mg/dL (31 Dec 2020 11:50)            D-Dimer Assay, Quantitative: 645 ng/mL DDU (12-29 @ 06:17)        RECENT CULTURES:    r< from: CT Chest No Cont (12.24.20 @ 22:12) >        INTERPRETATION:  EXAMINATION: CT CHEST    CLINICAL INDICATION: Dyspnea.    TECHNIQUE: Noncontrast CT of the chest was obtained.    COMPARISON: None.    FINDINGS:    AIRWAYS AND LUNGS: The central tracheobronchial tree is patent.  Emphysema is present. The lungs are otherwise clear. Patchy bilateral lung opacities with predominantly lower lobe predominance    MEDIASTINUM AND PLEURA: There are no enlarged mediastinal, hilar or axillary lymph nodes. The visualized portion of the thyroid gland is unremarkable. There is no pleural effusion. There is no pneumothorax.    HEART AND VESSELS: There is cardiomegaly.  There are atherosclerotic calcifications of the aorta and coronary arteries. There is no pericardial effusion.    UPPER ABDOMEN: Images of the upper abdomen demonstrate no abnormality.    BONES AND SOFT TISSUES: The bones are unremarkable.  The soft tissues are unremarkable.    TUBES/LINES: None.    IMPRESSION:  Multifocal pneumonia. This all may represent: Pneumonia, superimposed other etiology not excluded.              ALISSA SMITH MD; Attending Radiologist  This document has been electronically signed. Dec 25 2020  8:21AM    < end of copied text >      RESPIRATORY CULTURES:          Studies  Chest X-RAY  CT SCAN Chest   Venous Dopplers: LE:   CT Abdomen  Others              
    Patient is a 75y old  Male who presents with a chief complaint of Shortness of Breath (27 Dec 2020 13:25)      Any change in ROS:     MEDICATIONS  (STANDING):  ascorbic acid 250 milliGRAM(s) Oral daily  aspirin  chewable 81 milliGRAM(s) Oral daily  atorvastatin 80 milliGRAM(s) Oral at bedtime  budesonide 160 MICROgram(s)/formoterol 4.5 MICROgram(s) Inhaler 2 Puff(s) Inhalation two times a day  calcium carbonate 1250 mG  + Vitamin D (OsCal 500 + D) 1 Tablet(s) Oral two times a day  dexAMETHasone  Injectable 6 milliGRAM(s) IV Push daily  dextrose 40% Gel 15 Gram(s) Oral once  dextrose 5%. 1000 milliLiter(s) (50 mL/Hr) IV Continuous <Continuous>  dextrose 5%. 1000 milliLiter(s) (100 mL/Hr) IV Continuous <Continuous>  dextrose 50% Injectable 25 Gram(s) IV Push once  dextrose 50% Injectable 12.5 Gram(s) IV Push once  dextrose 50% Injectable 25 Gram(s) IV Push once  enoxaparin Injectable 40 milliGRAM(s) SubCutaneous daily  ferrous    sulfate 325 milliGRAM(s) Oral daily  folic acid 1 milliGRAM(s) Oral daily  furosemide    Tablet 40 milliGRAM(s) Oral daily  glucagon  Injectable 1 milliGRAM(s) IntraMuscular once  insulin glargine Injectable (LANTUS) 10 Unit(s) SubCutaneous at bedtime  insulin lispro (ADMELOG) corrective regimen sliding scale   SubCutaneous three times a day before meals  insulin lispro (ADMELOG) corrective regimen sliding scale   SubCutaneous at bedtime  losartan 25 milliGRAM(s) Oral daily  metoprolol succinate ER 25 milliGRAM(s) Oral daily  multivitamin 1 Tablet(s) Oral daily  polyethylene glycol 3350 17 Gram(s) Oral daily  QUEtiapine 25 milliGRAM(s) Oral daily  QUEtiapine 50 milliGRAM(s) Oral at bedtime  remdesivir  IVPB 100 milliGRAM(s) IV Intermittent every 24 hours  remdesivir  IVPB   IV Intermittent   senna 2 Tablet(s) Oral at bedtime  tiotropium 18 MICROgram(s) Capsule 1 Capsule(s) Inhalation daily    MEDICATIONS  (PRN):  acetaminophen   Tablet .. 650 milliGRAM(s) Oral every 4 hours PRN Temp greater or equal to 38.5C (101.3F)  ALBUTerol    90 MICROgram(s) HFA Inhaler 1 Puff(s) Inhalation every 4 hours PRN Shortness of Breath and/or Wheezing  melatonin 3 milliGRAM(s) Oral at bedtime PRN Insomnia    Vital Signs Last 24 Hrs  T(C): 36.3 (27 Dec 2020 10:02), Max: 37.1 (26 Dec 2020 14:36)  T(F): 97.4 (27 Dec 2020 10:02), Max: 98.7 (26 Dec 2020 14:36)  HR: 82 (27 Dec 2020 10:02) (68 - 98)  BP: 107/66 (27 Dec 2020 10:02) (107/66 - 128/67)  BP(mean): --  RR: 20 (27 Dec 2020 10:02) (18 - 20)  SpO2: 97% (27 Dec 2020 10:02) (97% - 97%)    I&O's Summary        Physical Exam:   GENERAL: NAD, well-groomed, well-developed  HEENT: TRISTON/   Atraumatic, Normocephalic  ENMT: No tonsillar erythema, exudates, or enlargement; Moist mucous membranes, Good dentition, No lesions  NECK: Supple, No JVD, Normal thyroid  CHEST/LUNG: Clear to auscultaion, ; No rales, rhonchi, wheezing, or rubs  CVS: Regular rate and rhythm; No murmurs, rubs, or gallops  GI: : Soft, Nontender, Nondistended; Bowel sounds present  NERVOUS SYSTEM:  Alert & Oriented X3  EXTREMITIES:  -r edema  LYMPH: No lymphadenopathy noted  SKIN: No rashes or lesions  ENDOCRINOLOGY: No Thyromegaly  PSYCH: Appropriate    Labs:  25                            9.6    5.34  )-----------( 303      ( 27 Dec 2020 07:53 )             29.9                         9.7    7.42  )-----------( 338      ( 26 Dec 2020 06:58 )             31.2                         8.7    5.31  )-----------( 243      ( 25 Dec 2020 06:51 )             27.6                         9.9    8.69  )-----------( 263      ( 24 Dec 2020 16:50 )             32.3     12-27    137  |  100  |  19  ----------------------------<  103<H>  3.6   |  25  |  0.70  12-26    140  |  103  |  19  ----------------------------<  76  3.8   |  26  |  0.76  12-24    136  |  96<L>  |  24<H>  ----------------------------<  152<H>  4.6   |  25  |  0.92    Ca    8.4      27 Dec 2020 07:53  Ca    8.7      26 Dec 2020 06:58    TPro  6.0  /  Alb  2.5<L>  /  TBili  0.3  /  DBili  <0.2  /  AST  23  /  ALT  49<H>  /  AlkPhos  117  12-27  TPro  6.8  /  Alb  3.0<L>  /  TBili  0.2  /  DBili  <0.2  /  AST  37  /  ALT  71<H>  /  AlkPhos  135<H>  12-26  TPro  7.2  /  Alb  3.5  /  TBili  0.3  /  DBili  x   /  AST  76<H>  /  ALT  115<H>  /  AlkPhos  155<H>  12-24    CAPILLARY BLOOD GLUCOSE      POCT Blood Glucose.: 269 mg/dL (27 Dec 2020 12:59)  POCT Blood Glucose.: 175 mg/dL (27 Dec 2020 09:01)  POCT Blood Glucose.: 179 mg/dL (26 Dec 2020 22:48)  POCT Blood Glucose.: 195 mg/dL (26 Dec 2020 17:33)      LIVER FUNCTIONS - ( 27 Dec 2020 07:53 )  Alb: 2.5 g/dL / Pro: 6.0 g/dL / ALK PHOS: 117 U/L / ALT: 49 U/L / AST: 23 U/L / GGT: x               D-Dimer Assay, Quantitative: 632 ng/mL DDU (12-25 @ 06:51)  D-Dimer Assay, Quantitative: 550 ng/mL DDU (12-24 @ 16:50)  Procalcitonin, Serum: 0.10 ng/mL (12-24 @ 16:50)  Serum Pro-Brain Natriuretic Peptide: 274 pg/mL (12-24 @ 19:47)  Serum Pro-Brain Natriuretic Peptide: 285 pg/mL (12-24 @ 16:50)  Lactate, Blood: 1.3 mmol/L (12-25 @ 06:51)        RECENT CULTURES:        RESPIRATORY CULTURES:    r< from: CT Chest No Cont (12.24.20 @ 22:12) >  FINDINGS:    AIRWAYS AND LUNGS: The central tracheobronchial tree is patent.  Emphysema is present. The lungs are otherwise clear. Patchy bilateral lung opacities with predominantly lower lobe predominance    MEDIASTINUM AND PLEURA: There are no enlarged mediastinal, hilar or axillary lymph nodes. The visualized portion of the thyroid gland is unremarkable. There is no pleural effusion. There is no pneumothorax.    HEART AND VESSELS: There is cardiomegaly.  There are atherosclerotic calcifications of the aorta and coronary arteries. There is no pericardial effusion.    UPPER ABDOMEN: Images of the upper abdomen demonstrate no abnormality.    BONES AND SOFT TISSUES: The bones are unremarkable.  The soft tissues are unremarkable.    TUBES/LINES: None.    IMPRESSION:  Multifocal pneumonia. This all may represent: Pneumonia, superimposed other etiology not excluded.              ALISSA SMITH MD; Attending Radiologist  This document has been electronically signed. Dec 25 2020  8:21AM    < end of copied text >        Studies  Chest X-RAY  CT SCAN Chest   Venous Dopplers: LE:   CT Abdomen  Others              
    Patient is a 75y old  Male who presents with a chief complaint of Shortness of Breath (31 Dec 2020 10:29)      Any change in ROS: doing better: feels nose is blocked     MEDICATIONS  (STANDING):  ascorbic acid 250 milliGRAM(s) Oral daily  aspirin  chewable 81 milliGRAM(s) Oral daily  atorvastatin 80 milliGRAM(s) Oral at bedtime  budesonide 160 MICROgram(s)/formoterol 4.5 MICROgram(s) Inhaler 2 Puff(s) Inhalation two times a day  calcium carbonate 1250 mG  + Vitamin D (OsCal 500 + D) 1 Tablet(s) Oral two times a day  dexAMETHasone  Injectable 6 milliGRAM(s) IV Push daily  dextrose 40% Gel 15 Gram(s) Oral once  dextrose 5%. 1000 milliLiter(s) (50 mL/Hr) IV Continuous <Continuous>  dextrose 5%. 1000 milliLiter(s) (100 mL/Hr) IV Continuous <Continuous>  dextrose 50% Injectable 25 Gram(s) IV Push once  dextrose 50% Injectable 12.5 Gram(s) IV Push once  dextrose 50% Injectable 25 Gram(s) IV Push once  enoxaparin Injectable 40 milliGRAM(s) SubCutaneous daily  ferrous    sulfate 325 milliGRAM(s) Oral daily  folic acid 1 milliGRAM(s) Oral daily  furosemide    Tablet 40 milliGRAM(s) Oral daily  glucagon  Injectable 1 milliGRAM(s) IntraMuscular once  insulin glargine Injectable (LANTUS) 14 Unit(s) SubCutaneous at bedtime  insulin lispro (ADMELOG) corrective regimen sliding scale   SubCutaneous three times a day before meals  insulin lispro (ADMELOG) corrective regimen sliding scale   SubCutaneous at bedtime  losartan 25 milliGRAM(s) Oral daily  metoprolol succinate ER 25 milliGRAM(s) Oral daily  multivitamin 1 Tablet(s) Oral daily  oxymetazoline 0.05% Nasal Spray 1 Spray(s) Both Nostrils once  polyethylene glycol 3350 17 Gram(s) Oral daily  QUEtiapine 25 milliGRAM(s) Oral daily  QUEtiapine 50 milliGRAM(s) Oral at bedtime  senna 2 Tablet(s) Oral at bedtime  sodium chloride 0.65% Nasal 1 Spray(s) Both Nostrils three times a day  tiotropium 18 MICROgram(s) Capsule 1 Capsule(s) Inhalation daily    MEDICATIONS  (PRN):  acetaminophen   Tablet .. 650 milliGRAM(s) Oral every 4 hours PRN Temp greater or equal to 38.5C (101.3F)  ALBUTerol    90 MICROgram(s) HFA Inhaler 1 Puff(s) Inhalation every 4 hours PRN Shortness of Breath and/or Wheezing  melatonin 3 milliGRAM(s) Oral at bedtime PRN Insomnia    Vital Signs Last 24 Hrs  T(C): 36.7 (31 Dec 2020 06:06), Max: 36.7 (30 Dec 2020 20:50)  T(F): 98 (31 Dec 2020 06:06), Max: 98.1 (30 Dec 2020 20:50)  HR: 87 (31 Dec 2020 06:06) (80 - 87)  BP: 119/60 (31 Dec 2020 06:06) (119/60 - 120/55)  BP(mean): --  RR: 18 (31 Dec 2020 06:06) (17 - 18)  SpO2: 96% (31 Dec 2020 06:06) (96% - 97%)    I&O's Summary        Physical Exam:   GENERAL: NAD, well-groomed, well-developed  HEENT: TRISTON/   Atraumatic, Normocephalic  ENMT: No tonsillar erythema, exudates, or enlargement; Moist mucous membranes, Good dentition, No lesions  NECK: Supple, No JVD, Normal thyroid  CHEST/LUNG: Clear to auscultaion  CVS: Regular rate and rhythm; No murmurs, rubs, or gallops  GI: : Soft, Nontender, Nondistended; Bowel sounds present  NERVOUS SYSTEM:  Alert & Oriented X2-3  EXTREMITIES:- edema  LYMPH: No lymphadenopathy noted  SKIN: No rashes or lesions  ENDOCRINOLOGY: No Thyromegaly  PSYCH: Appropriate    Labs:  28, 25                            9.8    7.21  )-----------( 381      ( 29 Dec 2020 06:17 )             31.6                         9.6    7.51  )-----------( 349      ( 28 Dec 2020 06:47 )             30.4         137  |  100  |  22  ----------------------------<  119<H>  5.1   |  27  |  0.63      138  |  102  |  19  ----------------------------<  146<H>  3.6   |  26  |  0.64      TPro  6.4  /  Alb  2.8<L>  /  TBili  0.3  /  DBili  x   /  AST  43<H>  /  ALT  42<H>  /  AlkPhos  125<H>      CAPILLARY BLOOD GLUCOSE      POCT Blood Glucose.: 159 mg/dL (31 Dec 2020 08:17)  POCT Blood Glucose.: 181 mg/dL (30 Dec 2020 20:53)  POCT Blood Glucose.: 95 mg/dL (30 Dec 2020 16:37)  POCT Blood Glucose.: 80 mg/dL (30 Dec 2020 11:46)          Urinalysis Basic - ( 29 Dec 2020 23:22 )    Color: Light Orange / Appearance: Turbid / S.021 / pH: x  Gluc: x / Ketone: Negative  / Bili: Negative / Urobili: <2 mg/dL   Blood: x / Protein: 100 mg/dL / Nitrite: Positive   Leuk Esterase: Large / RBC: 25-50 /HPF / WBC >50 /HPF   Sq Epi: x / Non Sq Epi: x / Bacteria: Many      D-Dimer Assay, Quantitative: 645 ng/mL DDU ( @ 06:17)  D-Dimer Assay, Quantitative: 632 ng/mL DDU ( @ 06:51)  D-Dimer Assay, Quantitative: 550 ng/mL DDU ( @ 16:50)  Procalcitonin, Serum: 0.02 ng/mL ( @ 06:58)      r< from: CT Chest No Cont (20 @ 22:12) >      INTERPRETATION:  EXAMINATION: CT CHEST    CLINICAL INDICATION: Dyspnea.    TECHNIQUE: Noncontrast CT of the chest was obtained.    COMPARISON: None.    FINDINGS:    AIRWAYS AND LUNGS: The central tracheobronchial tree is patent.  Emphysema is present. The lungs are otherwise clear. Patchy bilateral lung opacities with predominantly lower lobe predominance    MEDIASTINUM AND PLEURA: There are no enlarged mediastinal, hilar or axillary lymph nodes. The visualized portion of the thyroid gland is unremarkable. There is no pleural effusion. There is no pneumothorax.    HEART AND VESSELS: There is cardiomegaly.  There are atherosclerotic calcifications of the aorta and coronary arteries. There is no pericardial effusion.    UPPER ABDOMEN: Images of the upper abdomen demonstrate no abnormality.    BONES AND SOFT TISSUES: The bones are unremarkable.  The soft tissues are unremarkable.    TUBES/LINES: None.    IMPRESSION:  Multifocal pneumonia. This all may represent: Pneumonia, superimposed other etiology not excluded.              ALISSA SMITH MD; Attending Radiologist  This document has been electronically signed. Dec 25 2020  8:21AM    < end of copied text >    RECENT CULTURES:        RESPIRATORY CULTURES:          Studies  Chest X-RAY  CT SCAN Chest   Venous Dopplers: LE:   CT Abdomen  Others              
Date of Service: 01-06-21 @ 10:57    Patient is a 75y old  Male who presents with a chief complaint of Shortness of Breath (06 Jan 2021 09:37)      Any change in ROS: Doing ok : no SOB : yelling to get some food and water:  on room air     MEDICATIONS  (STANDING):  ascorbic acid 250 milliGRAM(s) Oral daily  aspirin  chewable 81 milliGRAM(s) Oral daily  atorvastatin 80 milliGRAM(s) Oral at bedtime  budesonide 160 MICROgram(s)/formoterol 4.5 MICROgram(s) Inhaler 2 Puff(s) Inhalation two times a day  calcium carbonate 1250 mG  + Vitamin D (OsCal 500 + D) 1 Tablet(s) Oral two times a day  cefTRIAXone   IVPB 1000 milliGRAM(s) IV Intermittent every 24 hours  dextrose 40% Gel 15 Gram(s) Oral once  dextrose 5%. 1000 milliLiter(s) (50 mL/Hr) IV Continuous <Continuous>  dextrose 5%. 1000 milliLiter(s) (100 mL/Hr) IV Continuous <Continuous>  dextrose 50% Injectable 25 Gram(s) IV Push once  dextrose 50% Injectable 12.5 Gram(s) IV Push once  dextrose 50% Injectable 25 Gram(s) IV Push once  enoxaparin Injectable 40 milliGRAM(s) SubCutaneous daily  ferrous    sulfate 325 milliGRAM(s) Oral daily  fluticasone propionate 50 MICROgram(s)/spray Nasal Spray 1 Spray(s) Both Nostrils two times a day  folic acid 1 milliGRAM(s) Oral daily  furosemide    Tablet 40 milliGRAM(s) Oral daily  glucagon  Injectable 1 milliGRAM(s) IntraMuscular once  insulin glargine Injectable (LANTUS) 10 Unit(s) SubCutaneous at bedtime  insulin lispro (ADMELOG) corrective regimen sliding scale   SubCutaneous three times a day before meals  insulin lispro (ADMELOG) corrective regimen sliding scale   SubCutaneous at bedtime  losartan 25 milliGRAM(s) Oral daily  metoprolol tartrate 12.5 milliGRAM(s) Oral two times a day  multivitamin 1 Tablet(s) Oral daily  polyethylene glycol 3350 17 Gram(s) Oral daily  QUEtiapine 25 milliGRAM(s) Oral daily  QUEtiapine 50 milliGRAM(s) Oral at bedtime  senna 2 Tablet(s) Oral at bedtime  sodium chloride 0.65% Nasal 1 Spray(s) Both Nostrils three times a day  tiotropium 18 MICROgram(s) Capsule 1 Capsule(s) Inhalation daily    MEDICATIONS  (PRN):  acetaminophen   Tablet .. 650 milliGRAM(s) Oral every 4 hours PRN Temp greater or equal to 38C (100.4F), Mild Pain (1 - 3), Moderate Pain (4 - 6)  ALBUTerol    90 MICROgram(s) HFA Inhaler 1 Puff(s) Inhalation every 4 hours PRN Shortness of Breath and/or Wheezing  melatonin 3 milliGRAM(s) Oral at bedtime PRN Insomnia    Vital Signs Last 24 Hrs  T(C): 37.4 (06 Jan 2021 06:43), Max: 37.4 (06 Jan 2021 06:43)  T(F): 99.3 (06 Jan 2021 06:43), Max: 99.3 (06 Jan 2021 06:43)  HR: 95 (06 Jan 2021 06:43) (91 - 95)  BP: 97/60 (06 Jan 2021 06:43) (97/60 - 123/53)  BP(mean): --  RR: 18 (06 Jan 2021 06:43) (16 - 19)  SpO2: 95% (06 Jan 2021 06:43) (95% - 100%)    I&O's Summary        Physical Exam:   GENERAL: NAD, well-groomed, well-developed  HEENT: TRISTON/   Atraumatic, Normocephalic  ENMT: No tonsillar erythema, exudates, or enlargement; Moist mucous membranes, Good dentition, No lesions  NECK: Supple, No JVD, Normal thyroid  CHEST/LUNG: Clear to auscultaion, ; No rales, rhonchi, wheezing, or rubs  CVS: Regular rate and rhythm; No murmurs, rubs, or gallops  GI: : Soft, Nontender, Nondistended; Bowel sounds present  NERVOUS SYSTEM:  Alert & Oriented X1-2  EXTREMITIES: -edema  LYMPH: No lymphadenopathy noted  SKIN: No rashes or lesions  ENDOCRINOLOGY: No Thyromegaly  PSYCH: Appropriate    Labs:                              9.8    9.68  )-----------( 406      ( 03 Jan 2021 07:22 )             32.6     01-03    139  |  103  |  26<H>  ----------------------------<  131<H>  3.9   |  25  |  0.74      TPro  6.0  /  Alb  3.0<L>  /  TBili  0.3  /  DBili  x   /  AST  17  /  ALT  29  /  AlkPhos  123<H>  01-03    CAPILLARY BLOOD GLUCOSE  149 (05 Jan 2021 22:00)      POCT Blood Glucose.: 133 mg/dL (06 Jan 2021 07:43)  POCT Blood Glucose.: 185 mg/dL (05 Jan 2021 17:08)  POCT Blood Glucose.: 215 mg/dL (05 Jan 2021 12:15)        ra< from: CT Chest No Cont (12.24.20 @ 22:12) >  INTERPRETATION:  EXAMINATION: CT CHEST    CLINICAL INDICATION: Dyspnea.    TECHNIQUE: Noncontrast CT of the chest was obtained.    COMPARISON: None.    FINDINGS:    AIRWAYS AND LUNGS: The central tracheobronchial tree is patent.  Emphysema is present. The lungs are otherwise clear. Patchy bilateral lung opacities with predominantly lower lobe predominance    MEDIASTINUM AND PLEURA: There are no enlarged mediastinal, hilar or axillary lymph nodes. The visualized portion of the thyroid gland is unremarkable. There is no pleural effusion. There is no pneumothorax.    HEART AND VESSELS: There is cardiomegaly.  There are atherosclerotic calcifications of the aorta and coronary arteries. There is no pericardial effusion.    UPPER ABDOMEN: Images of the upper abdomen demonstrate no abnormality.    BONES AND SOFT TISSUES: The bones are unremarkable.  The soft tissues are unremarkable.    TUBES/LINES: None.    IMPRESSION:  Multifocal pneumonia. This all may represent: Pneumonia, superimposed other etiology not excluded.              ALISSA SMITH MD; Attending Radiologist  This document has been electronically signed. Dec 25 2020  8:21AM    < end of copied text >            RECENT CULTURES:  01-02 @ 16:43 .Urine Clean Catch (Midstream)   KYARA      Proteus mirabilis  Proteus mirabilis     >100,000 CFU/ml Proteus mirabilis          RESPIRATORY CULTURES:    r< from: CT Chest No Cont (12.24.20 @ 22:12) >    AIRWAYS AND LUNGS: The central tracheobronchial tree is patent.  Emphysema is present. The lungs are otherwise clear. Patchy bilateral lung opacities with predominantly lower lobe predominance    MEDIASTINUM AND PLEURA: There are no enlarged mediastinal, hilar or axillary lymph nodes. The visualized portion of the thyroid gland is unremarkable. There is no pleural effusion. There is no pneumothorax.    HEART AND VESSELS: There is cardiomegaly.  There are atherosclerotic calcifications of the aorta and coronary arteries. There is no pericardial effusion.    UPPER ABDOMEN: Images of the upper abdomen demonstrate no abnormality.    BONES AND SOFT TISSUES: The bones are unremarkable.  The soft tissues are unremarkable.    TUBES/LINES: None.    IMPRESSION:  Multifocal pneumonia. This all may represent: Pneumonia, superimposed other etiology not excluded.              ALISSA SMITH MD; Attending Radiologist  This document has been electronically signed. Dec 25 2020  8:21AM    < end of copied text >        Studies  Chest X-RAY  CT SCAN Chest   Venous Dopplers: LE:   CT Abdomen  Others              
Date of Service: 01-10-21 @ 12:54    Patient is a 75y old  Male who presents with a chief complaint of Shortness of Breath (10 Tony 2021 12:09)      Any change in ROS: Doing ok : on 2 L of oxygen     MEDICATIONS  (STANDING):  ascorbic acid 250 milliGRAM(s) Oral daily  aspirin  chewable 81 milliGRAM(s) Oral daily  atorvastatin 80 milliGRAM(s) Oral at bedtime  budesonide 160 MICROgram(s)/formoterol 4.5 MICROgram(s) Inhaler 2 Puff(s) Inhalation two times a day  calcium carbonate 1250 mG  + Vitamin D (OsCal 500 + D) 1 Tablet(s) Oral two times a day  dextrose 40% Gel 15 Gram(s) Oral once  dextrose 5%. 1000 milliLiter(s) (50 mL/Hr) IV Continuous <Continuous>  dextrose 5%. 1000 milliLiter(s) (100 mL/Hr) IV Continuous <Continuous>  dextrose 50% Injectable 25 Gram(s) IV Push once  dextrose 50% Injectable 12.5 Gram(s) IV Push once  dextrose 50% Injectable 25 Gram(s) IV Push once  enoxaparin Injectable 40 milliGRAM(s) SubCutaneous daily  ferrous    sulfate 325 milliGRAM(s) Oral daily  fluticasone propionate 50 MICROgram(s)/spray Nasal Spray 1 Spray(s) Both Nostrils two times a day  folic acid 1 milliGRAM(s) Oral daily  furosemide    Tablet 40 milliGRAM(s) Oral daily  glucagon  Injectable 1 milliGRAM(s) IntraMuscular once  insulin glargine Injectable (LANTUS) 10 Unit(s) SubCutaneous at bedtime  insulin lispro (ADMELOG) corrective regimen sliding scale   SubCutaneous three times a day before meals  insulin lispro (ADMELOG) corrective regimen sliding scale   SubCutaneous at bedtime  losartan 25 milliGRAM(s) Oral daily  metoprolol tartrate 12.5 milliGRAM(s) Oral two times a day  multivitamin 1 Tablet(s) Oral daily  polyethylene glycol 3350 17 Gram(s) Oral daily  QUEtiapine 25 milliGRAM(s) Oral daily  QUEtiapine 50 milliGRAM(s) Oral at bedtime  senna 2 Tablet(s) Oral at bedtime  sodium chloride 0.65% Nasal 1 Spray(s) Both Nostrils three times a day  tiotropium 18 MICROgram(s) Capsule 1 Capsule(s) Inhalation daily    MEDICATIONS  (PRN):  acetaminophen   Tablet .. 650 milliGRAM(s) Oral every 4 hours PRN Temp greater or equal to 38C (100.4F), Mild Pain (1 - 3), Moderate Pain (4 - 6)  ALBUTerol    90 MICROgram(s) HFA Inhaler 1 Puff(s) Inhalation every 4 hours PRN Shortness of Breath and/or Wheezing  melatonin 3 milliGRAM(s) Oral at bedtime PRN Insomnia    Vital Signs Last 24 Hrs  T(C): 36.5 (10 Tony 2021 06:11), Max: 36.8 (09 Jan 2021 21:26)  T(F): 97.7 (10 Tony 2021 06:11), Max: 98.3 (09 Jan 2021 21:26)  HR: 92 (10 Tony 2021 06:11) (70 - 96)  BP: 107/63 (10 Tony 2021 06:11) (103/56 - 110/67)  BP(mean): --  RR: 16 (10 Tony 2021 06:11) (16 - 16)  SpO2: 98% (10 Tony 2021 06:11) (98% - 100%)    I&O's Summary        Physical Exam:   GENERAL: NAD, well-groomed, well-developed  HEENT: TRISTON/   Atraumatic, Normocephalic  ENMT: No tonsillar erythema, exudates, or enlargement; Moist mucous membranes, Good dentition, No lesions  NECK: Supple, No JVD, Normal thyroid  CHEST/LUNG: Clear to auscultaion  GI: : Soft, Nontender, Nondistended; Bowel sounds present  NERVOUS SYSTEM:  Alert & awake    EXTREMITIES: - edema  LYMPH: No lymphadenopathy noted  SKIN: No rashes or lesions  ENDOCdemented    Labs:                              9.1    9.70  )-----------( 274      ( 10 Tony 2021 11:04 )             29.4                         8.7    13.73 )-----------( 244      ( 08 Jan 2021 09:50 )             28.0     01-08    136  |  99  |  11  ----------------------------<  116<H>  3.9   |  23  |  0.65        CAPILLARY BLOOD GLUCOSE      POCT Blood Glucose.: 189 mg/dL (10 Tony 2021 11:57)  POCT Blood Glucose.: 127 mg/dL (10 Tony 2021 09:11)  POCT Blood Glucose.: 226 mg/dL (09 Jan 2021 22:23)  POCT Blood Glucose.: 178 mg/dL (09 Jan 2021 17:02)                < from: Xray Chest 1 View- PORTABLE-Urgent (Xray Chest 1 View- PORTABLE-Urgent .) (01.08.21 @ 13:38) >    EXAM:  XR CHEST PORTABLE URGENT 1V        PROCEDURE DATE:  Jan 8 2021         INTERPRETATION:  INDICATION: Hypoxia.    TECHNIQUE: Single portable view of the chest.    COMPARISON: CT scan and chest radiograph dated 12/24/2020    FINDINGS: The cardiac silhouette is enlarged. The lung volumes are low. There are no focal consolidations or pleural effusions. There is mild pulmonary vascular congestion.    COMPARISON: Low lung volumes with mild pulmonary vascular congestion.              RADHA SO MD; Attending Radiologist    < end of copied text >    RECENT CULTURES:        RESPIRATORY CULTURES:          Studies  Chest X-RAY  CT SCAN Chest   Venous Dopplers: LE:   CT Abdomen  Others              
Date of Service: 01-11-21 @ 11:06    Patient is a 75y old  Male who presents with a chief complaint of Shortness of Breath (11 Jan 2021 10:19)      Any change in ROS: Doing ok: cathryn: he wants to leave:     MEDICATIONS  (STANDING):  ascorbic acid 250 milliGRAM(s) Oral daily  aspirin  chewable 81 milliGRAM(s) Oral daily  atorvastatin 80 milliGRAM(s) Oral at bedtime  budesonide 160 MICROgram(s)/formoterol 4.5 MICROgram(s) Inhaler 2 Puff(s) Inhalation two times a day  calcium carbonate 1250 mG  + Vitamin D (OsCal 500 + D) 1 Tablet(s) Oral two times a day  dextrose 40% Gel 15 Gram(s) Oral once  dextrose 5%. 1000 milliLiter(s) (50 mL/Hr) IV Continuous <Continuous>  dextrose 5%. 1000 milliLiter(s) (100 mL/Hr) IV Continuous <Continuous>  dextrose 50% Injectable 25 Gram(s) IV Push once  dextrose 50% Injectable 12.5 Gram(s) IV Push once  dextrose 50% Injectable 25 Gram(s) IV Push once  enoxaparin Injectable 40 milliGRAM(s) SubCutaneous daily  ferrous    sulfate 325 milliGRAM(s) Oral daily  fluticasone propionate 50 MICROgram(s)/spray Nasal Spray 1 Spray(s) Both Nostrils two times a day  folic acid 1 milliGRAM(s) Oral daily  furosemide    Tablet 40 milliGRAM(s) Oral daily  glucagon  Injectable 1 milliGRAM(s) IntraMuscular once  insulin glargine Injectable (LANTUS) 10 Unit(s) SubCutaneous at bedtime  insulin lispro (ADMELOG) corrective regimen sliding scale   SubCutaneous three times a day before meals  insulin lispro (ADMELOG) corrective regimen sliding scale   SubCutaneous at bedtime  losartan 25 milliGRAM(s) Oral daily  metoprolol tartrate 12.5 milliGRAM(s) Oral two times a day  multivitamin 1 Tablet(s) Oral daily  polyethylene glycol 3350 17 Gram(s) Oral daily  QUEtiapine 25 milliGRAM(s) Oral daily  QUEtiapine 50 milliGRAM(s) Oral at bedtime  senna 2 Tablet(s) Oral at bedtime  sodium chloride 0.65% Nasal 1 Spray(s) Both Nostrils three times a day  tiotropium 18 MICROgram(s) Capsule 1 Capsule(s) Inhalation daily    MEDICATIONS  (PRN):  acetaminophen   Tablet .. 650 milliGRAM(s) Oral every 4 hours PRN Temp greater or equal to 38C (100.4F), Mild Pain (1 - 3), Moderate Pain (4 - 6)  ALBUTerol    90 MICROgram(s) HFA Inhaler 1 Puff(s) Inhalation every 4 hours PRN Shortness of Breath and/or Wheezing  melatonin 3 milliGRAM(s) Oral at bedtime PRN Insomnia    Vital Signs Last 24 Hrs  T(C): 36.7 (11 Jan 2021 05:12), Max: 37.1 (10 Tony 2021 17:18)  T(F): 98 (11 Jan 2021 05:12), Max: 98.8 (10 Tony 2021 17:18)  HR: 79 (11 Jan 2021 05:12) (77 - 92)  BP: 107/54 (11 Jan 2021 05:12) (96/67 - 118/62)  BP(mean): --  RR: 17 (11 Jan 2021 05:12) (16 - 17)  SpO2: 100% (11 Jan 2021 05:12) (97% - 100%)    I&O's Summary        Physical Exam:   GENERAL: NAD, well-groomed, well-developed  HEENT: TRISTON/   Atraumatic, Normocephalic  ENMT: No tonsillar erythema, exudates, or enlargement; Moist mucous membranes, Good dentition, No lesions  NECK: Supple, No JVD, Normal thyroid  CHEST/LUNG: Clear to auscultaion  CVS: Regular rate and rhythm; No murmurs, rubs, or gallops  GI: : Soft, Nontender, Nondistended; Bowel sounds present  NERVOUS SYSTEM:  Alert & awake  EXTREMITIES:  -edema  LYMPH: No lymphadenopathy noted  SKIN: No rashes or lesions  ENDOCRINOLOGY: No Thyromegaly  PSYCH: awake: calm+    Labs:                              9.1    9.70  )-----------( 274      ( 10 Tony 2021 11:04 )             29.4                         8.7    13.73 )-----------( 244      ( 08 Jan 2021 09:50 )             28.0     01-08    136  |  99  |  11  ----------------------------<  116<H>  3.9   |  23  |  0.65        CAPILLARY BLOOD GLUCOSE      POCT Blood Glucose.: 128 mg/dL (11 Jan 2021 08:47)  POCT Blood Glucose.: 156 mg/dL (10 Tony 2021 22:47)  POCT Blood Glucose.: 186 mg/dL (10 Tony 2021 17:22)  POCT Blood Glucose.: 189 mg/dL (10 Tony 2021 11:57)            < from: Xray Chest 1 View- PORTABLE-Urgent (Xray Chest 1 View- PORTABLE-Urgent .) (01.08.21 @ 13:38) >  EXAM:  XR CHEST PORTABLE URGENT 1V        PROCEDURE DATE:  Jan 8 2021         INTERPRETATION:  INDICATION: Hypoxia.    TECHNIQUE: Single portable view of the chest.    COMPARISON: CT scan and chest radiograph dated 12/24/2020    FINDINGS: The cardiac silhouette is enlarged. The lung volumes are low. There are no focal consolidations or pleural effusions. There is mild pulmonary vascular congestion.    COMPARISON: Low lung volumes with mild pulmonary vascular congestion.              RADHA SO MD; Attending Radiologist  This document has been electronically signed. Jan 8 2021  2:57PM    < end of copied text >  < from: CT Chest No Cont (12.24.20 @ 22:12) >    FINDINGS:    AIRWAYS AND LUNGS: The central tracheobronchial tree is patent.  Emphysema is present. The lungs are otherwise clear. Patchy bilateral lung opacities with predominantly lower lobe predominance    MEDIASTINUM AND PLEURA: There are no enlarged mediastinal, hilar or axillary lymph nodes. The visualized portion of the thyroid gland is unremarkable. There is no pleural effusion. There is no pneumothorax.    HEART AND VESSELS: There is cardiomegaly.  There are atherosclerotic calcifications of the aorta and coronary arteries. There is no pericardial effusion.    UPPER ABDOMEN: Images of the upper abdomen demonstrate no abnormality.    BONES AND SOFT TISSUES: The bones are unremarkable.  The soft tissues are unremarkable.    TUBES/LINES: None.    IMPRESSION:  Multifocal pneumonia. This all may represent: Pneumonia, superimposed other etiology not excluded.              ALISSA SMITH MD; Attending Radiologist  This document has been electronically signed. Dec 25 2020  8:21AM    < end of copied text >        RECENT CULTURES:        RESPIRATORY CULTURES:          Studies  Chest X-RAY  CT SCAN Chest   Venous Dopplers: LE:   CT Abdomen  Others              
Date of Service: 01-12-21 @ 12:13    Patient is a 75y old  Male who presents with a chief complaint of Shortness of Breath (12 Jan 2021 10:35)      Any change in ROS: Doing ok : on 2 L of oxygen     MEDICATIONS  (STANDING):  ascorbic acid 250 milliGRAM(s) Oral daily  aspirin  chewable 81 milliGRAM(s) Oral daily  atorvastatin 80 milliGRAM(s) Oral at bedtime  budesonide 160 MICROgram(s)/formoterol 4.5 MICROgram(s) Inhaler 2 Puff(s) Inhalation two times a day  calcium carbonate 1250 mG  + Vitamin D (OsCal 500 + D) 1 Tablet(s) Oral two times a day  dextrose 40% Gel 15 Gram(s) Oral once  dextrose 5%. 1000 milliLiter(s) (50 mL/Hr) IV Continuous <Continuous>  dextrose 5%. 1000 milliLiter(s) (100 mL/Hr) IV Continuous <Continuous>  dextrose 50% Injectable 25 Gram(s) IV Push once  dextrose 50% Injectable 12.5 Gram(s) IV Push once  dextrose 50% Injectable 25 Gram(s) IV Push once  enoxaparin Injectable 40 milliGRAM(s) SubCutaneous daily  ferrous    sulfate 325 milliGRAM(s) Oral daily  fluticasone propionate 50 MICROgram(s)/spray Nasal Spray 1 Spray(s) Both Nostrils two times a day  folic acid 1 milliGRAM(s) Oral daily  furosemide    Tablet 40 milliGRAM(s) Oral daily  glucagon  Injectable 1 milliGRAM(s) IntraMuscular once  insulin glargine Injectable (LANTUS) 10 Unit(s) SubCutaneous at bedtime  insulin lispro (ADMELOG) corrective regimen sliding scale   SubCutaneous three times a day before meals  insulin lispro (ADMELOG) corrective regimen sliding scale   SubCutaneous at bedtime  losartan 25 milliGRAM(s) Oral daily  metoprolol tartrate 12.5 milliGRAM(s) Oral two times a day  multivitamin 1 Tablet(s) Oral daily  polyethylene glycol 3350 17 Gram(s) Oral daily  QUEtiapine 25 milliGRAM(s) Oral daily  QUEtiapine 50 milliGRAM(s) Oral at bedtime  senna 2 Tablet(s) Oral at bedtime  sodium chloride 0.65% Nasal 1 Spray(s) Both Nostrils three times a day  tiotropium 18 MICROgram(s) Capsule 1 Capsule(s) Inhalation daily    MEDICATIONS  (PRN):  acetaminophen   Tablet .. 650 milliGRAM(s) Oral every 4 hours PRN Temp greater or equal to 38C (100.4F), Mild Pain (1 - 3), Moderate Pain (4 - 6)  ALBUTerol    90 MICROgram(s) HFA Inhaler 1 Puff(s) Inhalation every 4 hours PRN Shortness of Breath and/or Wheezing  melatonin 3 milliGRAM(s) Oral at bedtime PRN Insomnia    Vital Signs Last 24 Hrs  T(C): 37.2 (12 Jan 2021 06:35), Max: 37.2 (12 Jan 2021 06:35)  T(F): 99 (12 Jan 2021 06:35), Max: 99 (12 Jan 2021 06:35)  HR: 76 (12 Jan 2021 06:35) (76 - 90)  BP: 115/58 (12 Jan 2021 06:35) (112/56 - 119/60)  BP(mean): --  RR: 20 (12 Jan 2021 06:35) (17 - 20)  SpO2: 98% (12 Jan 2021 06:35) (98% - 100%)    I&O's Summary        Physical Exam:   GENERAL: NAD, well-groomed, well-developed  HEENT: TRISTON/   Atraumatic, Normocephalic  ENMT: No tonsillar erythema, exudates, or enlargement; Moist mucous membranes, Good dentition, No lesions  NECK: Supple, No JVD, Normal thyroid  CHEST/LUNG: Clear to auscultaion  CVS: Regular rate and rhythm; No murmurs, rubs, or gallops  GI: : Soft, Nontender, Nondistended; Bowel sounds present  NERVOUS SYSTEM:  Alert & Oriented X3  EXTREMITIES:  - edema  LYMPH: No lymphadenopathy noted  SKIN: No rashes or lesions  ENDOCRINOLOGY: No Thyromegaly  PSYCH: Appropriate    Labs:                              9.1    9.70  )-----------( 274      ( 10 Tony 2021 11:04 )             29.4           CAPILLARY BLOOD GLUCOSE      POCT Blood Glucose.: 130 mg/dL (12 Jan 2021 09:30)  POCT Blood Glucose.: 160 mg/dL (11 Jan 2021 22:05)  POCT Blood Glucose.: 124 mg/dL (11 Jan 2021 17:45)          r< from: Xray Chest 1 View- PORTABLE-Urgent (Xray Chest 1 View- PORTABLE-Urgent .) (01.08.21 @ 13:38) >    PROCEDURE DATE:  Jan 8 2021         INTERPRETATION:  INDICATION: Hypoxia.    TECHNIQUE: Single portable view of the chest.    COMPARISON: CT scan and chest radiograph dated 12/24/2020    FINDINGS: The cardiac silhouette is enlarged. The lung volumes are low. There are no focal consolidations or pleural effusions. There is mild pulmonary vascular congestion.    COMPARISON: Low lung volumes with mild pulmonary vascular congestion.              RADHA SO MD; Attending Radiologist  This document has been electronically signed. Jan 8 2021  2:57PM    < end of copied text >          RECENT CULTURES:        RESPIRATORY CULTURES:          Studies  Chest X-RAY  CT SCAN Chest   Venous Dopplers: LE:   CT Abdomen  Others              
Date of Service: 01-13-21 @ 11:32    Patient is a 75y old  Male who presents with a chief complaint of Shortness of Breath (13 Jan 2021 11:18)      Any change in ROS: alert and awake:     MEDICATIONS  (STANDING):  ascorbic acid 250 milliGRAM(s) Oral daily  atorvastatin 80 milliGRAM(s) Oral at bedtime  budesonide 160 MICROgram(s)/formoterol 4.5 MICROgram(s) Inhaler 2 Puff(s) Inhalation two times a day  calcium carbonate 1250 mG  + Vitamin D (OsCal 500 + D) 1 Tablet(s) Oral two times a day  dextrose 40% Gel 15 Gram(s) Oral once  dextrose 5%. 1000 milliLiter(s) (50 mL/Hr) IV Continuous <Continuous>  dextrose 5%. 1000 milliLiter(s) (100 mL/Hr) IV Continuous <Continuous>  dextrose 50% Injectable 25 Gram(s) IV Push once  dextrose 50% Injectable 12.5 Gram(s) IV Push once  dextrose 50% Injectable 25 Gram(s) IV Push once  ferrous    sulfate 325 milliGRAM(s) Oral daily  fluticasone propionate 50 MICROgram(s)/spray Nasal Spray 1 Spray(s) Both Nostrils two times a day  folic acid 1 milliGRAM(s) Oral daily  furosemide    Tablet 40 milliGRAM(s) Oral daily  glucagon  Injectable 1 milliGRAM(s) IntraMuscular once  insulin glargine Injectable (LANTUS) 10 Unit(s) SubCutaneous at bedtime  insulin lispro (ADMELOG) corrective regimen sliding scale   SubCutaneous three times a day before meals  insulin lispro (ADMELOG) corrective regimen sliding scale   SubCutaneous at bedtime  losartan 25 milliGRAM(s) Oral daily  metoprolol tartrate 12.5 milliGRAM(s) Oral two times a day  multivitamin 1 Tablet(s) Oral daily  polyethylene glycol 3350 17 Gram(s) Oral daily  QUEtiapine 25 milliGRAM(s) Oral daily  QUEtiapine 50 milliGRAM(s) Oral at bedtime  senna 2 Tablet(s) Oral at bedtime  sodium chloride 0.65% Nasal 1 Spray(s) Both Nostrils three times a day  tiotropium 18 MICROgram(s) Capsule 1 Capsule(s) Inhalation daily    MEDICATIONS  (PRN):  acetaminophen   Tablet .. 650 milliGRAM(s) Oral every 4 hours PRN Temp greater or equal to 38C (100.4F), Mild Pain (1 - 3), Moderate Pain (4 - 6)  ALBUTerol    90 MICROgram(s) HFA Inhaler 1 Puff(s) Inhalation every 4 hours PRN Shortness of Breath and/or Wheezing  melatonin 3 milliGRAM(s) Oral at bedtime PRN Insomnia    Vital Signs Last 24 Hrs  T(C): 36.7 (13 Jan 2021 05:38), Max: 37.2 (12 Jan 2021 17:53)  T(F): 98.1 (13 Jan 2021 05:38), Max: 98.9 (12 Jan 2021 17:53)  HR: 79 (13 Jan 2021 05:38) (79 - 92)  BP: 118/64 (13 Jan 2021 05:38) (103/60 - 118/64)  BP(mean): --  RR: 18 (13 Jan 2021 05:38) (17 - 18)  SpO2: 100% (13 Jan 2021 05:38) (98% - 100%)    I&O's Summary    12 Jan 2021 07:01  -  13 Jan 2021 07:00  --------------------------------------------------------  IN: 120 mL / OUT: 300 mL / NET: -180 mL          Physical Exam:   GENERAL: NAD, well-groomed, well-developed  HEENT: TRISTON/   Atraumatic, Normocephalic  ENMT: No tonsillar erythema, exudates, or enlargement; Moist mucous membranes, Good dentition, No lesions  NECK: Supple, No JVD, Normal thyroid  CHEST/LUNG: Clear to auscultaion  CVS: Regular rate and rhythm; No murmurs, rubs, or gallops  GI: : Soft, Nontender, Nondistended; Bowel sounds present  NERVOUS SYSTEM:  Alert & Awake  EXTREMITIES:  - edema  LYMPH: No lymphadenopathy noted  SKIN: No rashes or lesions  ENDOCRINOLOGY: No Thyromegaly  PSYCH: Appropriate    Labs:                              9.1    9.70  )-----------( 274      ( 10 Tony 2021 11:04 )             29.4           CAPILLARY BLOOD GLUCOSE      POCT Blood Glucose.: 81 mg/dL (13 Jan 2021 07:52)  POCT Blood Glucose.: 104 mg/dL (12 Jan 2021 22:47)  POCT Blood Glucose.: 185 mg/dL (12 Jan 2021 16:58)  POCT Blood Glucose.: 152 mg/dL (12 Jan 2021 12:36)            r< from: Xray Chest 1 View- PORTABLE-Urgent (Xray Chest 1 View- PORTABLE-Urgent .) (01.08.21 @ 13:38) >    EXAM:  XR CHEST PORTABLE URGENT 1V        PROCEDURE DATE:  Jan 8 2021         INTERPRETATION:  INDICATION: Hypoxia.    TECHNIQUE: Single portable view of the chest.    COMPARISON: CT scan and chest radiograph dated 12/24/2020    FINDINGS: The cardiac silhouette is enlarged. The lung volumes are low. There are no focal consolidations or pleural effusions. There is mild pulmonary vascular congestion.    COMPARISON: Low lung volumes with mild pulmonary vascular congestion.              RADHA SO MD; Attending Radiologist  This document has been electronically signed. Jan 8 2021  2:57PM    < end of copied text >        RECENT CULTURES:        RESPIRATORY CULTURES:          Studies  Chest X-RAY  CT SCAN Chest   Venous Dopplers: LE:   CT Abdomen  Others              
Patient is a 75y old  Male who presents with a chief complaint of Shortness of Breath (04 Jan 2021 13:29)      SUBJECTIVE / OVERNIGHT EVENTS: overnight events noted    ROS:  Resp: No cough no sputum production  CVS: No chest pain no palpitations no orthopnea  GI: no N/V/D          MEDICATIONS  (STANDING):  ascorbic acid 250 milliGRAM(s) Oral daily  aspirin  chewable 81 milliGRAM(s) Oral daily  atorvastatin 80 milliGRAM(s) Oral at bedtime  budesonide 160 MICROgram(s)/formoterol 4.5 MICROgram(s) Inhaler 2 Puff(s) Inhalation two times a day  calcium carbonate 1250 mG  + Vitamin D (OsCal 500 + D) 1 Tablet(s) Oral two times a day  cefTRIAXone   IVPB 1000 milliGRAM(s) IV Intermittent every 24 hours  dextrose 40% Gel 15 Gram(s) Oral once  dextrose 5%. 1000 milliLiter(s) (50 mL/Hr) IV Continuous <Continuous>  dextrose 5%. 1000 milliLiter(s) (100 mL/Hr) IV Continuous <Continuous>  dextrose 50% Injectable 25 Gram(s) IV Push once  dextrose 50% Injectable 12.5 Gram(s) IV Push once  dextrose 50% Injectable 25 Gram(s) IV Push once  enoxaparin Injectable 40 milliGRAM(s) SubCutaneous daily  ferrous    sulfate 325 milliGRAM(s) Oral daily  fluticasone propionate 50 MICROgram(s)/spray Nasal Spray 1 Spray(s) Both Nostrils two times a day  folic acid 1 milliGRAM(s) Oral daily  furosemide    Tablet 40 milliGRAM(s) Oral daily  glucagon  Injectable 1 milliGRAM(s) IntraMuscular once  insulin glargine Injectable (LANTUS) 10 Unit(s) SubCutaneous at bedtime  insulin lispro (ADMELOG) corrective regimen sliding scale   SubCutaneous three times a day before meals  insulin lispro (ADMELOG) corrective regimen sliding scale   SubCutaneous at bedtime  losartan 25 milliGRAM(s) Oral daily  metoprolol tartrate 12.5 milliGRAM(s) Oral two times a day  multivitamin 1 Tablet(s) Oral daily  polyethylene glycol 3350 17 Gram(s) Oral daily  QUEtiapine 25 milliGRAM(s) Oral daily  QUEtiapine 50 milliGRAM(s) Oral at bedtime  senna 2 Tablet(s) Oral at bedtime  sodium chloride 0.65% Nasal 1 Spray(s) Both Nostrils three times a day  tiotropium 18 MICROgram(s) Capsule 1 Capsule(s) Inhalation daily    MEDICATIONS  (PRN):  acetaminophen   Tablet .. 650 milliGRAM(s) Oral every 4 hours PRN Temp greater or equal to 38C (100.4F), Mild Pain (1 - 3), Moderate Pain (4 - 6)  ALBUTerol    90 MICROgram(s) HFA Inhaler 1 Puff(s) Inhalation every 4 hours PRN Shortness of Breath and/or Wheezing  melatonin 3 milliGRAM(s) Oral at bedtime PRN Insomnia        CAPILLARY BLOOD GLUCOSE  121 (05 Jan 2021 08:06)      POCT Blood Glucose.: 121 mg/dL (05 Jan 2021 07:56)  POCT Blood Glucose.: 208 mg/dL (04 Jan 2021 22:51)  POCT Blood Glucose.: 162 mg/dL (04 Jan 2021 21:00)  POCT Blood Glucose.: 150 mg/dL (04 Jan 2021 17:28)  POCT Blood Glucose.: 135 mg/dL (04 Jan 2021 11:34)    I&O's Summary      Vital Signs Last 24 Hrs  T(C): 36.9 (05 Jan 2021 06:08), Max: 36.9 (04 Jan 2021 18:06)  T(F): 98.5 (05 Jan 2021 06:08), Max: 98.5 (04 Jan 2021 18:06)  HR: 87 (05 Jan 2021 06:08) (63 - 87)  BP: 114/53 (05 Jan 2021 06:08) (114/53 - 126/71)  BP(mean): --  RR: 19 (05 Jan 2021 06:08) (18 - 20)  SpO2: 95% (05 Jan 2021 06:08) (94% - 100%)    PHYSICAL EXAM:  CHEST/LUNG: clear   HEART: S1 S2; systolic murmur +   ABDOMEN: Soft, Nontender  EXTREMITIES:  no edema  NEUROLOGY: Alert nonfocal  SKIN: No rashes or lesions    LABS:                      All consultant(s) notes reviewed and care discussed with other providers        Contact Number, Dr Saleem [phone]
    Patient is a 75y old  Male who presents with a chief complaint of Shortness of Breath (05 Jan 2021 09:18)      Any change in ROS: Doing ok : on room air:       MEDICATIONS  (STANDING):  ascorbic acid 250 milliGRAM(s) Oral daily  aspirin  chewable 81 milliGRAM(s) Oral daily  atorvastatin 80 milliGRAM(s) Oral at bedtime  budesonide 160 MICROgram(s)/formoterol 4.5 MICROgram(s) Inhaler 2 Puff(s) Inhalation two times a day  calcium carbonate 1250 mG  + Vitamin D (OsCal 500 + D) 1 Tablet(s) Oral two times a day  cefTRIAXone   IVPB 1000 milliGRAM(s) IV Intermittent every 24 hours  dextrose 40% Gel 15 Gram(s) Oral once  dextrose 5%. 1000 milliLiter(s) (50 mL/Hr) IV Continuous <Continuous>  dextrose 5%. 1000 milliLiter(s) (100 mL/Hr) IV Continuous <Continuous>  dextrose 50% Injectable 25 Gram(s) IV Push once  dextrose 50% Injectable 12.5 Gram(s) IV Push once  dextrose 50% Injectable 25 Gram(s) IV Push once  enoxaparin Injectable 40 milliGRAM(s) SubCutaneous daily  ferrous    sulfate 325 milliGRAM(s) Oral daily  fluticasone propionate 50 MICROgram(s)/spray Nasal Spray 1 Spray(s) Both Nostrils two times a day  folic acid 1 milliGRAM(s) Oral daily  furosemide    Tablet 40 milliGRAM(s) Oral daily  glucagon  Injectable 1 milliGRAM(s) IntraMuscular once  insulin glargine Injectable (LANTUS) 10 Unit(s) SubCutaneous at bedtime  insulin lispro (ADMELOG) corrective regimen sliding scale   SubCutaneous three times a day before meals  insulin lispro (ADMELOG) corrective regimen sliding scale   SubCutaneous at bedtime  losartan 25 milliGRAM(s) Oral daily  metoprolol tartrate 12.5 milliGRAM(s) Oral two times a day  multivitamin 1 Tablet(s) Oral daily  polyethylene glycol 3350 17 Gram(s) Oral daily  QUEtiapine 25 milliGRAM(s) Oral daily  QUEtiapine 50 milliGRAM(s) Oral at bedtime  senna 2 Tablet(s) Oral at bedtime  sodium chloride 0.65% Nasal 1 Spray(s) Both Nostrils three times a day  tiotropium 18 MICROgram(s) Capsule 1 Capsule(s) Inhalation daily    MEDICATIONS  (PRN):  acetaminophen   Tablet .. 650 milliGRAM(s) Oral every 4 hours PRN Temp greater or equal to 38C (100.4F), Mild Pain (1 - 3), Moderate Pain (4 - 6)  ALBUTerol    90 MICROgram(s) HFA Inhaler 1 Puff(s) Inhalation every 4 hours PRN Shortness of Breath and/or Wheezing  melatonin 3 milliGRAM(s) Oral at bedtime PRN Insomnia    Vital Signs Last 24 Hrs  T(C): 36.4 (05 Jan 2021 10:18), Max: 36.9 (04 Jan 2021 18:06)  T(F): 97.5 (05 Jan 2021 10:18), Max: 98.5 (04 Jan 2021 18:06)  HR: 110 (05 Jan 2021 10:18) (71 - 110)  BP: 109/55 (05 Jan 2021 10:18) (109/55 - 126/71)  BP(mean): --  RR: 18 (05 Jan 2021 10:18) (18 - 20)  SpO2: 95% (05 Jan 2021 10:18) (95% - 100%)    I&O's Summary        Physical Exam:   GENERAL: NAD, well-groomed, well-developed  HEENT: TRISTON/   Atraumatic, Normocephalic  ENMT: No tonsillar erythema, exudates, or enlargement; Moist mucous membranes, Good dentition, No lesions  NECK: Supple, No JVD, Normal thyroid  CHEST/LUNG: Clear to auscultaion  CVS: Regular rate and rhythm; No murmurs, rubs, or gallops  GI: : Soft, Nontender, Nondistended; Bowel sounds present  NERVOUS SYSTEM:  Alert & awake: on room pollo   EXTREMITIES:  -edema  LYMPH: No lymphadenopathy noted  SKIN: No rashes or lesions  ENDOCRINOLOGY: No Thyromegaly  PSYCH: appr    Labs:                              9.8    9.68  )-----------( 406      ( 03 Jan 2021 07:22 )             32.6     01-03    139  |  103  |  26<H>  ----------------------------<  131<H>  3.9   |  25  |  0.74      TPro  6.0  /  Alb  3.0<L>  /  TBili  0.3  /  DBili  x   /  AST  17  /  ALT  29  /  AlkPhos  123<H>  01-03    CAPILLARY BLOOD GLUCOSE  121 (05 Jan 2021 08:06)      POCT Blood Glucose.: 215 mg/dL (05 Jan 2021 12:15)  POCT Blood Glucose.: 121 mg/dL (05 Jan 2021 07:56)  POCT Blood Glucose.: 208 mg/dL (04 Jan 2021 22:51)  POCT Blood Glucose.: 162 mg/dL (04 Jan 2021 21:00)  POCT Blood Glucose.: 150 mg/dL (04 Jan 2021 17:28)                  RECENT CULTURES:  01-02 @ 16:43 .Urine Clean Catch (Midstream)   KYARA      Proteus mirabilis  Proteus mirabilis     >100,000 CFU/ml Proteus mirabilis          RESPIRATORY CULTURES:          Studies  Chest X-RAY  CT SCAN Chest   Venous Dopplers: LE:   CT Abdomen  Others      rra< from: CT Chest No Cont (12.24.20 @ 22:12) >    TECHNIQUE: Noncontrast CT of the chest was obtained.    COMPARISON: None.    FINDINGS:    AIRWAYS AND LUNGS: The central tracheobronchial tree is patent.  Emphysema is present. The lungs are otherwise clear. Patchy bilateral lung opacities with predominantly lower lobe predominance    MEDIASTINUM AND PLEURA: There are no enlarged mediastinal, hilar or axillary lymph nodes. The visualized portion of the thyroid gland is unremarkable. There is no pleural effusion. There is no pneumothorax.    HEART AND VESSELS: There is cardiomegaly.  There are atherosclerotic calcifications of the aorta and coronary arteries. There is no pericardial effusion.    UPPER ABDOMEN: Images of the upper abdomen demonstrate no abnormality.    BONES AND SOFT TISSUES: The bones are unremarkable.  The soft tissues are unremarkable.    TUBES/LINES: None.    IMPRESSION:  Multifocal pneumonia. This all may represent: Pneumonia, superimposed other etiology not excluded.              ALISSA SMITH MD; Attending Radiologist  This document has been electronically signed. Dec 25 2020  8:21AM    < end of copied text >          
Date of Service: 01-09-21 @ 13:29    Patient is a 75y old  Male who presents with a chief complaint of Shortness of Breath (09 Jan 2021 09:53)      Any change in ROS: h is 1005 on 2 L :  no overnight issues    MEDICATIONS  (STANDING):  ascorbic acid 250 milliGRAM(s) Oral daily  aspirin  chewable 81 milliGRAM(s) Oral daily  atorvastatin 80 milliGRAM(s) Oral at bedtime  budesonide 160 MICROgram(s)/formoterol 4.5 MICROgram(s) Inhaler 2 Puff(s) Inhalation two times a day  calcium carbonate 1250 mG  + Vitamin D (OsCal 500 + D) 1 Tablet(s) Oral two times a day  dextrose 40% Gel 15 Gram(s) Oral once  dextrose 5%. 1000 milliLiter(s) (50 mL/Hr) IV Continuous <Continuous>  dextrose 5%. 1000 milliLiter(s) (100 mL/Hr) IV Continuous <Continuous>  dextrose 50% Injectable 12.5 Gram(s) IV Push once  dextrose 50% Injectable 25 Gram(s) IV Push once  dextrose 50% Injectable 25 Gram(s) IV Push once  enoxaparin Injectable 40 milliGRAM(s) SubCutaneous daily  ferrous    sulfate 325 milliGRAM(s) Oral daily  fluticasone propionate 50 MICROgram(s)/spray Nasal Spray 1 Spray(s) Both Nostrils two times a day  folic acid 1 milliGRAM(s) Oral daily  furosemide    Tablet 40 milliGRAM(s) Oral daily  glucagon  Injectable 1 milliGRAM(s) IntraMuscular once  insulin glargine Injectable (LANTUS) 10 Unit(s) SubCutaneous at bedtime  insulin lispro (ADMELOG) corrective regimen sliding scale   SubCutaneous three times a day before meals  insulin lispro (ADMELOG) corrective regimen sliding scale   SubCutaneous at bedtime  losartan 25 milliGRAM(s) Oral daily  metoprolol tartrate 12.5 milliGRAM(s) Oral two times a day  multivitamin 1 Tablet(s) Oral daily  polyethylene glycol 3350 17 Gram(s) Oral daily  QUEtiapine 25 milliGRAM(s) Oral daily  QUEtiapine 50 milliGRAM(s) Oral at bedtime  senna 2 Tablet(s) Oral at bedtime  sodium chloride 0.65% Nasal 1 Spray(s) Both Nostrils three times a day  tiotropium 18 MICROgram(s) Capsule 1 Capsule(s) Inhalation daily    MEDICATIONS  (PRN):  acetaminophen   Tablet .. 650 milliGRAM(s) Oral every 4 hours PRN Temp greater or equal to 38C (100.4F), Mild Pain (1 - 3), Moderate Pain (4 - 6)  ALBUTerol    90 MICROgram(s) HFA Inhaler 1 Puff(s) Inhalation every 4 hours PRN Shortness of Breath and/or Wheezing  melatonin 3 milliGRAM(s) Oral at bedtime PRN Insomnia    Vital Signs Last 24 Hrs  T(C): 36.9 (09 Jan 2021 12:02), Max: 37.3 (08 Jan 2021 23:33)  T(F): 98.4 (09 Jan 2021 12:02), Max: 99.1 (08 Jan 2021 23:33)  HR: 83 (09 Jan 2021 12:02) (83 - 89)  BP: 103/52 (09 Jan 2021 12:02) (101/58 - 114/80)  BP(mean): --  RR: 17 (09 Jan 2021 12:02) (17 - 18)  SpO2: 100% (09 Jan 2021 12:02) (98% - 100%)    I&O's Summary        Physical Exam:   GENERAL: NAD, well-groomed, well-developed  HEENT: TRISTON/   Atraumatic, Normocephalic  ENMT: No tonsillar erythema, exudates, or enlargement; Moist mucous membranes, Good dentition, No lesions  NECK: Supple, No JVD, Normal thyroid  CHEST/LUNG:bibasilar crckels9+: no wheezing  CVS: Regular rate and rhythm; No murmurs, rubs, or gallops  GI: : Soft, Nontender, Nondistended; Bowel sounds present  NERVOUS SYSTEM:  Alert & awake  EXTREMITIES:-edema  LYMPH: No lymphadenopathy noted  SKIN: No rashes or lesions  ENDOCRINOLOGY: No Thyromegaly  PSYCH: Appropriate    Labs:                              8.7    13.73 )-----------( 244      ( 08 Jan 2021 09:50 )             28.0     01-08    136  |  99  |  11  ----------------------------<  116<H>  3.9   |  23  |  0.65    Ca    8.7      08 Jan 2021 09:50      CAPILLARY BLOOD GLUCOSE      POCT Blood Glucose.: 211 mg/dL (09 Jan 2021 11:33)  POCT Blood Glucose.: 125 mg/dL (09 Jan 2021 07:30)  POCT Blood Glucose.: 155 mg/dL (08 Jan 2021 22:50)  POCT Blood Glucose.: 116 mg/dL (08 Jan 2021 16:20)                rd< from: Xray Chest 1 View- PORTABLE-Urgent (Xray Chest 1 View- PORTABLE-Urgent .) (01.08.21 @ 13:38) >    EXAM:  XR CHEST PORTABLE URGENT 1V        PROCEDURE DATE:  Jan 8 2021         INTERPRETATION:  INDICATION: Hypoxia.    TECHNIQUE: Single portable view of the chest.    COMPARISON: CT scan and chest radiograph dated 12/24/2020    FINDINGS: The cardiac silhouette is enlarged. The lung volumes are low. There are no focal consolidations or pleural effusions. There is mild pulmonary vascular congestion.    COMPARISON: Low lung volumes with mild pulmonary vascular congestion.              RADHA SO MD; Attending Radiologist  This document has been electronically signed. Jan 8 2021  2:57PM    < end of copied text >    RECENT CULTURES:  01-02 @ 16:43 .Urine Clean Catch (Midstream)   KYARA      Proteus mirabilis  Proteus mirabilis     >100,000 CFU/ml Proteus mirabilis          RESPIRATORY CULTURES:          Studies  Chest X-RAY  CT SCAN Chest   Venous Dopplers: LE:   CT Abdomen  Others              
  Subjective: Patient seen and examined. No new events except as noted.     REVIEW OF SYSTEMS:    CONSTITUTIONAL: No weakness, fevers or chills  EYES/ENT: No visual changes;  No vertigo or throat pain   NECK: No pain or stiffness  RESPIRATORY: No cough, wheezing, hemoptysis; No shortness of breath  CARDIOVASCULAR: No chest pain or palpitations  GASTROINTESTINAL: No abdominal or epigastric pain. No nausea, vomiting, or hematemesis; No diarrhea or constipation. No melena or hematochezia.  GENITOURINARY: No dysuria, frequency or hematuria  NEUROLOGICAL: No numbness or weakness  SKIN: No itching, burning, rashes, or lesions   All other review of systems is negative unless indicated above.    MEDICATIONS:  MEDICATIONS  (STANDING):  ascorbic acid 250 milliGRAM(s) Oral daily  aspirin  chewable 81 milliGRAM(s) Oral daily  atorvastatin 80 milliGRAM(s) Oral at bedtime  budesonide 160 MICROgram(s)/formoterol 4.5 MICROgram(s) Inhaler 2 Puff(s) Inhalation two times a day  calcium carbonate 1250 mG  + Vitamin D (OsCal 500 + D) 1 Tablet(s) Oral two times a day  cefTRIAXone   IVPB 1000 milliGRAM(s) IV Intermittent every 24 hours  dextrose 40% Gel 15 Gram(s) Oral once  dextrose 5%. 1000 milliLiter(s) (50 mL/Hr) IV Continuous <Continuous>  dextrose 5%. 1000 milliLiter(s) (100 mL/Hr) IV Continuous <Continuous>  dextrose 50% Injectable 25 Gram(s) IV Push once  dextrose 50% Injectable 12.5 Gram(s) IV Push once  dextrose 50% Injectable 25 Gram(s) IV Push once  enoxaparin Injectable 40 milliGRAM(s) SubCutaneous daily  ferrous    sulfate 325 milliGRAM(s) Oral daily  fluticasone propionate 50 MICROgram(s)/spray Nasal Spray 1 Spray(s) Both Nostrils two times a day  folic acid 1 milliGRAM(s) Oral daily  furosemide    Tablet 40 milliGRAM(s) Oral daily  glucagon  Injectable 1 milliGRAM(s) IntraMuscular once  insulin glargine Injectable (LANTUS) 10 Unit(s) SubCutaneous at bedtime  insulin lispro (ADMELOG) corrective regimen sliding scale   SubCutaneous three times a day before meals  insulin lispro (ADMELOG) corrective regimen sliding scale   SubCutaneous at bedtime  losartan 25 milliGRAM(s) Oral daily  metoprolol tartrate 12.5 milliGRAM(s) Oral two times a day  multivitamin 1 Tablet(s) Oral daily  polyethylene glycol 3350 17 Gram(s) Oral daily  QUEtiapine 25 milliGRAM(s) Oral daily  QUEtiapine 50 milliGRAM(s) Oral at bedtime  senna 2 Tablet(s) Oral at bedtime  sodium chloride 0.65% Nasal 1 Spray(s) Both Nostrils three times a day  tiotropium 18 MICROgram(s) Capsule 1 Capsule(s) Inhalation daily      PHYSICAL EXAM:  T(C): 36.8 (01-05-21 @ 18:08), Max: 36.9 (01-04-21 @ 20:55)  HR: 95 (01-05-21 @ 18:08) (71 - 110)  BP: 116/58 (01-05-21 @ 18:08) (109/55 - 119/60)  RR: 16 (01-05-21 @ 18:08) (16 - 20)  SpO2: 97% (01-05-21 @ 18:08) (95% - 99%)  Wt(kg): --  I&O's Summary        Appearance: Normal	  HEENT:   Normal oral mucosa, PERRL, EOMI	  Lymphatic: No lymphadenopathy , no edema  Cardiovascular: Normal S1 S2, No JVD, No murmurs , Peripheral pulses palpable 2+ bilaterally  Respiratory: Lungs clear to auscultation, normal effort 	  Gastrointestinal:  Soft, Non-tender, + BS	  Skin: No rashes, No ecchymoses, No cyanosis, warm to touch  Musculoskeletal: Normal range of motion, normal strength  Psychiatry:  Mood & affect appropriate  Ext: No edema      LABS:    CARDIAC MARKERS:                  proBNP:   Lipid Profile:   HgA1c:   TSH:             TELEMETRY: 	    ECG:  	  RADIOLOGY:   DIAGNOSTIC TESTING:  [ ] Echocardiogram:  [ ]  Catheterization:  [ ] Stress Test:    OTHER: 	          
Subjective: Patient seen and examined. No new events except as noted.     REVIEW OF SYSTEMS:    CONSTITUTIONAL: No weakness, fevers or chills  EYES/ENT: No visual changes;  No vertigo or throat pain   NECK: No pain or stiffness  RESPIRATORY: No cough, wheezing, hemoptysis; No shortness of breath  CARDIOVASCULAR: No chest pain or palpitations  GASTROINTESTINAL: No abdominal or epigastric pain. No nausea, vomiting, or hematemesis; No diarrhea or constipation. No melena or hematochezia.  GENITOURINARY: No dysuria, frequency or hematuria  NEUROLOGICAL: No numbness or weakness  SKIN: No itching, burning, rashes, or lesions   All other review of systems is negative unless indicated above.    MEDICATIONS:  MEDICATIONS  (STANDING):  ascorbic acid 250 milliGRAM(s) Oral daily  aspirin  chewable 81 milliGRAM(s) Oral daily  atorvastatin 80 milliGRAM(s) Oral at bedtime  budesonide 160 MICROgram(s)/formoterol 4.5 MICROgram(s) Inhaler 2 Puff(s) Inhalation two times a day  calcium carbonate 1250 mG  + Vitamin D (OsCal 500 + D) 1 Tablet(s) Oral two times a day  cefTRIAXone   IVPB 1000 milliGRAM(s) IV Intermittent every 24 hours  dextrose 40% Gel 15 Gram(s) Oral once  dextrose 5%. 1000 milliLiter(s) (50 mL/Hr) IV Continuous <Continuous>  dextrose 5%. 1000 milliLiter(s) (100 mL/Hr) IV Continuous <Continuous>  dextrose 50% Injectable 25 Gram(s) IV Push once  dextrose 50% Injectable 12.5 Gram(s) IV Push once  dextrose 50% Injectable 25 Gram(s) IV Push once  enoxaparin Injectable 40 milliGRAM(s) SubCutaneous daily  ferrous    sulfate 325 milliGRAM(s) Oral daily  fluticasone propionate 50 MICROgram(s)/spray Nasal Spray 1 Spray(s) Both Nostrils two times a day  folic acid 1 milliGRAM(s) Oral daily  furosemide    Tablet 40 milliGRAM(s) Oral daily  glucagon  Injectable 1 milliGRAM(s) IntraMuscular once  insulin glargine Injectable (LANTUS) 10 Unit(s) SubCutaneous at bedtime  insulin lispro (ADMELOG) corrective regimen sliding scale   SubCutaneous three times a day before meals  insulin lispro (ADMELOG) corrective regimen sliding scale   SubCutaneous at bedtime  losartan 25 milliGRAM(s) Oral daily  metoprolol tartrate 12.5 milliGRAM(s) Oral two times a day  multivitamin 1 Tablet(s) Oral daily  polyethylene glycol 3350 17 Gram(s) Oral daily  QUEtiapine 25 milliGRAM(s) Oral daily  QUEtiapine 50 milliGRAM(s) Oral at bedtime  senna 2 Tablet(s) Oral at bedtime  sodium chloride 0.65% Nasal 1 Spray(s) Both Nostrils three times a day  tiotropium 18 MICROgram(s) Capsule 1 Capsule(s) Inhalation daily      PHYSICAL EXAM:  T(C): 37.2 (01-08-21 @ 09:15), Max: 37.2 (01-07-21 @ 20:55)  HR: 77 (01-08-21 @ 09:15) (77 - 92)  BP: 104/50 (01-08-21 @ 09:15) (104/50 - 126/78)  RR: 18 (01-08-21 @ 09:15) (17 - 18)  SpO2: 100% (01-08-21 @ 09:15) (97% - 100%)  Wt(kg): --  I&O's Summary        Appearance: NAD	  HEENT:   Normal oral mucosa, PERRL, EOMI	  Lymphatic: No lymphadenopathy , no edema  Cardiovascular: Normal S1 S2, No JVD, No murmurs , Peripheral pulses palpable 2+ bilaterally  Respiratory: Lungs clear to auscultation, normal effort 	  Gastrointestinal:  Soft, Non-tender, + BS	  Skin: No rashes, No ecchymoses, No cyanosis, warm to touch  Musculoskeletal: Normal range of motion, normal strength  Psychiatry:  Mood & affect appropriate  Ext: No edema      LABS:    CARDIAC MARKERS:                                8.7    13.73 )-----------( 244      ( 08 Jan 2021 09:50 )             28.0     01-08    136  |  99  |  11  ----------------------------<  116<H>  3.9   |  23  |  0.65    Ca    8.7      08 Jan 2021 09:50      proBNP:   Lipid Profile:   HgA1c:   TSH:             TELEMETRY: 	    ECG:  	  RADIOLOGY:   DIAGNOSTIC TESTING:  [ ] Echocardiogram:  [ ]  Catheterization:  [ ] Stress Test:    OTHER: 	          
Patient is a 75y old  Male who presents with a chief complaint of Shortness of Breath (07 Jan 2021 10:43)      DATE OF SERVICE: 01-07-21 @ 11:03  SUBJECTIVE / OVERNIGHT EVENTS: overnight events noted    ROS:  Resp: No cough no sputum production  CVS: No chest pain no palpitations no orthopnea  GI: no N/V/D  : no dysuria, no hematuria          MEDICATIONS  (STANDING):  ascorbic acid 250 milliGRAM(s) Oral daily  aspirin  chewable 81 milliGRAM(s) Oral daily  atorvastatin 80 milliGRAM(s) Oral at bedtime  budesonide 160 MICROgram(s)/formoterol 4.5 MICROgram(s) Inhaler 2 Puff(s) Inhalation two times a day  calcium carbonate 1250 mG  + Vitamin D (OsCal 500 + D) 1 Tablet(s) Oral two times a day  cefTRIAXone   IVPB 1000 milliGRAM(s) IV Intermittent every 24 hours  dextrose 40% Gel 15 Gram(s) Oral once  dextrose 5%. 1000 milliLiter(s) (50 mL/Hr) IV Continuous <Continuous>  dextrose 5%. 1000 milliLiter(s) (100 mL/Hr) IV Continuous <Continuous>  dextrose 50% Injectable 25 Gram(s) IV Push once  dextrose 50% Injectable 12.5 Gram(s) IV Push once  dextrose 50% Injectable 25 Gram(s) IV Push once  enoxaparin Injectable 40 milliGRAM(s) SubCutaneous daily  ferrous    sulfate 325 milliGRAM(s) Oral daily  fluticasone propionate 50 MICROgram(s)/spray Nasal Spray 1 Spray(s) Both Nostrils two times a day  folic acid 1 milliGRAM(s) Oral daily  furosemide    Tablet 40 milliGRAM(s) Oral daily  glucagon  Injectable 1 milliGRAM(s) IntraMuscular once  insulin glargine Injectable (LANTUS) 10 Unit(s) SubCutaneous at bedtime  insulin lispro (ADMELOG) corrective regimen sliding scale   SubCutaneous three times a day before meals  insulin lispro (ADMELOG) corrective regimen sliding scale   SubCutaneous at bedtime  losartan 25 milliGRAM(s) Oral daily  metoprolol tartrate 12.5 milliGRAM(s) Oral two times a day  multivitamin 1 Tablet(s) Oral daily  polyethylene glycol 3350 17 Gram(s) Oral daily  QUEtiapine 25 milliGRAM(s) Oral daily  QUEtiapine 50 milliGRAM(s) Oral at bedtime  senna 2 Tablet(s) Oral at bedtime  sodium chloride 0.65% Nasal 1 Spray(s) Both Nostrils three times a day  tiotropium 18 MICROgram(s) Capsule 1 Capsule(s) Inhalation daily    MEDICATIONS  (PRN):  acetaminophen   Tablet .. 650 milliGRAM(s) Oral every 4 hours PRN Temp greater or equal to 38C (100.4F), Mild Pain (1 - 3), Moderate Pain (4 - 6)  ALBUTerol    90 MICROgram(s) HFA Inhaler 1 Puff(s) Inhalation every 4 hours PRN Shortness of Breath and/or Wheezing  melatonin 3 milliGRAM(s) Oral at bedtime PRN Insomnia        CAPILLARY BLOOD GLUCOSE      POCT Blood Glucose.: 148 mg/dL (07 Jan 2021 08:11)  POCT Blood Glucose.: 138 mg/dL (06 Jan 2021 21:23)  POCT Blood Glucose.: 172 mg/dL (06 Jan 2021 17:04)  POCT Blood Glucose.: 195 mg/dL (06 Jan 2021 11:57)    I&O's Summary      Vital Signs Last 24 Hrs  T(C): 36.8 (07 Jan 2021 06:45), Max: 37.5 (06 Jan 2021 13:00)  T(F): 98.2 (07 Jan 2021 06:45), Max: 99.5 (06 Jan 2021 13:00)  HR: 95 (07 Jan 2021 06:45) (92 - 95)  BP: 124/70 (07 Jan 2021 06:45) (114/65 - 129/99)  BP(mean): --  RR: 18 (07 Jan 2021 06:45) (18 - 18)  SpO2: 98% (07 Jan 2021 06:45) (98% - 100%)      PHYSICAL EXAM:  CHEST/LUNG: clear   HEART: S1 S2; systolic murmur +   ABDOMEN: Soft, Nontender  EXTREMITIES:  no edema  NEUROLOGY: Alert nonfocal  SKIN: No rashes or lesions    LABS:                      All consultant(s) notes reviewed and care discussed with other providers        Contact Number, Dr Saleem [phone]
Patient is a 75y old  Male who presents with a chief complaint of Shortness of Breath (08 Jan 2021 12:59)      DATE OF SERVICE: 01-09-21 @ 09:53  SUBJECTIVE / OVERNIGHT EVENTS: overnight events noted    ROS:  Resp: No cough no sputum production  CVS: No chest pain no palpitations no orthopnea  GI: no N/V/D        MEDICATIONS  (STANDING):  ascorbic acid 250 milliGRAM(s) Oral daily  aspirin  chewable 81 milliGRAM(s) Oral daily  atorvastatin 80 milliGRAM(s) Oral at bedtime  budesonide 160 MICROgram(s)/formoterol 4.5 MICROgram(s) Inhaler 2 Puff(s) Inhalation two times a day  calcium carbonate 1250 mG  + Vitamin D (OsCal 500 + D) 1 Tablet(s) Oral two times a day  cefTRIAXone   IVPB 1000 milliGRAM(s) IV Intermittent every 24 hours  dextrose 40% Gel 15 Gram(s) Oral once  dextrose 5%. 1000 milliLiter(s) (50 mL/Hr) IV Continuous <Continuous>  dextrose 5%. 1000 milliLiter(s) (100 mL/Hr) IV Continuous <Continuous>  dextrose 50% Injectable 25 Gram(s) IV Push once  dextrose 50% Injectable 25 Gram(s) IV Push once  dextrose 50% Injectable 12.5 Gram(s) IV Push once  enoxaparin Injectable 40 milliGRAM(s) SubCutaneous daily  ferrous    sulfate 325 milliGRAM(s) Oral daily  fluticasone propionate 50 MICROgram(s)/spray Nasal Spray 1 Spray(s) Both Nostrils two times a day  folic acid 1 milliGRAM(s) Oral daily  furosemide    Tablet 40 milliGRAM(s) Oral daily  glucagon  Injectable 1 milliGRAM(s) IntraMuscular once  insulin glargine Injectable (LANTUS) 10 Unit(s) SubCutaneous at bedtime  insulin lispro (ADMELOG) corrective regimen sliding scale   SubCutaneous three times a day before meals  insulin lispro (ADMELOG) corrective regimen sliding scale   SubCutaneous at bedtime  losartan 25 milliGRAM(s) Oral daily  metoprolol tartrate 12.5 milliGRAM(s) Oral two times a day  multivitamin 1 Tablet(s) Oral daily  polyethylene glycol 3350 17 Gram(s) Oral daily  QUEtiapine 50 milliGRAM(s) Oral at bedtime  QUEtiapine 25 milliGRAM(s) Oral daily  senna 2 Tablet(s) Oral at bedtime  sodium chloride 0.65% Nasal 1 Spray(s) Both Nostrils three times a day  tiotropium 18 MICROgram(s) Capsule 1 Capsule(s) Inhalation daily    MEDICATIONS  (PRN):  acetaminophen   Tablet .. 650 milliGRAM(s) Oral every 4 hours PRN Temp greater or equal to 38C (100.4F), Mild Pain (1 - 3), Moderate Pain (4 - 6)  ALBUTerol    90 MICROgram(s) HFA Inhaler 1 Puff(s) Inhalation every 4 hours PRN Shortness of Breath and/or Wheezing  melatonin 3 milliGRAM(s) Oral at bedtime PRN Insomnia        CAPILLARY BLOOD GLUCOSE      POCT Blood Glucose.: 125 mg/dL (09 Jan 2021 07:30)  POCT Blood Glucose.: 155 mg/dL (08 Jan 2021 22:50)  POCT Blood Glucose.: 116 mg/dL (08 Jan 2021 16:20)  POCT Blood Glucose.: 201 mg/dL (08 Jan 2021 11:59)    I&O's Summary      Vital Signs Last 24 Hrs  T(C): 37.2 (09 Jan 2021 05:59), Max: 37.3 (08 Jan 2021 13:13)  T(F): 98.9 (09 Jan 2021 05:59), Max: 99.2 (08 Jan 2021 13:13)  HR: 84 (09 Jan 2021 05:59) (84 - 97)  BP: 101/58 (09 Jan 2021 05:59) (101/55 - 114/80)  BP(mean): --  RR: 18 (09 Jan 2021 05:59) (18 - 20)  SpO2: 100% (09 Jan 2021 05:59) (97% - 100%)    PHYSICAL EXAM:  CHEST/LUNG: clear   HEART: S1 S2; systolic murmur +   ABDOMEN: Soft, Nontender  EXTREMITIES:  no edema  NEUROLOGY: Alert nonfocal  SKIN: No rashes or lesions    LABS:                        8.7    13.73 )-----------( 244      ( 08 Jan 2021 09:50 )             28.0     01-08    136  |  99  |  11  ----------------------------<  116<H>  3.9   |  23  |  0.65    Ca    8.7      08 Jan 2021 09:50                  All consultant(s) notes reviewed and care discussed with other providers        Contact Number, Dr Saleem [phone]
Subjective: Patient seen and examined. No new events except as noted.   feels ok     REVIEW OF SYSTEMS:    CONSTITUTIONAL: + weakness, fevers or chills  EYES/ENT: No visual changes;  No vertigo or throat pain   NECK: No pain or stiffness  RESPIRATORY: No cough, wheezing, hemoptysis; No shortness of breath  CARDIOVASCULAR: No chest pain or palpitations  GASTROINTESTINAL: No abdominal or epigastric pain. No nausea, vomiting, or hematemesis; No diarrhea or constipation. No melena or hematochezia.  GENITOURINARY: No dysuria, frequency or hematuria  NEUROLOGICAL: No numbness or weakness  SKIN: No itching, burning, rashes, or lesions   All other review of systems is negative unless indicated above.    MEDICATIONS:  MEDICATIONS  (STANDING):  ascorbic acid 250 milliGRAM(s) Oral daily  aspirin  chewable 81 milliGRAM(s) Oral daily  atorvastatin 80 milliGRAM(s) Oral at bedtime  budesonide 160 MICROgram(s)/formoterol 4.5 MICROgram(s) Inhaler 2 Puff(s) Inhalation two times a day  calcium carbonate 1250 mG  + Vitamin D (OsCal 500 + D) 1 Tablet(s) Oral two times a day  dexAMETHasone  Injectable 6 milliGRAM(s) IV Push daily  dextrose 40% Gel 15 Gram(s) Oral once  dextrose 5%. 1000 milliLiter(s) (50 mL/Hr) IV Continuous <Continuous>  dextrose 5%. 1000 milliLiter(s) (100 mL/Hr) IV Continuous <Continuous>  dextrose 50% Injectable 25 Gram(s) IV Push once  dextrose 50% Injectable 12.5 Gram(s) IV Push once  dextrose 50% Injectable 25 Gram(s) IV Push once  enoxaparin Injectable 40 milliGRAM(s) SubCutaneous daily  ferrous    sulfate 325 milliGRAM(s) Oral daily  folic acid 1 milliGRAM(s) Oral daily  furosemide    Tablet 40 milliGRAM(s) Oral daily  glucagon  Injectable 1 milliGRAM(s) IntraMuscular once  insulin glargine Injectable (LANTUS) 14 Unit(s) SubCutaneous at bedtime  insulin lispro (ADMELOG) corrective regimen sliding scale   SubCutaneous three times a day before meals  insulin lispro (ADMELOG) corrective regimen sliding scale   SubCutaneous at bedtime  losartan 25 milliGRAM(s) Oral daily  metoprolol succinate ER 25 milliGRAM(s) Oral daily  multivitamin 1 Tablet(s) Oral daily  polyethylene glycol 3350 17 Gram(s) Oral daily  QUEtiapine 25 milliGRAM(s) Oral daily  QUEtiapine 50 milliGRAM(s) Oral at bedtime  senna 2 Tablet(s) Oral at bedtime  sodium chloride 0.65% Nasal 1 Spray(s) Both Nostrils three times a day  tiotropium 18 MICROgram(s) Capsule 1 Capsule(s) Inhalation daily      PHYSICAL EXAM:  T(C): 36.8 (12-29-20 @ 11:27), Max: 36.9 (12-28-20 @ 20:35)  HR: 74 (12-29-20 @ 11:27) (62 - 78)  BP: 108/52 (12-29-20 @ 11:27) (108/52 - 121/76)  RR: 17 (12-29-20 @ 11:27) (16 - 18)  SpO2: 100% (12-29-20 @ 11:27) (100% - 100%)  Wt(kg): --  I&O's Summary    29 Dec 2020 07:01  -  29 Dec 2020 12:56  --------------------------------------------------------  IN: 300 mL / OUT: 300 mL / NET: 0 mL          Appearance: NAD	  HEENT:  Dry oral mucosa, PERRL, EOMI	  Lymphatic: No lymphadenopathy , no edema  Cardiovascular: Normal S1 S2, No JVD, No murmurs , Peripheral pulses palpable 2+ bilaterally  Respiratory: decreased bs 	  Gastrointestinal:  Soft, Non-tender, + BS	  Skin: No rashes, No ecchymoses, No cyanosis, warm to touch  Musculoskeletal: Normal range of motion, normal strength  Psychiatry:  Mood & affect appropriate  Ext: No edema      LABS:    CARDIAC MARKERS:    D-Dimer Assay, Quantitative: 645: A result less than 230 ng/mL DDU correlates with the absence of                             9.8    7.21  )-----------( 381      ( 29 Dec 2020 06:17 )             31.6     12-29    137  |  100  |  22  ----------------------------<  119<H>  5.1   |  27  |  0.63    Ca    8.7      29 Dec 2020 06:17  Mg     2.0     12-29    TPro  6.4  /  Alb  2.8<L>  /  TBili  0.3  /  DBili  x   /  AST  43<H>  /  ALT  42<H>  /  AlkPhos  125<H>  12-29    proBNP:   Lipid Profile:   HgA1c:   TSH:             TELEMETRY: 	    ECG:  	  RADIOLOGY:   DIAGNOSTIC TESTING:  [ ] Echocardiogram:  [ ]  Catheterization:  [ ] Stress Test:    OTHER: 	          
Patient is a 75y old  Male who presents with a chief complaint of Shortness of Breath (09 Jan 2021 13:29)      DATE OF SERVICE: 01-10-21 @ 10:11    SUBJECTIVE / OVERNIGHT EVENTS: overnight events noted    ROS:  Resp: No cough no sputum production  CVS: No chest pain no palpitations no orthopnea  GI: no N/V/D            MEDICATIONS  (STANDING):  ascorbic acid 250 milliGRAM(s) Oral daily  aspirin  chewable 81 milliGRAM(s) Oral daily  atorvastatin 80 milliGRAM(s) Oral at bedtime  budesonide 160 MICROgram(s)/formoterol 4.5 MICROgram(s) Inhaler 2 Puff(s) Inhalation two times a day  calcium carbonate 1250 mG  + Vitamin D (OsCal 500 + D) 1 Tablet(s) Oral two times a day  dextrose 40% Gel 15 Gram(s) Oral once  dextrose 5%. 1000 milliLiter(s) (50 mL/Hr) IV Continuous <Continuous>  dextrose 5%. 1000 milliLiter(s) (100 mL/Hr) IV Continuous <Continuous>  dextrose 50% Injectable 25 Gram(s) IV Push once  dextrose 50% Injectable 12.5 Gram(s) IV Push once  dextrose 50% Injectable 25 Gram(s) IV Push once  enoxaparin Injectable 40 milliGRAM(s) SubCutaneous daily  ferrous    sulfate 325 milliGRAM(s) Oral daily  fluticasone propionate 50 MICROgram(s)/spray Nasal Spray 1 Spray(s) Both Nostrils two times a day  folic acid 1 milliGRAM(s) Oral daily  furosemide    Tablet 40 milliGRAM(s) Oral daily  glucagon  Injectable 1 milliGRAM(s) IntraMuscular once  insulin glargine Injectable (LANTUS) 10 Unit(s) SubCutaneous at bedtime  insulin lispro (ADMELOG) corrective regimen sliding scale   SubCutaneous three times a day before meals  insulin lispro (ADMELOG) corrective regimen sliding scale   SubCutaneous at bedtime  losartan 25 milliGRAM(s) Oral daily  metoprolol tartrate 12.5 milliGRAM(s) Oral two times a day  multivitamin 1 Tablet(s) Oral daily  polyethylene glycol 3350 17 Gram(s) Oral daily  QUEtiapine 25 milliGRAM(s) Oral daily  QUEtiapine 50 milliGRAM(s) Oral at bedtime  senna 2 Tablet(s) Oral at bedtime  sodium chloride 0.65% Nasal 1 Spray(s) Both Nostrils three times a day  tiotropium 18 MICROgram(s) Capsule 1 Capsule(s) Inhalation daily    MEDICATIONS  (PRN):  acetaminophen   Tablet .. 650 milliGRAM(s) Oral every 4 hours PRN Temp greater or equal to 38C (100.4F), Mild Pain (1 - 3), Moderate Pain (4 - 6)  ALBUTerol    90 MICROgram(s) HFA Inhaler 1 Puff(s) Inhalation every 4 hours PRN Shortness of Breath and/or Wheezing  melatonin 3 milliGRAM(s) Oral at bedtime PRN Insomnia        CAPILLARY BLOOD GLUCOSE      POCT Blood Glucose.: 127 mg/dL (10 Tony 2021 09:11)  POCT Blood Glucose.: 226 mg/dL (09 Jan 2021 22:23)  POCT Blood Glucose.: 178 mg/dL (09 Jan 2021 17:02)  POCT Blood Glucose.: 211 mg/dL (09 Jan 2021 11:33)    I&O's Summary      Vital Signs Last 24 Hrs  T(C): 36.5 (10 Tony 2021 06:11), Max: 36.9 (09 Jan 2021 12:02)  T(F): 97.7 (10 Tony 2021 06:11), Max: 98.4 (09 Jan 2021 12:02)  HR: 92 (10 Tony 2021 06:11) (70 - 96)  BP: 107/63 (10 Tony 2021 06:11) (103/52 - 110/67)  BP(mean): --  RR: 16 (10 Tony 2021 06:11) (16 - 17)  SpO2: 98% (10 Tony 2021 06:11) (98% - 100%)    PHYSICAL EXAM:  CHEST/LUNG: clear   HEART: S1 S2; systolic murmur +   ABDOMEN: Soft, Nontender  EXTREMITIES:  no edema  NEUROLOGY: Alert nonfocal  SKIN: No rashes or lesions    LABS:                      All consultant(s) notes reviewed and care discussed with other providers        Contact Number, Dr Saleem [phone]
Subjective: Patient seen and examined. No new events except as noted.   feels fine   no cp or palpitations   REVIEW OF SYSTEMS:    CONSTITUTIONAL: No weakness, fevers or chills  EYES/ENT: No visual changes;  No vertigo or throat pain   NECK: No pain or stiffness  RESPIRATORY: No cough, wheezing, hemoptysis; No shortness of breath  CARDIOVASCULAR: No chest pain or palpitations  GASTROINTESTINAL: No abdominal or epigastric pain. No nausea, vomiting, or hematemesis; No diarrhea or constipation. No melena or hematochezia.  GENITOURINARY: No dysuria, frequency or hematuria  NEUROLOGICAL: No numbness or weakness  SKIN: No itching, burning, rashes, or lesions   All other review of systems is negative unless indicated above.    MEDICATIONS:  MEDICATIONS  (STANDING):  ascorbic acid 250 milliGRAM(s) Oral daily  aspirin  chewable 81 milliGRAM(s) Oral daily  atorvastatin 80 milliGRAM(s) Oral at bedtime  budesonide 160 MICROgram(s)/formoterol 4.5 MICROgram(s) Inhaler 2 Puff(s) Inhalation two times a day  calcium carbonate 1250 mG  + Vitamin D (OsCal 500 + D) 1 Tablet(s) Oral two times a day  dexAMETHasone  Injectable 6 milliGRAM(s) IV Push daily  dextrose 40% Gel 15 Gram(s) Oral once  dextrose 5%. 1000 milliLiter(s) (50 mL/Hr) IV Continuous <Continuous>  dextrose 5%. 1000 milliLiter(s) (100 mL/Hr) IV Continuous <Continuous>  dextrose 50% Injectable 25 Gram(s) IV Push once  dextrose 50% Injectable 12.5 Gram(s) IV Push once  dextrose 50% Injectable 25 Gram(s) IV Push once  enoxaparin Injectable 40 milliGRAM(s) SubCutaneous daily  ferrous    sulfate 325 milliGRAM(s) Oral daily  folic acid 1 milliGRAM(s) Oral daily  furosemide    Tablet 40 milliGRAM(s) Oral daily  glucagon  Injectable 1 milliGRAM(s) IntraMuscular once  insulin glargine Injectable (LANTUS) 14 Unit(s) SubCutaneous at bedtime  insulin lispro (ADMELOG) corrective regimen sliding scale   SubCutaneous three times a day before meals  insulin lispro (ADMELOG) corrective regimen sliding scale   SubCutaneous at bedtime  losartan 25 milliGRAM(s) Oral daily  metoprolol succinate ER 25 milliGRAM(s) Oral daily  multivitamin 1 Tablet(s) Oral daily  oxymetazoline 0.05% Nasal Spray      oxymetazoline 0.05% Nasal Spray 1 Spray(s) Both Nostrils every 12 hours  polyethylene glycol 3350 17 Gram(s) Oral daily  QUEtiapine 25 milliGRAM(s) Oral daily  QUEtiapine 50 milliGRAM(s) Oral at bedtime  senna 2 Tablet(s) Oral at bedtime  sodium chloride 0.65% Nasal 1 Spray(s) Both Nostrils three times a day  tiotropium 18 MICROgram(s) Capsule 1 Capsule(s) Inhalation daily      PHYSICAL EXAM:  T(C): 36.8 (01-01-21 @ 12:02), Max: 36.8 (01-01-21 @ 12:02)  HR: 71 (01-01-21 @ 12:02) (71 - 99)  BP: 111/74 (01-01-21 @ 12:02) (111/74 - 118/70)  RR: 18 (01-01-21 @ 12:02) (17 - 19)  SpO2: 98% (01-01-21 @ 12:02) (93% - 98%)  Wt(kg): --  I&O's Summary    31 Dec 2020 07:01  -  01 Jan 2021 07:00  --------------------------------------------------------  IN: 0 mL / OUT: 425 mL / NET: -425 mL          Appearance: Normal	  HEENT:   Normal oral mucosa, PERRL, EOMI	  Lymphatic: No lymphadenopathy , no edema  Cardiovascular: Normal S1 S2, No JVD, No murmurs , Peripheral pulses palpable 2+ bilaterally  Respiratory: Lungs clear to auscultation, normal effort 	  Gastrointestinal:  Soft, Non-tender, + BS	  Skin: No rashes, No ecchymoses, No cyanosis, warm to touch  Musculoskeletal: Normal range of motion, normal strength  Psychiatry:  Mood & affect appropriate  Ext: No edema      LABS:    CARDIAC MARKERS:                  proBNP:   Lipid Profile:   HgA1c:   TSH:             TELEMETRY: 	  SR, PVCs, 4 beats NSVT   ECG:  	  RADIOLOGY:   DIAGNOSTIC TESTING:  [ ] Echocardiogram:  [ ]  Catheterization:  [ ] Stress Test:    OTHER: 	          
Patient is a 75y old  Male who presents with a chief complaint of Shortness of Breath (07 Jan 2021 11:03)      DATE OF SERVICE: 01-08-21 @ 11:42  SUBJECTIVE / OVERNIGHT EVENTS: overnight events noted    ROS:  Resp: No cough no sputum production  CVS: No chest pain no palpitations no orthopnea  GI: no N/V/D  : no dysuria, no hematuria          MEDICATIONS  (STANDING):  ascorbic acid 250 milliGRAM(s) Oral daily  aspirin  chewable 81 milliGRAM(s) Oral daily  atorvastatin 80 milliGRAM(s) Oral at bedtime  budesonide 160 MICROgram(s)/formoterol 4.5 MICROgram(s) Inhaler 2 Puff(s) Inhalation two times a day  calcium carbonate 1250 mG  + Vitamin D (OsCal 500 + D) 1 Tablet(s) Oral two times a day  cefTRIAXone   IVPB 1000 milliGRAM(s) IV Intermittent every 24 hours  dextrose 40% Gel 15 Gram(s) Oral once  dextrose 5%. 1000 milliLiter(s) (50 mL/Hr) IV Continuous <Continuous>  dextrose 5%. 1000 milliLiter(s) (100 mL/Hr) IV Continuous <Continuous>  dextrose 50% Injectable 25 Gram(s) IV Push once  dextrose 50% Injectable 12.5 Gram(s) IV Push once  dextrose 50% Injectable 25 Gram(s) IV Push once  enoxaparin Injectable 40 milliGRAM(s) SubCutaneous daily  ferrous    sulfate 325 milliGRAM(s) Oral daily  fluticasone propionate 50 MICROgram(s)/spray Nasal Spray 1 Spray(s) Both Nostrils two times a day  folic acid 1 milliGRAM(s) Oral daily  furosemide    Tablet 40 milliGRAM(s) Oral daily  glucagon  Injectable 1 milliGRAM(s) IntraMuscular once  insulin glargine Injectable (LANTUS) 10 Unit(s) SubCutaneous at bedtime  insulin lispro (ADMELOG) corrective regimen sliding scale   SubCutaneous three times a day before meals  insulin lispro (ADMELOG) corrective regimen sliding scale   SubCutaneous at bedtime  losartan 25 milliGRAM(s) Oral daily  metoprolol tartrate 12.5 milliGRAM(s) Oral two times a day  multivitamin 1 Tablet(s) Oral daily  polyethylene glycol 3350 17 Gram(s) Oral daily  QUEtiapine 25 milliGRAM(s) Oral daily  QUEtiapine 50 milliGRAM(s) Oral at bedtime  senna 2 Tablet(s) Oral at bedtime  sodium chloride 0.65% Nasal 1 Spray(s) Both Nostrils three times a day  tiotropium 18 MICROgram(s) Capsule 1 Capsule(s) Inhalation daily    MEDICATIONS  (PRN):  acetaminophen   Tablet .. 650 milliGRAM(s) Oral every 4 hours PRN Temp greater or equal to 38C (100.4F), Mild Pain (1 - 3), Moderate Pain (4 - 6)  ALBUTerol    90 MICROgram(s) HFA Inhaler 1 Puff(s) Inhalation every 4 hours PRN Shortness of Breath and/or Wheezing  melatonin 3 milliGRAM(s) Oral at bedtime PRN Insomnia        CAPILLARY BLOOD GLUCOSE      POCT Blood Glucose.: 140 mg/dL (08 Jan 2021 09:07)  POCT Blood Glucose.: 176 mg/dL (07 Jan 2021 21:52)  POCT Blood Glucose.: 214 mg/dL (07 Jan 2021 16:48)  POCT Blood Glucose.: 193 mg/dL (07 Jan 2021 11:48)    I&O's Summary      Vital Signs Last 24 Hrs  T(C): 37.2 (08 Jan 2021 09:15), Max: 37.2 (07 Jan 2021 20:55)  T(F): 99 (08 Jan 2021 09:15), Max: 99 (08 Jan 2021 09:15)  HR: 77 (08 Jan 2021 09:15) (77 - 92)  BP: 104/50 (08 Jan 2021 09:15) (104/50 - 126/78)  BP(mean): --  RR: 18 (08 Jan 2021 09:15) (17 - 18)  SpO2: 100% (08 Jan 2021 09:15) (97% - 100%)    PHYSICAL EXAM:  CHEST/LUNG: clear   HEART: S1 S2; systolic murmur +   ABDOMEN: Soft, Nontender  EXTREMITIES:  no edema  NEUROLOGY: Alert nonfocal  SKIN: No rashes or lesions    LABS:                        8.7    13.73 )-----------( 244      ( 08 Jan 2021 09:50 )             28.0     01-08    136  |  99  |  11  ----------------------------<  116<H>  3.9   |  23  |  0.65    Ca    8.7      08 Jan 2021 09:50                  All consultant(s) notes reviewed and care discussed with other providers        Contact Number, Dr Saleem [phone]
Subjective: Patient seen and examined. No new events except as noted.     REVIEW OF SYSTEMS:    CONSTITUTIONAL: + weakness, fevers or chills  EYES/ENT: No visual changes;  No vertigo or throat pain   NECK: No pain or stiffness  RESPIRATORY: No cough, wheezing, hemoptysis; No shortness of breath  CARDIOVASCULAR: No chest pain or palpitations  GASTROINTESTINAL: No abdominal or epigastric pain. No nausea, vomiting, or hematemesis; No diarrhea or constipation. No melena or hematochezia.  GENITOURINARY: No dysuria, frequency or hematuria  NEUROLOGICAL: No numbness or weakness  SKIN: No itching, burning, rashes, or lesions   All other review of systems is negative unless indicated above.    MEDICATIONS:  MEDICATIONS  (STANDING):  ascorbic acid 250 milliGRAM(s) Oral daily  aspirin  chewable 81 milliGRAM(s) Oral daily  atorvastatin 80 milliGRAM(s) Oral at bedtime  budesonide 160 MICROgram(s)/formoterol 4.5 MICROgram(s) Inhaler 2 Puff(s) Inhalation two times a day  calcium carbonate 1250 mG  + Vitamin D (OsCal 500 + D) 1 Tablet(s) Oral two times a day  dextrose 40% Gel 15 Gram(s) Oral once  dextrose 5%. 1000 milliLiter(s) (50 mL/Hr) IV Continuous <Continuous>  dextrose 5%. 1000 milliLiter(s) (100 mL/Hr) IV Continuous <Continuous>  dextrose 50% Injectable 25 Gram(s) IV Push once  dextrose 50% Injectable 12.5 Gram(s) IV Push once  dextrose 50% Injectable 25 Gram(s) IV Push once  enoxaparin Injectable 40 milliGRAM(s) SubCutaneous daily  ferrous    sulfate 325 milliGRAM(s) Oral daily  fluticasone propionate 50 MICROgram(s)/spray Nasal Spray 1 Spray(s) Both Nostrils two times a day  folic acid 1 milliGRAM(s) Oral daily  furosemide    Tablet 40 milliGRAM(s) Oral daily  glucagon  Injectable 1 milliGRAM(s) IntraMuscular once  insulin glargine Injectable (LANTUS) 10 Unit(s) SubCutaneous at bedtime  insulin lispro (ADMELOG) corrective regimen sliding scale   SubCutaneous three times a day before meals  insulin lispro (ADMELOG) corrective regimen sliding scale   SubCutaneous at bedtime  losartan 25 milliGRAM(s) Oral daily  metoprolol tartrate 12.5 milliGRAM(s) Oral two times a day  multivitamin 1 Tablet(s) Oral daily  polyethylene glycol 3350 17 Gram(s) Oral daily  QUEtiapine 25 milliGRAM(s) Oral daily  QUEtiapine 50 milliGRAM(s) Oral at bedtime  senna 2 Tablet(s) Oral at bedtime  sodium chloride 0.65% Nasal 1 Spray(s) Both Nostrils three times a day  tiotropium 18 MICROgram(s) Capsule 1 Capsule(s) Inhalation daily      PHYSICAL EXAM:  T(C): 37 (01-03-21 @ 09:25), Max: 37 (01-03-21 @ 09:25)  HR: 69 (01-03-21 @ 09:25) (69 - 80)  BP: 123/73 (01-03-21 @ 09:25) (109/50 - 125/63)  RR: 18 (01-03-21 @ 09:25) (18 - 19)  SpO2: 98% (01-03-21 @ 09:25) (98% - 99%)  Wt(kg): --  I&O's Summary        Appearance: NAD	  HEENT:   Normal oral mucosa, PERRL, EOMI	  Lymphatic: No lymphadenopathy , no edema  Cardiovascular: Normal S1 S2, No JVD, No murmurs , Peripheral pulses palpable 2+ bilaterally  Respiratory: Lungs clear to auscultation, normal effort 	  Gastrointestinal:  Soft, Non-tender, + BS	  Skin: No rashes, No ecchymoses, No cyanosis, warm to touch  Musculoskeletal: Normal range of motion, normal strength  Psychiatry:  Mood & affect appropriate  Ext: No edema      LABS:    CARDIAC MARKERS:                                9.8    9.68  )-----------( 406      ( 03 Jan 2021 07:22 )             32.6     01-03    139  |  103  |  26<H>  ----------------------------<  131<H>  3.9   |  25  |  0.74    Ca    8.9      03 Jan 2021 07:22    TPro  6.0  /  Alb  3.0<L>  /  TBili  0.3  /  DBili  x   /  AST  17  /  ALT  29  /  AlkPhos  123<H>  01-03    proBNP:   Lipid Profile:   HgA1c:   TSH:             TELEMETRY: SR	    ECG:  	  RADIOLOGY:   DIAGNOSTIC TESTING:  [ ] Echocardiogram:  [ ]  Catheterization:  [ ] Stress Test:    OTHER: 	          
Subjective: Patient seen and examined. No new events except as noted.   feels fine       REVIEW OF SYSTEMS:    CONSTITUTIONAL: No weakness, fevers or chills  EYES/ENT: No visual changes;  No vertigo or throat pain   NECK: No pain or stiffness  RESPIRATORY: No cough, wheezing, hemoptysis; No shortness of breath  CARDIOVASCULAR: No chest pain or palpitations  GASTROINTESTINAL: No abdominal or epigastric pain. No nausea, vomiting, or hematemesis; No diarrhea or constipation. No melena or hematochezia.  GENITOURINARY: No dysuria, frequency or hematuria  NEUROLOGICAL: No numbness or weakness  SKIN: No itching, burning, rashes, or lesions   All other review of systems is negative unless indicated above.    MEDICATIONS:  MEDICATIONS  (STANDING):  ascorbic acid 250 milliGRAM(s) Oral daily  aspirin  chewable 81 milliGRAM(s) Oral daily  atorvastatin 80 milliGRAM(s) Oral at bedtime  budesonide 160 MICROgram(s)/formoterol 4.5 MICROgram(s) Inhaler 2 Puff(s) Inhalation two times a day  calcium carbonate 1250 mG  + Vitamin D (OsCal 500 + D) 1 Tablet(s) Oral two times a day  dexAMETHasone  Injectable 6 milliGRAM(s) IV Push daily  dextrose 40% Gel 15 Gram(s) Oral once  dextrose 5%. 1000 milliLiter(s) (50 mL/Hr) IV Continuous <Continuous>  dextrose 5%. 1000 milliLiter(s) (100 mL/Hr) IV Continuous <Continuous>  dextrose 50% Injectable 25 Gram(s) IV Push once  dextrose 50% Injectable 12.5 Gram(s) IV Push once  dextrose 50% Injectable 25 Gram(s) IV Push once  enoxaparin Injectable 40 milliGRAM(s) SubCutaneous daily  ferrous    sulfate 325 milliGRAM(s) Oral daily  fluticasone propionate 50 MICROgram(s)/spray Nasal Spray 1 Spray(s) Both Nostrils two times a day  folic acid 1 milliGRAM(s) Oral daily  furosemide    Tablet 40 milliGRAM(s) Oral daily  glucagon  Injectable 1 milliGRAM(s) IntraMuscular once  insulin glargine Injectable (LANTUS) 14 Unit(s) SubCutaneous at bedtime  insulin lispro (ADMELOG) corrective regimen sliding scale   SubCutaneous three times a day before meals  insulin lispro (ADMELOG) corrective regimen sliding scale   SubCutaneous at bedtime  losartan 25 milliGRAM(s) Oral daily  metoprolol tartrate 12.5 milliGRAM(s) Oral two times a day  multivitamin 1 Tablet(s) Oral daily  oxymetazoline 0.05% Nasal Spray      oxymetazoline 0.05% Nasal Spray 1 Spray(s) Both Nostrils every 12 hours  polyethylene glycol 3350 17 Gram(s) Oral daily  QUEtiapine 50 milliGRAM(s) Oral at bedtime  QUEtiapine 25 milliGRAM(s) Oral daily  senna 2 Tablet(s) Oral at bedtime  sodium chloride 0.65% Nasal 1 Spray(s) Both Nostrils three times a day  tiotropium 18 MICROgram(s) Capsule 1 Capsule(s) Inhalation daily      PHYSICAL EXAM:  T(C): 36.7 (01-02-21 @ 05:39), Max: 37 (01-01-21 @ 21:20)  HR: 76 (01-02-21 @ 19:20) (68 - 81)  BP: 109/50 (01-02-21 @ 19:20) (101/53 - 118/49)  RR: 19 (01-02-21 @ 19:20) (17 - 19)  SpO2: 98% (01-02-21 @ 19:20) (95% - 99%)  Wt(kg): --  I&O's Summary        Appearance: Normal	  HEENT:   Normal oral mucosa, PERRL, EOMI	  Lymphatic: No lymphadenopathy , no edema  Cardiovascular: Normal S1 S2, No JVD, No murmurs , Peripheral pulses palpable 2+ bilaterally  Respiratory: Lungs clear to auscultation, normal effort 	  Gastrointestinal:  Soft, Non-tender, + BS	  Skin: No rashes, No ecchymoses, No cyanosis, warm to touch  Musculoskeletal: Normal range of motion, normal strength  Psychiatry:  Mood & affect appropriate  Ext: No edema      LABS:    CARDIAC MARKERS:                  proBNP:   Lipid Profile:   HgA1c:   TSH:             TELEMETRY: SR PVcs	    ECG:  	  RADIOLOGY:   DIAGNOSTIC TESTING:  [ ] Echocardiogram:  [ ]  Catheterization:  [ ] Stress Test:    OTHER: 	          
Patient is a 75y old  Male who presents with a chief complaint of Shortness of Breath (30 Dec 2020 10:29)      SUBJECTIVE / OVERNIGHT EVENTS: overnight events noted    ROS:  Resp: No cough no sputum production  CVS: No chest pain no palpitations no orthopnea  GI: no N/V/D  : no dysuria, no hematuria          MEDICATIONS  (STANDING):  ascorbic acid 250 milliGRAM(s) Oral daily  aspirin  chewable 81 milliGRAM(s) Oral daily  atorvastatin 80 milliGRAM(s) Oral at bedtime  budesonide 160 MICROgram(s)/formoterol 4.5 MICROgram(s) Inhaler 2 Puff(s) Inhalation two times a day  calcium carbonate 1250 mG  + Vitamin D (OsCal 500 + D) 1 Tablet(s) Oral two times a day  dexAMETHasone  Injectable 6 milliGRAM(s) IV Push daily  dextrose 40% Gel 15 Gram(s) Oral once  dextrose 5%. 1000 milliLiter(s) (50 mL/Hr) IV Continuous <Continuous>  dextrose 5%. 1000 milliLiter(s) (100 mL/Hr) IV Continuous <Continuous>  dextrose 50% Injectable 25 Gram(s) IV Push once  dextrose 50% Injectable 12.5 Gram(s) IV Push once  dextrose 50% Injectable 25 Gram(s) IV Push once  enoxaparin Injectable 40 milliGRAM(s) SubCutaneous daily  ferrous    sulfate 325 milliGRAM(s) Oral daily  folic acid 1 milliGRAM(s) Oral daily  furosemide    Tablet 40 milliGRAM(s) Oral daily  glucagon  Injectable 1 milliGRAM(s) IntraMuscular once  insulin glargine Injectable (LANTUS) 14 Unit(s) SubCutaneous at bedtime  insulin lispro (ADMELOG) corrective regimen sliding scale   SubCutaneous three times a day before meals  insulin lispro (ADMELOG) corrective regimen sliding scale   SubCutaneous at bedtime  losartan 25 milliGRAM(s) Oral daily  metoprolol succinate ER 25 milliGRAM(s) Oral daily  multivitamin 1 Tablet(s) Oral daily  polyethylene glycol 3350 17 Gram(s) Oral daily  QUEtiapine 25 milliGRAM(s) Oral daily  QUEtiapine 50 milliGRAM(s) Oral at bedtime  senna 2 Tablet(s) Oral at bedtime  sodium chloride 0.65% Nasal 1 Spray(s) Both Nostrils three times a day  tiotropium 18 MICROgram(s) Capsule 1 Capsule(s) Inhalation daily    MEDICATIONS  (PRN):  acetaminophen   Tablet .. 650 milliGRAM(s) Oral every 4 hours PRN Temp greater or equal to 38.5C (101.3F)  ALBUTerol    90 MICROgram(s) HFA Inhaler 1 Puff(s) Inhalation every 4 hours PRN Shortness of Breath and/or Wheezing  melatonin 3 milliGRAM(s) Oral at bedtime PRN Insomnia        CAPILLARY BLOOD GLUCOSE      POCT Blood Glucose.: 109 mg/dL (30 Dec 2020 07:22)  POCT Blood Glucose.: 186 mg/dL (29 Dec 2020 21:49)  POCT Blood Glucose.: 192 mg/dL (29 Dec 2020 16:53)  POCT Blood Glucose.: 233 mg/dL (29 Dec 2020 11:48)    I&O's Summary    29 Dec 2020 07:01  -  30 Dec 2020 07:00  --------------------------------------------------------  IN: 600 mL / OUT: 700 mL / NET: -100 mL        Vital Signs Last 24 Hrs  T(C): 36.7 (30 Dec 2020 05:34), Max: 36.9 (29 Dec 2020 20:35)  T(F): 98 (30 Dec 2020 05:34), Max: 98.4 (29 Dec 2020 20:35)  HR: 64 (30 Dec 2020 05:34) (64 - 75)  BP: 110/60 (30 Dec 2020 05:34) (108/52 - 112/61)  BP(mean): --  RR: 18 (30 Dec 2020 05:34) (17 - 18)  SpO2: 99% (30 Dec 2020 05:34) (99% - 100%)    PHYSICAL EXAM:  GENERAL: in no distress  NECK: Supple, No JVD  CHEST/LUNG: clear b/l, no wheeze  HEART: S1 S2; soft ejection systolic murmur +   ABDOMEN: Soft, Nontender  EXTREMITIES:  no edema  NEUROLOGY: Alert nonfocal  SKIN: No rashes or lesions    LABS:                        9.8    7.21  )-----------( 381      ( 29 Dec 2020 06:17 )             31.6     12-    137  |  100  |  22  ----------------------------<  119<H>  5.1   |  27  |  0.63    Ca    8.7      29 Dec 2020 06:17  Mg     2.0         TPro  6.4  /  Alb  2.8<L>  /  TBili  0.3  /  DBili  x   /  AST  43<H>  /  ALT  42<H>  /  AlkPhos  125<H>            Urinalysis Basic - ( 29 Dec 2020 23:22 )    Color: Light Orange / Appearance: Turbid / S.021 / pH: x  Gluc: x / Ketone: Negative  / Bili: Negative / Urobili: <2 mg/dL   Blood: x / Protein: 100 mg/dL / Nitrite: Positive   Leuk Esterase: Large / RBC: 25-50 /HPF / WBC >50 /HPF   Sq Epi: x / Non Sq Epi: x / Bacteria: Many          All consultant(s) notes reviewed and care discussed with other providers        Contact Number, Dr Saleem [phone]
Patient is a 75y old  Male who presents with a chief complaint of Shortness of Breath (01 Jan 2021 12:09)      SUBJECTIVE / OVERNIGHT EVENTS: overnight events noted    ROS:  Resp: No cough no sputum production  CVS: No chest pain no palpitations no orthopnea  GI: no N/V/D          MEDICATIONS  (STANDING):  ascorbic acid 250 milliGRAM(s) Oral daily  aspirin  chewable 81 milliGRAM(s) Oral daily  atorvastatin 80 milliGRAM(s) Oral at bedtime  budesonide 160 MICROgram(s)/formoterol 4.5 MICROgram(s) Inhaler 2 Puff(s) Inhalation two times a day  calcium carbonate 1250 mG  + Vitamin D (OsCal 500 + D) 1 Tablet(s) Oral two times a day  dexAMETHasone  Injectable 6 milliGRAM(s) IV Push daily  dextrose 40% Gel 15 Gram(s) Oral once  dextrose 5%. 1000 milliLiter(s) (50 mL/Hr) IV Continuous <Continuous>  dextrose 5%. 1000 milliLiter(s) (100 mL/Hr) IV Continuous <Continuous>  dextrose 50% Injectable 25 Gram(s) IV Push once  dextrose 50% Injectable 12.5 Gram(s) IV Push once  dextrose 50% Injectable 25 Gram(s) IV Push once  enoxaparin Injectable 40 milliGRAM(s) SubCutaneous daily  ferrous    sulfate 325 milliGRAM(s) Oral daily  fluticasone propionate 50 MICROgram(s)/spray Nasal Spray 1 Spray(s) Both Nostrils two times a day  folic acid 1 milliGRAM(s) Oral daily  furosemide    Tablet 40 milliGRAM(s) Oral daily  glucagon  Injectable 1 milliGRAM(s) IntraMuscular once  insulin glargine Injectable (LANTUS) 14 Unit(s) SubCutaneous at bedtime  insulin lispro (ADMELOG) corrective regimen sliding scale   SubCutaneous three times a day before meals  insulin lispro (ADMELOG) corrective regimen sliding scale   SubCutaneous at bedtime  losartan 25 milliGRAM(s) Oral daily  metoprolol tartrate 12.5 milliGRAM(s) Oral two times a day  multivitamin 1 Tablet(s) Oral daily  oxymetazoline 0.05% Nasal Spray      oxymetazoline 0.05% Nasal Spray 1 Spray(s) Both Nostrils every 12 hours  polyethylene glycol 3350 17 Gram(s) Oral daily  QUEtiapine 25 milliGRAM(s) Oral daily  QUEtiapine 50 milliGRAM(s) Oral at bedtime  senna 2 Tablet(s) Oral at bedtime  sodium chloride 0.65% Nasal 1 Spray(s) Both Nostrils three times a day  tiotropium 18 MICROgram(s) Capsule 1 Capsule(s) Inhalation daily    MEDICATIONS  (PRN):  acetaminophen   Tablet .. 650 milliGRAM(s) Oral every 4 hours PRN Temp greater or equal to 38C (100.4F), Mild Pain (1 - 3), Moderate Pain (4 - 6)  ALBUTerol    90 MICROgram(s) HFA Inhaler 1 Puff(s) Inhalation every 4 hours PRN Shortness of Breath and/or Wheezing  melatonin 3 milliGRAM(s) Oral at bedtime PRN Insomnia        CAPILLARY BLOOD GLUCOSE      POCT Blood Glucose.: 164 mg/dL (02 Jan 2021 07:34)  POCT Blood Glucose.: 230 mg/dL (01 Jan 2021 21:56)  POCT Blood Glucose.: 288 mg/dL (01 Jan 2021 17:16)  POCT Blood Glucose.: 218 mg/dL (01 Jan 2021 12:18)    I&O's Summary      Vital Signs Last 24 Hrs  T(C): 36.7 (02 Jan 2021 05:39), Max: 37 (01 Jan 2021 21:20)  T(F): 98.1 (02 Jan 2021 05:39), Max: 98.6 (01 Jan 2021 21:20)  HR: 68 (02 Jan 2021 05:39) (68 - 77)  BP: 101/53 (02 Jan 2021 05:39) (101/53 - 111/74)  BP(mean): --  RR: 18 (02 Jan 2021 05:39) (18 - 19)  SpO2: 95% (02 Jan 2021 05:39) (95% - 99%)    PHYSICAL EXAM:  CHEST/LUNG: clear b/l, no wheeze  HEART: S1 S2; systolic murmur +   ABDOMEN: Soft, Nontender  EXTREMITIES:  no edema  NEUROLOGY: Alert nonfocal  SKIN: No rashes or lesions    LABS:                      All consultant(s) notes reviewed and care discussed with other providers        Contact Number, Dr Saleem [phone]
Patient is a 75y old  Male who presents with a chief complaint of Shortness of Breath (02 Jan 2021 19:50)      SUBJECTIVE / OVERNIGHT EVENTS: overnight events noted    ROS:  Resp: No cough no sputum production  CVS: No chest pain no palpitations no orthopnea  GI: no N/V/D  : no dysuria, no hematuria  Neuro: no weakness no paresthesias  Heme: No petechiae no easy bruising  Msk: No joint pain no swelling  Skin: No rash no itching        MEDICATIONS  (STANDING):  ascorbic acid 250 milliGRAM(s) Oral daily  aspirin  chewable 81 milliGRAM(s) Oral daily  atorvastatin 80 milliGRAM(s) Oral at bedtime  budesonide 160 MICROgram(s)/formoterol 4.5 MICROgram(s) Inhaler 2 Puff(s) Inhalation two times a day  calcium carbonate 1250 mG  + Vitamin D (OsCal 500 + D) 1 Tablet(s) Oral two times a day  dextrose 40% Gel 15 Gram(s) Oral once  dextrose 5%. 1000 milliLiter(s) (50 mL/Hr) IV Continuous <Continuous>  dextrose 5%. 1000 milliLiter(s) (100 mL/Hr) IV Continuous <Continuous>  dextrose 50% Injectable 25 Gram(s) IV Push once  dextrose 50% Injectable 12.5 Gram(s) IV Push once  dextrose 50% Injectable 25 Gram(s) IV Push once  enoxaparin Injectable 40 milliGRAM(s) SubCutaneous daily  ferrous    sulfate 325 milliGRAM(s) Oral daily  fluticasone propionate 50 MICROgram(s)/spray Nasal Spray 1 Spray(s) Both Nostrils two times a day  folic acid 1 milliGRAM(s) Oral daily  furosemide    Tablet 40 milliGRAM(s) Oral daily  glucagon  Injectable 1 milliGRAM(s) IntraMuscular once  insulin glargine Injectable (LANTUS) 14 Unit(s) SubCutaneous at bedtime  insulin lispro (ADMELOG) corrective regimen sliding scale   SubCutaneous three times a day before meals  insulin lispro (ADMELOG) corrective regimen sliding scale   SubCutaneous at bedtime  losartan 25 milliGRAM(s) Oral daily  metoprolol tartrate 12.5 milliGRAM(s) Oral two times a day  multivitamin 1 Tablet(s) Oral daily  polyethylene glycol 3350 17 Gram(s) Oral daily  QUEtiapine 50 milliGRAM(s) Oral at bedtime  QUEtiapine 25 milliGRAM(s) Oral daily  senna 2 Tablet(s) Oral at bedtime  sodium chloride 0.65% Nasal 1 Spray(s) Both Nostrils three times a day  tiotropium 18 MICROgram(s) Capsule 1 Capsule(s) Inhalation daily    MEDICATIONS  (PRN):  acetaminophen   Tablet .. 650 milliGRAM(s) Oral every 4 hours PRN Temp greater or equal to 38C (100.4F), Mild Pain (1 - 3), Moderate Pain (4 - 6)  ALBUTerol    90 MICROgram(s) HFA Inhaler 1 Puff(s) Inhalation every 4 hours PRN Shortness of Breath and/or Wheezing  melatonin 3 milliGRAM(s) Oral at bedtime PRN Insomnia        CAPILLARY BLOOD GLUCOSE  240 (02 Jan 2021 21:45)      POCT Blood Glucose.: 124 mg/dL (03 Jan 2021 07:45)  POCT Blood Glucose.: 240 mg/dL (02 Jan 2021 21:36)  POCT Blood Glucose.: 242 mg/dL (02 Jan 2021 17:12)  POCT Blood Glucose.: 244 mg/dL (02 Jan 2021 11:47)    I&O's Summary      Vital Signs Last 24 Hrs  T(C): 37 (03 Jan 2021 09:25), Max: 37 (03 Jan 2021 09:25)  T(F): 98.6 (03 Jan 2021 09:25), Max: 98.6 (03 Jan 2021 09:25)  HR: 69 (03 Jan 2021 09:25) (69 - 80)  BP: 123/73 (03 Jan 2021 09:25) (109/50 - 125/63)  BP(mean): --  RR: 18 (03 Jan 2021 09:25) (17 - 19)  SpO2: 98% (03 Jan 2021 09:25) (95% - 99%)    PHYSICAL EXAM:  CHEST/LUNG: clear b/l, no wheeze  HEART: S1 S2; systolic murmur +   ABDOMEN: Soft, Nontender  EXTREMITIES:  no edema  NEUROLOGY: Alert nonfocal  SKIN: No rashes or lesions    LABS:                        9.8    9.68  )-----------( 406      ( 03 Jan 2021 07:22 )             32.6     01-03    139  |  103  |  26<H>  ----------------------------<  131<H>  3.9   |  25  |  0.74    Ca    8.9      03 Jan 2021 07:22    TPro  6.0  /  Alb  3.0<L>  /  TBili  0.3  /  DBili  x   /  AST  17  /  ALT  29  /  AlkPhos  123<H>  01-03                All consultant(s) notes reviewed and care discussed with other providers        Contact Number, Dr Saleem [phone]
Patient is a 75y old  Male who presents with a chief complaint of Shortness of Breath (27 Dec 2020 14:04)      SUBJECTIVE / OVERNIGHT EVENTS: overnight events noted    ROS:  Resp: No cough no sputum production  CVS: No chest pain no palpitations no orthopnea  GI: no N/V/D  : no dysuria, no hematuria  Neuro: no weakness no paresthesias          MEDICATIONS  (STANDING):  ascorbic acid 250 milliGRAM(s) Oral daily  aspirin  chewable 81 milliGRAM(s) Oral daily  atorvastatin 80 milliGRAM(s) Oral at bedtime  budesonide 160 MICROgram(s)/formoterol 4.5 MICROgram(s) Inhaler 2 Puff(s) Inhalation two times a day  calcium carbonate 1250 mG  + Vitamin D (OsCal 500 + D) 1 Tablet(s) Oral two times a day  dexAMETHasone  Injectable 6 milliGRAM(s) IV Push daily  dextrose 40% Gel 15 Gram(s) Oral once  dextrose 5%. 1000 milliLiter(s) (50 mL/Hr) IV Continuous <Continuous>  dextrose 5%. 1000 milliLiter(s) (100 mL/Hr) IV Continuous <Continuous>  dextrose 50% Injectable 25 Gram(s) IV Push once  dextrose 50% Injectable 12.5 Gram(s) IV Push once  dextrose 50% Injectable 25 Gram(s) IV Push once  enoxaparin Injectable 40 milliGRAM(s) SubCutaneous daily  ferrous    sulfate 325 milliGRAM(s) Oral daily  folic acid 1 milliGRAM(s) Oral daily  furosemide    Tablet 40 milliGRAM(s) Oral daily  glucagon  Injectable 1 milliGRAM(s) IntraMuscular once  insulin glargine Injectable (LANTUS) 10 Unit(s) SubCutaneous at bedtime  insulin lispro (ADMELOG) corrective regimen sliding scale   SubCutaneous three times a day before meals  insulin lispro (ADMELOG) corrective regimen sliding scale   SubCutaneous at bedtime  losartan 25 milliGRAM(s) Oral daily  magnesium sulfate  IVPB 1 Gram(s) IV Intermittent once  metoprolol succinate ER 25 milliGRAM(s) Oral daily  multivitamin 1 Tablet(s) Oral daily  polyethylene glycol 3350 17 Gram(s) Oral daily  potassium chloride   Powder 40 milliEquivalent(s) Oral once  QUEtiapine 25 milliGRAM(s) Oral daily  QUEtiapine 50 milliGRAM(s) Oral at bedtime  remdesivir  IVPB 100 milliGRAM(s) IV Intermittent every 24 hours  remdesivir  IVPB   IV Intermittent   senna 2 Tablet(s) Oral at bedtime  tiotropium 18 MICROgram(s) Capsule 1 Capsule(s) Inhalation daily    MEDICATIONS  (PRN):  acetaminophen   Tablet .. 650 milliGRAM(s) Oral every 4 hours PRN Temp greater or equal to 38.5C (101.3F)  ALBUTerol    90 MICROgram(s) HFA Inhaler 1 Puff(s) Inhalation every 4 hours PRN Shortness of Breath and/or Wheezing  melatonin 3 milliGRAM(s) Oral at bedtime PRN Insomnia        CAPILLARY BLOOD GLUCOSE      POCT Blood Glucose.: 156 mg/dL (28 Dec 2020 07:38)  POCT Blood Glucose.: 234 mg/dL (27 Dec 2020 21:21)  POCT Blood Glucose.: 245 mg/dL (27 Dec 2020 18:15)  POCT Blood Glucose.: 269 mg/dL (27 Dec 2020 12:59)    I&O's Summary      Vital Signs Last 24 Hrs  T(C): 36.4 (28 Dec 2020 06:07), Max: 36.9 (27 Dec 2020 21:10)  T(F): 97.6 (28 Dec 2020 06:07), Max: 98.5 (27 Dec 2020 21:10)  HR: 70 (28 Dec 2020 06:07) (70 - 77)  BP: 127/57 (28 Dec 2020 06:07) (106/61 - 148/66)  BP(mean): --  RR: 18 (28 Dec 2020 06:07) (18 - 19)  SpO2: 95% (28 Dec 2020 06:07) (95% - 98%)    PHYSICAL EXAM:  GENERAL: in no distress  NECK: Supple, No JVD  CHEST/LUNG: clear b/l, no wheeze  HEART: S1 S2; soft ejection systolic murmur +   ABDOMEN: Soft, Nontender  EXTREMITIES:  no edema  NEUROLOGY: Alert nonfocal  SKIN: No rashes or lesions    LABS:                        9.6    7.51  )-----------( 349      ( 28 Dec 2020 06:47 )             30.4     12-28    138  |  102  |  19  ----------------------------<  146<H>  3.6   |  26  |  0.64    Ca    8.3<L>      28 Dec 2020 06:47  Mg     1.7     12-28    TPro  6.0  /  Alb  2.5<L>  /  TBili  0.3  /  DBili  <0.2  /  AST  23  /  ALT  49<H>  /  AlkPhos  117  12-27                All consultant(s) notes reviewed and care discussed with other providers        Contact Number, Dr Saleem [phone]
  Subjective: Patient seen and examined. No new events except as noted.   wants to go ohome     REVIEW OF SYSTEMS:    CONSTITUTIONAL: No weakness, fevers or chills  EYES/ENT: No visual changes;  No vertigo or throat pain   NECK: No pain or stiffness  RESPIRATORY: No cough, wheezing, hemoptysis; No shortness of breath  CARDIOVASCULAR: No chest pain or palpitations  GASTROINTESTINAL: No abdominal or epigastric pain. No nausea, vomiting, or hematemesis; No diarrhea or constipation. No melena or hematochezia.  GENITOURINARY: No dysuria, frequency or hematuria  NEUROLOGICAL: No numbness or weakness  SKIN: No itching, burning, rashes, or lesions   All other review of systems is negative unless indicated above.    MEDICATIONS:  MEDICATIONS  (STANDING):  ascorbic acid 250 milliGRAM(s) Oral daily  aspirin  chewable 81 milliGRAM(s) Oral daily  atorvastatin 80 milliGRAM(s) Oral at bedtime  budesonide 160 MICROgram(s)/formoterol 4.5 MICROgram(s) Inhaler 2 Puff(s) Inhalation two times a day  calcium carbonate 1250 mG  + Vitamin D (OsCal 500 + D) 1 Tablet(s) Oral two times a day  cefTRIAXone   IVPB 1000 milliGRAM(s) IV Intermittent every 24 hours  dextrose 40% Gel 15 Gram(s) Oral once  dextrose 5%. 1000 milliLiter(s) (50 mL/Hr) IV Continuous <Continuous>  dextrose 5%. 1000 milliLiter(s) (100 mL/Hr) IV Continuous <Continuous>  dextrose 50% Injectable 25 Gram(s) IV Push once  dextrose 50% Injectable 12.5 Gram(s) IV Push once  dextrose 50% Injectable 25 Gram(s) IV Push once  enoxaparin Injectable 40 milliGRAM(s) SubCutaneous daily  ferrous    sulfate 325 milliGRAM(s) Oral daily  fluticasone propionate 50 MICROgram(s)/spray Nasal Spray 1 Spray(s) Both Nostrils two times a day  folic acid 1 milliGRAM(s) Oral daily  furosemide    Tablet 40 milliGRAM(s) Oral daily  glucagon  Injectable 1 milliGRAM(s) IntraMuscular once  insulin glargine Injectable (LANTUS) 10 Unit(s) SubCutaneous at bedtime  insulin lispro (ADMELOG) corrective regimen sliding scale   SubCutaneous three times a day before meals  insulin lispro (ADMELOG) corrective regimen sliding scale   SubCutaneous at bedtime  losartan 25 milliGRAM(s) Oral daily  metoprolol tartrate 12.5 milliGRAM(s) Oral two times a day  multivitamin 1 Tablet(s) Oral daily  polyethylene glycol 3350 17 Gram(s) Oral daily  QUEtiapine 25 milliGRAM(s) Oral daily  QUEtiapine 50 milliGRAM(s) Oral at bedtime  senna 2 Tablet(s) Oral at bedtime  sodium chloride 0.65% Nasal 1 Spray(s) Both Nostrils three times a day  tiotropium 18 MICROgram(s) Capsule 1 Capsule(s) Inhalation daily      PHYSICAL EXAM:  T(C): 36.8 (01-04-21 @ 09:24), Max: 37.1 (01-03-21 @ 17:27)  HR: 63 (01-04-21 @ 10:42) (60 - 74)  BP: 115/66 (01-04-21 @ 09:24) (108/61 - 150/69)  RR: 18 (01-04-21 @ 10:42) (16 - 18)  SpO2: 94% (01-04-21 @ 10:42) (94% - 100%)  Wt(kg): --  I&O's Summary        Appearance: Normal	  HEENT:   Normal oral mucosa, PERRL, EOMI	  Lymphatic: No lymphadenopathy , no edema  Cardiovascular: Normal S1 S2, No JVD, No murmurs , Peripheral pulses palpable 2+ bilaterally  Respiratory: Lungs clear to auscultation, normal effort 	  Gastrointestinal:  Soft, Non-tender, + BS	  Skin: No rashes, No ecchymoses, No cyanosis, warm to touch  Musculoskeletal: Normal range of motion, normal strength  Psychiatry:  Mood & affect appropriate  Ext: No edema      LABS:    CARDIAC MARKERS:                                9.8    9.68  )-----------( 406      ( 03 Jan 2021 07:22 )             32.6     01-03    139  |  103  |  26<H>  ----------------------------<  131<H>  3.9   |  25  |  0.74    Ca    8.9      03 Jan 2021 07:22    TPro  6.0  /  Alb  3.0<L>  /  TBili  0.3  /  DBili  x   /  AST  17  /  ALT  29  /  AlkPhos  123<H>  01-03    proBNP:   Lipid Profile:   HgA1c:   TSH:             TELEMETRY: 	SR    ECG:  	  RADIOLOGY:   DIAGNOSTIC TESTING:  [ ] Echocardiogram:  [ ]  Catheterization:  [ ] Stress Test:    OTHER: 	              
Subjective: Patient seen and examined. No new events except as noted.     REVIEW OF SYSTEMS:    CONSTITUTIONAL: + weakness, fevers or chills  EYES/ENT: No visual changes;  No vertigo or throat pain   NECK: No pain or stiffness  RESPIRATORY: No cough, wheezing, hemoptysis; No shortness of breath  CARDIOVASCULAR: No chest pain or palpitations  GASTROINTESTINAL: No abdominal or epigastric pain. No nausea, vomiting, or hematemesis; No diarrhea or constipation. No melena or hematochezia.  GENITOURINARY: No dysuria, frequency or hematuria  NEUROLOGICAL: No numbness or weakness  SKIN: No itching, burning, rashes, or lesions   All other review of systems is negative unless indicated above.    MEDICATIONS:  MEDICATIONS  (STANDING):  ascorbic acid 250 milliGRAM(s) Oral daily  aspirin  chewable 81 milliGRAM(s) Oral daily  atorvastatin 80 milliGRAM(s) Oral at bedtime  budesonide 160 MICROgram(s)/formoterol 4.5 MICROgram(s) Inhaler 2 Puff(s) Inhalation two times a day  calcium carbonate 1250 mG  + Vitamin D (OsCal 500 + D) 1 Tablet(s) Oral two times a day  cefTRIAXone   IVPB 1000 milliGRAM(s) IV Intermittent every 24 hours  dextrose 40% Gel 15 Gram(s) Oral once  dextrose 5%. 1000 milliLiter(s) (50 mL/Hr) IV Continuous <Continuous>  dextrose 5%. 1000 milliLiter(s) (100 mL/Hr) IV Continuous <Continuous>  dextrose 50% Injectable 25 Gram(s) IV Push once  dextrose 50% Injectable 12.5 Gram(s) IV Push once  dextrose 50% Injectable 25 Gram(s) IV Push once  enoxaparin Injectable 40 milliGRAM(s) SubCutaneous daily  ferrous    sulfate 325 milliGRAM(s) Oral daily  fluticasone propionate 50 MICROgram(s)/spray Nasal Spray 1 Spray(s) Both Nostrils two times a day  folic acid 1 milliGRAM(s) Oral daily  furosemide    Tablet 40 milliGRAM(s) Oral daily  glucagon  Injectable 1 milliGRAM(s) IntraMuscular once  insulin glargine Injectable (LANTUS) 10 Unit(s) SubCutaneous at bedtime  insulin lispro (ADMELOG) corrective regimen sliding scale   SubCutaneous three times a day before meals  insulin lispro (ADMELOG) corrective regimen sliding scale   SubCutaneous at bedtime  losartan 25 milliGRAM(s) Oral daily  metoprolol tartrate 12.5 milliGRAM(s) Oral two times a day  multivitamin 1 Tablet(s) Oral daily  polyethylene glycol 3350 17 Gram(s) Oral daily  QUEtiapine 25 milliGRAM(s) Oral daily  QUEtiapine 50 milliGRAM(s) Oral at bedtime  senna 2 Tablet(s) Oral at bedtime  sodium chloride 0.65% Nasal 1 Spray(s) Both Nostrils three times a day  tiotropium 18 MICROgram(s) Capsule 1 Capsule(s) Inhalation daily      PHYSICAL EXAM:  T(C): 36.8 (01-07-21 @ 06:45), Max: 37.5 (01-06-21 @ 13:00)  HR: 95 (01-07-21 @ 06:45) (92 - 95)  BP: 124/70 (01-07-21 @ 06:45) (114/65 - 129/99)  RR: 18 (01-07-21 @ 06:45) (18 - 18)  SpO2: 98% (01-07-21 @ 06:45) (98% - 100%)  Wt(kg): --  I&O's Summary        Appearance: NAD	  HEENT:   Normal oral mucosa, PERRL, EOMI	  Lymphatic: No lymphadenopathy , no edema  Cardiovascular: Normal S1 S2, No JVD, No murmurs , Peripheral pulses palpable 2+ bilaterally  Respiratory: Lungs clear to auscultation, normal effort 	  Gastrointestinal:  Soft, Non-tender, + BS	  Skin: No rashes, No ecchymoses, No cyanosis, warm to touch  Musculoskeletal: Normal range of motion, normal strength  Psychiatry:  Mood & affect appropriate  Ext: No edema      LABS:    CARDIAC MARKERS:                  proBNP:   Lipid Profile:   HgA1c:   TSH:             TELEMETRY: 	    ECG:  	  RADIOLOGY:   DIAGNOSTIC TESTING:  [ ] Echocardiogram:  [ ]  Catheterization:  [ ] Stress Test:    OTHER: 	          
Subjective: Patient seen and examined. No new events except as noted.     REVIEW OF SYSTEMS:    CONSTITUTIONAL: No weakness, fevers or chills  EYES/ENT: No visual changes;  No vertigo or throat pain   NECK: No pain or stiffness  RESPIRATORY: No cough, wheezing, hemoptysis; No shortness of breath  CARDIOVASCULAR: No chest pain or palpitations  GASTROINTESTINAL: No abdominal or epigastric pain. No nausea, vomiting, or hematemesis; No diarrhea or constipation. No melena or hematochezia.  GENITOURINARY: No dysuria, frequency or hematuria  NEUROLOGICAL: No numbness or weakness  SKIN: No itching, burning, rashes, or lesions   All other review of systems is negative unless indicated above.    MEDICATIONS:  MEDICATIONS  (STANDING):  ascorbic acid 250 milliGRAM(s) Oral daily  aspirin  chewable 81 milliGRAM(s) Oral daily  atorvastatin 80 milliGRAM(s) Oral at bedtime  budesonide 160 MICROgram(s)/formoterol 4.5 MICROgram(s) Inhaler 2 Puff(s) Inhalation two times a day  calcium carbonate 1250 mG  + Vitamin D (OsCal 500 + D) 1 Tablet(s) Oral two times a day  dextrose 40% Gel 15 Gram(s) Oral once  dextrose 5%. 1000 milliLiter(s) (50 mL/Hr) IV Continuous <Continuous>  dextrose 5%. 1000 milliLiter(s) (100 mL/Hr) IV Continuous <Continuous>  dextrose 50% Injectable 25 Gram(s) IV Push once  dextrose 50% Injectable 12.5 Gram(s) IV Push once  dextrose 50% Injectable 25 Gram(s) IV Push once  enoxaparin Injectable 40 milliGRAM(s) SubCutaneous daily  ferrous    sulfate 325 milliGRAM(s) Oral daily  fluticasone propionate 50 MICROgram(s)/spray Nasal Spray 1 Spray(s) Both Nostrils two times a day  folic acid 1 milliGRAM(s) Oral daily  furosemide    Tablet 40 milliGRAM(s) Oral daily  glucagon  Injectable 1 milliGRAM(s) IntraMuscular once  insulin glargine Injectable (LANTUS) 10 Unit(s) SubCutaneous at bedtime  insulin lispro (ADMELOG) corrective regimen sliding scale   SubCutaneous three times a day before meals  insulin lispro (ADMELOG) corrective regimen sliding scale   SubCutaneous at bedtime  losartan 25 milliGRAM(s) Oral daily  metoprolol tartrate 12.5 milliGRAM(s) Oral two times a day  multivitamin 1 Tablet(s) Oral daily  polyethylene glycol 3350 17 Gram(s) Oral daily  QUEtiapine 25 milliGRAM(s) Oral daily  QUEtiapine 50 milliGRAM(s) Oral at bedtime  senna 2 Tablet(s) Oral at bedtime  sodium chloride 0.65% Nasal 1 Spray(s) Both Nostrils three times a day  tiotropium 18 MICROgram(s) Capsule 1 Capsule(s) Inhalation daily      PHYSICAL EXAM:  T(C): 36.5 (01-10-21 @ 06:11), Max: 36.8 (01-09-21 @ 21:26)  HR: 92 (01-10-21 @ 06:11) (70 - 96)  BP: 107/63 (01-10-21 @ 06:11) (103/56 - 110/67)  RR: 16 (01-10-21 @ 06:11) (16 - 16)  SpO2: 98% (01-10-21 @ 06:11) (98% - 100%)  Wt(kg): --  I&O's Summary        Appearance: Normal	  HEENT:   Normal oral mucosa, PERRL, EOMI	  Lymphatic: No lymphadenopathy , no edema  Cardiovascular: Normal S1 S2, No JVD, No murmurs , Peripheral pulses palpable 2+ bilaterally  Respiratory: Lungs clear to auscultation, normal effort 	  Gastrointestinal:  Soft, Non-tender, + BS	  Skin: No rashes, No ecchymoses, No cyanosis, warm to touch  Musculoskeletal: Normal range of motion, normal strength  Psychiatry:  Mood & affect appropriate  Ext: No edema      LABS:    CARDIAC MARKERS:                                9.1    9.70  )-----------( 274      ( 10 Tony 2021 11:04 )             29.4           proBNP:   Lipid Profile:   HgA1c:   TSH:             TELEMETRY: 	    ECG:  	  RADIOLOGY:   DIAGNOSTIC TESTING:  [ ] Echocardiogram:  [ ]  Catheterization:  [ ] Stress Test:    OTHER: 	          
Subjective: Patient seen and examined. No new events except as noted.     REVIEW OF SYSTEMS:    CONSTITUTIONAL: No weakness, fevers or chills  EYES/ENT: No visual changes;  No vertigo or throat pain   NECK: No pain or stiffness  RESPIRATORY: No cough, wheezing, hemoptysis; No shortness of breath  CARDIOVASCULAR: No chest pain or palpitations  GASTROINTESTINAL: No abdominal or epigastric pain. No nausea, vomiting, or hematemesis; No diarrhea or constipation. No melena or hematochezia.  GENITOURINARY: No dysuria, frequency or hematuria  NEUROLOGICAL: No numbness or weakness  SKIN: No itching, burning, rashes, or lesions   All other review of systems is negative unless indicated above.    MEDICATIONS:  MEDICATIONS  (STANDING):  ascorbic acid 250 milliGRAM(s) Oral daily  aspirin  chewable 81 milliGRAM(s) Oral daily  atorvastatin 80 milliGRAM(s) Oral at bedtime  budesonide 160 MICROgram(s)/formoterol 4.5 MICROgram(s) Inhaler 2 Puff(s) Inhalation two times a day  calcium carbonate 1250 mG  + Vitamin D (OsCal 500 + D) 1 Tablet(s) Oral two times a day  dextrose 40% Gel 15 Gram(s) Oral once  dextrose 5%. 1000 milliLiter(s) (50 mL/Hr) IV Continuous <Continuous>  dextrose 5%. 1000 milliLiter(s) (100 mL/Hr) IV Continuous <Continuous>  dextrose 50% Injectable 25 Gram(s) IV Push once  dextrose 50% Injectable 12.5 Gram(s) IV Push once  dextrose 50% Injectable 25 Gram(s) IV Push once  enoxaparin Injectable 40 milliGRAM(s) SubCutaneous daily  ferrous    sulfate 325 milliGRAM(s) Oral daily  fluticasone propionate 50 MICROgram(s)/spray Nasal Spray 1 Spray(s) Both Nostrils two times a day  folic acid 1 milliGRAM(s) Oral daily  furosemide    Tablet 40 milliGRAM(s) Oral daily  glucagon  Injectable 1 milliGRAM(s) IntraMuscular once  insulin glargine Injectable (LANTUS) 10 Unit(s) SubCutaneous at bedtime  insulin lispro (ADMELOG) corrective regimen sliding scale   SubCutaneous three times a day before meals  insulin lispro (ADMELOG) corrective regimen sliding scale   SubCutaneous at bedtime  losartan 25 milliGRAM(s) Oral daily  metoprolol tartrate 12.5 milliGRAM(s) Oral two times a day  multivitamin 1 Tablet(s) Oral daily  polyethylene glycol 3350 17 Gram(s) Oral daily  QUEtiapine 25 milliGRAM(s) Oral daily  QUEtiapine 50 milliGRAM(s) Oral at bedtime  senna 2 Tablet(s) Oral at bedtime  sodium chloride 0.65% Nasal 1 Spray(s) Both Nostrils three times a day  tiotropium 18 MICROgram(s) Capsule 1 Capsule(s) Inhalation daily      PHYSICAL EXAM:  T(C): 36.9 (01-11-21 @ 13:30), Max: 36.9 (01-11-21 @ 13:30)  HR: 79 (01-11-21 @ 18:26) (77 - 90)  BP: 119/60 (01-11-21 @ 18:26) (107/54 - 119/60)  RR: 18 (01-11-21 @ 13:30) (16 - 18)  SpO2: 100% (01-11-21 @ 13:30) (100% - 100%)  Wt(kg): --  I&O's Summary        Appearance: NAD  HEENT:   Normal oral mucosa, PERRL, EOMI	  Lymphatic: No lymphadenopathy , no edema  Cardiovascular: Normal S1 S2, No JVD, No murmurs , Peripheral pulses palpable 2+ bilaterally  Respiratory: Lungs clear to auscultation, normal effort 	  Gastrointestinal:  Soft, Non-tender, + BS	  Skin: No rashes, No ecchymoses, No cyanosis, warm to touch  Musculoskeletal: Normal range of motion, normal strength  Psychiatry:  Mood & affect appropriate  Ext: No edema      LABS:    CARDIAC MARKERS:                                9.1    9.70  )-----------( 274      ( 10 Tony 2021 11:04 )             29.4           proBNP:   Lipid Profile:   HgA1c:   TSH:             TELEMETRY: 	    ECG:  	  RADIOLOGY:   DIAGNOSTIC TESTING:  [ ] Echocardiogram:  [ ]  Catheterization:  [ ] Stress Test:    OTHER: 	          
Subjective: Patient seen and examined. No new events except as noted.   feels ok     REVIEW OF SYSTEMS:    CONSTITUTIONAL: No weakness, fevers or chills  EYES/ENT: No visual changes;  No vertigo or throat pain   NECK: No pain or stiffness  RESPIRATORY: No cough, wheezing, hemoptysis; No shortness of breath  CARDIOVASCULAR: No chest pain or palpitations  GASTROINTESTINAL: No abdominal or epigastric pain. No nausea, vomiting, or hematemesis; No diarrhea or constipation. No melena or hematochezia.  GENITOURINARY: No dysuria, frequency or hematuria  NEUROLOGICAL: No numbness or weakness  SKIN: No itching, burning, rashes, or lesions   All other review of systems is negative unless indicated above.    MEDICATIONS:  MEDICATIONS  (STANDING):  ascorbic acid 250 milliGRAM(s) Oral daily  aspirin  chewable 81 milliGRAM(s) Oral daily  atorvastatin 80 milliGRAM(s) Oral at bedtime  budesonide 160 MICROgram(s)/formoterol 4.5 MICROgram(s) Inhaler 2 Puff(s) Inhalation two times a day  calcium carbonate 1250 mG  + Vitamin D (OsCal 500 + D) 1 Tablet(s) Oral two times a day  dexAMETHasone  Injectable 6 milliGRAM(s) IV Push daily  dextrose 40% Gel 15 Gram(s) Oral once  dextrose 5%. 1000 milliLiter(s) (50 mL/Hr) IV Continuous <Continuous>  dextrose 5%. 1000 milliLiter(s) (100 mL/Hr) IV Continuous <Continuous>  dextrose 50% Injectable 25 Gram(s) IV Push once  dextrose 50% Injectable 12.5 Gram(s) IV Push once  dextrose 50% Injectable 25 Gram(s) IV Push once  enoxaparin Injectable 40 milliGRAM(s) SubCutaneous daily  ferrous    sulfate 325 milliGRAM(s) Oral daily  folic acid 1 milliGRAM(s) Oral daily  furosemide    Tablet 40 milliGRAM(s) Oral daily  glucagon  Injectable 1 milliGRAM(s) IntraMuscular once  insulin glargine Injectable (LANTUS) 14 Unit(s) SubCutaneous at bedtime  insulin lispro (ADMELOG) corrective regimen sliding scale   SubCutaneous three times a day before meals  insulin lispro (ADMELOG) corrective regimen sliding scale   SubCutaneous at bedtime  losartan 25 milliGRAM(s) Oral daily  metoprolol succinate ER 25 milliGRAM(s) Oral daily  multivitamin 1 Tablet(s) Oral daily  polyethylene glycol 3350 17 Gram(s) Oral daily  QUEtiapine 25 milliGRAM(s) Oral daily  QUEtiapine 50 milliGRAM(s) Oral at bedtime  senna 2 Tablet(s) Oral at bedtime  sodium chloride 0.65% Nasal 1 Spray(s) Both Nostrils three times a day  tiotropium 18 MICROgram(s) Capsule 1 Capsule(s) Inhalation daily      PHYSICAL EXAM:  T(C): 36.7 (12-30-20 @ 05:34), Max: 36.9 (12-29-20 @ 20:35)  HR: 64 (12-30-20 @ 05:34) (64 - 75)  BP: 110/60 (12-30-20 @ 05:34) (110/60 - 112/61)  RR: 18 (12-30-20 @ 05:34) (17 - 18)  SpO2: 99% (12-30-20 @ 05:34) (99% - 99%)  Wt(kg): --  I&O's Summary    29 Dec 2020 07:01  -  30 Dec 2020 07:00  --------------------------------------------------------  IN: 600 mL / OUT: 700 mL / NET: -100 mL          Appearance: NAD  HEENT:   Normal oral mucosa, PERRL, EOMI	  Lymphatic: No lymphadenopathy , no edema  Cardiovascular: Normal S1 S2, No JVD, No murmurs , Peripheral pulses palpable 2+ bilaterally  Respiratory: Lungs clear to auscultation, normal effort 	  Gastrointestinal:  Soft, Non-tender, + BS	  Skin: No rashes, No ecchymoses, No cyanosis, warm to touch  Musculoskeletal: Normal range of motion, normal strength  Psychiatry:  Mood & affect appropriate  Ext: No edema      LABS:    CARDIAC MARKERS:                                9.8    7.21  )-----------( 381      ( 29 Dec 2020 06:17 )             31.6     12-29    137  |  100  |  22  ----------------------------<  119<H>  5.1   |  27  |  0.63    Ca    8.7      29 Dec 2020 06:17  Mg     2.0     12-29    TPro  6.4  /  Alb  2.8<L>  /  TBili  0.3  /  DBili  x   /  AST  43<H>  /  ALT  42<H>  /  AlkPhos  125<H>  12-29    proBNP:   Lipid Profile:   HgA1c:   TSH:           TELEMETRY: 	    ECG:  	  RADIOLOGY:   DIAGNOSTIC TESTING:  [ ] Echocardiogram:  [ ]  Catheterization:  [ ] Stress Test:    OTHER: 	          
Subjective: Patient seen and examined. No new events except as noted.   nasal congestion   no cp     REVIEW OF SYSTEMS:    CONSTITUTIONAL: + weakness, fevers or chills  EYES/ENT: No visual changes;  No vertigo or throat pain   NECK: No pain or stiffness  RESPIRATORY: +cough, wheezing, hemoptysis; + shortness of breath  CARDIOVASCULAR: No chest pain or palpitations  GASTROINTESTINAL: No abdominal or epigastric pain. No nausea, vomiting, or hematemesis; No diarrhea or constipation. No melena or hematochezia.  GENITOURINARY: No dysuria, frequency or hematuria  NEUROLOGICAL: No numbness or weakness  SKIN: No itching, burning, rashes, or lesions   All other review of systems is negative unless indicated above.    MEDICATIONS:  MEDICATIONS  (STANDING):  ascorbic acid 250 milliGRAM(s) Oral daily  aspirin  chewable 81 milliGRAM(s) Oral daily  atorvastatin 80 milliGRAM(s) Oral at bedtime  budesonide 160 MICROgram(s)/formoterol 4.5 MICROgram(s) Inhaler 2 Puff(s) Inhalation two times a day  calcium carbonate 1250 mG  + Vitamin D (OsCal 500 + D) 1 Tablet(s) Oral two times a day  dexAMETHasone  Injectable 6 milliGRAM(s) IV Push daily  dextrose 40% Gel 15 Gram(s) Oral once  dextrose 5%. 1000 milliLiter(s) (50 mL/Hr) IV Continuous <Continuous>  dextrose 5%. 1000 milliLiter(s) (100 mL/Hr) IV Continuous <Continuous>  dextrose 50% Injectable 25 Gram(s) IV Push once  dextrose 50% Injectable 12.5 Gram(s) IV Push once  dextrose 50% Injectable 25 Gram(s) IV Push once  enoxaparin Injectable 40 milliGRAM(s) SubCutaneous daily  ferrous    sulfate 325 milliGRAM(s) Oral daily  folic acid 1 milliGRAM(s) Oral daily  furosemide    Tablet 40 milliGRAM(s) Oral daily  glucagon  Injectable 1 milliGRAM(s) IntraMuscular once  insulin glargine Injectable (LANTUS) 14 Unit(s) SubCutaneous at bedtime  insulin lispro (ADMELOG) corrective regimen sliding scale   SubCutaneous three times a day before meals  insulin lispro (ADMELOG) corrective regimen sliding scale   SubCutaneous at bedtime  losartan 25 milliGRAM(s) Oral daily  metoprolol succinate ER 25 milliGRAM(s) Oral daily  multivitamin 1 Tablet(s) Oral daily  oxymetazoline 0.05% Nasal Spray      oxymetazoline 0.05% Nasal Spray 1 Spray(s) Both Nostrils every 12 hours  polyethylene glycol 3350 17 Gram(s) Oral daily  QUEtiapine 25 milliGRAM(s) Oral daily  QUEtiapine 50 milliGRAM(s) Oral at bedtime  senna 2 Tablet(s) Oral at bedtime  sodium chloride 0.65% Nasal 1 Spray(s) Both Nostrils three times a day  tiotropium 18 MICROgram(s) Capsule 1 Capsule(s) Inhalation daily      PHYSICAL EXAM:  T(C): 36.3 (12-31-20 @ 11:54), Max: 36.7 (12-30-20 @ 20:50)  HR: 77 (12-31-20 @ 11:54) (77 - 87)  BP: 127/66 (12-31-20 @ 11:54) (119/60 - 127/66)  RR: 18 (12-31-20 @ 11:54) (17 - 18)  SpO2: 95% (12-31-20 @ 11:54) (95% - 97%)  Wt(kg): --  I&O's Summary        Appearance: NAD  HEENT:   Normal oral mucosa, PERRL, EOMI	  Lymphatic: No lymphadenopathy , no edema  Cardiovascular: Normal S1 S2, No JVD, No murmurs , Peripheral pulses palpable 2+ bilaterally  Respiratory: decreased bs   Gastrointestinal:  Soft, Non-tender, + BS	  Skin: No rashes, No ecchymoses, No cyanosis, warm to touch  Musculoskeletal: Normal range of motion, normal strength  Psychiatry:  Mood & affect appropriate  Ext: No edema      LABS:    CARDIAC MARKERS:                  proBNP:   Lipid Profile:   HgA1c:   TSH:     Negative          TELEMETRY: 	    ECG:  	  RADIOLOGY:   DIAGNOSTIC TESTING:  [ ] Echocardiogram:  [ ]  Catheterization:  [ ] Stress Test:    OTHER: 	            
Subjective: Patient seen and examined. No new events except as noted.   no cp     REVIEW OF SYSTEMS:    CONSTITUTIONAL: + weakness, fevers or chills  EYES/ENT: No visual changes;  No vertigo or throat pain   NECK: No pain or stiffness  RESPIRATORY: +cough, wheezing, hemoptysis;+shortness of breath  CARDIOVASCULAR: No chest pain or palpitations  GASTROINTESTINAL: No abdominal or epigastric pain. No nausea, vomiting, or hematemesis; No diarrhea or constipation. No melena or hematochezia.  GENITOURINARY: No dysuria, frequency or hematuria  NEUROLOGICAL: No numbness or weakness  SKIN: No itching, burning, rashes, or lesions   All other review of systems is negative unless indicated above.    MEDICATIONS:  MEDICATIONS  (STANDING):  ascorbic acid 250 milliGRAM(s) Oral daily  aspirin  chewable 81 milliGRAM(s) Oral daily  atorvastatin 80 milliGRAM(s) Oral at bedtime  budesonide 160 MICROgram(s)/formoterol 4.5 MICROgram(s) Inhaler 2 Puff(s) Inhalation two times a day  calcium carbonate 1250 mG  + Vitamin D (OsCal 500 + D) 1 Tablet(s) Oral two times a day  dexAMETHasone  Injectable 6 milliGRAM(s) IV Push daily  dextrose 40% Gel 15 Gram(s) Oral once  dextrose 5%. 1000 milliLiter(s) (50 mL/Hr) IV Continuous <Continuous>  dextrose 5%. 1000 milliLiter(s) (100 mL/Hr) IV Continuous <Continuous>  dextrose 50% Injectable 25 Gram(s) IV Push once  dextrose 50% Injectable 12.5 Gram(s) IV Push once  dextrose 50% Injectable 25 Gram(s) IV Push once  enoxaparin Injectable 40 milliGRAM(s) SubCutaneous daily  ferrous    sulfate 325 milliGRAM(s) Oral daily  folic acid 1 milliGRAM(s) Oral daily  furosemide    Tablet 40 milliGRAM(s) Oral daily  glucagon  Injectable 1 milliGRAM(s) IntraMuscular once  insulin glargine Injectable (LANTUS) 14 Unit(s) SubCutaneous at bedtime  insulin lispro (ADMELOG) corrective regimen sliding scale   SubCutaneous three times a day before meals  insulin lispro (ADMELOG) corrective regimen sliding scale   SubCutaneous at bedtime  losartan 25 milliGRAM(s) Oral daily  metoprolol succinate ER 25 milliGRAM(s) Oral daily  multivitamin 1 Tablet(s) Oral daily  polyethylene glycol 3350 17 Gram(s) Oral daily  QUEtiapine 25 milliGRAM(s) Oral daily  QUEtiapine 50 milliGRAM(s) Oral at bedtime  remdesivir  IVPB 100 milliGRAM(s) IV Intermittent every 24 hours  remdesivir  IVPB   IV Intermittent   senna 2 Tablet(s) Oral at bedtime  tiotropium 18 MICROgram(s) Capsule 1 Capsule(s) Inhalation daily      PHYSICAL EXAM:  T(C): 36.6 (12-28-20 @ 10:15), Max: 36.9 (12-27-20 @ 21:10)  HR: 85 (12-28-20 @ 10:15) (70 - 85)  BP: 130/61 (12-28-20 @ 10:15) (106/61 - 148/66)  RR: 18 (12-28-20 @ 10:15) (18 - 19)  SpO2: 98% (12-28-20 @ 10:15) (95% - 98%)  Wt(kg): --  I&O's Summary        Appearance: Normal	  HEENT:   Normal oral mucosa, PERRL, EOMI	  Lymphatic: No lymphadenopathy , no edema  Cardiovascular: Normal S1 S2, No JVD, No murmurs , Peripheral pulses palpable 2+ bilaterally  Respiratory: Lungs clear to auscultation, normal effort 	  Gastrointestinal:  Soft, Non-tender, + BS	  Skin: No rashes, No ecchymoses, No cyanosis, warm to touch  Musculoskeletal: Normal range of motion, normal strength  Psychiatry:  Mood & affect appropriate  Ext: No edema      LABS:    CARDIAC MARKERS:                                9.6    7.51  )-----------( 349      ( 28 Dec 2020 06:47 )             30.4     12-28    138  |  102  |  19  ----------------------------<  146<H>  3.6   |  26  |  0.64    Ca    8.3<L>      28 Dec 2020 06:47  Mg     1.7     12-28    TPro  6.0  /  Alb  2.5<L>  /  TBili  0.3  /  DBili  <0.2  /  AST  23  /  ALT  49<H>  /  AlkPhos  117  12-27    proBNP:   Lipid Profile:   HgA1c:   TSH:             TELEMETRY: 	    ECG:  	  RADIOLOGY:   DIAGNOSTIC TESTING:  [ ] Echocardiogram:  [ ]  Catheterization:  [ ] Stress Test:    OTHER: 	          
Patient is a 75y old  Male who presents with a chief complaint of Shortness of Breath (31 Dec 2020 16:11)      SUBJECTIVE / OVERNIGHT EVENTS: overnight events noted    ROS:  Resp: No cough no sputum production  CVS: No chest pain no palpitations no orthopnea  GI: no N/V/D        MEDICATIONS  (STANDING):  ascorbic acid 250 milliGRAM(s) Oral daily  aspirin  chewable 81 milliGRAM(s) Oral daily  atorvastatin 80 milliGRAM(s) Oral at bedtime  budesonide 160 MICROgram(s)/formoterol 4.5 MICROgram(s) Inhaler 2 Puff(s) Inhalation two times a day  calcium carbonate 1250 mG  + Vitamin D (OsCal 500 + D) 1 Tablet(s) Oral two times a day  dexAMETHasone  Injectable 6 milliGRAM(s) IV Push daily  dextrose 40% Gel 15 Gram(s) Oral once  dextrose 5%. 1000 milliLiter(s) (50 mL/Hr) IV Continuous <Continuous>  dextrose 5%. 1000 milliLiter(s) (100 mL/Hr) IV Continuous <Continuous>  dextrose 50% Injectable 25 Gram(s) IV Push once  dextrose 50% Injectable 12.5 Gram(s) IV Push once  dextrose 50% Injectable 25 Gram(s) IV Push once  enoxaparin Injectable 40 milliGRAM(s) SubCutaneous daily  ferrous    sulfate 325 milliGRAM(s) Oral daily  folic acid 1 milliGRAM(s) Oral daily  furosemide    Tablet 40 milliGRAM(s) Oral daily  glucagon  Injectable 1 milliGRAM(s) IntraMuscular once  insulin glargine Injectable (LANTUS) 14 Unit(s) SubCutaneous at bedtime  insulin lispro (ADMELOG) corrective regimen sliding scale   SubCutaneous three times a day before meals  insulin lispro (ADMELOG) corrective regimen sliding scale   SubCutaneous at bedtime  losartan 25 milliGRAM(s) Oral daily  metoprolol succinate ER 25 milliGRAM(s) Oral daily  multivitamin 1 Tablet(s) Oral daily  oxymetazoline 0.05% Nasal Spray      oxymetazoline 0.05% Nasal Spray 1 Spray(s) Both Nostrils every 12 hours  polyethylene glycol 3350 17 Gram(s) Oral daily  QUEtiapine 25 milliGRAM(s) Oral daily  QUEtiapine 50 milliGRAM(s) Oral at bedtime  senna 2 Tablet(s) Oral at bedtime  sodium chloride 0.65% Nasal 1 Spray(s) Both Nostrils three times a day  tiotropium 18 MICROgram(s) Capsule 1 Capsule(s) Inhalation daily    MEDICATIONS  (PRN):  acetaminophen   Tablet .. 650 milliGRAM(s) Oral every 4 hours PRN Temp greater or equal to 38.5C (101.3F)  ALBUTerol    90 MICROgram(s) HFA Inhaler 1 Puff(s) Inhalation every 4 hours PRN Shortness of Breath and/or Wheezing  melatonin 3 milliGRAM(s) Oral at bedtime PRN Insomnia        CAPILLARY BLOOD GLUCOSE      POCT Blood Glucose.: 141 mg/dL (01 Jan 2021 08:48)  POCT Blood Glucose.: 156 mg/dL (31 Dec 2020 21:13)  POCT Blood Glucose.: 225 mg/dL (31 Dec 2020 16:46)  POCT Blood Glucose.: 200 mg/dL (31 Dec 2020 11:50)    I&O's Summary    31 Dec 2020 07:01  -  01 Jan 2021 07:00  --------------------------------------------------------  IN: 0 mL / OUT: 425 mL / NET: -425 mL        Vital Signs Last 24 Hrs  T(C): 36.3 (01 Jan 2021 06:29), Max: 36.5 (31 Dec 2020 21:05)  T(F): 97.3 (01 Jan 2021 06:29), Max: 97.7 (31 Dec 2020 21:05)  HR: 91 (01 Jan 2021 06:29) (75 - 99)  BP: 118/70 (01 Jan 2021 06:29) (116/68 - 127/66)  BP(mean): --  RR: 17 (01 Jan 2021 06:29) (17 - 19)  SpO2: 95% (01 Jan 2021 06:29) (93% - 95%)    PHYSICAL EXAM:  CHEST/LUNG: clear b/l, no wheeze  HEART: S1 S2; systolic murmur +   ABDOMEN: Soft, Nontender  EXTREMITIES:  no edema  NEUROLOGY: Alert nonfocal  SKIN: No rashes or lesions    LABS:                      All consultant(s) notes reviewed and care discussed with other providers        Contact Number, Dr Saleem [phone]
Patient is a 75y old  Male who presents with a chief complaint of Shortness of Breath (25 Dec 2020 11:58)  patient not known to me, assigned this AM to assume care  chart reviewed and events thus far noted  admitted overnight by faculty hospitalist     SUBJECTIVE / OVERNIGHT EVENTS: overnight events noted    ROS:  Resp: No cough no sputum production  CVS: No chest pain no palpitations no orthopnea  GI: no N/V/D  : no dysuria, no hematuria  Neuro: no weakness no paresthesias  Heme: No petechiae no easy bruising  Msk: No joint pain no swelling  Skin: No rash no itching        MEDICATIONS  (STANDING):  ascorbic acid 250 milliGRAM(s) Oral daily  aspirin  chewable 81 milliGRAM(s) Oral daily  atorvastatin 80 milliGRAM(s) Oral at bedtime  budesonide 160 MICROgram(s)/formoterol 4.5 MICROgram(s) Inhaler 2 Puff(s) Inhalation two times a day  calcium carbonate 1250 mG  + Vitamin D (OsCal 500 + D) 1 Tablet(s) Oral two times a day  dexAMETHasone  Injectable 6 milliGRAM(s) IV Push daily  dextrose 40% Gel 15 Gram(s) Oral once  dextrose 5%. 1000 milliLiter(s) (50 mL/Hr) IV Continuous <Continuous>  dextrose 5%. 1000 milliLiter(s) (100 mL/Hr) IV Continuous <Continuous>  dextrose 50% Injectable 25 Gram(s) IV Push once  dextrose 50% Injectable 12.5 Gram(s) IV Push once  dextrose 50% Injectable 25 Gram(s) IV Push once  enoxaparin Injectable 40 milliGRAM(s) SubCutaneous daily  ferrous    sulfate 325 milliGRAM(s) Oral daily  folic acid 1 milliGRAM(s) Oral daily  furosemide    Tablet 40 milliGRAM(s) Oral daily  glucagon  Injectable 1 milliGRAM(s) IntraMuscular once  insulin glargine Injectable (LANTUS) 20 Unit(s) SubCutaneous at bedtime  insulin lispro (ADMELOG) corrective regimen sliding scale   SubCutaneous three times a day before meals  insulin lispro (ADMELOG) corrective regimen sliding scale   SubCutaneous at bedtime  losartan 25 milliGRAM(s) Oral daily  metoprolol succinate ER 25 milliGRAM(s) Oral daily  multivitamin 1 Tablet(s) Oral daily  polyethylene glycol 3350 17 Gram(s) Oral daily  QUEtiapine 25 milliGRAM(s) Oral daily  QUEtiapine 50 milliGRAM(s) Oral at bedtime  remdesivir  IVPB 100 milliGRAM(s) IV Intermittent every 24 hours  remdesivir  IVPB   IV Intermittent   senna 2 Tablet(s) Oral at bedtime  tiotropium 18 MICROgram(s) Capsule 1 Capsule(s) Inhalation daily    MEDICATIONS  (PRN):  acetaminophen   Tablet .. 650 milliGRAM(s) Oral every 4 hours PRN Temp greater or equal to 38.5C (101.3F)  ALBUTerol    90 MICROgram(s) HFA Inhaler 1 Puff(s) Inhalation every 4 hours PRN Shortness of Breath and/or Wheezing  melatonin 3 milliGRAM(s) Oral at bedtime PRN Insomnia        CAPILLARY BLOOD GLUCOSE      POCT Blood Glucose.: 93 mg/dL (26 Dec 2020 09:02)  POCT Blood Glucose.: 104 mg/dL (26 Dec 2020 00:09)  POCT Blood Glucose.: 98 mg/dL (25 Dec 2020 23:52)  POCT Blood Glucose.: 83 mg/dL (25 Dec 2020 23:35)  POCT Blood Glucose.: 79 mg/dL (25 Dec 2020 23:11)  POCT Blood Glucose.: 191 mg/dL (25 Dec 2020 18:07)  POCT Blood Glucose.: 171 mg/dL (25 Dec 2020 12:14)    I&O's Summary    25 Dec 2020 07:01  -  26 Dec 2020 07:00  --------------------------------------------------------  IN: 460 mL / OUT: 0 mL / NET: 460 mL        Vital Signs Last 24 Hrs  T(C): 37.1 (26 Dec 2020 05:32), Max: 37.6 (25 Dec 2020 23:46)  T(F): 98.8 (26 Dec 2020 05:32), Max: 99.7 (25 Dec 2020 23:46)  HR: 97 (26 Dec 2020 05:32) (76 - 97)  BP: 135/65 (26 Dec 2020 05:32) (121/61 - 135/65)  BP(mean): --  RR: 18 (26 Dec 2020 05:32) (18 - 18)  SpO2: 94% (26 Dec 2020 05:32) (94% - 100%)    PHYSICAL EXAM:  GENERAL: in no apparent distress  HEAD:  Atraumatic, Normocephalic  EYES: legally blind sclera clear  NECK: Supple, No JVD  CHEST/LUNG: clear b/l, no wheeze  HEART: S1 S2; soft ejection systolic murmur +   ABDOMEN: Soft, Nontender, Bowel sounds present  EXTREMITIES:  No clubbing or cyanosis,  no edema  NEUROLOGY: Alert oriented x 1 only non-focal  SKIN: No rashes or lesions    LABS:                        9.7    7.42  )-----------( 338      ( 26 Dec 2020 06:58 )             31.2     12-26    140  |  103  |  19  ----------------------------<  76  3.8   |  26  |  0.76    Ca    8.7      26 Dec 2020 06:58    TPro  6.8  /  Alb  3.0<L>  /  TBili  0.2  /  DBili  <0.2  /  AST  37  /  ALT  71<H>  /  AlkPhos  135<H>  12-26      CARDIAC MARKERS ( 24 Dec 2020 19:47 )  x     / x     / 88 U/L / x     / 1.5 ng/mL  CARDIAC MARKERS ( 24 Dec 2020 16:51 )  x     / x     / 104 U/L / x     / 1.7 ng/mL            All consultant(s) notes reviewed and care discussed with other providers        Contact Number, Dr Saleem [phone]
Patient is a 75y old  Male who presents with a chief complaint of Shortness of Breath (12 Jan 2021 12:12)      DATE OF SERVICE: 01-13-21 @ 11:18    SUBJECTIVE / OVERNIGHT EVENTS: overnight events noted    ROS:  Resp: No cough no sputum production  CVS: No chest pain no palpitations no orthopnea  GI: no N/V/D  : no dysuria, no hematuria  Neuro: no weakness no paresthesias  Heme: No petechiae no easy bruising  Msk: No joint pain no swelling  Skin: No rash no itching        MEDICATIONS  (STANDING):  ascorbic acid 250 milliGRAM(s) Oral daily  atorvastatin 80 milliGRAM(s) Oral at bedtime  budesonide 160 MICROgram(s)/formoterol 4.5 MICROgram(s) Inhaler 2 Puff(s) Inhalation two times a day  calcium carbonate 1250 mG  + Vitamin D (OsCal 500 + D) 1 Tablet(s) Oral two times a day  dextrose 40% Gel 15 Gram(s) Oral once  dextrose 5%. 1000 milliLiter(s) (50 mL/Hr) IV Continuous <Continuous>  dextrose 5%. 1000 milliLiter(s) (100 mL/Hr) IV Continuous <Continuous>  dextrose 50% Injectable 25 Gram(s) IV Push once  dextrose 50% Injectable 12.5 Gram(s) IV Push once  dextrose 50% Injectable 25 Gram(s) IV Push once  ferrous    sulfate 325 milliGRAM(s) Oral daily  fluticasone propionate 50 MICROgram(s)/spray Nasal Spray 1 Spray(s) Both Nostrils two times a day  folic acid 1 milliGRAM(s) Oral daily  furosemide    Tablet 40 milliGRAM(s) Oral daily  glucagon  Injectable 1 milliGRAM(s) IntraMuscular once  insulin glargine Injectable (LANTUS) 10 Unit(s) SubCutaneous at bedtime  insulin lispro (ADMELOG) corrective regimen sliding scale   SubCutaneous three times a day before meals  insulin lispro (ADMELOG) corrective regimen sliding scale   SubCutaneous at bedtime  losartan 25 milliGRAM(s) Oral daily  metoprolol tartrate 12.5 milliGRAM(s) Oral two times a day  multivitamin 1 Tablet(s) Oral daily  polyethylene glycol 3350 17 Gram(s) Oral daily  QUEtiapine 25 milliGRAM(s) Oral daily  QUEtiapine 50 milliGRAM(s) Oral at bedtime  senna 2 Tablet(s) Oral at bedtime  sodium chloride 0.65% Nasal 1 Spray(s) Both Nostrils three times a day  tiotropium 18 MICROgram(s) Capsule 1 Capsule(s) Inhalation daily    MEDICATIONS  (PRN):  acetaminophen   Tablet .. 650 milliGRAM(s) Oral every 4 hours PRN Temp greater or equal to 38C (100.4F), Mild Pain (1 - 3), Moderate Pain (4 - 6)  ALBUTerol    90 MICROgram(s) HFA Inhaler 1 Puff(s) Inhalation every 4 hours PRN Shortness of Breath and/or Wheezing  melatonin 3 milliGRAM(s) Oral at bedtime PRN Insomnia        CAPILLARY BLOOD GLUCOSE      POCT Blood Glucose.: 81 mg/dL (13 Jan 2021 07:52)  POCT Blood Glucose.: 104 mg/dL (12 Jan 2021 22:47)  POCT Blood Glucose.: 185 mg/dL (12 Jan 2021 16:58)  POCT Blood Glucose.: 152 mg/dL (12 Jan 2021 12:36)    I&O's Summary    12 Jan 2021 07:01  -  13 Jan 2021 07:00  --------------------------------------------------------  IN: 120 mL / OUT: 300 mL / NET: -180 mL        Vital Signs Last 24 Hrs  T(C): 36.7 (13 Jan 2021 05:38), Max: 37.2 (12 Jan 2021 17:53)  T(F): 98.1 (13 Jan 2021 05:38), Max: 98.9 (12 Jan 2021 17:53)  HR: 79 (13 Jan 2021 05:38) (79 - 92)  BP: 118/64 (13 Jan 2021 05:38) (103/60 - 118/64)  BP(mean): --  RR: 18 (13 Jan 2021 05:38) (17 - 18)  SpO2: 100% (13 Jan 2021 05:38) (98% - 100%)    PHYSICAL EXAM:  CHEST/LUNG: clear   HEART: S1 S2; systolic murmur +   ABDOMEN: Soft, Nontender  EXTREMITIES:  no edema  NEUROLOGY: Alert nonfocal  SKIN: No rashes or lesions    LABS:                      All consultant(s) notes reviewed and care discussed with other providers        Contact Number, Dr Saleem [phone]
Patient is a 75y old  Male who presents with a chief complaint of Shortness of Breath (11 Jan 2021 19:59)      DATE OF SERVICE: 01-12-21 @ 10:35    SUBJECTIVE / OVERNIGHT EVENTS: overnight events noted    ROS:  Resp: No cough no sputum production  CVS: No chest pain no palpitations no orthopnea  GI: no N/V/D          MEDICATIONS  (STANDING):  ascorbic acid 250 milliGRAM(s) Oral daily  aspirin  chewable 81 milliGRAM(s) Oral daily  atorvastatin 80 milliGRAM(s) Oral at bedtime  budesonide 160 MICROgram(s)/formoterol 4.5 MICROgram(s) Inhaler 2 Puff(s) Inhalation two times a day  calcium carbonate 1250 mG  + Vitamin D (OsCal 500 + D) 1 Tablet(s) Oral two times a day  dextrose 40% Gel 15 Gram(s) Oral once  dextrose 5%. 1000 milliLiter(s) (50 mL/Hr) IV Continuous <Continuous>  dextrose 5%. 1000 milliLiter(s) (100 mL/Hr) IV Continuous <Continuous>  dextrose 50% Injectable 25 Gram(s) IV Push once  dextrose 50% Injectable 12.5 Gram(s) IV Push once  dextrose 50% Injectable 25 Gram(s) IV Push once  enoxaparin Injectable 40 milliGRAM(s) SubCutaneous daily  ferrous    sulfate 325 milliGRAM(s) Oral daily  fluticasone propionate 50 MICROgram(s)/spray Nasal Spray 1 Spray(s) Both Nostrils two times a day  folic acid 1 milliGRAM(s) Oral daily  furosemide    Tablet 40 milliGRAM(s) Oral daily  glucagon  Injectable 1 milliGRAM(s) IntraMuscular once  insulin glargine Injectable (LANTUS) 10 Unit(s) SubCutaneous at bedtime  insulin lispro (ADMELOG) corrective regimen sliding scale   SubCutaneous three times a day before meals  insulin lispro (ADMELOG) corrective regimen sliding scale   SubCutaneous at bedtime  losartan 25 milliGRAM(s) Oral daily  metoprolol tartrate 12.5 milliGRAM(s) Oral two times a day  multivitamin 1 Tablet(s) Oral daily  polyethylene glycol 3350 17 Gram(s) Oral daily  QUEtiapine 25 milliGRAM(s) Oral daily  QUEtiapine 50 milliGRAM(s) Oral at bedtime  senna 2 Tablet(s) Oral at bedtime  sodium chloride 0.65% Nasal 1 Spray(s) Both Nostrils three times a day  tiotropium 18 MICROgram(s) Capsule 1 Capsule(s) Inhalation daily    MEDICATIONS  (PRN):  acetaminophen   Tablet .. 650 milliGRAM(s) Oral every 4 hours PRN Temp greater or equal to 38C (100.4F), Mild Pain (1 - 3), Moderate Pain (4 - 6)  ALBUTerol    90 MICROgram(s) HFA Inhaler 1 Puff(s) Inhalation every 4 hours PRN Shortness of Breath and/or Wheezing  melatonin 3 milliGRAM(s) Oral at bedtime PRN Insomnia        CAPILLARY BLOOD GLUCOSE      POCT Blood Glucose.: 130 mg/dL (12 Jan 2021 09:30)  POCT Blood Glucose.: 160 mg/dL (11 Jan 2021 22:05)  POCT Blood Glucose.: 124 mg/dL (11 Jan 2021 17:45)  POCT Blood Glucose.: 174 mg/dL (11 Jan 2021 11:48)    I&O's Summary      Vital Signs Last 24 Hrs  T(C): 37.2 (12 Jan 2021 06:35), Max: 37.2 (12 Jan 2021 06:35)  T(F): 99 (12 Jan 2021 06:35), Max: 99 (12 Jan 2021 06:35)  HR: 76 (12 Jan 2021 06:35) (76 - 90)  BP: 115/58 (12 Jan 2021 06:35) (112/56 - 119/60)  BP(mean): --  RR: 20 (12 Jan 2021 06:35) (17 - 20)  SpO2: 98% (12 Jan 2021 06:35) (98% - 100%)    PHYSICAL EXAM:  CHEST/LUNG: clear   HEART: S1 S2; systolic murmur +   ABDOMEN: Soft, Nontender  EXTREMITIES:  no edema  NEUROLOGY: Alert nonfocal  SKIN: No rashes or lesions    LABS:                        9.1    9.70  )-----------( 274      ( 10 Tony 2021 11:04 )             29.4                       All consultant(s) notes reviewed and care discussed with other providers        Contact Number, Dr Saleem [phone]
Patient is a 75y old  Male who presents with a chief complaint of Shortness of Breath (10 Tony 2021 12:54)      DATE OF SERVICE: 01-11-21 @ 10:20    SUBJECTIVE / OVERNIGHT EVENTS: overnight events noted    ROS:  Resp: No cough no sputum production  CVS: No chest pain no palpitations no orthopnea  GI: no N/V/D          MEDICATIONS  (STANDING):  ascorbic acid 250 milliGRAM(s) Oral daily  aspirin  chewable 81 milliGRAM(s) Oral daily  atorvastatin 80 milliGRAM(s) Oral at bedtime  budesonide 160 MICROgram(s)/formoterol 4.5 MICROgram(s) Inhaler 2 Puff(s) Inhalation two times a day  calcium carbonate 1250 mG  + Vitamin D (OsCal 500 + D) 1 Tablet(s) Oral two times a day  dextrose 40% Gel 15 Gram(s) Oral once  dextrose 5%. 1000 milliLiter(s) (50 mL/Hr) IV Continuous <Continuous>  dextrose 5%. 1000 milliLiter(s) (100 mL/Hr) IV Continuous <Continuous>  dextrose 50% Injectable 25 Gram(s) IV Push once  dextrose 50% Injectable 12.5 Gram(s) IV Push once  dextrose 50% Injectable 25 Gram(s) IV Push once  enoxaparin Injectable 40 milliGRAM(s) SubCutaneous daily  ferrous    sulfate 325 milliGRAM(s) Oral daily  fluticasone propionate 50 MICROgram(s)/spray Nasal Spray 1 Spray(s) Both Nostrils two times a day  folic acid 1 milliGRAM(s) Oral daily  furosemide    Tablet 40 milliGRAM(s) Oral daily  glucagon  Injectable 1 milliGRAM(s) IntraMuscular once  insulin glargine Injectable (LANTUS) 10 Unit(s) SubCutaneous at bedtime  insulin lispro (ADMELOG) corrective regimen sliding scale   SubCutaneous three times a day before meals  insulin lispro (ADMELOG) corrective regimen sliding scale   SubCutaneous at bedtime  losartan 25 milliGRAM(s) Oral daily  metoprolol tartrate 12.5 milliGRAM(s) Oral two times a day  multivitamin 1 Tablet(s) Oral daily  polyethylene glycol 3350 17 Gram(s) Oral daily  QUEtiapine 25 milliGRAM(s) Oral daily  QUEtiapine 50 milliGRAM(s) Oral at bedtime  senna 2 Tablet(s) Oral at bedtime  sodium chloride 0.65% Nasal 1 Spray(s) Both Nostrils three times a day  tiotropium 18 MICROgram(s) Capsule 1 Capsule(s) Inhalation daily    MEDICATIONS  (PRN):  acetaminophen   Tablet .. 650 milliGRAM(s) Oral every 4 hours PRN Temp greater or equal to 38C (100.4F), Mild Pain (1 - 3), Moderate Pain (4 - 6)  ALBUTerol    90 MICROgram(s) HFA Inhaler 1 Puff(s) Inhalation every 4 hours PRN Shortness of Breath and/or Wheezing  melatonin 3 milliGRAM(s) Oral at bedtime PRN Insomnia        CAPILLARY BLOOD GLUCOSE      POCT Blood Glucose.: 128 mg/dL (11 Jan 2021 08:47)  POCT Blood Glucose.: 156 mg/dL (10 Tony 2021 22:47)  POCT Blood Glucose.: 186 mg/dL (10 Tony 2021 17:22)  POCT Blood Glucose.: 189 mg/dL (10 Tony 2021 11:57)    I&O's Summary      Vital Signs Last 24 Hrs  T(C): 36.7 (11 Jan 2021 05:12), Max: 37.1 (10 Tony 2021 17:18)  T(F): 98 (11 Jan 2021 05:12), Max: 98.8 (10 Tony 2021 17:18)  HR: 79 (11 Jan 2021 05:12) (77 - 92)  BP: 107/54 (11 Jan 2021 05:12) (96/67 - 118/62)  BP(mean): --  RR: 17 (11 Jan 2021 05:12) (16 - 17)  SpO2: 100% (11 Jan 2021 05:12) (97% - 100%)        LABS:                        9.1    9.70  )-----------( 274      ( 10 Tony 2021 11:04 )             29.4                       All consultant(s) notes reviewed and care discussed with other providers        Contact Number, Dr Saleem [phone]
Patient is a 75y old  Male who presents with a chief complaint of Shortness of Breath (28 Dec 2020 12:48)      SUBJECTIVE / OVERNIGHT EVENTS: overnight events noted    ROS:  Resp: No cough no sputum production  CVS: No chest pain no palpitations no orthopnea  GI: no N/V/D  : no dysuria, no hematuria          MEDICATIONS  (STANDING):  ascorbic acid 250 milliGRAM(s) Oral daily  aspirin  chewable 81 milliGRAM(s) Oral daily  atorvastatin 80 milliGRAM(s) Oral at bedtime  budesonide 160 MICROgram(s)/formoterol 4.5 MICROgram(s) Inhaler 2 Puff(s) Inhalation two times a day  calcium carbonate 1250 mG  + Vitamin D (OsCal 500 + D) 1 Tablet(s) Oral two times a day  dexAMETHasone  Injectable 6 milliGRAM(s) IV Push daily  dextrose 40% Gel 15 Gram(s) Oral once  dextrose 5%. 1000 milliLiter(s) (50 mL/Hr) IV Continuous <Continuous>  dextrose 5%. 1000 milliLiter(s) (100 mL/Hr) IV Continuous <Continuous>  dextrose 50% Injectable 25 Gram(s) IV Push once  dextrose 50% Injectable 12.5 Gram(s) IV Push once  dextrose 50% Injectable 25 Gram(s) IV Push once  enoxaparin Injectable 40 milliGRAM(s) SubCutaneous daily  ferrous    sulfate 325 milliGRAM(s) Oral daily  folic acid 1 milliGRAM(s) Oral daily  furosemide    Tablet 40 milliGRAM(s) Oral daily  glucagon  Injectable 1 milliGRAM(s) IntraMuscular once  insulin glargine Injectable (LANTUS) 14 Unit(s) SubCutaneous at bedtime  insulin lispro (ADMELOG) corrective regimen sliding scale   SubCutaneous three times a day before meals  insulin lispro (ADMELOG) corrective regimen sliding scale   SubCutaneous at bedtime  losartan 25 milliGRAM(s) Oral daily  metoprolol succinate ER 25 milliGRAM(s) Oral daily  multivitamin 1 Tablet(s) Oral daily  polyethylene glycol 3350 17 Gram(s) Oral daily  QUEtiapine 25 milliGRAM(s) Oral daily  QUEtiapine 50 milliGRAM(s) Oral at bedtime  senna 2 Tablet(s) Oral at bedtime  tiotropium 18 MICROgram(s) Capsule 1 Capsule(s) Inhalation daily    MEDICATIONS  (PRN):  acetaminophen   Tablet .. 650 milliGRAM(s) Oral every 4 hours PRN Temp greater or equal to 38.5C (101.3F)  ALBUTerol    90 MICROgram(s) HFA Inhaler 1 Puff(s) Inhalation every 4 hours PRN Shortness of Breath and/or Wheezing  melatonin 3 milliGRAM(s) Oral at bedtime PRN Insomnia        CAPILLARY BLOOD GLUCOSE      POCT Blood Glucose.: 133 mg/dL (29 Dec 2020 08:09)  POCT Blood Glucose.: 229 mg/dL (28 Dec 2020 21:29)  POCT Blood Glucose.: 261 mg/dL (28 Dec 2020 17:29)  POCT Blood Glucose.: 273 mg/dL (28 Dec 2020 12:24)    I&O's Summary    29 Dec 2020 07:01  -  29 Dec 2020 10:29  --------------------------------------------------------  IN: 300 mL / OUT: 300 mL / NET: 0 mL        Vital Signs Last 24 Hrs  T(C): 36.7 (29 Dec 2020 05:29), Max: 36.9 (28 Dec 2020 20:35)  T(F): 98.1 (29 Dec 2020 05:29), Max: 98.4 (28 Dec 2020 20:35)  HR: 62 (29 Dec 2020 05:29) (62 - 78)  BP: 119/66 (29 Dec 2020 05:29) (118/62 - 121/76)  BP(mean): --  RR: 18 (29 Dec 2020 05:29) (16 - 18)  SpO2: 100% (29 Dec 2020 05:29) (100% - 100%)      PHYSICAL EXAM:  GENERAL: in no distress  NECK: Supple, No JVD  CHEST/LUNG: clear b/l, no wheeze  HEART: S1 S2; soft ejection systolic murmur +   ABDOMEN: Soft, Nontender  EXTREMITIES:  no edema  NEUROLOGY: Alert nonfocal  SKIN: No rashes or lesions    LABS:                        9.8    7.21  )-----------( 381      ( 29 Dec 2020 06:17 )             31.6     12-29    137  |  100  |  22  ----------------------------<  119<H>  5.1   |  27  |  0.63    Ca    8.7      29 Dec 2020 06:17  Mg     2.0     12-29    TPro  6.4  /  Alb  2.8<L>  /  TBili  0.3  /  DBili  x   /  AST  43<H>  /  ALT  42<H>  /  AlkPhos  125<H>  12-29                All consultant(s) notes reviewed and care discussed with other providers        Contact Number, Dr Saleem [phone]
Patient is a 75y old  Male who presents with a chief complaint of Shortness of Breath (25 Dec 2020 11:58)      SUBJECTIVE / OVERNIGHT EVENTS:  resting in bed  comfortable looking on nc oxygen    MEDICATIONS  (STANDING):  ascorbic acid 250 milliGRAM(s) Oral daily  aspirin  chewable 81 milliGRAM(s) Oral daily  atorvastatin 80 milliGRAM(s) Oral at bedtime  budesonide 160 MICROgram(s)/formoterol 4.5 MICROgram(s) Inhaler 2 Puff(s) Inhalation two times a day  calcium carbonate 1250 mG  + Vitamin D (OsCal 500 + D) 1 Tablet(s) Oral two times a day  dexAMETHasone  Injectable 6 milliGRAM(s) IV Push daily  dextrose 40% Gel 15 Gram(s) Oral once  dextrose 5%. 1000 milliLiter(s) (50 mL/Hr) IV Continuous <Continuous>  dextrose 5%. 1000 milliLiter(s) (100 mL/Hr) IV Continuous <Continuous>  dextrose 50% Injectable 25 Gram(s) IV Push once  dextrose 50% Injectable 12.5 Gram(s) IV Push once  dextrose 50% Injectable 25 Gram(s) IV Push once  enoxaparin Injectable 40 milliGRAM(s) SubCutaneous daily  ferrous    sulfate 325 milliGRAM(s) Oral daily  folic acid 1 milliGRAM(s) Oral daily  furosemide    Tablet 40 milliGRAM(s) Oral daily  glucagon  Injectable 1 milliGRAM(s) IntraMuscular once  insulin glargine Injectable (LANTUS) 20 Unit(s) SubCutaneous at bedtime  insulin lispro (ADMELOG) corrective regimen sliding scale   SubCutaneous three times a day before meals  insulin lispro (ADMELOG) corrective regimen sliding scale   SubCutaneous at bedtime  losartan 25 milliGRAM(s) Oral daily  metoprolol succinate ER 25 milliGRAM(s) Oral daily  multivitamin 1 Tablet(s) Oral daily  polyethylene glycol 3350 17 Gram(s) Oral daily  QUEtiapine 25 milliGRAM(s) Oral daily  QUEtiapine 50 milliGRAM(s) Oral at bedtime  remdesivir  IVPB 100 milliGRAM(s) IV Intermittent every 24 hours  remdesivir  IVPB   IV Intermittent   senna 2 Tablet(s) Oral at bedtime  tiotropium 18 MICROgram(s) Capsule 1 Capsule(s) Inhalation daily    MEDICATIONS  (PRN):  acetaminophen   Tablet .. 650 milliGRAM(s) Oral every 4 hours PRN Temp greater or equal to 38.5C (101.3F)  ALBUTerol    90 MICROgram(s) HFA Inhaler 1 Puff(s) Inhalation every 4 hours PRN Shortness of Breath and/or Wheezing  melatonin 3 milliGRAM(s) Oral at bedtime PRN Insomnia        CAPILLARY BLOOD GLUCOSE      POCT Blood Glucose.: 171 mg/dL (25 Dec 2020 12:14)  POCT Blood Glucose.: 135 mg/dL (25 Dec 2020 08:55)  POCT Blood Glucose.: 131 mg/dL (24 Dec 2020 21:47)    Vital Signs Last 24 Hrs  T(C): 36.7 (25 Dec 2020 10:01), Max: 36.9 (24 Dec 2020 14:34)  T(F): 98.1 (25 Dec 2020 10:01), Max: 98.5 (24 Dec 2020 14:34)  HR: 76 (25 Dec 2020 10:01) (60 - 86)  BP: 121/61 (25 Dec 2020 10:01) (99/49 - 135/65)  BP(mean): --  RR: 18 (25 Dec 2020 10:01) (18 - 20)  SpO2: 98% (25 Dec 2020 10:01) (96% - 100%)  PHYSICAL EXAM:  GENERAL: NAD, well-developed  HEAD:  Atraumatic, Normocephalic  EYES: EOMI, PERRLA, conjunctiva and sclera clear  NECK: Supple, No JVD  CHEST/LUNG: Clear to auscultation bilaterally; No wheeze  HEART: Regular rate and rhythm; No murmurs, rubs, or gallops  ABDOMEN: Soft, Nontender, Nondistended; Bowel sounds present  EXTREMITIES:  2+ Peripheral Pulses, No clubbing, cyanosis, or edema  PSYCH: Alert    LABS:                        8.7    5.31  )-----------( 243      ( 25 Dec 2020 06:51 )             27.6     12-24    136  |  96<L>  |  24<H>  ----------------------------<  152<H>  4.6   |  25  |  0.92    Ca    9.0      24 Dec 2020 16:50    TPro  7.2  /  Alb  3.5  /  TBili  0.3  /  DBili  x   /  AST  76<H>  /  ALT  115<H>  /  AlkPhos  155<H>  12-24      CARDIAC MARKERS ( 24 Dec 2020 19:47 )  x     / x     / 88 U/L / x     / 1.5 ng/mL  CARDIAC MARKERS ( 24 Dec 2020 16:51 )  x     / x     / 104 U/L / x     / 1.7 ng/mL      ad< from: CT Chest No Cont (12.24.20 @ 22:12) >  IMPRESSION:  Multifocal pneumonia. This all may represent: Pneumonia, superimposed other etiology not excluded.            
Patient is a 75y old  Male who presents with a chief complaint of Shortness of Breath (30 Dec 2020 15:28)      SUBJECTIVE / OVERNIGHT EVENTS: overnight events noted    ROS:  Resp: No cough no sputum production  CVS: No chest pain no palpitations no orthopnea  GI: no N/V/D  : no dysuria, no hematuria          MEDICATIONS  (STANDING):  ascorbic acid 250 milliGRAM(s) Oral daily  aspirin  chewable 81 milliGRAM(s) Oral daily  atorvastatin 80 milliGRAM(s) Oral at bedtime  budesonide 160 MICROgram(s)/formoterol 4.5 MICROgram(s) Inhaler 2 Puff(s) Inhalation two times a day  calcium carbonate 1250 mG  + Vitamin D (OsCal 500 + D) 1 Tablet(s) Oral two times a day  dexAMETHasone  Injectable 6 milliGRAM(s) IV Push daily  dextrose 40% Gel 15 Gram(s) Oral once  dextrose 5%. 1000 milliLiter(s) (50 mL/Hr) IV Continuous <Continuous>  dextrose 5%. 1000 milliLiter(s) (100 mL/Hr) IV Continuous <Continuous>  dextrose 50% Injectable 25 Gram(s) IV Push once  dextrose 50% Injectable 12.5 Gram(s) IV Push once  dextrose 50% Injectable 25 Gram(s) IV Push once  enoxaparin Injectable 40 milliGRAM(s) SubCutaneous daily  ferrous    sulfate 325 milliGRAM(s) Oral daily  folic acid 1 milliGRAM(s) Oral daily  furosemide    Tablet 40 milliGRAM(s) Oral daily  glucagon  Injectable 1 milliGRAM(s) IntraMuscular once  insulin glargine Injectable (LANTUS) 14 Unit(s) SubCutaneous at bedtime  insulin lispro (ADMELOG) corrective regimen sliding scale   SubCutaneous three times a day before meals  insulin lispro (ADMELOG) corrective regimen sliding scale   SubCutaneous at bedtime  losartan 25 milliGRAM(s) Oral daily  metoprolol succinate ER 25 milliGRAM(s) Oral daily  multivitamin 1 Tablet(s) Oral daily  polyethylene glycol 3350 17 Gram(s) Oral daily  QUEtiapine 25 milliGRAM(s) Oral daily  QUEtiapine 50 milliGRAM(s) Oral at bedtime  senna 2 Tablet(s) Oral at bedtime  sodium chloride 0.65% Nasal 1 Spray(s) Both Nostrils three times a day  tiotropium 18 MICROgram(s) Capsule 1 Capsule(s) Inhalation daily    MEDICATIONS  (PRN):  acetaminophen   Tablet .. 650 milliGRAM(s) Oral every 4 hours PRN Temp greater or equal to 38.5C (101.3F)  ALBUTerol    90 MICROgram(s) HFA Inhaler 1 Puff(s) Inhalation every 4 hours PRN Shortness of Breath and/or Wheezing  melatonin 3 milliGRAM(s) Oral at bedtime PRN Insomnia        CAPILLARY BLOOD GLUCOSE      POCT Blood Glucose.: 159 mg/dL (31 Dec 2020 08:17)  POCT Blood Glucose.: 181 mg/dL (30 Dec 2020 20:53)  POCT Blood Glucose.: 95 mg/dL (30 Dec 2020 16:37)  POCT Blood Glucose.: 80 mg/dL (30 Dec 2020 11:46)    I&O's Summary      Vital Signs Last 24 Hrs  T(C): 36.7 (31 Dec 2020 06:06), Max: 36.7 (30 Dec 2020 20:50)  T(F): 98 (31 Dec 2020 06:06), Max: 98.1 (30 Dec 2020 20:50)  HR: 87 (31 Dec 2020 06:06) (80 - 87)  BP: 119/60 (31 Dec 2020 06:06) (119/60 - 120/55)  BP(mean): --  RR: 18 (31 Dec 2020 06:06) (17 - 18)  SpO2: 96% (31 Dec 2020 06:06) (96% - 97%)    PHYSICAL EXAM:  CHEST/LUNG: clear b/l, no wheeze  HEART: S1 S2; systolic murmur +   ABDOMEN: Soft, Nontender  EXTREMITIES:  no edema  NEUROLOGY: Alert nonfocal  SKIN: No rashes or lesions    LABS:                Urinalysis Basic - ( 29 Dec 2020 23:22 )    Color: Light Orange / Appearance: Turbid / S.021 / pH: x  Gluc: x / Ketone: Negative  / Bili: Negative / Urobili: <2 mg/dL   Blood: x / Protein: 100 mg/dL / Nitrite: Positive   Leuk Esterase: Large / RBC: 25-50 /HPF / WBC >50 /HPF   Sq Epi: x / Non Sq Epi: x / Bacteria: Many          All consultant(s) notes reviewed and care discussed with other providers        Contact Number, Dr Saleem [phone]
Patient is a 75y old  Male who presents with a chief complaint of Shortness of Breath (04 Jan 2021 10:25)      SUBJECTIVE / OVERNIGHT EVENTS: overnight events noted  "when can I go home?"    ROS:  Resp: No cough no sputum production  CVS: No chest pain no palpitations no orthopnea  GI: no N/V/D  : no dysuria, no hematuria          MEDICATIONS  (STANDING):  ascorbic acid 250 milliGRAM(s) Oral daily  aspirin  chewable 81 milliGRAM(s) Oral daily  atorvastatin 80 milliGRAM(s) Oral at bedtime  budesonide 160 MICROgram(s)/formoterol 4.5 MICROgram(s) Inhaler 2 Puff(s) Inhalation two times a day  calcium carbonate 1250 mG  + Vitamin D (OsCal 500 + D) 1 Tablet(s) Oral two times a day  cefTRIAXone   IVPB 1000 milliGRAM(s) IV Intermittent every 24 hours  dextrose 40% Gel 15 Gram(s) Oral once  dextrose 5%. 1000 milliLiter(s) (50 mL/Hr) IV Continuous <Continuous>  dextrose 5%. 1000 milliLiter(s) (100 mL/Hr) IV Continuous <Continuous>  dextrose 50% Injectable 25 Gram(s) IV Push once  dextrose 50% Injectable 12.5 Gram(s) IV Push once  dextrose 50% Injectable 25 Gram(s) IV Push once  enoxaparin Injectable 40 milliGRAM(s) SubCutaneous daily  ferrous    sulfate 325 milliGRAM(s) Oral daily  fluticasone propionate 50 MICROgram(s)/spray Nasal Spray 1 Spray(s) Both Nostrils two times a day  folic acid 1 milliGRAM(s) Oral daily  furosemide    Tablet 40 milliGRAM(s) Oral daily  glucagon  Injectable 1 milliGRAM(s) IntraMuscular once  insulin glargine Injectable (LANTUS) 10 Unit(s) SubCutaneous at bedtime  insulin lispro (ADMELOG) corrective regimen sliding scale   SubCutaneous three times a day before meals  insulin lispro (ADMELOG) corrective regimen sliding scale   SubCutaneous at bedtime  losartan 25 milliGRAM(s) Oral daily  metoprolol tartrate 12.5 milliGRAM(s) Oral two times a day  multivitamin 1 Tablet(s) Oral daily  polyethylene glycol 3350 17 Gram(s) Oral daily  QUEtiapine 50 milliGRAM(s) Oral at bedtime  QUEtiapine 25 milliGRAM(s) Oral daily  senna 2 Tablet(s) Oral at bedtime  sodium chloride 0.65% Nasal 1 Spray(s) Both Nostrils three times a day  tiotropium 18 MICROgram(s) Capsule 1 Capsule(s) Inhalation daily    MEDICATIONS  (PRN):  acetaminophen   Tablet .. 650 milliGRAM(s) Oral every 4 hours PRN Temp greater or equal to 38C (100.4F), Mild Pain (1 - 3), Moderate Pain (4 - 6)  ALBUTerol    90 MICROgram(s) HFA Inhaler 1 Puff(s) Inhalation every 4 hours PRN Shortness of Breath and/or Wheezing  melatonin 3 milliGRAM(s) Oral at bedtime PRN Insomnia        CAPILLARY BLOOD GLUCOSE      POCT Blood Glucose.: 211 mg/dL (03 Jan 2021 21:34)  POCT Blood Glucose.: 132 mg/dL (03 Jan 2021 17:15)  POCT Blood Glucose.: 81 mg/dL (03 Jan 2021 11:41)    I&O's Summary      Vital Signs Last 24 Hrs  T(C): 36.8 (04 Jan 2021 09:24), Max: 37.1 (03 Jan 2021 17:27)  T(F): 98.3 (04 Jan 2021 09:24), Max: 98.8 (03 Jan 2021 17:27)  HR: 68 (04 Jan 2021 09:24) (60 - 74)  BP: 115/66 (04 Jan 2021 09:24) (108/61 - 150/69)  BP(mean): --  RR: 18 (04 Jan 2021 09:24) (16 - 18)  SpO2: 100% (04 Jan 2021 09:24) (96% - 100%)    PHYSICAL EXAM:  CHEST/LUNG: clear b/l, no wheeze  HEART: S1 S2; systolic murmur +   ABDOMEN: Soft, Nontender  EXTREMITIES:  no edema  NEUROLOGY: Alert nonfocal  SKIN: No rashes or lesions    LABS:                        9.8    9.68  )-----------( 406      ( 03 Jan 2021 07:22 )             32.6     01-03    139  |  103  |  26<H>  ----------------------------<  131<H>  3.9   |  25  |  0.74    Ca    8.9      03 Jan 2021 07:22    TPro  6.0  /  Alb  3.0<L>  /  TBili  0.3  /  DBili  x   /  AST  17  /  ALT  29  /  AlkPhos  123<H>  01-03                All consultant(s) notes reviewed and care discussed with other providers        Contact Number, Dr Saleem [phone]
Patient is a 75y old  Male who presents with a chief complaint of Shortness of Breath (05 Jan 2021 19:10)      SUBJECTIVE / OVERNIGHT EVENTS: overnight events noted    ROS:  Resp: No cough no sputum production  CVS: No chest pain no palpitations no orthopnea  GI: no N/V/D          MEDICATIONS  (STANDING):  ascorbic acid 250 milliGRAM(s) Oral daily  aspirin  chewable 81 milliGRAM(s) Oral daily  atorvastatin 80 milliGRAM(s) Oral at bedtime  budesonide 160 MICROgram(s)/formoterol 4.5 MICROgram(s) Inhaler 2 Puff(s) Inhalation two times a day  calcium carbonate 1250 mG  + Vitamin D (OsCal 500 + D) 1 Tablet(s) Oral two times a day  cefTRIAXone   IVPB 1000 milliGRAM(s) IV Intermittent every 24 hours  dextrose 40% Gel 15 Gram(s) Oral once  dextrose 5%. 1000 milliLiter(s) (50 mL/Hr) IV Continuous <Continuous>  dextrose 5%. 1000 milliLiter(s) (100 mL/Hr) IV Continuous <Continuous>  dextrose 50% Injectable 25 Gram(s) IV Push once  dextrose 50% Injectable 12.5 Gram(s) IV Push once  dextrose 50% Injectable 25 Gram(s) IV Push once  enoxaparin Injectable 40 milliGRAM(s) SubCutaneous daily  ferrous    sulfate 325 milliGRAM(s) Oral daily  fluticasone propionate 50 MICROgram(s)/spray Nasal Spray 1 Spray(s) Both Nostrils two times a day  folic acid 1 milliGRAM(s) Oral daily  furosemide    Tablet 40 milliGRAM(s) Oral daily  glucagon  Injectable 1 milliGRAM(s) IntraMuscular once  insulin glargine Injectable (LANTUS) 10 Unit(s) SubCutaneous at bedtime  insulin lispro (ADMELOG) corrective regimen sliding scale   SubCutaneous three times a day before meals  insulin lispro (ADMELOG) corrective regimen sliding scale   SubCutaneous at bedtime  losartan 25 milliGRAM(s) Oral daily  metoprolol tartrate 12.5 milliGRAM(s) Oral two times a day  multivitamin 1 Tablet(s) Oral daily  polyethylene glycol 3350 17 Gram(s) Oral daily  QUEtiapine 25 milliGRAM(s) Oral daily  QUEtiapine 50 milliGRAM(s) Oral at bedtime  senna 2 Tablet(s) Oral at bedtime  sodium chloride 0.65% Nasal 1 Spray(s) Both Nostrils three times a day  tiotropium 18 MICROgram(s) Capsule 1 Capsule(s) Inhalation daily    MEDICATIONS  (PRN):  acetaminophen   Tablet .. 650 milliGRAM(s) Oral every 4 hours PRN Temp greater or equal to 38C (100.4F), Mild Pain (1 - 3), Moderate Pain (4 - 6)  ALBUTerol    90 MICROgram(s) HFA Inhaler 1 Puff(s) Inhalation every 4 hours PRN Shortness of Breath and/or Wheezing  melatonin 3 milliGRAM(s) Oral at bedtime PRN Insomnia        CAPILLARY BLOOD GLUCOSE  149 (05 Jan 2021 22:00)      POCT Blood Glucose.: 133 mg/dL (06 Jan 2021 07:43)  POCT Blood Glucose.: 185 mg/dL (05 Jan 2021 17:08)  POCT Blood Glucose.: 215 mg/dL (05 Jan 2021 12:15)    I&O's Summary      Vital Signs Last 24 Hrs  T(C): 37.4 (06 Jan 2021 06:43), Max: 37.4 (06 Jan 2021 06:43)  T(F): 99.3 (06 Jan 2021 06:43), Max: 99.3 (06 Jan 2021 06:43)  HR: 95 (06 Jan 2021 06:43) (91 - 110)  BP: 97/60 (06 Jan 2021 06:43) (97/60 - 123/53)  BP(mean): --  RR: 18 (06 Jan 2021 06:43) (16 - 19)  SpO2: 95% (06 Jan 2021 06:43) (95% - 100%)    PHYSICAL EXAM:  CHEST/LUNG: clear   HEART: S1 S2; systolic murmur +   ABDOMEN: Soft, Nontender  EXTREMITIES:  no edema  NEUROLOGY: Alert nonfocal  SKIN: No rashes or lesions    LABS:                      All consultant(s) notes reviewed and care discussed with other providers        Contact Number, Dr Saleem [phone]
Patient is a 75y old  Male who presents with a chief complaint of Shortness of Breath (26 Dec 2020 14:03)      SUBJECTIVE / OVERNIGHT EVENTS: overnight events noted  states feels "fine"    ROS:  Resp: No cough no sputum production  CVS: No chest pain no palpitations no orthopnea  GI: no N/V/D  : no dysuria, no hematuria  Neuro: no weakness no paresthesias  Heme: No petechiae no easy bruising  Msk: No joint pain no swelling  Skin: No rash no itching        MEDICATIONS  (STANDING):  ascorbic acid 250 milliGRAM(s) Oral daily  aspirin  chewable 81 milliGRAM(s) Oral daily  atorvastatin 80 milliGRAM(s) Oral at bedtime  budesonide 160 MICROgram(s)/formoterol 4.5 MICROgram(s) Inhaler 2 Puff(s) Inhalation two times a day  calcium carbonate 1250 mG  + Vitamin D (OsCal 500 + D) 1 Tablet(s) Oral two times a day  dexAMETHasone  Injectable 6 milliGRAM(s) IV Push daily  dextrose 40% Gel 15 Gram(s) Oral once  dextrose 5%. 1000 milliLiter(s) (50 mL/Hr) IV Continuous <Continuous>  dextrose 5%. 1000 milliLiter(s) (100 mL/Hr) IV Continuous <Continuous>  dextrose 50% Injectable 25 Gram(s) IV Push once  dextrose 50% Injectable 12.5 Gram(s) IV Push once  dextrose 50% Injectable 25 Gram(s) IV Push once  enoxaparin Injectable 40 milliGRAM(s) SubCutaneous daily  ferrous    sulfate 325 milliGRAM(s) Oral daily  folic acid 1 milliGRAM(s) Oral daily  furosemide    Tablet 40 milliGRAM(s) Oral daily  glucagon  Injectable 1 milliGRAM(s) IntraMuscular once  insulin glargine Injectable (LANTUS) 10 Unit(s) SubCutaneous at bedtime  insulin lispro (ADMELOG) corrective regimen sliding scale   SubCutaneous three times a day before meals  insulin lispro (ADMELOG) corrective regimen sliding scale   SubCutaneous at bedtime  losartan 25 milliGRAM(s) Oral daily  metoprolol succinate ER 25 milliGRAM(s) Oral daily  multivitamin 1 Tablet(s) Oral daily  polyethylene glycol 3350 17 Gram(s) Oral daily  QUEtiapine 25 milliGRAM(s) Oral daily  QUEtiapine 50 milliGRAM(s) Oral at bedtime  remdesivir  IVPB   IV Intermittent   remdesivir  IVPB 100 milliGRAM(s) IV Intermittent every 24 hours  senna 2 Tablet(s) Oral at bedtime  tiotropium 18 MICROgram(s) Capsule 1 Capsule(s) Inhalation daily    MEDICATIONS  (PRN):  acetaminophen   Tablet .. 650 milliGRAM(s) Oral every 4 hours PRN Temp greater or equal to 38.5C (101.3F)  ALBUTerol    90 MICROgram(s) HFA Inhaler 1 Puff(s) Inhalation every 4 hours PRN Shortness of Breath and/or Wheezing  melatonin 3 milliGRAM(s) Oral at bedtime PRN Insomnia        CAPILLARY BLOOD GLUCOSE      POCT Blood Glucose.: 175 mg/dL (27 Dec 2020 09:01)  POCT Blood Glucose.: 179 mg/dL (26 Dec 2020 22:48)  POCT Blood Glucose.: 195 mg/dL (26 Dec 2020 17:33)  POCT Blood Glucose.: 132 mg/dL (26 Dec 2020 12:06)    I&O's Summary      Vital Signs Last 24 Hrs  T(C): 36.3 (27 Dec 2020 10:02), Max: 37.1 (26 Dec 2020 14:36)  T(F): 97.4 (27 Dec 2020 10:02), Max: 98.7 (26 Dec 2020 14:36)  HR: 82 (27 Dec 2020 10:02) (68 - 98)  BP: 107/66 (27 Dec 2020 10:02) (107/66 - 128/67)  BP(mean): --  RR: 20 (27 Dec 2020 10:02) (18 - 20)  SpO2: 97% (27 Dec 2020 10:02) (97% - 97%)      PHYSICAL EXAM:  GENERAL: in no distress  NECK: Supple, No JVD  CHEST/LUNG: clear b/l, no wheeze  HEART: S1 S2; soft ejection systolic murmur +   ABDOMEN: Soft, Nontender, Bowel sounds present  EXTREMITIES:  no edema  NEUROLOGY: Alert nonfocal  SKIN: No rashes or lesions    LABS:                        9.6    5.34  )-----------( 303      ( 27 Dec 2020 07:53 )             29.9     12-27    137  |  100  |  19  ----------------------------<  103<H>  3.6   |  25  |  0.70    Ca    8.4      27 Dec 2020 07:53    TPro  6.0  /  Alb  2.5<L>  /  TBili  0.3  /  DBili  <0.2  /  AST  23  /  ALT  49<H>  /  AlkPhos  117  12-27                All consultant(s) notes reviewed and care discussed with other providers        Contact Number, Dr Saleem [phone]

## 2021-02-10 RX ORDER — ENOXAPARIN SODIUM 100 MG/ML
40 INJECTION SUBCUTANEOUS
Qty: 0 | Refills: 0 | DISCHARGE
Start: 2021-02-10 | End: 2021-03-06

## 2021-02-12 ENCOUNTER — INPATIENT (INPATIENT)
Facility: HOSPITAL | Age: 76
LOS: 12 days | Discharge: NURSING, REHAB OR LONG-TERM CARE | DRG: 388 | End: 2021-02-25
Attending: INTERNAL MEDICINE | Admitting: HOSPITALIST
Payer: MEDICARE

## 2021-02-12 VITALS
TEMPERATURE: 100 F | SYSTOLIC BLOOD PRESSURE: 108 MMHG | DIASTOLIC BLOOD PRESSURE: 72 MMHG | RESPIRATION RATE: 20 BRPM | WEIGHT: 175.05 LBS | HEART RATE: 109 BPM | OXYGEN SATURATION: 100 %

## 2021-02-12 DIAGNOSIS — Z02.9 ENCOUNTER FOR ADMINISTRATIVE EXAMINATIONS, UNSPECIFIED: ICD-10-CM

## 2021-02-12 DIAGNOSIS — J44.9 CHRONIC OBSTRUCTIVE PULMONARY DISEASE, UNSPECIFIED: ICD-10-CM

## 2021-02-12 DIAGNOSIS — I10 ESSENTIAL (PRIMARY) HYPERTENSION: ICD-10-CM

## 2021-02-12 DIAGNOSIS — F03.90 UNSPECIFIED DEMENTIA WITHOUT BEHAVIORAL DISTURBANCE: ICD-10-CM

## 2021-02-12 DIAGNOSIS — R09.02 HYPOXEMIA: ICD-10-CM

## 2021-02-12 DIAGNOSIS — D64.9 ANEMIA, UNSPECIFIED: ICD-10-CM

## 2021-02-12 DIAGNOSIS — N39.0 URINARY TRACT INFECTION, SITE NOT SPECIFIED: ICD-10-CM

## 2021-02-12 DIAGNOSIS — K56.609 UNSPECIFIED INTESTINAL OBSTRUCTION, UNSPECIFIED AS TO PARTIAL VERSUS COMPLETE OBSTRUCTION: ICD-10-CM

## 2021-02-12 DIAGNOSIS — U07.1 COVID-19: ICD-10-CM

## 2021-02-12 DIAGNOSIS — N17.9 ACUTE KIDNEY FAILURE, UNSPECIFIED: ICD-10-CM

## 2021-02-12 DIAGNOSIS — Z29.9 ENCOUNTER FOR PROPHYLACTIC MEASURES, UNSPECIFIED: ICD-10-CM

## 2021-02-12 LAB
ALBUMIN SERPL ELPH-MCNC: 3 G/DL — LOW (ref 3.3–5)
ALP SERPL-CCNC: 76 U/L — SIGNIFICANT CHANGE UP (ref 40–120)
ALT FLD-CCNC: 80 U/L — HIGH (ref 10–45)
ANION GAP SERPL CALC-SCNC: 18 MMOL/L — HIGH (ref 5–17)
ANISOCYTOSIS BLD QL: SLIGHT — SIGNIFICANT CHANGE UP
APPEARANCE UR: ABNORMAL
APTT BLD: 36.1 SEC — HIGH (ref 27.5–35.5)
AST SERPL-CCNC: 93 U/L — HIGH (ref 10–40)
BACTERIA # UR AUTO: NEGATIVE — SIGNIFICANT CHANGE UP
BASE EXCESS BLDV CALC-SCNC: -2.9 MMOL/L — LOW (ref -2–2)
BASE EXCESS BLDV CALC-SCNC: SIGNIFICANT CHANGE UP MMOL/L (ref -2–2)
BASOPHILS # BLD AUTO: 0 K/UL — SIGNIFICANT CHANGE UP (ref 0–0.2)
BASOPHILS NFR BLD AUTO: 0 % — SIGNIFICANT CHANGE UP (ref 0–2)
BILIRUB SERPL-MCNC: 0.5 MG/DL — SIGNIFICANT CHANGE UP (ref 0.2–1.2)
BILIRUB UR-MCNC: NEGATIVE — SIGNIFICANT CHANGE UP
BLD GP AB SCN SERPL QL: NEGATIVE — SIGNIFICANT CHANGE UP
BUN SERPL-MCNC: 93 MG/DL — HIGH (ref 7–23)
CA-I SERPL-SCNC: 1 MMOL/L — LOW (ref 1.12–1.3)
CA-I SERPL-SCNC: SIGNIFICANT CHANGE UP MMOL/L (ref 1.12–1.3)
CALCIUM SERPL-MCNC: 8.7 MG/DL — SIGNIFICANT CHANGE UP (ref 8.4–10.5)
CHLORIDE BLDV-SCNC: 108 MMOL/L — SIGNIFICANT CHANGE UP (ref 96–108)
CHLORIDE BLDV-SCNC: SIGNIFICANT CHANGE UP MMOL/L (ref 96–108)
CHLORIDE SERPL-SCNC: 96 MMOL/L — SIGNIFICANT CHANGE UP (ref 96–108)
CO2 BLDV-SCNC: 23 MMOL/L — SIGNIFICANT CHANGE UP (ref 22–30)
CO2 BLDV-SCNC: SIGNIFICANT CHANGE UP MMOL/L (ref 22–30)
CO2 SERPL-SCNC: 24 MMOL/L — SIGNIFICANT CHANGE UP (ref 22–31)
COLOR SPEC: YELLOW — SIGNIFICANT CHANGE UP
CREAT SERPL-MCNC: 3.24 MG/DL — HIGH (ref 0.5–1.3)
DIFF PNL FLD: NEGATIVE — SIGNIFICANT CHANGE UP
ELLIPTOCYTES BLD QL SMEAR: SLIGHT — SIGNIFICANT CHANGE UP
EOSINOPHIL # BLD AUTO: 0 K/UL — SIGNIFICANT CHANGE UP (ref 0–0.5)
EOSINOPHIL NFR BLD AUTO: 0 % — SIGNIFICANT CHANGE UP (ref 0–6)
EPI CELLS # UR: 8 /HPF — HIGH
GAS PNL BLDV: 140 MMOL/L — SIGNIFICANT CHANGE UP (ref 135–145)
GAS PNL BLDV: SIGNIFICANT CHANGE UP
GAS PNL BLDV: SIGNIFICANT CHANGE UP MMOL/L (ref 135–145)
GLUCOSE BLDV-MCNC: 148 MG/DL — HIGH (ref 70–99)
GLUCOSE BLDV-MCNC: SIGNIFICANT CHANGE UP MG/DL (ref 70–99)
GLUCOSE SERPL-MCNC: 184 MG/DL — HIGH (ref 70–99)
GLUCOSE UR QL: NEGATIVE — SIGNIFICANT CHANGE UP
GRAN CASTS # UR COMP ASSIST: SIGNIFICANT CHANGE UP /LPF
HCO3 BLDV-SCNC: 22 MMOL/L — SIGNIFICANT CHANGE UP (ref 21–29)
HCO3 BLDV-SCNC: SIGNIFICANT CHANGE UP MMOL/L (ref 21–29)
HCT VFR BLD CALC: 30.5 % — LOW (ref 39–50)
HCT VFR BLDA CALC: 26 % — LOW (ref 39–50)
HCT VFR BLDA CALC: SIGNIFICANT CHANGE UP % (ref 39–50)
HGB BLD CALC-MCNC: 8.4 G/DL — LOW (ref 13–17)
HGB BLD CALC-MCNC: SIGNIFICANT CHANGE UP G/DL (ref 13–17)
HGB BLD-MCNC: 9.5 G/DL — LOW (ref 13–17)
HYALINE CASTS # UR AUTO: 17 /LPF — HIGH (ref 0–2)
HYPOCHROMIA BLD QL: SLIGHT — SIGNIFICANT CHANGE UP
INR BLD: 1.23 RATIO — HIGH (ref 0.88–1.16)
KETONES UR-MCNC: NEGATIVE — SIGNIFICANT CHANGE UP
LACTATE BLDV-MCNC: 2.3 MMOL/L — HIGH (ref 0.7–2)
LACTATE BLDV-MCNC: SIGNIFICANT CHANGE UP MMOL/L (ref 0.7–2)
LEUKOCYTE ESTERASE UR-ACNC: ABNORMAL
LYMPHOCYTES # BLD AUTO: 0.81 K/UL — LOW (ref 1–3.3)
LYMPHOCYTES # BLD AUTO: 5.2 % — LOW (ref 13–44)
MACROCYTES BLD QL: SLIGHT — SIGNIFICANT CHANGE UP
MANUAL SMEAR VERIFICATION: SIGNIFICANT CHANGE UP
MCHC RBC-ENTMCNC: 22.4 PG — LOW (ref 27–34)
MCHC RBC-ENTMCNC: 31.1 GM/DL — LOW (ref 32–36)
MCV RBC AUTO: 71.8 FL — LOW (ref 80–100)
MONOCYTES # BLD AUTO: 1.62 K/UL — HIGH (ref 0–0.9)
MONOCYTES NFR BLD AUTO: 10.4 % — SIGNIFICANT CHANGE UP (ref 2–14)
NEUTROPHILS # BLD AUTO: 13.12 K/UL — HIGH (ref 1.8–7.4)
NEUTROPHILS NFR BLD AUTO: 64.4 % — SIGNIFICANT CHANGE UP (ref 43–77)
NEUTS BAND # BLD: 20 % — HIGH (ref 0–8)
NITRITE UR-MCNC: NEGATIVE — SIGNIFICANT CHANGE UP
OB PNL STL: POSITIVE
PCO2 BLDV: 42 MMHG — SIGNIFICANT CHANGE UP (ref 35–50)
PCO2 BLDV: SIGNIFICANT CHANGE UP MMHG (ref 35–50)
PH BLDV: 7.34 — LOW (ref 7.35–7.45)
PH BLDV: SIGNIFICANT CHANGE UP (ref 7.35–7.45)
PH UR: 5.5 — SIGNIFICANT CHANGE UP (ref 5–8)
PLAT MORPH BLD: NORMAL — SIGNIFICANT CHANGE UP
PLATELET # BLD AUTO: 306 K/UL — SIGNIFICANT CHANGE UP (ref 150–400)
PO2 BLDV: 26 MMHG — SIGNIFICANT CHANGE UP (ref 25–45)
PO2 BLDV: SIGNIFICANT CHANGE UP MMHG (ref 25–45)
POIKILOCYTOSIS BLD QL AUTO: SIGNIFICANT CHANGE UP
POLYCHROMASIA BLD QL SMEAR: SLIGHT — SIGNIFICANT CHANGE UP
POTASSIUM BLDV-SCNC: 3.9 MMOL/L — SIGNIFICANT CHANGE UP (ref 3.5–5.3)
POTASSIUM BLDV-SCNC: SIGNIFICANT CHANGE UP MMOL/L (ref 3.5–5.3)
POTASSIUM SERPL-MCNC: 4.6 MMOL/L — SIGNIFICANT CHANGE UP (ref 3.5–5.3)
POTASSIUM SERPL-SCNC: 4.6 MMOL/L — SIGNIFICANT CHANGE UP (ref 3.5–5.3)
PROT SERPL-MCNC: 6.9 G/DL — SIGNIFICANT CHANGE UP (ref 6–8.3)
PROT UR-MCNC: ABNORMAL
PROTHROM AB SERPL-ACNC: 14.6 SEC — HIGH (ref 10.6–13.6)
RBC # BLD: 4.25 M/UL — SIGNIFICANT CHANGE UP (ref 4.2–5.8)
RBC # FLD: 20.7 % — HIGH (ref 10.3–14.5)
RBC BLD AUTO: ABNORMAL
RBC CASTS # UR COMP ASSIST: 371 /HPF — HIGH (ref 0–4)
RH IG SCN BLD-IMP: POSITIVE — SIGNIFICANT CHANGE UP
SAO2 % BLDV: 30 % — LOW (ref 67–88)
SAO2 % BLDV: SIGNIFICANT CHANGE UP % (ref 67–88)
SARS-COV-2 RNA SPEC QL NAA+PROBE: DETECTED
SODIUM SERPL-SCNC: 138 MMOL/L — SIGNIFICANT CHANGE UP (ref 135–145)
SP GR SPEC: 1.02 — SIGNIFICANT CHANGE UP (ref 1.01–1.02)
TARGETS BLD QL SMEAR: SIGNIFICANT CHANGE UP
UROBILINOGEN FLD QL: NEGATIVE — SIGNIFICANT CHANGE UP
WBC # BLD: 15.54 K/UL — HIGH (ref 3.8–10.5)
WBC # FLD AUTO: 15.54 K/UL — HIGH (ref 3.8–10.5)
WBC UR QL: 37 /HPF — HIGH (ref 0–5)

## 2021-02-12 PROCEDURE — 43753 TX GASTRO INTUB W/ASP: CPT | Mod: GC

## 2021-02-12 PROCEDURE — 99223 1ST HOSP IP/OBS HIGH 75: CPT | Mod: GC

## 2021-02-12 PROCEDURE — 99202 OFFICE O/P NEW SF 15 MIN: CPT | Mod: GC

## 2021-02-12 PROCEDURE — 99285 EMERGENCY DEPT VISIT HI MDM: CPT | Mod: 25,GC

## 2021-02-12 RX ORDER — PANTOPRAZOLE SODIUM 20 MG/1
40 TABLET, DELAYED RELEASE ORAL EVERY 12 HOURS
Refills: 0 | Status: DISCONTINUED | OUTPATIENT
Start: 2021-02-12 | End: 2021-02-23

## 2021-02-12 RX ORDER — INSULIN LISPRO 100/ML
VIAL (ML) SUBCUTANEOUS EVERY 6 HOURS
Refills: 0 | Status: DISCONTINUED | OUTPATIENT
Start: 2021-02-12 | End: 2021-02-20

## 2021-02-12 RX ORDER — SODIUM CHLORIDE 9 MG/ML
1000 INJECTION, SOLUTION INTRAVENOUS ONCE
Refills: 0 | Status: COMPLETED | OUTPATIENT
Start: 2021-02-12 | End: 2021-02-12

## 2021-02-12 RX ORDER — SODIUM CHLORIDE 9 MG/ML
1000 INJECTION INTRAMUSCULAR; INTRAVENOUS; SUBCUTANEOUS ONCE
Refills: 0 | Status: COMPLETED | OUTPATIENT
Start: 2021-02-12 | End: 2021-02-12

## 2021-02-12 RX ORDER — SODIUM CHLORIDE 9 MG/ML
1000 INJECTION, SOLUTION INTRAVENOUS
Refills: 0 | Status: DISCONTINUED | OUTPATIENT
Start: 2021-02-12 | End: 2021-02-19

## 2021-02-12 RX ORDER — DEXTROSE 50 % IN WATER 50 %
15 SYRINGE (ML) INTRAVENOUS ONCE
Refills: 0 | Status: DISCONTINUED | OUTPATIENT
Start: 2021-02-12 | End: 2021-02-25

## 2021-02-12 RX ORDER — DEXTROSE 50 % IN WATER 50 %
25 SYRINGE (ML) INTRAVENOUS ONCE
Refills: 0 | Status: DISCONTINUED | OUTPATIENT
Start: 2021-02-12 | End: 2021-02-25

## 2021-02-12 RX ORDER — DEXTROSE 50 % IN WATER 50 %
12.5 SYRINGE (ML) INTRAVENOUS ONCE
Refills: 0 | Status: DISCONTINUED | OUTPATIENT
Start: 2021-02-12 | End: 2021-02-25

## 2021-02-12 RX ORDER — OLANZAPINE 15 MG/1
2 TABLET, FILM COATED ORAL EVERY 6 HOURS
Refills: 0 | Status: DISCONTINUED | OUTPATIENT
Start: 2021-02-12 | End: 2021-02-23

## 2021-02-12 RX ORDER — ACETAMINOPHEN 500 MG
650 TABLET ORAL ONCE
Refills: 0 | Status: COMPLETED | OUTPATIENT
Start: 2021-02-12 | End: 2021-02-12

## 2021-02-12 RX ORDER — GLUCAGON INJECTION, SOLUTION 0.5 MG/.1ML
1 INJECTION, SOLUTION SUBCUTANEOUS ONCE
Refills: 0 | Status: DISCONTINUED | OUTPATIENT
Start: 2021-02-12 | End: 2021-02-25

## 2021-02-12 RX ORDER — MEROPENEM 1 G/30ML
500 INJECTION INTRAVENOUS ONCE
Refills: 0 | Status: COMPLETED | OUTPATIENT
Start: 2021-02-12 | End: 2021-02-12

## 2021-02-12 RX ORDER — PIPERACILLIN AND TAZOBACTAM 4; .5 G/20ML; G/20ML
3.38 INJECTION, POWDER, LYOPHILIZED, FOR SOLUTION INTRAVENOUS EVERY 8 HOURS
Refills: 0 | Status: COMPLETED | OUTPATIENT
Start: 2021-02-12 | End: 2021-02-19

## 2021-02-12 RX ORDER — HEPARIN SODIUM 5000 [USP'U]/ML
5000 INJECTION INTRAVENOUS; SUBCUTANEOUS EVERY 8 HOURS
Refills: 0 | Status: DISCONTINUED | OUTPATIENT
Start: 2021-02-12 | End: 2021-02-13

## 2021-02-12 RX ORDER — PIPERACILLIN AND TAZOBACTAM 4; .5 G/20ML; G/20ML
3.38 INJECTION, POWDER, LYOPHILIZED, FOR SOLUTION INTRAVENOUS ONCE
Refills: 0 | Status: COMPLETED | OUTPATIENT
Start: 2021-02-12 | End: 2021-02-12

## 2021-02-12 RX ORDER — QUETIAPINE FUMARATE 200 MG/1
50 TABLET, FILM COATED ORAL THREE TIMES A DAY
Refills: 0 | Status: DISCONTINUED | OUTPATIENT
Start: 2021-02-12 | End: 2021-02-12

## 2021-02-12 RX ORDER — ACETAMINOPHEN 500 MG
650 TABLET ORAL EVERY 6 HOURS
Refills: 0 | Status: DISCONTINUED | OUTPATIENT
Start: 2021-02-12 | End: 2021-02-12

## 2021-02-12 RX ORDER — BUDESONIDE AND FORMOTEROL FUMARATE DIHYDRATE 160; 4.5 UG/1; UG/1
2 AEROSOL RESPIRATORY (INHALATION)
Refills: 0 | Status: DISCONTINUED | OUTPATIENT
Start: 2021-02-12 | End: 2021-02-25

## 2021-02-12 RX ORDER — LACTOBACILLUS ACIDOPHILUS 100MM CELL
2 CAPSULE ORAL
Qty: 0 | Refills: 0 | DISCHARGE
Start: 2021-02-12 | End: 2021-03-04

## 2021-02-12 RX ORDER — DULOXETINE HYDROCHLORIDE 30 MG/1
30 CAPSULE, DELAYED RELEASE ORAL DAILY
Refills: 0 | Status: DISCONTINUED | OUTPATIENT
Start: 2021-02-12 | End: 2021-02-12

## 2021-02-12 RX ORDER — ALBUTEROL 90 UG/1
1 AEROSOL, METERED ORAL EVERY 4 HOURS
Refills: 0 | Status: DISCONTINUED | OUTPATIENT
Start: 2021-02-12 | End: 2021-02-25

## 2021-02-12 RX ADMIN — PIPERACILLIN AND TAZOBACTAM 200 GRAM(S): 4; .5 INJECTION, POWDER, LYOPHILIZED, FOR SOLUTION INTRAVENOUS at 23:28

## 2021-02-12 RX ADMIN — Medication 650 MILLIGRAM(S): at 10:24

## 2021-02-12 RX ADMIN — SODIUM CHLORIDE 1000 MILLILITER(S): 9 INJECTION, SOLUTION INTRAVENOUS at 15:25

## 2021-02-12 RX ADMIN — MEROPENEM 100 MILLIGRAM(S): 1 INJECTION INTRAVENOUS at 14:04

## 2021-02-12 RX ADMIN — SODIUM CHLORIDE 1000 MILLILITER(S): 9 INJECTION, SOLUTION INTRAVENOUS at 16:45

## 2021-02-12 RX ADMIN — HEPARIN SODIUM 5000 UNIT(S): 5000 INJECTION INTRAVENOUS; SUBCUTANEOUS at 23:28

## 2021-02-12 RX ADMIN — SODIUM CHLORIDE 1000 MILLILITER(S): 9 INJECTION INTRAMUSCULAR; INTRAVENOUS; SUBCUTANEOUS at 11:00

## 2021-02-12 NOTE — CONSULT NOTE ADULT - ASSESSMENT
75M hx of dementia, DM2, HTN, ?HF, HLD, recent admission for COVID requiring intubation (11/2020 [place]), currently treated for pneumonia presents from nursing home for abdominal pain and vomiting, found to have multifocal PNA, and high-grade SBO with TP LUQ, c/f internal hernia on CT    Recommendation:  - Given patient's extensive comorbidities, patient is high risk for mortality/complication from surgical intervention  - Recommend nonoperative management with NPO, NGT decompression, IVF resuscitation  - Palliative consult for GOC discussion prior to any invasive intervention  - plan discussed with attending. Dr. Lavelle Shaikh    [id number] 75M hx of dementia, DM2, HTN, ?HF, HLD, recent admission for COVID requiring intubation (11/2020 [place]), currently treated for pneumonia presents from nursing home for abdominal pain and vomiting, found to have multifocal PNA, and high-grade SBO with TP LUQ, c/f internal hernia on CT    Recommendation:  - Given patient's extensive comorbidities, patient is high risk for mortality/complication from surgical intervention  - Recommend nonoperative management with NPO, NGT decompression, IVF resuscitation  - Palliative consult for GOC discussion prior to any invasive intervention  - continue antibiotics for pneumonia  - plan discussed with attending. Dr. Lavelle Shaikh    [id number]

## 2021-02-12 NOTE — ED PROVIDER NOTE - CCCP TRG CHIEF CMPLNT
Impression: Dry eye syndrome of bilateral lacrimal glands: H04.123. Plan: Discussed diagnosis in detail with patient. Discussed treatment options with patient. Patient instructed to use Systane Balance at least qid ou. abdominal pain

## 2021-02-12 NOTE — H&P ADULT - PROBLEM SELECTOR PLAN 9
Transitions of Care Status:  1.  Name of PCP:  2.  PCP Contacted on Admission: [ ] Y    [ ] N    3.  PCP contacted at Discharge: [ ] Y    [ ] N    [ ] N/A  4.  Post-Discharge Appointment Date and Location:  5.  Summary of Handoff given to PCP: - NPO  - heparin subQ  - PT eval - NPO  - heparin subQ  - PT eval  - DM2 - home regimen 10u Basaglar qHS and SSI at nursing home - will give SSI here given NPO and poor po intake - NPO  - heparin subQ  - PT eval  - DM2 - home regimen 10u Basaglar qHS and SSI at nursing home - will give SSI here given NPO and poor po intake  - full code per daughter

## 2021-02-12 NOTE — H&P ADULT - PROBLEM SELECTOR PLAN 6
- titrate spO2 88-93%  - continue home meds  - unlikely exacerbation, daughter denies cough, phlegm, fevers - A&Ox1 since Nov 2020 after intubation at [facility]  - at baseline, h/o prior CVA

## 2021-02-12 NOTE — H&P ADULT - PROBLEM SELECTOR PLAN 4
- baseline 2L NC at home  - satting well 4L here  - multifocal PNA known rom prior admission  - unclear if warrants treatement but due to possible aspiration, will cover until cultures negative - baseline 2L NC at home  - satting well 4L here  - multifocal PNA known rom prior admission  - unclear if warrants treatement but due to possible aspiration, will cover until cultures negative  - O2 sat 88-93%

## 2021-02-12 NOTE — ED ADULT NURSE REASSESSMENT NOTE - NS ED NURSE REASSESS COMMENT FT1
patient is resting comfortably in bed in no acute distress. denies pain at this time. duonebs completed with improvement. patient denies SOB, chest pain. +coughing noted. lung sounds CTA bilaterally. MD to reassess. VSS. will continue to monitor. call bell in reach. patient aware of plan of care. changed and repositioned. patient is resting comfortably in bed in no acute distress. denies pain at this time. duonebs completed with improvement. patient denies SOB, chest pain. +coughing noted. lung sounds slight wheezes noted bilaterally. MD to reassess. VSS. will continue to monitor. call bell in reach. patient aware of plan of care. changed and repositioned.

## 2021-02-12 NOTE — H&P ADULT - PROBLEM SELECTOR PLAN 2
- Cr 0.68 Jan 2021  - likely prerenal - send urine studies  - s/p 3L NS  - bladder scan q8, trend i/o

## 2021-02-12 NOTE — PROVIDER CONTACT NOTE (OTHER) - SITUATION
Patient with hx of covid possibly >90 days (known to be Nov 2020). Being treated outpt for PNA with abx. COPD @ 6L via NC baseline. Here with vomiting and bowel obstruction, s/p NGT.

## 2021-02-12 NOTE — CONSULT NOTE ADULT - SUBJECTIVE AND OBJECTIVE BOX
GENERAL SURGERY CONSULT NOTE    Patient is a 75y old  Male who presents with a chief complaint of abdominal pain    HPI:  75 year old male with hx of dementia, DM2, HTN, ?HF, HLD, recent admission for COVID requiring intubation (2020 [place]), currently treated for pneumonia presents from nursing home for abdominal pain x 1 week and vomiting x 1 day. History obtained from chart review and daughter over the phone, due to patient's mental status (AAOx1). Patient reports abdominal pain x 1 week, and vomiting with poor appetite x 1 day. Patient reports abdominal pain in LUQ.     In ED, patient was tachycardic to 114, satting at 95% on 3L NC. Lab significant for leukocytosis of 15.5, v lactate of 2.3, with +UA. CT A/P shows multifocal PNA, high-grade SBO with TP LUQ, c/f internal hernia. NGT was placed with immediate return of 1.1L stomach content. Received 3l fluid boluses and meropenem    10-points review of system performed with pertinent negative and positive findings documented in the HPI     PAST MEDICAL & SURGICAL HISTORY:  COPD (chronic obstructive pulmonary disease)    CVA (cerebral vascular accident)    HLD (hyperlipidemia)    T2DM (type 2 diabetes mellitus)    HTN (hypertension)    UTI (urinary tract infection)    Dementia    No significant past surgical history    FAMILY HISTORY:  : Family history not pertinent as reviewed with the patient and family    SOCIAL HISTORY: Unable to obtain due to patient's condition    MEDICATIONS  (STANDING):    MEDICATIONS  (PRN):    Allergies    No Known Allergies    Intolerances    Vital Signs Last 24 Hrs  T(C): 37.1 (2021 13:40), Max: 37.8 (2021 11:48)  T(F): 98.7 (2021 13:40), Max: 100.1 (2021 11:48)  HR: 109 (2021 15:16) (109 - 114)  BP: 98/56 (2021 15:16) (98/56 - 110/67)  BP(mean): --  RR: 20 (2021 15:16) (20 - 25)  SpO2: 95% (2021 15:16) (95% - 100%)  Daily     Daily     Exam:  General: resting comfortable in stretcher  Resp: nonlabored breathing. sating 95% on NC  Abd: soft, moderately distended, mild LUQ TTP  Ext: WWP  Neuro: AAOx1                          9.5    15.54 )-----------( 306      ( 2021 11:58 )             30.5         138  |  96  |  93<H>  ----------------------------<  184<H>  4.6   |  24  |  3.24<H>    Ca    8.7      2021 11:58    TPro  6.9  /  Alb  3.0<L>  /  TBili  0.5  /  DBili  x   /  AST  93<H>  /  ALT  80<H>  /  AlkPhos  76  12    PT/INR - ( 2021 11:58 )   PT: 14.6 sec;   INR: 1.23 ratio         PTT - ( 2021 11:58 )  PTT:36.1 sec  Urinalysis Basic - ( 2021 12:01 )    Color: Yellow / Appearance: Slightly Turbid / S.025 / pH: x  Gluc: x / Ketone: Negative  / Bili: Negative / Urobili: Negative   Blood: x / Protein: 30 mg/dL / Nitrite: Negative   Leuk Esterase: Moderate / RBC: 371 /hpf / WBC 37 /HPF   Sq Epi: x / Non Sq Epi: 8 /hpf / Bacteria: Negative    IMAGING STUDIES:  < from: CT Abdomen and Pelvis No Cont (21 @ 13:49) >  FINDINGS:  LOWER CHEST: Bilateral diffuse patchy opacities and right basilar consolidation consistent with the known multifocal pneumonia. Small pericardial effusion.    LIVER: Focal hepatic steatosis.  BILE DUCTS: Normal caliber.  GALLBLADDER: Contraction limits evaluation.  SPLEEN: Within normal limits.  PANCREAS: Within normal limits.  ADRENALS: Within normal limits.  KIDNEYS/URETERS: Horseshoe kidney. 4 mm nonobstructing calculus in the right moiety. No hydronephrosis.    BLADDER: Within normal limits.  REPRODUCTIVE ORGANS: Prostate is enlarged.    BOWEL: Distended fluid-filled esophagus, stomach and small bowel loops with a transition point in the left upper quadrant (2-61, 602-52). There is a cluster of small bowel loops adjacent to the transition point in the left hemiabdomen lateral to the descending colon, suspicious for internal hernia. No pneumatosis or portal venous gas. Mild mesenteric edema.  PERITONEUM: No ascites.  VESSELS: Atherosclerotic changes.  RETROPERITONEUM/LYMPH NODES: No lymphadenopathy.  ABDOMINAL WALL: Small fat-containing left inguinal hernia.  BONES: Degenerative changes.    IMPRESSION:    Multifocal pneumonia.    High grade small bowel obstruction with the transition point in the left upper quadrant. Cluster of small bowel loops adjacent to the transition point in the left hemiabdomen lateral to the descending colon, suspicious for internal hernia.    < end of copied text >

## 2021-02-12 NOTE — ED PROVIDER NOTE - CLINICAL SUMMARY MEDICAL DECISION MAKING FREE TEXT BOX
74y/o M NH currently treated for PNA, COPD (on 6L O2) BIBEMS for abdominal pain and vomiting. Limited history given mental status. tachycardic, abdominal distension. Concern for sigmoid volvulus vs COVID pna. will check sepsis labs, CTAP and admission for likely pna. 76y/o M NH currently treated for PNA, COPD (on 6L O2) BIBEMS for abdominal pain and vomiting. Limited history given mental status. tachycardic, abdominal distension. Concern for sigmoid volvulus +/- COVID/bacterial pna. will check sepsis labs, CTAP and admission for likely pna. 76y/o M NH currently treated for PNA, COPD (on 6L O2) BIBEMS for abdominal pain and vomiting. Limited history given mental status. tachycardic, abdominal distension. Concern for sigmoid volvulus/SBO +/- COVID/bacterial pna. will check sepsis labs, CTAP and admission.

## 2021-02-12 NOTE — H&P ADULT - HISTORY OF PRESENT ILLNESS
75 year old male with hx of dementia, DM2, HTN, ?HF, HLD, recent admission for COVID requiring intubation (11/2020 [place]), currently treated for pneumonia presents from nursing home for abdominal pain x 1 week and vomiting x 1 day. History obtained from chart review and daughter over the phone, due to patient's mental status (AAOx1). Patient reports abdominal pain x 1 week, and vomiting with poor appetite x 1 day. Patient reports abdominal pain in LUQ.     In ED, patient was tachycardic to 114, satting at 95% on 3L NC. Lab significant for leukocytosis of 15.5, v lactate of 2.3, with +UA. CT A/P shows multifocal PNA, high-grade SBO with TP LUQ, c/f internal hernia. NGT was placed with immediate return of 1.1L stomach content. Received 3l fluid boluses and meropenem    10-points review of system performed with pertinent negative and positive findings documented in the HPI    75 year old male with hx of dementia, DM2, HTN, ?HF, HLD, recent admission for COVID requiring intubation (11/2020 [place]), currently treated for pneumonia presents from nursing home for abdominal pain x 1 week and vomiting x 1 day. History obtained from chart review and son over the phone, due to patient's mental status (AAOx1 at baseline since intubation in Nov 2020 due COPD with superimposed pneumonia), which son confirms. States patient has been having abdominal pain over last week or so, and then had nausea and vomiting. Unsure about fevers, but not mentioned by nursing home staff. Used 2 L at nursing most days, has been getting rehab at the nursing home. This week has not been participating as well. Per daughter, patient has not had any other issues apart from last week, otherwise at baseline since November    In ED, patient was tachycardic to 114, satting at 95% on 3L NC. Lab significant for leukocytosis of 15.5, v lactate of 2.3, with +UA. CT A/P shows multifocal PNA (known from January), high-grade SBO with TP LUQ, c/f internal hernia. NGT was placed with immediate return of 1.1L stomach content. Received 3l fluid boluses and meropenem 75 year old male with hx of dementia, DM2, HTN, ?HF, HLD, recent admission for COVID requiring intubation (11/2020 [place]), currently treated for pneumonia presents from nursing home for abdominal pain x 1 week and vomiting x 1 day. History obtained from chart review and son over the phone, due to patient's mental status (AAOx1 at baseline since intubation in Nov 2020 due COPD with superimposed pneumonia), which son confirms. States patient has been having abdominal pain over last week or so, and then had nausea and vomiting. Unsure about fevers, but not mentioned by nursing home staff. Used 2 L at nursing most days, has been getting rehab at the nursing home. This week has not been participating as well. Per daughter, patient has not had any other issues apart from last week, otherwise at baseline since November. Per daughter no cough or SOB, or phlegm that she knows of.    In ED, patient was tachycardic to 114, satting at 95% on 3L NC. Lab significant for leukocytosis of 15.5, v lactate of 2.3, with +UA. CT A/P shows multifocal PNA (known from January), high-grade SBO with TP LUQ, c/f internal hernia. NGT was placed with immediate return of 1.1L stomach content. Received 3l fluid boluses and meropenem 75 year old male with hx of dementia, DM2, HTN, ?HF, HLD, recent admission for COVID requiring intubation (11/2020 [place]), currently treated for pneumonia presents from nursing home for abdominal pain x 1 week and vomiting x 1 day. History obtained from chart review and son over the phone, due to patient's mental status (AAOx1 at baseline since intubation in Nov 2020 due COPD with superimposed pneumonia), which son confirms. States patient has been having abdominal pain over last week or so, and then had nausea and vomiting. Unsure about fevers, but not mentioned by nursing home staff. Used 2 L at nursing most days, has been getting rehab at the nursing home. This week has not been participating as well. Per daughter, patient has not had any other issues apart from last week, otherwise at baseline since November. Per daughter no cough or SOB, or phlegm that she knows of. No known surgeries previously per daughter    In ED, patient was tachycardic to 114, satting at 95% on 3L NC. Lab significant for leukocytosis of 15.5, v lactate of 2.3, with +UA. CT A/P shows multifocal PNA (known from January), high-grade SBO with TP LUQ, c/f internal hernia. NGT was placed with immediate return of 1.1L stomach content. Received 3l fluid boluses and meropenem

## 2021-02-12 NOTE — ED ADULT NURSE NOTE - OBJECTIVE STATEMENT
76 yo male with PMH COVID, COPD on 4L NC, brought in by EMS c/o abdominal pain and vomiting x1 day. as per EMS, patient is full code from NH currently treated for PNA w/ ceftriaxone. Patient is AAOx1 at baseline as per EMS and states that he has had abdominal pain. Limited history given mental status. patient arrives to the ED, AAOx1, orientated to name only. PERRL. speech is clear. moving all extremities with equal strength and sensation. skin intact. abdomen is soft, distended, tender on palpation. during rectal temperature, blood noted in stool. sent to lab. denies chest pain, SOB, fevers, chills, diarrhea, HA, dizziness, back pain, dysuria, numbness or tingling. patient on asa. 1:1 in place for patient safety due to confusion and pulling at lines. sinus tachycardia on monitor with PVCs. straight catheter procedure performed. sterile technique maintained. 200 cc dark urine noted. will continue to monitor.

## 2021-02-12 NOTE — ED PROVIDER NOTE - CARE PLAN
Principal Discharge DX:	Pneumonia   Principal Discharge DX:	Small bowel obstruction  Secondary Diagnosis:	Pneumonia

## 2021-02-12 NOTE — H&P ADULT - PROBLEM SELECTOR PLAN 5
- A&Ox1 since Nov 2020 after intubation at [facility]  - at baseline, h/o prior CVA - send iron studies  - FOBT+ but at baseline - unclear significance given interuser variability  - Hb 9.8 in Jan 2021  - active T&S  - tx Hb < 7 or if hemodynamic instability

## 2021-02-12 NOTE — H&P ADULT - PROBLEM SELECTOR PLAN 3
- will treat +U/A given inability to assess symtpoms  - f/u culture - will treat +U/A given inability to assess symptoms  - f/u culture

## 2021-02-12 NOTE — ED ADULT NURSE NOTE - NSIMPLEMENTINTERV_GEN_ALL_ED
Implemented All Fall with Harm Risk Interventions:  [place] to call system. Call bell, personal items and telephone within reach. Instruct patient to call for assistance. Room bathroom lighting operational. Non-slip footwear when patient is off stretcher. Physically safe environment: no spills, clutter or unnecessary equipment. Stretcher in lowest position, wheels locked, appropriate side rails in place. Provide visual cue, wrist band, yellow gown, etc. Monitor gait and stability. Monitor for mental status changes and reorient to person, place, and time. Review medications for side effects contributing to fall risk. Reinforce activity limits and safety measures with patient and family. Provide visual clues: red socks.

## 2021-02-12 NOTE — H&P ADULT - ATTENDING COMMENTS
Pt seen and examined at bedside.  I have precepted this case with house staff and agree with resident note above and have edited it where appropriate.  This patient was assigned to me by the hospitalist in charge; my involvement in this case has consisted of the initial history, physical, chart review, and management plan.  This patient was previously unknown to me.   Briefly: 75M hx of dementia, DM2, HTN, ?HF, HLD, recent admission for COPD pneumonia requiring intubation (11/2020 [place]), COVID in January 2021 (not intubated) currently being treated for pneumonia presents from nursing home for abdominal pain and vomiting, found to have multifocal PNA (unclear if sequela of prior COVID) and high-grade SBO.  Seen this am, states he is feeling much better, with abd pain nearly resolved s/p NGT.  Pt is irritable and requesting po hydration; I explained to him that while NGT is in place on intermittent wall suction this will not be possible, and that his hydration has occurred via IV. Pt met sepsis criteria on admission; c/w empiric zosyn while awaiting cultures. trend bmp to assess SARAH in response to IV fluids, as we are suspecting pre-renal etiology at this time. Pt seen and examined at bedside.  I have precepted this case with house staff and agree with resident note above and have edited it where appropriate.  This patient was assigned to me by the hospitalist in charge; my involvement in this case has consisted of the initial history, physical, chart review, and management plan.  This patient was previously unknown to me.   Briefly: 75M hx of dementia, DM2, HTN, ?HF, HLD, recent admission for COPD pneumonia requiring intubation (11/2020 [place]), COVID in January 2021 (not intubated) currently being treated for pneumonia presents from nursing home for abdominal pain and vomiting, found to have multifocal PNA (unclear if sequela of prior COVID) and high-grade SBO.  Seen this am, states he is feeling much better, with abd pain nearly resolved s/p NGT. Nonop candidate per surgery consult.  Pt is irritable and requesting po hydration; I explained to him that while NGT is in place on intermittent wall suction this will not be possible, and that his hydration has occurred via IV. Pt met sepsis criteria on admission; c/w empiric zosyn while awaiting cultures. trend bmp to assess SARAH in response to IV fluids, as we are suspecting pre-renal etiology at this time. Pt seen and examined at bedside.  I have precepted this case with house staff and agree with resident note above and have edited it where appropriate.  This patient was assigned to me by the hospitalist in charge; my involvement in this case has consisted of the initial history, physical, chart review, and management plan.  This patient was previously unknown to me.   Briefly: 75M hx of dementia, DM2, HTN, ?HF, HLD, recent admission for COPD pneumonia requiring intubation (11/2020 [place]), COVID in January 2021 (not intubated) currently being treated for pneumonia presents from nursing home for abdominal pain and vomiting, found to have multifocal PNA (unclear if sequela of prior COVID) and high-grade SBO.  Seen this am, states he is feeling much better, with abd pain nearly resolved s/p NGT. Nonop candidate per surgery consult.  Pt is irritable and requesting po hydration; I explained to him that while NGT is in place on intermittent wall suction this will not be possible, and that his hydration has occurred via IV. Pt met sepsis criteria on admission; c/w empiric zosyn while awaiting cultures. trend bmp to assess SARAH in response to IV fluids, as we are suspecting pre-renal etiology at this time. Temporarily holding antihypertensives in setting of sepsis and other po meds in setting of NGT; restart as able.

## 2021-02-12 NOTE — ED ADULT NURSE REASSESSMENT NOTE - NS ED NURSE REASSESS COMMENT FT1
patient is resting in bed in no acute distress. denies pain at this time. total of 1500 cc drainage from NG tube. patient tolerating well. patient changed, repositioned. VSS. waiting for bed. will continue to monitor.

## 2021-02-12 NOTE — ED PROVIDER NOTE - ATTENDING CONTRIBUTION TO CARE
pt at [facility], presently getting iv rocephin for pna - sent to er  distended abdomen  ctap ,  xr, labs , suspect pt w resistance or esbl uti.  pan-cx  re-assess  discussed the case w the pt's daughter

## 2021-02-12 NOTE — ED ADULT NURSE REASSESSMENT NOTE - NS ED NURSE REASSESS COMMENT FT1
patient is resting in bed in no acute distress. 1:1 in place for patient safety. MD Hager aware of patient VS. patient to go to CT scan. will continue to monitor.

## 2021-02-12 NOTE — ED PROVIDER NOTE - OBJECTIVE STATEMENT
76y/o M full code from NH currently treated for PNA w/ ceftriaxone, COPD (on 6L O2) BIBEMS for abdominal pain and vomiting. Pt AAOx1 states that he has had abdominal pain. Limited history given mental status. Had b/l PNA on CXR from today and elevated WBC. 74y/o M full code from NH currently treated for PNA w/ ceftriaxone, COVID (11/20 intubated), COPD (on 6L O2) BIBEMS for abdominal pain and vomiting. Pt AAOx1 states that he has had abdominal pain. Limited history given mental status. Had b/l PNA on CXR from today and elevated WBC.    Levon wife [phone] 74y/o M full code from NH currently treated for PNA w/ ceftriaxone, COVID (11/20 intubated), COPD (on 6L O2), dementia BIBEMS for abdominal pain and vomiting. Pt AAOx1 states that he has had abdominal pain. Limited history given mental status. Had b/l PNA on CXR from today and elevated WBC.    Levon (domestic partner) [phone]

## 2021-02-12 NOTE — H&P ADULT - NSHPLABSRESULTS_GEN_ALL_CORE
LABS:                          9.5    15.54 )-----------( 306      ( 2021 11:58 )             30.5     02-12    138  |  96  |  93<H>  ----------------------------<  184<H>  4.6   |  24  |  3.24<H>    Ca    8.7      2021 11:58    TPro  6.9  /  Alb  3.0<L>  /  TBili  0.5  /  DBili  x   /  AST  93<H>  /  ALT  80<H>  /  AlkPhos  76  02-12    LIVER FUNCTIONS - ( 2021 11:58 )  Alb: 3.0 g/dL / Pro: 6.9 g/dL / ALK PHOS: 76 U/L / ALT: 80 U/L / AST: 93 U/L / GGT: x           PT/INR - ( 2021 11:58 )   PT: 14.6 sec;   INR: 1.23 ratio         PTT - ( 2021 11:58 )  PTT:36.1 sec        Urinalysis Basic - ( 2021 12:01 )    Color: Yellow / Appearance: Slightly Turbid / S.025 / pH: x  Gluc: x / Ketone: Negative  / Bili: Negative / Urobili: Negative   Blood: x / Protein: 30 mg/dL / Nitrite: Negative   Leuk Esterase: Moderate / RBC: 371 /hpf / WBC 37 /HPF   Sq Epi: x / Non Sq Epi: 8 /hpf / Bacteria: Negative        TELEMETRY:     EKG:     IMAGING: LABS:                          9.5    15.54 )-----------( 306      ( 2021 11:58 )             30.5     02-12    138  |  96  |  93<H>  ----------------------------<  184<H>  4.6   |  24  |  3.24<H>    Ca    8.7      2021 11:58    TPro  6.9  /  Alb  3.0<L>  /  TBili  0.5  /  DBili  x   /  AST  93<H>  /  ALT  80<H>  /  AlkPhos  76  02-12    LIVER FUNCTIONS - ( 2021 11:58 )  Alb: 3.0 g/dL / Pro: 6.9 g/dL / ALK PHOS: 76 U/L / ALT: 80 U/L / AST: 93 U/L / GGT: x           PT/INR - ( 2021 11:58 )   PT: 14.6 sec;   INR: 1.23 ratio         PTT - ( 2021 11:58 )  PTT:36.1 sec        Urinalysis Basic - ( 2021 12:01 )    Color: Yellow / Appearance: Slightly Turbid / S.025 / pH: x  Gluc: x / Ketone: Negative  / Bili: Negative / Urobili: Negative   Blood: x / Protein: 30 mg/dL / Nitrite: Negative   Leuk Esterase: Moderate / RBC: 371 /hpf / WBC 37 /HPF   Sq Epi: x / Non Sq Epi: 8 /hpf / Bacteria: Negative        TELEMETRY:     EKG: sinus arrhythmia    IMAGING: CT A/P with sBO hernia LABS: personally reviewed                           9.5    15.54 )-----------( 306      ( 2021 11:58 )             30.5     02-12    138  |  96  |  93<H>  ----------------------------<  184<H>  4.6   |  24  |  3.24<H>    Ca    8.7      2021 11:58    TPro  6.9  /  Alb  3.0<L>  /  TBili  0.5  /  DBili  x   /  AST  93<H>  /  ALT  80<H>  /  AlkPhos  76  02-12    LIVER FUNCTIONS - ( 2021 11:58 )  Alb: 3.0 g/dL / Pro: 6.9 g/dL / ALK PHOS: 76 U/L / ALT: 80 U/L / AST: 93 U/L / GGT: x           PT/INR - ( 2021 11:58 )   PT: 14.6 sec;   INR: 1.23 ratio         PTT - ( 2021 11:58 )  PTT:36.1 sec        Urinalysis Basic - ( 2021 12:01 )    Color: Yellow / Appearance: Slightly Turbid / S.025 / pH: x  Gluc: x / Ketone: Negative  / Bili: Negative / Urobili: Negative   Blood: x / Protein: 30 mg/dL / Nitrite: Negative   Leuk Esterase: Moderate / RBC: 371 /hpf / WBC 37 /HPF   Sq Epi: x / Non Sq Epi: 8 /hpf / Bacteria: Negative        TELEMETRY:     EKG personally reviewed : sinus arrhythmia    IMAGING personally reviewed : CT A/P with sBO hernia

## 2021-02-12 NOTE — ED PROVIDER NOTE - PMH
COPD (chronic obstructive pulmonary disease)    CVA (cerebral vascular accident)    Dementia    HLD (hyperlipidemia)    HTN (hypertension)    T2DM (type 2 diabetes mellitus)    UTI (urinary tract infection)

## 2021-02-12 NOTE — H&P ADULT - NSHPPHYSICALEXAM_GEN_ALL_CORE
ICU Vital Signs Last 24 Hrs  T(C): 37.3 (12 Feb 2021 18:31), Max: 37.8 (12 Feb 2021 11:48)  T(F): 99.2 (12 Feb 2021 18:31), Max: 100.1 (12 Feb 2021 11:48)  HR: 101 (12 Feb 2021 20:51) (101 - 114)  BP: 101/59 (12 Feb 2021 20:51) (98/56 - 126/76)  BP(mean): --  ABP: --  ABP(mean): --  RR: 19 (12 Feb 2021 20:51) (19 - 25)  SpO2: 100% (12 Feb 2021 20:51) (95% - 100%)    PHYSICAL EXAM:  GENERAL: NAD, lying in bed comfortably, sleepy but easily arousable  HEAD:  Atraumatic, Normocephalic  EYES: EOMI, PERRL, +cataract   ENT: dry membranes  NECK: Supple, No JVD  CHEST/LUNG: diminishment diffusely  HEART: +RUSB systolic mumur  ABDOMEN: mild TTP, +BS, NG tube output 1.5 L in canister  EXTREMITIES:  no edema  NERVOUS SYSTEM: alert, oriented x 1, GROVE to noxious stimuli, follows simple commands, pupils equals  SKIN: No rashes or lesions ICU Vital Signs Last 24 Hrs  T(C): 37.3 (12 Feb 2021 18:31), Max: 37.8 (12 Feb 2021 11:48)  T(F): 99.2 (12 Feb 2021 18:31), Max: 100.1 (12 Feb 2021 11:48)  HR: 101 (12 Feb 2021 20:51) (101 - 114)  BP: 101/59 (12 Feb 2021 20:51) (98/56 - 126/76)  RR: 19 (12 Feb 2021 20:51) (19 - 25)  SpO2: 100% (12 Feb 2021 20:51) (95% - 100%)    PHYSICAL EXAM:  GENERAL: NAD, lying in bed comfortably, sleepy but easily arousable  HEAD:  Atraumatic, Normocephalic  EYES: EOMI, PERRL, +cataract   ENT: dry membranes  NECK: Supple, No JVD  CHEST/LUNG: diminishment diffusely  HEART: +RUSB systolic mumur  ABDOMEN: mild TTP, +BS, NG tube output 1.5 L in canister  EXTREMITIES:  no edema  NERVOUS SYSTEM: alert, oriented x 1, GROVE to noxious stimuli, follows simple commands, pupils equals  SKIN: No rashes or lesions ICU Vital Signs Last 24 Hrs  T(C): 37.3 (12 Feb 2021 18:31), Max: 37.8 (12 Feb 2021 11:48)  T(F): 99.2 (12 Feb 2021 18:31), Max: 100.1 (12 Feb 2021 11:48)  HR: 101 (12 Feb 2021 20:51) (101 - 114)  BP: 101/59 (12 Feb 2021 20:51) (98/56 - 126/76)  RR: 19 (12 Feb 2021 20:51) (19 - 25)  SpO2: 100% (12 Feb 2021 20:51) (95% - 100%)    PHYSICAL EXAM:  GENERAL: NAD, lying in bed comfortably, sleepy but easily arousable  HEAD:  Atraumatic, Normocephalic  EYES: EOMI, PERRL, +cataract   ENT: dry membranes  NECK: Supple, No JVD  CHEST/LUNG: diminishment diffusely  HEART: +RUSB systolic mumur  ABDOMEN: mild TTP, +BS, NG tube output 1.5 L in canister  EXTREMITIES:  no edema  NERVOUS SYSTEM: alert, oriented x 1, GROVE to noxious stimuli, follows simple commands, pupils equals  SKIN: No rashes or lesions  PSYCH: no si no hi, irritable.

## 2021-02-12 NOTE — ED CLERICAL - NS ED CLERK NOTE PRE-ARRIVAL INFORMATION; ADDITIONAL PRE-ARRIVAL INFORMATION
CC/Reason For referral: pneumonia; vomiting; IV Fluids  Preferred Consultant(if applicable):  Who admits for you (if needed):  Do you have documents you would like to fax over?  Would you still like to speak to an ED attending? YES

## 2021-02-12 NOTE — ED ADULT NURSE REASSESSMENT NOTE - NS ED NURSE REASSESS COMMENT FT1
NG tube placed to left nare. 1200 cc dark drainage noted. patient tolerated well. placed on intermittient suction. will continue to monitor. denies pain at this time.

## 2021-02-12 NOTE — H&P ADULT - ASSESSMENT
75M hx of dementia, DM2, HTN, ?HF, HLD, recent admission for COPD pneumonia requiring intubation (11/2020 [place]), COVID in January 2021 (not intubated) currently being treated for pneumonia presents from nursing home for abdominal pain and vomiting, found to have multifocal PNA (unclear if sequela of prior COVID) and high-grade SBO

## 2021-02-12 NOTE — ED PROVIDER NOTE - CONSTITUTIONAL, MLM
normal... Elderly male, awake, alert, oriented to person, place, time/situation and in no apparent distress.

## 2021-02-12 NOTE — H&P ADULT - NSHPREVIEWOFSYSTEMS_GEN_ALL_CORE

## 2021-02-12 NOTE — H&P ADULT - PROBLEM SELECTOR PLAN 1
- per surgery, conservatvie managmeent  - NPO  - NG tube low intermittent  - trend i/o  - GI ppx with Protonix - per surgery, conservative management  - NPO  - NG tube low intermittent  - trend i/o  - GI ppx with Protonix  - Zosyn (2/12 - ) - per surgery, conservative management, precipitant ?hernia - no known prior abdominal surgeries  - NPO  - NG tube low intermittent  - trend i/o  - GI ppx with Protonix  - Zosyn (2/12 - )

## 2021-02-12 NOTE — H&P ADULT - PROBLEM SELECTOR PLAN 7
- hold meds - restart as indicated - titrate spO2 88-93%  - continue home meds  - unlikely exacerbation, daughter denies cough, phlegm, fevers

## 2021-02-13 DIAGNOSIS — I48.91 UNSPECIFIED ATRIAL FIBRILLATION: ICD-10-CM

## 2021-02-13 DIAGNOSIS — E11.9 TYPE 2 DIABETES MELLITUS WITHOUT COMPLICATIONS: ICD-10-CM

## 2021-02-13 LAB
A1C WITH ESTIMATED AVERAGE GLUCOSE RESULT: 7.5 % — HIGH (ref 4–5.6)
ALBUMIN SERPL ELPH-MCNC: 2.9 G/DL — LOW (ref 3.3–5)
ALP SERPL-CCNC: 92 U/L — SIGNIFICANT CHANGE UP (ref 40–120)
ALT FLD-CCNC: 67 U/L — HIGH (ref 10–45)
ANION GAP SERPL CALC-SCNC: 18 MMOL/L — HIGH (ref 5–17)
APTT BLD: 34.1 SEC — SIGNIFICANT CHANGE UP (ref 27.5–35.5)
AST SERPL-CCNC: 52 U/L — HIGH (ref 10–40)
BASOPHILS # BLD AUTO: 0.08 K/UL — SIGNIFICANT CHANGE UP (ref 0–0.2)
BASOPHILS NFR BLD AUTO: 0.5 % — SIGNIFICANT CHANGE UP (ref 0–2)
BILIRUB SERPL-MCNC: 0.4 MG/DL — SIGNIFICANT CHANGE UP (ref 0.2–1.2)
BLD GP AB SCN SERPL QL: NEGATIVE — SIGNIFICANT CHANGE UP
BUN SERPL-MCNC: 79 MG/DL — HIGH (ref 7–23)
CALCIUM SERPL-MCNC: 8.8 MG/DL — SIGNIFICANT CHANGE UP (ref 8.4–10.5)
CHLORIDE SERPL-SCNC: 103 MMOL/L — SIGNIFICANT CHANGE UP (ref 96–108)
CHLORIDE UR-SCNC: <35 MMOL/L — SIGNIFICANT CHANGE UP
CO2 SERPL-SCNC: 23 MMOL/L — SIGNIFICANT CHANGE UP (ref 22–31)
CREAT ?TM UR-MCNC: 56 MG/DL — SIGNIFICANT CHANGE UP
CREAT SERPL-MCNC: 1.91 MG/DL — HIGH (ref 0.5–1.3)
EOSINOPHIL # BLD AUTO: 0.04 K/UL — SIGNIFICANT CHANGE UP (ref 0–0.5)
EOSINOPHIL NFR BLD AUTO: 0.3 % — SIGNIFICANT CHANGE UP (ref 0–6)
ESTIMATED AVERAGE GLUCOSE: 169 MG/DL — HIGH (ref 68–114)
FERRITIN SERPL-MCNC: 1302 NG/ML — HIGH (ref 30–400)
GLUCOSE SERPL-MCNC: 149 MG/DL — HIGH (ref 70–99)
HCT VFR BLD CALC: 31.9 % — LOW (ref 39–50)
HCV AB S/CO SERPL IA: 0.17 S/CO — SIGNIFICANT CHANGE UP (ref 0–0.99)
HCV AB SERPL-IMP: SIGNIFICANT CHANGE UP
HGB BLD-MCNC: 10 G/DL — LOW (ref 13–17)
IMM GRANULOCYTES NFR BLD AUTO: 0.7 % — SIGNIFICANT CHANGE UP (ref 0–1.5)
INR BLD: 1.22 RATIO — HIGH (ref 0.88–1.16)
IRON SATN MFR SERPL: 10 % — LOW (ref 16–55)
IRON SATN MFR SERPL: 20 UG/DL — LOW (ref 45–165)
LYMPHOCYTES # BLD AUTO: 0.56 K/UL — LOW (ref 1–3.3)
LYMPHOCYTES # BLD AUTO: 3.7 % — LOW (ref 13–44)
MAGNESIUM SERPL-MCNC: 2.4 MG/DL — SIGNIFICANT CHANGE UP (ref 1.6–2.6)
MCHC RBC-ENTMCNC: 22.5 PG — LOW (ref 27–34)
MCHC RBC-ENTMCNC: 31.3 GM/DL — LOW (ref 32–36)
MCV RBC AUTO: 71.8 FL — LOW (ref 80–100)
MONOCYTES # BLD AUTO: 0.81 K/UL — SIGNIFICANT CHANGE UP (ref 0–0.9)
MONOCYTES NFR BLD AUTO: 5.3 % — SIGNIFICANT CHANGE UP (ref 2–14)
NEUTROPHILS # BLD AUTO: 13.65 K/UL — HIGH (ref 1.8–7.4)
NEUTROPHILS NFR BLD AUTO: 89.5 % — HIGH (ref 43–77)
NRBC # BLD: 0 /100 WBCS — SIGNIFICANT CHANGE UP (ref 0–0)
OSMOLALITY UR: 531 MOS/KG — SIGNIFICANT CHANGE UP (ref 300–900)
PHOSPHATE 24H UR-MCNC: 59.9 MG/DL — SIGNIFICANT CHANGE UP
PHOSPHATE SERPL-MCNC: 4.2 MG/DL — SIGNIFICANT CHANGE UP (ref 2.5–4.5)
PLATELET # BLD AUTO: 326 K/UL — SIGNIFICANT CHANGE UP (ref 150–400)
POTASSIUM SERPL-MCNC: 4.6 MMOL/L — SIGNIFICANT CHANGE UP (ref 3.5–5.3)
POTASSIUM SERPL-SCNC: 4.6 MMOL/L — SIGNIFICANT CHANGE UP (ref 3.5–5.3)
POTASSIUM UR-SCNC: 34 MMOL/L — SIGNIFICANT CHANGE UP
PROT SERPL-MCNC: 6.8 G/DL — SIGNIFICANT CHANGE UP (ref 6–8.3)
PROTHROM AB SERPL-ACNC: 14.5 SEC — HIGH (ref 10.6–13.6)
RBC # BLD: 4.44 M/UL — SIGNIFICANT CHANGE UP (ref 4.2–5.8)
RBC # FLD: 20.9 % — HIGH (ref 10.3–14.5)
RH IG SCN BLD-IMP: POSITIVE — SIGNIFICANT CHANGE UP
SODIUM SERPL-SCNC: 144 MMOL/L — SIGNIFICANT CHANGE UP (ref 135–145)
SODIUM UR-SCNC: 37 MMOL/L — SIGNIFICANT CHANGE UP
TIBC SERPL-MCNC: 204 UG/DL — LOW (ref 220–430)
UIBC SERPL-MCNC: 184 UG/DL — SIGNIFICANT CHANGE UP (ref 110–370)
UUN UR-MCNC: 1068 MG/DL — SIGNIFICANT CHANGE UP
WBC # BLD: 15.25 K/UL — HIGH (ref 3.8–10.5)
WBC # FLD AUTO: 15.25 K/UL — HIGH (ref 3.8–10.5)

## 2021-02-13 PROCEDURE — 93010 ELECTROCARDIOGRAM REPORT: CPT

## 2021-02-13 PROCEDURE — 99233 SBSQ HOSP IP/OBS HIGH 50: CPT | Mod: CS,GC

## 2021-02-13 PROCEDURE — 71045 X-RAY EXAM CHEST 1 VIEW: CPT | Mod: 26

## 2021-02-13 RX ORDER — HEPARIN SODIUM 5000 [USP'U]/ML
6500 INJECTION INTRAVENOUS; SUBCUTANEOUS EVERY 6 HOURS
Refills: 0 | Status: DISCONTINUED | OUTPATIENT
Start: 2021-02-13 | End: 2021-02-21

## 2021-02-13 RX ORDER — AMIODARONE HYDROCHLORIDE 400 MG/1
1 TABLET ORAL
Qty: 900 | Refills: 0 | Status: DISCONTINUED | OUTPATIENT
Start: 2021-02-13 | End: 2021-02-14

## 2021-02-13 RX ORDER — AMIODARONE HYDROCHLORIDE 400 MG/1
150 TABLET ORAL ONCE
Refills: 0 | Status: COMPLETED | OUTPATIENT
Start: 2021-02-13 | End: 2021-02-13

## 2021-02-13 RX ORDER — AMIODARONE HYDROCHLORIDE 400 MG/1
0.5 TABLET ORAL
Qty: 900 | Refills: 0 | Status: DISCONTINUED | OUTPATIENT
Start: 2021-02-13 | End: 2021-02-14

## 2021-02-13 RX ORDER — METOPROLOL TARTRATE 50 MG
5 TABLET ORAL ONCE
Refills: 0 | Status: COMPLETED | OUTPATIENT
Start: 2021-02-13 | End: 2021-02-13

## 2021-02-13 RX ORDER — METOPROLOL TARTRATE 50 MG
5 TABLET ORAL EVERY 6 HOURS
Refills: 0 | Status: COMPLETED | OUTPATIENT
Start: 2021-02-13 | End: 2021-02-21

## 2021-02-13 RX ORDER — HEPARIN SODIUM 5000 [USP'U]/ML
INJECTION INTRAVENOUS; SUBCUTANEOUS
Qty: 25000 | Refills: 0 | Status: DISCONTINUED | OUTPATIENT
Start: 2021-02-13 | End: 2021-02-21

## 2021-02-13 RX ORDER — DIGOXIN 250 MCG
0.25 TABLET ORAL ONCE
Refills: 0 | Status: COMPLETED | OUTPATIENT
Start: 2021-02-13 | End: 2021-02-13

## 2021-02-13 RX ORDER — HEPARIN SODIUM 5000 [USP'U]/ML
3000 INJECTION INTRAVENOUS; SUBCUTANEOUS EVERY 6 HOURS
Refills: 0 | Status: DISCONTINUED | OUTPATIENT
Start: 2021-02-13 | End: 2021-02-21

## 2021-02-13 RX ADMIN — Medication 5 MILLIGRAM(S): at 22:11

## 2021-02-13 RX ADMIN — BUDESONIDE AND FORMOTEROL FUMARATE DIHYDRATE 2 PUFF(S): 160; 4.5 AEROSOL RESPIRATORY (INHALATION) at 18:23

## 2021-02-13 RX ADMIN — HEPARIN SODIUM 1400 UNIT(S)/HR: 5000 INJECTION INTRAVENOUS; SUBCUTANEOUS at 20:04

## 2021-02-13 RX ADMIN — HEPARIN SODIUM 5000 UNIT(S): 5000 INJECTION INTRAVENOUS; SUBCUTANEOUS at 06:16

## 2021-02-13 RX ADMIN — PANTOPRAZOLE SODIUM 40 MILLIGRAM(S): 20 TABLET, DELAYED RELEASE ORAL at 22:11

## 2021-02-13 RX ADMIN — Medication 5 MILLIGRAM(S): at 09:34

## 2021-02-13 RX ADMIN — Medication 2: at 18:48

## 2021-02-13 RX ADMIN — Medication 0.25 MILLIGRAM(S): at 18:22

## 2021-02-13 RX ADMIN — AMIODARONE HYDROCHLORIDE 600 MILLIGRAM(S): 400 TABLET ORAL at 11:14

## 2021-02-13 RX ADMIN — HEPARIN SODIUM 5000 UNIT(S): 5000 INJECTION INTRAVENOUS; SUBCUTANEOUS at 12:28

## 2021-02-13 RX ADMIN — AMIODARONE HYDROCHLORIDE 16.7 MG/MIN: 400 TABLET ORAL at 20:03

## 2021-02-13 RX ADMIN — Medication 1: at 12:28

## 2021-02-13 RX ADMIN — PIPERACILLIN AND TAZOBACTAM 25 GRAM(S): 4; .5 INJECTION, POWDER, LYOPHILIZED, FOR SOLUTION INTRAVENOUS at 08:35

## 2021-02-13 RX ADMIN — PANTOPRAZOLE SODIUM 40 MILLIGRAM(S): 20 TABLET, DELAYED RELEASE ORAL at 01:52

## 2021-02-13 RX ADMIN — PANTOPRAZOLE SODIUM 40 MILLIGRAM(S): 20 TABLET, DELAYED RELEASE ORAL at 11:15

## 2021-02-13 RX ADMIN — PIPERACILLIN AND TAZOBACTAM 25 GRAM(S): 4; .5 INJECTION, POWDER, LYOPHILIZED, FOR SOLUTION INTRAVENOUS at 16:15

## 2021-02-13 RX ADMIN — AMIODARONE HYDROCHLORIDE 33.3 MG/MIN: 400 TABLET ORAL at 13:10

## 2021-02-13 RX ADMIN — Medication 1: at 00:30

## 2021-02-13 RX ADMIN — Medication 5 MILLIGRAM(S): at 09:23

## 2021-02-13 RX ADMIN — BUDESONIDE AND FORMOTEROL FUMARATE DIHYDRATE 2 PUFF(S): 160; 4.5 AEROSOL RESPIRATORY (INHALATION) at 06:16

## 2021-02-13 NOTE — PROGRESS NOTE ADULT - PROBLEM SELECTOR PLAN 1
New afib with RVR on 2/13 with HR 130s-150s. Given amio bolus and started on amio gtt.  -continue amio load: amio 1mg/min for 6hrs, then 0.5mg/min for 18hrs  -cards consulted, appreciate recs  -monitor on tele

## 2021-02-13 NOTE — CONSULT NOTE ADULT - SUBJECTIVE AND OBJECTIVE BOX
CHIEF COMPLAINT:  Afib     HISTORY OF PRESENT ILLNESS:    75 year old male with hx of dementia, DM2, HTN, ?HF, HLD, recent admission for COVID requiring intubation (11/2020 [place]), currently treated for pneumonia presents from nursing home for abdominal pain x 1 week and vomiting x 1 day. History obtained from chart review and son over the phone, due to patient's mental status (AAOx1 at baseline since intubation in Nov 2020 due COPD with superimposed pneumonia), which son confirms. States patient has been having abdominal pain over last week or so, and then had nausea and vomiting. Unsure about fevers, but not mentioned by nursing home staff. Used 2 L at nursing most days, has been getting rehab at the nursing home. This week has not been participating as well. Per daughter, patient has not had any other issues apart from last week, otherwise at baseline since November. Per daughter no cough or SOB, or phlegm that she knows of. No known surgeries previously per daughter    In ED, patient was tachycardic to 114, satting at 95% on 3L NC. Lab significant for leukocytosis of 15.5, v lactate of 2.3, with +UA. CT A/P shows multifocal PNA (known from January), high-grade SBO with TP LUQ, c/f internal hernia. NGT was placed with immediate return of 1.1L stomach content. Received 3l fluid boluses and meropenem  I was contacted earlier  today as pt went into Afib with RVR HR in 130s-150s. He had been given lopressor IV x2 w/o improvement and was subsequently i advised to give 150 mg IVPB amio with maintenance dose. He was transferred to tele floor. Pt asymptomatic entire time. Feels thirsty, no other complaints. He was given digoxin 0.25 mg IV x 1 as well a short while ago. His HR now 100-120s.       PAST MEDICAL & SURGICAL HISTORY:  COPD (chronic obstructive pulmonary disease)    CVA (cerebral vascular accident)    HLD (hyperlipidemia)    T2DM (type 2 diabetes mellitus)    HTN (hypertension)    UTI (urinary tract infection)    Dementia    No significant past surgical history            MEDICATIONS:  aMIOdarone Infusion 1 mG/Min IV Continuous <Continuous>  aMIOdarone Infusion 0.5 mG/Min IV Continuous <Continuous>  heparin   Injectable 5000 Unit(s) SubCutaneous every 8 hours    piperacillin/tazobactam IVPB.. 3.375 Gram(s) IV Intermittent every 8 hours    ALBUTerol    90 MICROgram(s) HFA Inhaler 1 Puff(s) Inhalation every 4 hours PRN  budesonide 160 MICROgram(s)/formoterol 4.5 MICROgram(s) Inhaler 2 Puff(s) Inhalation two times a day    OLANZapine Injectable 2 milliGRAM(s) IntraMuscular every 6 hours PRN    pantoprazole  Injectable 40 milliGRAM(s) IV Push every 12 hours    dextrose 40% Gel 15 Gram(s) Oral once  dextrose 50% Injectable 25 Gram(s) IV Push once  dextrose 50% Injectable 12.5 Gram(s) IV Push once  dextrose 50% Injectable 25 Gram(s) IV Push once  glucagon  Injectable 1 milliGRAM(s) IntraMuscular once  insulin lispro (ADMELOG) corrective regimen sliding scale   SubCutaneous every 6 hours    dextrose 5%. 1000 milliLiter(s) IV Continuous <Continuous>  dextrose 5%. 1000 milliLiter(s) IV Continuous <Continuous>      FAMILY HISTORY:  No pertinent family history in first degree relatives        SOCIAL HISTORY:    [ ] Non-smoker  [ ] Smoker  [ ] Alcohol    Allergies    No Known Allergies    Intolerances    	    REVIEW OF SYSTEMS:  CONSTITUTIONAL: No fever, weight loss, or fatigue  EYES: No eye pain, visual disturbances, or discharge  ENMT:  No difficulty hearing, tinnitus, vertigo; No sinus or throat pain  NECK: No pain or stiffness  RESPIRATORY: No cough, wheezing, chills or hemoptysis; No Shortness of Breath  CARDIOVASCULAR: No chest pain, palpitations, passing out, dizziness, or leg swelling  GASTROINTESTINAL:+ abdominal or epigastric pain. No nausea, vomiting, or hematemesis; No diarrhea or constipation. No melena or hematochezia.  GENITOURINARY: No dysuria, frequency, hematuria, or incontinence  NEUROLOGICAL: No headaches, memory loss, loss of strength, numbness, or tremors  SKIN: No itching, burning, rashes, or lesions   LYMPH Nodes: No enlarged glands  ENDOCRINE: No heat or cold intolerance; No hair loss  MUSCULOSKELETAL: No joint pain or swelling; No muscle, back, or extremity pain  PSYCHIATRIC: No depression, anxiety, mood swings, or difficulty sleeping  HEME/LYMPH: No easy bruising, or bleeding gums  ALLERY AND IMMUNOLOGIC: No hives or eczema	    [ ] All others negative	  [ ] Unable to obtain    PHYSICAL EXAM:  T(C): 37.1 (02-13-21 @ 16:09), Max: 37.3 (02-13-21 @ 10:45)  HR: 120 (02-13-21 @ 18:20) (83 - 147)  BP: 134/77 (02-13-21 @ 18:20) (89/53 - 146/65)  RR: 20 (02-13-21 @ 16:09) (18 - 20)  SpO2: 97% (02-13-21 @ 16:09) (91% - 100%)  Wt(kg): --  I&O's Summary    12 Feb 2021 07:01  -  13 Feb 2021 07:00  --------------------------------------------------------  IN: 0 mL / OUT: 550 mL / NET: -550 mL    13 Feb 2021 07:01  -  13 Feb 2021 18:54  --------------------------------------------------------  IN: 399.8 mL / OUT: 725 mL / NET: -325.2 mL        Appearance: NAD +NGT   HEENT:   Normal oral mucosa, PERRL, EOMI	  Lymphatic: No lymphadenopathy  Cardiovascular: Irregular  S1 S2, No JVD, No murmurs, No edema  Respiratory: Lungs clear to auscultation	  Psychiatry: A & O x 3, Mood & affect appropriate  Gastrointestinal:  Soft, Non-tender, + BS	  Skin: No rashes, No ecchymoses, No cyanosis	  Neurologic: Non-focal  Extremities: Normal range of motion, No clubbing, cyanosis or edema  Vascular: Peripheral pulses palpable 2+ bilaterally    TELEMETRY: 	-120s    ECG:  sinus tach, PACs, lateral TWi  	  RADIOLOGY:  < from: CT Abdomen and Pelvis No Cont (02.12.21 @ 13:49) >    EXAM:  CT ABDOMEN AND PELVIS                            PROCEDURE DATE:  02/12/2021            INTERPRETATION:  CLINICAL INFORMATION: Recent history of treated pneumonia. Abdominal pain.    COMPARISON: None.    PROCEDURE:  CT of the Abdomen and Pelvis was performed without intravenous contrast.  Intravenous contrast: None.  Oral contrast: None.  Sagittal and coronal reformats were performed.    Evaluation of solid organs and vascular structures is limited on noncontrast examination.    FINDINGS:  LOWER CHEST: Bilateral diffuse patchy opacities and right basilar consolidation consistent with the known multifocal pneumonia. Small pericardial effusion.    LIVER: Focal hepatic steatosis.  BILE DUCTS: Normal caliber.  GALLBLADDER: Contraction limits evaluation.  SPLEEN: Within normal limits.  PANCREAS: Within normal limits.  ADRENALS: Within normal limits.  KIDNEYS/URETERS: Horseshoe kidney. 4 mm nonobstructing calculus in the right moiety. No hydronephrosis.    BLADDER: Within normal limits.  REPRODUCTIVE ORGANS: Prostate is enlarged.    BOWEL: Distended fluid-filled esophagus, stomach and small bowel loops with a transition point in the left upper quadrant (2-61, 602-52). There is a cluster of small bowel loops adjacent to the transition point in the left hemiabdomen lateral to the descending colon, suspicious for internal hernia. No pneumatosis or portal venous gas. Mild mesenteric edema.  PERITONEUM: No ascites.  VESSELS: Atherosclerotic changes.  RETROPERITONEUM/LYMPH NODES: No lymphadenopathy.  ABDOMINAL WALL: Small fat-containing left inguinal hernia.  BONES: Degenerative changes.    IMPRESSION:    Multifocal pneumonia.    High grade small bowel obstruction with the transition point in the left upper quadrant. Cluster of small bowel loops adjacent to the transition point in the left hemiabdomen lateral to the descending colon, suspicious for internal hernia.    These findings were discussed with Dr. Benito at 2/12/2021 2:38 PM by Dr. Hernandez and at February 12, 2021 at 3:36 PM by Dr. Mendez of Radiology with read back confirmation.              IVANIA HERNANDEZ MD; Resident Radiology  This document has been electronically signed.  ADA MENDEZ M.D., ATTENDING RADIOLOGIST  This document has been electronically signed. Feb12 2021  3:36PM    < end of copied text >    OTHER: 	  	  LABS:	 	    CARDIAC MARKERS:        COVID-19 PCR . (02.12.21 @ 12:01)   COVID-19 PCR: Detected: You can help in the fight against COVID-19. [facility] may contact                           10.0   33.22 )-----------( 326      ( 13 Feb 2021 06:30 )             31.9     02-13    144  |  103  |  79<H>  ----------------------------<  149<H>  4.6   |  23  |  1.91<H>    Ca    8.8      13 Feb 2021 06:30  Phos  4.2     02-13  Mg     2.4     02-13    TPro  6.8  /  Alb  2.9<L>  /  TBili  0.4  /  DBili  x   /  AST  52<H>  /  ALT  67<H>  /  AlkPhos  92  02-13    proBNP:   Lipid Profile:   HgA1c:   TSH:

## 2021-02-13 NOTE — CONSULT NOTE ADULT - PROBLEM SELECTOR RECOMMENDATION 9
Cont with amiodarone gtt   s/p Digoxin 02.5 mg IV x 1.   Add Lopressor 5 mg IV q6 hours  Start AC if no contraindications  Check Ddimer. If elevated check CTA chest  Check Trops

## 2021-02-13 NOTE — CHART NOTE - NSCHARTNOTEFT_GEN_A_CORE
Assessment and Recommendation:   · Assessment	  75M hx of dementia, DM2, HTN, ?HF, HLD, recent admission for COVID requiring intubation (11/2020 [place]), currently treated for pneumonia presents from nursing home for abdominal pain and vomiting, found to have multifocal PNA, and high-grade SBO with TP LUQ, c/f internal hernia on CT    Recommendation:  - Given patient's extensive comorbidities, patient is high risk for mortality/complication from surgical intervention  - Recommend nonoperative management with NPO, NGT decompression, IVF resuscitation  - Palliative consult for GOC discussion prior to any invasive intervention  - continue antibiotics for pneumonia  - plan discussed with attending. Dr. Lavelle Shaikh    [id number]    Attending Attestation:   pt seen and examined  agree with above  pt with high grade SBO with dementia and poor pulmonary fxn with recurrent covid pna/o2 dependent  pt is poor operative candidate  agree with npo, ngt, ivf, abx per med/ID  condition guarded, prognosis is poor  pt needs urgent GOC/palliative evaluation to determine overall plan of care  will follow with you  d/w team in detail.      I was physically present for the key portions of the evaluation and management (E/M) service provided.  I agree with the above history, physical, and plan which I have reviewed and edited where appropriate.

## 2021-02-13 NOTE — PROGRESS NOTE ADULT - PROBLEM SELECTOR PLAN 10
- NPO  - heparin subQ  - eventual PT eval  - DM2 - home regimen 10u Basaglar qHS and SSI at nursing home - will give SSI here given NPO and poor po intake  - full code per daughter

## 2021-02-13 NOTE — PROGRESS NOTE ADULT - PROBLEM SELECTOR PLAN 3
Cr 0.68 Jan 2021, upon admission SCr 3. Now downtrending s/p 3L NS. Likely prerenal.   - bladder scan q8, trend i/o

## 2021-02-13 NOTE — PROGRESS NOTE ADULT - SUBJECTIVE AND OBJECTIVE BOX
PROGRESS NOTE:   Authored by Dr. Rebecca Gutierrez MD, Pager [phone] [facility], [id number] [facility]     Patient is a 75y old  Male who presents with a chief complaint of abdominal pain (2021 20:03)      SUBJECTIVE / OVERNIGHT EVENTS: No events overnight.    ADDITIONAL REVIEW OF SYSTEMS: Denies fevers, chills, n/v.    MEDICATIONS  (STANDING):  budesonide 160 MICROgram(s)/formoterol 4.5 MICROgram(s) Inhaler 2 Puff(s) Inhalation two times a day  dextrose 40% Gel 15 Gram(s) Oral once  dextrose 5%. 1000 milliLiter(s) (50 mL/Hr) IV Continuous <Continuous>  dextrose 5%. 1000 milliLiter(s) (100 mL/Hr) IV Continuous <Continuous>  dextrose 50% Injectable 25 Gram(s) IV Push once  dextrose 50% Injectable 12.5 Gram(s) IV Push once  dextrose 50% Injectable 25 Gram(s) IV Push once  glucagon  Injectable 1 milliGRAM(s) IntraMuscular once  heparin   Injectable 5000 Unit(s) SubCutaneous every 8 hours  insulin lispro (ADMELOG) corrective regimen sliding scale   SubCutaneous every 6 hours  pantoprazole  Injectable 40 milliGRAM(s) IV Push every 12 hours  piperacillin/tazobactam IVPB.. 3.375 Gram(s) IV Intermittent every 8 hours    MEDICATIONS  (PRN):  ALBUTerol    90 MICROgram(s) HFA Inhaler 1 Puff(s) Inhalation every 4 hours PRN Shortness of Breath and/or Wheezing  OLANZapine Injectable 2 milliGRAM(s) IntraMuscular every 6 hours PRN agitation      CAPILLARY BLOOD GLUCOSE      POCT Blood Glucose.: 191 mg/dL (2021 00:23)  POCT Blood Glucose.: 186 mg/dL (2021 12:03)    I&O's Summary    2021 07:01  -  2021 07:00  --------------------------------------------------------  IN: 0 mL / OUT: 550 mL / NET: -550 mL    2021 07:01  -  2021 11:38  --------------------------------------------------------  IN: 0 mL / OUT: 0 mL / NET: 0 mL        PHYSICAL EXAM:  Vital Signs Last 24 Hrs  T(C): 37.3 (2021 10:45), Max: 37.8 (2021 11:48)  T(F): 99.1 (2021 10:45), Max: 100.1 (2021 11:48)  HR: 147 (2021 10:45) (83 - 147)  BP: 106/66 (2021 10:45) (89/53 - 142/90)  BP(mean): --  RR: 20 (2021 10:45) (18 - 25)  SpO2: 91% (2021 10:45) (91% - 100%)    CONSTITUTIONAL: NAD, lying in bed comfortably  RESPIRATORY: Normal respiratory effort; CTABL  CARDIOVASCULAR: Regular rate and rhythm, normal S1 and S2, no murmur/rub/gallop  ABDOMEN: Soft, nondistended, nontender to palpation, normoactive bowel sounds, no rebound/guarding  MUSCLOSKELETAL: no joint swelling or tenderness to palpation, FROM all extremities  NEURO: AAOx3 to person, place, and time, full and equal strength all extremities   EXTREMITIES: no pedal edema    LABS:                        10.0   15.25 )-----------( 326      ( 2021 06:30 )             31.9         144  |  103  |  79<H>  ----------------------------<  149<H>  4.6   |  23  |  1.91<H>    Ca    8.8      2021 06:30  Phos  4.2     -  Mg     2.4         TPro  6.8  /  Alb  2.9<L>  /  TBili  0.4  /  DBili  x   /  AST  52<H>  /  ALT  67<H>  /  AlkPhos  92  13    PT/INR - ( 2021 06:30 )   PT: 14.5 sec;   INR: 1.22 ratio         PTT - ( 2021 06:30 )  PTT:34.1 sec      Urinalysis Basic - ( 2021 12:01 )    Color: Yellow / Appearance: Slightly Turbid / S.025 / pH: x  Gluc: x / Ketone: Negative  / Bili: Negative / Urobili: Negative   Blood: x / Protein: 30 mg/dL / Nitrite: Negative   Leuk Esterase: Moderate / RBC: 371 /hpf / WBC 37 /HPF   Sq Epi: x / Non Sq Epi: 8 /hpf / Bacteria: Negative          RADIOLOGY & ADDITIONAL TESTS:     PROGRESS NOTE:   Authored by Dr. Rebecca Gutierrez MD, Pager [phone] [facility], [id number] [facility]     Patient is a 75y old  Male who presents with a chief complaint of abdominal pain (2021 20:03)      SUBJECTIVE / OVERNIGHT EVENTS: This AM pt went into Afib with RVR HR in 130s-150s. Given lopressor IV x2 w/o improvement and was subsequently loaded on amio. Pt continued to have rapid afib and was transferred to tele floor. Pt asymptomatic entire time. Feels thirsty, no other complaints.    ADDITIONAL REVIEW OF SYSTEMS: Denies fevers, chills, n/v, pain.    MEDICATIONS  (STANDING):  budesonide 160 MICROgram(s)/formoterol 4.5 MICROgram(s) Inhaler 2 Puff(s) Inhalation two times a day  dextrose 40% Gel 15 Gram(s) Oral once  dextrose 5%. 1000 milliLiter(s) (50 mL/Hr) IV Continuous <Continuous>  dextrose 5%. 1000 milliLiter(s) (100 mL/Hr) IV Continuous <Continuous>  dextrose 50% Injectable 25 Gram(s) IV Push once  dextrose 50% Injectable 12.5 Gram(s) IV Push once  dextrose 50% Injectable 25 Gram(s) IV Push once  glucagon  Injectable 1 milliGRAM(s) IntraMuscular once  heparin   Injectable 5000 Unit(s) SubCutaneous every 8 hours  insulin lispro (ADMELOG) corrective regimen sliding scale   SubCutaneous every 6 hours  pantoprazole  Injectable 40 milliGRAM(s) IV Push every 12 hours  piperacillin/tazobactam IVPB.. 3.375 Gram(s) IV Intermittent every 8 hours    MEDICATIONS  (PRN):  ALBUTerol    90 MICROgram(s) HFA Inhaler 1 Puff(s) Inhalation every 4 hours PRN Shortness of Breath and/or Wheezing  OLANZapine Injectable 2 milliGRAM(s) IntraMuscular every 6 hours PRN agitation      CAPILLARY BLOOD GLUCOSE      POCT Blood Glucose.: 191 mg/dL (2021 00:23)  POCT Blood Glucose.: 186 mg/dL (2021 12:03)    I&O's Summary    2021 07:01  -  2021 07:00  --------------------------------------------------------  IN: 0 mL / OUT: 550 mL / NET: -550 mL    2021 07:01  -  2021 11:38  --------------------------------------------------------  IN: 0 mL / OUT: 0 mL / NET: 0 mL        PHYSICAL EXAM:  Vital Signs Last 24 Hrs  T(C): 37.3 (2021 10:45), Max: 37.8 (2021 11:48)  T(F): 99.1 (2021 10:45), Max: 100.1 (2021 11:48)  HR: 147 (2021 10:45) (83 - 147)  BP: 106/66 (2021 10:45) (89/53 - 142/90)  BP(mean): --  RR: 20 (2021 10:45) (18 - 25)  SpO2: 91% (2021 10:45) (91% - 100%)    CONSTITUTIONAL: NAD, lying in bed comfortably  RESPIRATORY: Normal respiratory effort; CTABL posteriorly  CARDIOVASCULAR: Regular rate and rhythm, normal S1 and S2, no murmur/rub/gallop  ABDOMEN: Soft, distended, nontender to palpation, normoactive bowel sounds, no rebound/guarding  MUSCLOSKELETAL: no joint swelling or tenderness to palpation, FROM all extremities  NEURO: AAOx1-2   EXTREMITIES: no pedal edema    LABS:                        10.0   15.25 )-----------( 326      ( 2021 06:30 )             31.9     02-13    144  |  103  |  79<H>  ----------------------------<  149<H>  4.6   |  23  |  1.91<H>    Ca    8.8      2021 06:30  Phos  4.2     02-13  Mg     2.4     02-13    TPro  6.8  /  Alb  2.9<L>  /  TBili  0.4  /  DBili  x   /  AST  52<H>  /  ALT  67<H>  /  AlkPhos  92      PT/INR - ( 2021 06:30 )   PT: 14.5 sec;   INR: 1.22 ratio         PTT - ( 2021 06:30 )  PTT:34.1 sec      Urinalysis Basic - ( 2021 12:01 )    Color: Yellow / Appearance: Slightly Turbid / S.025 / pH: x  Gluc: x / Ketone: Negative  / Bili: Negative / Urobili: Negative   Blood: x / Protein: 30 mg/dL / Nitrite: Negative   Leuk Esterase: Moderate / RBC: 371 /hpf / WBC 37 /HPF   Sq Epi: x / Non Sq Epi: 8 /hpf / Bacteria: Negative          RADIOLOGY & ADDITIONAL TESTS:

## 2021-02-13 NOTE — CONSULT NOTE ADULT - ASSESSMENT
75M hx of dementia, DM2, HTN, ?HF, HLD, recent admission for COVID requiring intubation (11/2020 [place]), currently treated for pneumonia presents from nursing home for abdominal pain and vomiting, found to have multifocal PNA, and high-grade SBO with TP LUQ, c/f internal hernia on CT. Complicated by Afib RVR

## 2021-02-13 NOTE — PROGRESS NOTE ADULT - PROBLEM SELECTOR PLAN 2
- per surgery, conservative management, precipitant ?hernia - no known prior abdominal surgeries  - NPO  - NG tube low intermittent  - trend i/o  - GI ppx with Protonix  - continue Zosyn (2/12 - )

## 2021-02-13 NOTE — PROGRESS NOTE ADULT - PROBLEM SELECTOR PLAN 5
- baseline 2L NC at home  - satting well 4L here  - multifocal PNA known from prior admission  - unclear if warrants treatment but due to possible aspiration, will cover until cultures negative  - f/u bcx  - O2 sat 88-93%

## 2021-02-13 NOTE — PROGRESS NOTE ADULT - ASSESSMENT
75M PMH dementia, DM2, HTN, ?HF, HLD, recent admission for COPD pneumonia requiring intubation (11/2020 [place]), COVID in January 2021 (not intubated) currently being treated for pneumonia presents from nursing home for abdominal pain and vomiting, found to have multifocal PNA (unclear if sequela of prior COVID) and high-grade SBO. Course c/b new afib with RVR.

## 2021-02-13 NOTE — PROGRESS NOTE ADULT - PROBLEM SELECTOR PLAN 6
- FOBT+ but at baseline - unclear significance given interuser variability  - Hb 9.8 in Jan 2021  - active T&S  - tx Hb < 7 or if hemodynamic instability

## 2021-02-13 NOTE — PROVIDER CONTACT NOTE (OTHER) - ASSESSMENT
Hr 132 other vss. Hr 132 other vss. pt denies chest pain, sob or palpitations. pt A&Ox3, disoriented to situation at times.

## 2021-02-14 LAB
ALBUMIN SERPL ELPH-MCNC: 3 G/DL — LOW (ref 3.3–5)
ALP SERPL-CCNC: 96 U/L — SIGNIFICANT CHANGE UP (ref 40–120)
ALT FLD-CCNC: 55 U/L — HIGH (ref 10–45)
ANION GAP SERPL CALC-SCNC: 14 MMOL/L — SIGNIFICANT CHANGE UP (ref 5–17)
APTT BLD: 125.2 SEC — CRITICAL HIGH (ref 27.5–35.5)
APTT BLD: 52.5 SEC — HIGH (ref 27.5–35.5)
APTT BLD: 71.6 SEC — HIGH (ref 27.5–35.5)
APTT BLD: 95.6 SEC — HIGH (ref 27.5–35.5)
AST SERPL-CCNC: 28 U/L — SIGNIFICANT CHANGE UP (ref 10–40)
BILIRUB SERPL-MCNC: 0.6 MG/DL — SIGNIFICANT CHANGE UP (ref 0.2–1.2)
BUN SERPL-MCNC: 45 MG/DL — HIGH (ref 7–23)
CALCIUM SERPL-MCNC: 8.8 MG/DL — SIGNIFICANT CHANGE UP (ref 8.4–10.5)
CHLORIDE SERPL-SCNC: 107 MMOL/L — SIGNIFICANT CHANGE UP (ref 96–108)
CO2 SERPL-SCNC: 25 MMOL/L — SIGNIFICANT CHANGE UP (ref 22–31)
CREAT SERPL-MCNC: 1.1 MG/DL — SIGNIFICANT CHANGE UP (ref 0.5–1.3)
CULTURE RESULTS: SIGNIFICANT CHANGE UP
D DIMER BLD IA.RAPID-MCNC: 1711 NG/ML DDU — HIGH
GLUCOSE SERPL-MCNC: 216 MG/DL — HIGH (ref 70–99)
HCT VFR BLD CALC: 33.7 % — LOW (ref 39–50)
HCT VFR BLD CALC: 33.9 % — LOW (ref 39–50)
HGB BLD-MCNC: 10.2 G/DL — LOW (ref 13–17)
HGB BLD-MCNC: 10.4 G/DL — LOW (ref 13–17)
MAGNESIUM SERPL-MCNC: 2.5 MG/DL — SIGNIFICANT CHANGE UP (ref 1.6–2.6)
MCHC RBC-ENTMCNC: 22.1 PG — LOW (ref 27–34)
MCHC RBC-ENTMCNC: 22.2 PG — LOW (ref 27–34)
MCHC RBC-ENTMCNC: 30.1 GM/DL — LOW (ref 32–36)
MCHC RBC-ENTMCNC: 30.9 GM/DL — LOW (ref 32–36)
MCV RBC AUTO: 71.9 FL — LOW (ref 80–100)
MCV RBC AUTO: 73.5 FL — LOW (ref 80–100)
NRBC # BLD: 0 /100 WBCS — SIGNIFICANT CHANGE UP (ref 0–0)
NRBC # BLD: 0 /100 WBCS — SIGNIFICANT CHANGE UP (ref 0–0)
PHOSPHATE SERPL-MCNC: 2 MG/DL — LOW (ref 2.5–4.5)
PLATELET # BLD AUTO: 303 K/UL — SIGNIFICANT CHANGE UP (ref 150–400)
PLATELET # BLD AUTO: 315 K/UL — SIGNIFICANT CHANGE UP (ref 150–400)
POTASSIUM SERPL-MCNC: 4 MMOL/L — SIGNIFICANT CHANGE UP (ref 3.5–5.3)
POTASSIUM SERPL-SCNC: 4 MMOL/L — SIGNIFICANT CHANGE UP (ref 3.5–5.3)
PROT SERPL-MCNC: 7.1 G/DL — SIGNIFICANT CHANGE UP (ref 6–8.3)
RBC # BLD: 4.61 M/UL — SIGNIFICANT CHANGE UP (ref 4.2–5.8)
RBC # BLD: 4.69 M/UL — SIGNIFICANT CHANGE UP (ref 4.2–5.8)
RBC # FLD: 20.3 % — HIGH (ref 10.3–14.5)
RBC # FLD: 20.7 % — HIGH (ref 10.3–14.5)
SARS-COV-2 IGG SERPL QL IA: POSITIVE
SARS-COV-2 IGM SERPL IA-ACNC: 7.35 INDEX — HIGH
SODIUM SERPL-SCNC: 146 MMOL/L — HIGH (ref 135–145)
SPECIMEN SOURCE: SIGNIFICANT CHANGE UP
TROPONIN T, HIGH SENSITIVITY RESULT: 41 NG/L — SIGNIFICANT CHANGE UP (ref 0–51)
WBC # BLD: 16.73 K/UL — HIGH (ref 3.8–10.5)
WBC # BLD: 17.72 K/UL — HIGH (ref 3.8–10.5)
WBC # FLD AUTO: 16.73 K/UL — HIGH (ref 3.8–10.5)
WBC # FLD AUTO: 17.72 K/UL — HIGH (ref 3.8–10.5)

## 2021-02-14 PROCEDURE — 99233 SBSQ HOSP IP/OBS HIGH 50: CPT | Mod: CS

## 2021-02-14 RX ORDER — ACETAMINOPHEN 500 MG
1000 TABLET ORAL ONCE
Refills: 0 | Status: COMPLETED | OUTPATIENT
Start: 2021-02-14 | End: 2021-02-14

## 2021-02-14 RX ORDER — AMIODARONE HYDROCHLORIDE 400 MG/1
0.5 TABLET ORAL
Qty: 900 | Refills: 0 | Status: DISCONTINUED | OUTPATIENT
Start: 2021-02-14 | End: 2021-02-15

## 2021-02-14 RX ADMIN — BUDESONIDE AND FORMOTEROL FUMARATE DIHYDRATE 2 PUFF(S): 160; 4.5 AEROSOL RESPIRATORY (INHALATION) at 17:57

## 2021-02-14 RX ADMIN — HEPARIN SODIUM 1400 UNIT(S)/HR: 5000 INJECTION INTRAVENOUS; SUBCUTANEOUS at 17:10

## 2021-02-14 RX ADMIN — BUDESONIDE AND FORMOTEROL FUMARATE DIHYDRATE 2 PUFF(S): 160; 4.5 AEROSOL RESPIRATORY (INHALATION) at 05:16

## 2021-02-14 RX ADMIN — Medication 400 MILLIGRAM(S): at 17:56

## 2021-02-14 RX ADMIN — Medication 1: at 17:57

## 2021-02-14 RX ADMIN — HEPARIN SODIUM 1600 UNIT(S)/HR: 5000 INJECTION INTRAVENOUS; SUBCUTANEOUS at 02:36

## 2021-02-14 RX ADMIN — PANTOPRAZOLE SODIUM 40 MILLIGRAM(S): 20 TABLET, DELAYED RELEASE ORAL at 13:34

## 2021-02-14 RX ADMIN — Medication 5 MILLIGRAM(S): at 13:34

## 2021-02-14 RX ADMIN — Medication 5 MILLIGRAM(S): at 05:15

## 2021-02-14 RX ADMIN — Medication 1: at 05:41

## 2021-02-14 RX ADMIN — PIPERACILLIN AND TAZOBACTAM 25 GRAM(S): 4; .5 INJECTION, POWDER, LYOPHILIZED, FOR SOLUTION INTRAVENOUS at 07:55

## 2021-02-14 RX ADMIN — HEPARIN SODIUM 1400 UNIT(S)/HR: 5000 INJECTION INTRAVENOUS; SUBCUTANEOUS at 10:45

## 2021-02-14 RX ADMIN — PIPERACILLIN AND TAZOBACTAM 25 GRAM(S): 4; .5 INJECTION, POWDER, LYOPHILIZED, FOR SOLUTION INTRAVENOUS at 00:14

## 2021-02-14 RX ADMIN — AMIODARONE HYDROCHLORIDE 16.7 MG/MIN: 400 TABLET ORAL at 07:54

## 2021-02-14 RX ADMIN — HEPARIN SODIUM 3000 UNIT(S): 5000 INJECTION INTRAVENOUS; SUBCUTANEOUS at 02:44

## 2021-02-14 RX ADMIN — Medication 2: at 13:34

## 2021-02-14 RX ADMIN — PIPERACILLIN AND TAZOBACTAM 25 GRAM(S): 4; .5 INJECTION, POWDER, LYOPHILIZED, FOR SOLUTION INTRAVENOUS at 16:09

## 2021-02-14 RX ADMIN — Medication 1: at 00:13

## 2021-02-14 RX ADMIN — AMIODARONE HYDROCHLORIDE 16.7 MG/MIN: 400 TABLET ORAL at 15:00

## 2021-02-14 RX ADMIN — Medication 5 MILLIGRAM(S): at 17:57

## 2021-02-14 RX ADMIN — HEPARIN SODIUM 1400 UNIT(S)/HR: 5000 INJECTION INTRAVENOUS; SUBCUTANEOUS at 23:46

## 2021-02-14 NOTE — PROGRESS NOTE ADULT - SUBJECTIVE AND OBJECTIVE BOX
Subjective: Patient seen and examined. No new events except as noted.     REVIEW OF SYSTEMS:    CONSTITUTIONAL:+ weakness, fevers or chills  EYES/ENT: No visual changes;  No vertigo or throat pain   NECK: No pain or stiffness  RESPIRATORY: No cough, wheezing, hemoptysis; No shortness of breath  CARDIOVASCULAR: No chest pain or palpitations  GASTROINTESTINAL: No abdominal or epigastric pain. No nausea, vomiting, or hematemesis; No diarrhea or constipation. No melena or hematochezia.  GENITOURINARY: No dysuria, frequency or hematuria  NEUROLOGICAL: No numbness or weakness  SKIN: No itching, burning, rashes, or lesions   All other review of systems is negative unless indicated above.    MEDICATIONS:  MEDICATIONS  (STANDING):  aMIOdarone Infusion 1 mG/Min (33.3 mL/Hr) IV Continuous <Continuous>  aMIOdarone Infusion 0.5 mG/Min (16.7 mL/Hr) IV Continuous <Continuous>  budesonide 160 MICROgram(s)/formoterol 4.5 MICROgram(s) Inhaler 2 Puff(s) Inhalation two times a day  dextrose 40% Gel 15 Gram(s) Oral once  dextrose 5%. 1000 milliLiter(s) (50 mL/Hr) IV Continuous <Continuous>  dextrose 5%. 1000 milliLiter(s) (100 mL/Hr) IV Continuous <Continuous>  dextrose 50% Injectable 25 Gram(s) IV Push once  dextrose 50% Injectable 12.5 Gram(s) IV Push once  dextrose 50% Injectable 25 Gram(s) IV Push once  glucagon  Injectable 1 milliGRAM(s) IntraMuscular once  heparin  Infusion.  Unit(s)/Hr (14 mL/Hr) IV Continuous <Continuous>  insulin lispro (ADMELOG) corrective regimen sliding scale   SubCutaneous every 6 hours  metoprolol tartrate Injectable 5 milliGRAM(s) IV Push every 6 hours  pantoprazole  Injectable 40 milliGRAM(s) IV Push every 12 hours  piperacillin/tazobactam IVPB.. 3.375 Gram(s) IV Intermittent every 8 hours      PHYSICAL EXAM:  T(C): 36.9 (02-14-21 @ 08:35), Max: 37.2 (02-14-21 @ 00:32)  HR: 112 (02-14-21 @ 08:35) (102 - 139)  BP: 145/77 (02-14-21 @ 08:35) (134/77 - 146/65)  RR: 20 (02-14-21 @ 08:35) (20 - 20)  SpO2: 96% (02-14-21 @ 08:35) (95% - 97%)  Wt(kg): --  I&O's Summary    13 Feb 2021 07:01  -  14 Feb 2021 07:00  --------------------------------------------------------  IN: 913.8 mL / OUT: 1800 mL / NET: -886.2 mL    14 Feb 2021 07:01  -  14 Feb 2021 11:01  --------------------------------------------------------  IN: 0 mL / OUT: 125 mL / NET: -125 mL            Appearance: NAD +NGT   HEENT:   Normal oral mucosa, PERRL, EOMI	  Lymphatic: No lymphadenopathy  Cardiovascular: Irregular  S1 S2, No JVD, No murmurs, No edema  Respiratory: Lungs clear to auscultation	  Psychiatry: A & O x 3, Mood & affect appropriate  Gastrointestinal:  Soft, Non-tender, + BS	  Skin: No rashes, No ecchymoses, No cyanosis	  Neurologic: Non-focal  Extremities: Normal range of motion, No clubbing, cyanosis or edema  Vascular: Peripheral pulses palpable 2+ bilaterally      LABS:    CARDIAC MARKERS:        dD-Dimer Assay, Quantitative: 1711 ng/mL DDU (02.14.21 @ 09:52)                         10.4   16.73 )-----------( 315      ( 14 Feb 2021 09:51 )             33.7     02-14    146<H>  |  107  |  45<H>  ----------------------------<  216<H>  4.0   |  25  |  1.10    Ca    8.8      14 Feb 2021 09:52  Phos  2.0     02-14  Mg     2.5     02-14    TPro  7.1  /  Alb  3.0<L>  /  TBili  0.6  /  DBili  x   /  AST  28  /  ALT  55<H>  /  AlkPhos  96  02-14    proBNP:   Lipid Profile:   HgA1c:   TSH:         TELEMETRY:  Afib 100-120s 	    ECG:  	  RADIOLOGY:     DIAGNOSTIC TESTING:  [ ] Echocardiogram:  [ ]  Catheterization:  [ ] Stress Test:    OTHER:

## 2021-02-14 NOTE — PROGRESS NOTE ADULT - PROBLEM SELECTOR PLAN 1
New afib with RVR on 2/13 with HR 130s-150s. Given amio bolus and started on amio gtt.  -continue amio load: amio 1mg/min for 6hrs, then 0.5mg/min for 18hrs  -cards consulted, appreciate recs  -monitor on tele New afib with RVR on 2/13 with HR 130s-150s. s/p amio load and gtt. Completed 18 hour gtt load on 2/14, s/w cardiology, given SBO, c/w amiodarone gtt at 0.5 mg/min, cardiology to re-assess.  -cards consulted, appreciate recs  -monitor on tele

## 2021-02-14 NOTE — PROGRESS NOTE ADULT - PROBLEM SELECTOR PLAN 2
- per surgery, conservative management, precipitant ?hernia - no known prior abdominal surgeries  - NPO  - NG tube low intermittent  - trend i/o  - GI ppx with Protonix  - continue Zosyn (2/12 - ) - per surgery, conservative management, precipitant ?hernia - no known prior abdominal surgeries  - NPO  - NG tube low intermittent  - trend i/o  - GI ppx with Protonix  - continue Zosyn (2/12 - )  - 2/14: still high output from NG tube, no BMs, c/w NG tube, as per surgery

## 2021-02-14 NOTE — PROGRESS NOTE ADULT - SUBJECTIVE AND OBJECTIVE BOX
Surgery Progress Note    Wants Ice chips. No nausea/emesis. No bowel function.    Vital Signs Last 24 Hrs  T(C): 36.8 (21 @ 05:24), Max: 37.3 (21 @ 10:45)  T(F): 98.3 (21 @ 05:24), Max: 99.1 (21 @ 10:45)  HR: 102 (21 @ 05:24) (102 - 147)  BP: 137/83 (21 @ 05:24) (89/53 - 146/65)  BP(mean): --  RR: 20 (21 @ 05:24) (20 - 20)  SpO2: 97% (21 05:24) (91% - 97%)  I&O's Detail    2021 07:01  -  2021 07:00  --------------------------------------------------------  IN:    Amiodarone: 271.8 mL    Heparin Infusion: 192 mL    IV PiggyBack: 450 mL  Total IN: 913.8 mL    OUT:    Nasogastric/Oral tube (mL): 1525 mL    Oral Fluid: 0 mL    Voided (mL): 275 mL  Total OUT: 1800 mL    Total NET: -886.2 mL      2021 07:01  -  2021 08:21  --------------------------------------------------------  IN:  Total IN: 0 mL    OUT:    Voided (mL): 125 mL  Total OUT: 125 mL    Total NET: -125 mL      PE  Gen: NAD  Abd: soft, nd, nt                        10.2   17.72 )-----------( 303      ( 2021 01:58 )             33.9     02    144  |  103  |  79<H>  ----------------------------<  149<H>  4.6   |  23  |  1.91<H>    Ca    8.8      2021 06:30  Phos  4.2       Mg     2.4         TPro  6.8  /  Alb  2.9<L>  /  TBili  0.4  /  DBili  x   /  AST  52<H>  /  ALT  67<H>  /  AlkPhos  92      PT/INR - ( 2021 06:30 )   PT: 14.5 sec;   INR: 1.22 ratio         PTT - ( 2021 01:58 )  PTT:52.5 sec  CAPILLARY BLOOD GLUCOSE      POCT Blood Glucose.: 200 mg/dL (2021 05:27)  POCT Blood Glucose.: 162 mg/dL (2021 23:53)  POCT Blood Glucose.: 204 mg/dL (2021 18:28)  POCT Blood Glucose.: 197 mg/dL (2021 12:15)    Urinalysis Basic - ( 2021 12:01 )    Color: Yellow / Appearance: Slightly Turbid / S.025 / pH: x  Gluc: x / Ketone: Negative  / Bili: Negative / Urobili: Negative   Blood: x / Protein: 30 mg/dL / Nitrite: Negative   Leuk Esterase: Moderate / RBC: 371 /hpf / WBC 37 /HPF   Sq Epi: x / Non Sq Epi: 8 /hpf / Bacteria: Negative      MEDICATIONS  (STANDING):  aMIOdarone Infusion 1 mG/Min (33.3 mL/Hr) IV Continuous <Continuous>  aMIOdarone Infusion 0.5 mG/Min (16.7 mL/Hr) IV Continuous <Continuous>  budesonide 160 MICROgram(s)/formoterol 4.5 MICROgram(s) Inhaler 2 Puff(s) Inhalation two times a day  dextrose 40% Gel 15 Gram(s) Oral once  dextrose 5%. 1000 milliLiter(s) (50 mL/Hr) IV Continuous <Continuous>  dextrose 5%. 1000 milliLiter(s) (100 mL/Hr) IV Continuous <Continuous>  dextrose 50% Injectable 25 Gram(s) IV Push once  dextrose 50% Injectable 12.5 Gram(s) IV Push once  dextrose 50% Injectable 25 Gram(s) IV Push once  glucagon  Injectable 1 milliGRAM(s) IntraMuscular once  heparin  Infusion.  Unit(s)/Hr (14 mL/Hr) IV Continuous <Continuous>  insulin lispro (ADMELOG) corrective regimen sliding scale   SubCutaneous every 6 hours  metoprolol tartrate Injectable 5 milliGRAM(s) IV Push every 6 hours  pantoprazole  Injectable 40 milliGRAM(s) IV Push every 12 hours  piperacillin/tazobactam IVPB.. 3.375 Gram(s) IV Intermittent every 8 hours    MEDICATIONS  (PRN):  ALBUTerol    90 MICROgram(s) HFA Inhaler 1 Puff(s) Inhalation every 4 hours PRN Shortness of Breath and/or Wheezing  heparin   Injectable 6500 Unit(s) IV Push every 6 hours PRN For aPTT less than 40  heparin   Injectable 3000 Unit(s) IV Push every 6 hours PRN For aPTT between 40 - 57  OLANZapine Injectable 2 milliGRAM(s) IntraMuscular every 6 hours PRN agitation

## 2021-02-14 NOTE — PROGRESS NOTE ADULT - ASSESSMENT
75M PMH dementia, DM2, HTN, ?HF, HLD, recent admission for COPD pneumonia requiring intubation (11/2020 [place]), COVID in January 2021 (not intubated) currently being treated for pneumonia presents from nursing home for abdominal pain and vomiting, found to have multifocal PNA (unclear if sequela of prior COVID) and high-grade SBO. Course c/b new afib with RVR.  75M PMH dementia, DM2, HTN, ?HF, HLD, recent admission for COPD pneumonia requiring intubation (11/2020 [place]), COVID in January 2021 (not intubated) currently being treated for pneumonia presents from nursing home for abdominal pain and vomiting, found to have multifocal PNA (unclear if sequela of prior COVID) and high-grade SBO. Course c/b new afib with RVR s/p amiodarone gtt, on standing Lopressor.

## 2021-02-14 NOTE — PROVIDER CONTACT NOTE (OTHER) - ACTION/TREATMENT ORDERED:
hot pack applied with mild relief. MD Castellanos aware. to assess pt. continue to monitor and notify of worsening symptoms VSS taken. MD Castellanos aware. hot pack applied with mild relief to r-ribs. to assess pt. continue to monitor and notify of worsening symptoms

## 2021-02-14 NOTE — PROGRESS NOTE ADULT - PROBLEM SELECTOR PLAN 1
Cont with amiodarone gtt   s/p Digoxin 02.5 mg IV x 1.    Lopressor 5 mg IV q6 hours  Started Heparin gtt  check CTA chest

## 2021-02-14 NOTE — PROGRESS NOTE ADULT - PROBLEM SELECTOR PLAN 4
- will treat +U/A given inability to assess symptoms  - f/u culture - will treat +U/A given inability to assess symptoms. On Zosyn for SBO as above.  - f/u culture

## 2021-02-14 NOTE — PROGRESS NOTE ADULT - PROBLEM SELECTOR PLAN 10
- NPO  - heparin subQ  - eventual PT eval  - DM2 - home regimen 10u Basaglar qHS and SSI at nursing home - will give SSI here given NPO and poor po intake  - full code per daughter - NPO with NG tube  - heparin subQ  - eventual PT eval  - DM2 - home regimen 10u Basaglar qHS and SSI at nursing home - will give SSI here given NPO and poor po intake  - full code per daughter

## 2021-02-14 NOTE — PROGRESS NOTE ADULT - SUBJECTIVE AND OBJECTIVE BOX
Tayler Faye M.D.  Beeper: [phone] ([facility])/ [phone] ([facility])     INTERNAL MEDICINE PROGRESS NOTE    Patient is a 75y old  Male who presents with a chief complaint of abdominal pain (14 Feb 2021 11:00)    Overnight events: no acute events  Subjective: no complaints    Medications:  MEDICATIONS  (STANDING):  aMIOdarone Infusion 1 mG/Min (33.3 mL/Hr) IV Continuous <Continuous>  aMIOdarone Infusion 0.5 mG/Min (16.7 mL/Hr) IV Continuous <Continuous>  budesonide 160 MICROgram(s)/formoterol 4.5 MICROgram(s) Inhaler 2 Puff(s) Inhalation two times a day  dextrose 40% Gel 15 Gram(s) Oral once  dextrose 5%. 1000 milliLiter(s) (50 mL/Hr) IV Continuous <Continuous>  dextrose 5%. 1000 milliLiter(s) (100 mL/Hr) IV Continuous <Continuous>  dextrose 50% Injectable 25 Gram(s) IV Push once  dextrose 50% Injectable 12.5 Gram(s) IV Push once  dextrose 50% Injectable 25 Gram(s) IV Push once  glucagon  Injectable 1 milliGRAM(s) IntraMuscular once  heparin  Infusion.  Unit(s)/Hr (14 mL/Hr) IV Continuous <Continuous>  insulin lispro (ADMELOG) corrective regimen sliding scale   SubCutaneous every 6 hours  metoprolol tartrate Injectable 5 milliGRAM(s) IV Push every 6 hours  pantoprazole  Injectable 40 milliGRAM(s) IV Push every 12 hours  piperacillin/tazobactam IVPB.. 3.375 Gram(s) IV Intermittent every 8 hours    MEDICATIONS  (PRN):  ALBUTerol    90 MICROgram(s) HFA Inhaler 1 Puff(s) Inhalation every 4 hours PRN Shortness of Breath and/or Wheezing  heparin   Injectable 6500 Unit(s) IV Push every 6 hours PRN For aPTT less than 40  heparin   Injectable 3000 Unit(s) IV Push every 6 hours PRN For aPTT between 40 - 57  OLANZapine Injectable 2 milliGRAM(s) IntraMuscular every 6 hours PRN agitation    Objective:    Vitals: Vital Signs Last 24 Hrs  T(C): 37.1 (02-14-21 @ 13:25), Max: 37.2 (02-14-21 @ 00:32)  T(F): 98.8 (02-14-21 @ 13:25), Max: 98.9 (02-14-21 @ 00:32)  HR: 111 (02-14-21 @ 13:25) (102 - 134)  BP: 131/84 (02-14-21 @ 13:25) (131/84 - 146/65)  BP(mean): --  RR: 20 (02-14-21 @ 13:25) (20 - 20)  SpO2: 96% (02-14-21 @ 13:25) (95% - 97%)                TELE:   overnight AFib 110-120, minor ectopy, 3 beats WCT overnight    I&O's Summary    13 Feb 2021 07:01  -  14 Feb 2021 07:00  --------------------------------------------------------  IN: 913.8 mL / OUT: 1800 mL / NET: -886.2 mL    14 Feb 2021 07:01  -  14 Feb 2021 14:27  --------------------------------------------------------  IN: 0 mL / OUT: 925 mL / NET: -925 mL    PHYSICAL EXAM:  GENERAL: NAD, conversant  CHEST/LUNG: Clear to auscultation bilaterally; No crackles, rhonchi, wheezing, or rubs  HEART: Regular rate and rhythm; No murmurs, rubs, or gallops  ABDOMEN: Soft, Nontender, Nondistended; Bowel sounds present  EXTREMITIES:  2+ Peripheral Pulses, No clubbing, cyanosis, or edema  NERVOUS SYSTEM:  Alert & Oriented, Good concentration                                                                                  LABS:  02-14    146<H>  |  107  |  45<H>  ----------------------------<  216<H>  4.0   |  25  |  1.10  02-13    144  |  103  |  79<H>  ----------------------------<  149<H>  4.6   |  23  |  1.91<H>  02-12    138  |  96  |  93<H>  ----------------------------<  184<H>  4.6   |  24  |  3.24<H>    Ca    8.8      14 Feb 2021 09:52  Ca    8.8      13 Feb 2021 06:30  Ca    8.7      12 Feb 2021 11:58  Phos  2.0     02-14  Mg     2.5     02-14    TPro  7.1  /  Alb  3.0<L>  /  TBili  0.6  /  DBili  x   /  AST  28  /  ALT  55<H>  /  AlkPhos  96  02-14  TPro  6.8  /  Alb  2.9<L>  /  TBili  0.4  /  DBili  x   /  AST  52<H>  /  ALT  67<H>  /  AlkPhos  92  02-13  TPro  6.9  /  Alb  3.0<L>  /  TBili  0.5  /  DBili  x   /  AST  93<H>  /  ALT  80<H>  /  AlkPhos  76  02-12    PT/INR - ( 13 Feb 2021 06:30 )   PT: 14.5 sec;   INR: 1.22 ratio    PTT - ( 14 Feb 2021 09:52 )  PTT:125.2 sec                           10.4   16.73 )-----------( 315      ( 14 Feb 2021 09:51 )             33.7                         10.2   17.72 )-----------( 303      ( 14 Feb 2021 01:58 )             33.9                         10.0   15.25 )-----------( 326      ( 13 Feb 2021 06:30 )             31.9     CAPILLARY BLOOD GLUCOSE  POCT Blood Glucose.: 217 mg/dL (14 Feb 2021 12:51)  POCT Blood Glucose.: 200 mg/dL (14 Feb 2021 05:27)  POCT Blood Glucose.: 162 mg/dL (13 Feb 2021 23:53)  POCT Blood Glucose.: 204 mg/dL (13 Feb 2021 18:28)    RECENT CULTURES:  02-12 @ 15:06  .Urine Clean Catch (Midstream)  --  --  --    10,000 - 49,000 CFU/mL Yeast like cells  <10,000 CFU/ml Normal Urogenital jefferson present  --  02-12 @ 14:59  .Blood Blood-Peripheral  --  --  --    No growth to date.  --     RADIOLOGY & ADDITIONAL TESTS: N  Consultants involved in case: cardio

## 2021-02-14 NOTE — PROGRESS NOTE ADULT - ASSESSMENT
75M hx of dementia, DM2, HTN, ?HF, HLD, recent admission for COVID requiring intubation (11/2020 [place]), currently treated for pneumonia presents from nursing home for abdominal pain and vomiting, found to have multifocal PNA, and high-grade SBO    Recommendation:  - Given patient's extensive comorbidities, patient is high risk for mortality/complication from surgical intervention  - Recommend nonoperative management with NPO, NGT decompression, IVF resuscitation  - D/w Dr. Shaikh    [id number]

## 2021-02-14 NOTE — PROGRESS NOTE ADULT - PROBLEM SELECTOR PLAN 3
Cr 0.68 Jan 2021, upon admission SCr 3. Now downtrending s/p 3L NS. Likely prerenal.   - bladder scan q8, trend i/o Cr 0.68 Jan 2021, upon admission SCr 3. Now downtrending s/p 3L NS. Likely prerenal.   - 2/14: Not retaining on bladder scan, incontinent making I&Os, not fully reliable; SARAH continues to improve. Trend UOP and SCr. If UOP truly dropping, give IVF.

## 2021-02-14 NOTE — PROGRESS NOTE ADULT - ASSESSMENT
75M hx of dementia, DM2, HTN, ?HF, HLD, recent admission for COVID requiring intubation (11/2020 [place]), currently treated for pneumonia presents from nursing home for abdominal pain and vomiting, found to have multifocal PNA, and high-grade SBO with TP LUQ, c/f internal hernia on CT. Complicated by Afib RVR

## 2021-02-14 NOTE — PROVIDER CONTACT NOTE (OTHER) - ASSESSMENT
pt A&Ox2. holding r-side rib, vss. pt A&Ox2. holding r-side rib, vss. no chest pain, palpitations, N/V noted.

## 2021-02-15 LAB
ALBUMIN SERPL ELPH-MCNC: 2.9 G/DL — LOW (ref 3.3–5)
ALP SERPL-CCNC: 103 U/L — SIGNIFICANT CHANGE UP (ref 40–120)
ALT FLD-CCNC: 40 U/L — SIGNIFICANT CHANGE UP (ref 10–45)
ANION GAP SERPL CALC-SCNC: 16 MMOL/L — SIGNIFICANT CHANGE UP (ref 5–17)
APTT BLD: 145.3 SEC — CRITICAL HIGH (ref 27.5–35.5)
APTT BLD: 37.7 SEC — HIGH (ref 27.5–35.5)
AST SERPL-CCNC: 25 U/L — SIGNIFICANT CHANGE UP (ref 10–40)
BASOPHILS # BLD AUTO: 0.03 K/UL — SIGNIFICANT CHANGE UP (ref 0–0.2)
BASOPHILS NFR BLD AUTO: 0.2 % — SIGNIFICANT CHANGE UP (ref 0–2)
BILIRUB SERPL-MCNC: 0.5 MG/DL — SIGNIFICANT CHANGE UP (ref 0.2–1.2)
BUN SERPL-MCNC: 34 MG/DL — HIGH (ref 7–23)
CALCIUM SERPL-MCNC: 8.8 MG/DL — SIGNIFICANT CHANGE UP (ref 8.4–10.5)
CHLORIDE SERPL-SCNC: 109 MMOL/L — HIGH (ref 96–108)
CO2 SERPL-SCNC: 24 MMOL/L — SIGNIFICANT CHANGE UP (ref 22–31)
CREAT SERPL-MCNC: 0.98 MG/DL — SIGNIFICANT CHANGE UP (ref 0.5–1.3)
EOSINOPHIL # BLD AUTO: 0.31 K/UL — SIGNIFICANT CHANGE UP (ref 0–0.5)
EOSINOPHIL NFR BLD AUTO: 1.6 % — SIGNIFICANT CHANGE UP (ref 0–6)
GLUCOSE SERPL-MCNC: 174 MG/DL — HIGH (ref 70–99)
HCT VFR BLD CALC: 32.9 % — LOW (ref 39–50)
HGB BLD-MCNC: 10 G/DL — LOW (ref 13–17)
IMM GRANULOCYTES NFR BLD AUTO: 1.3 % — SIGNIFICANT CHANGE UP (ref 0–1.5)
LYMPHOCYTES # BLD AUTO: 1.32 K/UL — SIGNIFICANT CHANGE UP (ref 1–3.3)
LYMPHOCYTES # BLD AUTO: 6.9 % — LOW (ref 13–44)
MAGNESIUM SERPL-MCNC: 2.4 MG/DL — SIGNIFICANT CHANGE UP (ref 1.6–2.6)
MCHC RBC-ENTMCNC: 22.3 PG — LOW (ref 27–34)
MCHC RBC-ENTMCNC: 30.4 GM/DL — LOW (ref 32–36)
MCV RBC AUTO: 73.3 FL — LOW (ref 80–100)
MONOCYTES # BLD AUTO: 1.26 K/UL — HIGH (ref 0–0.9)
MONOCYTES NFR BLD AUTO: 6.6 % — SIGNIFICANT CHANGE UP (ref 2–14)
NEUTROPHILS # BLD AUTO: 15.99 K/UL — HIGH (ref 1.8–7.4)
NEUTROPHILS NFR BLD AUTO: 83.4 % — HIGH (ref 43–77)
NRBC # BLD: 0 /100 WBCS — SIGNIFICANT CHANGE UP (ref 0–0)
PHOSPHATE SERPL-MCNC: 2 MG/DL — LOW (ref 2.5–4.5)
PLATELET # BLD AUTO: 282 K/UL — SIGNIFICANT CHANGE UP (ref 150–400)
POTASSIUM SERPL-MCNC: 3.8 MMOL/L — SIGNIFICANT CHANGE UP (ref 3.5–5.3)
POTASSIUM SERPL-SCNC: 3.8 MMOL/L — SIGNIFICANT CHANGE UP (ref 3.5–5.3)
PROT SERPL-MCNC: 6.8 G/DL — SIGNIFICANT CHANGE UP (ref 6–8.3)
RBC # BLD: 4.49 M/UL — SIGNIFICANT CHANGE UP (ref 4.2–5.8)
RBC # FLD: 20.7 % — HIGH (ref 10.3–14.5)
SODIUM SERPL-SCNC: 149 MMOL/L — HIGH (ref 135–145)
WBC # BLD: 19.15 K/UL — HIGH (ref 3.8–10.5)
WBC # FLD AUTO: 19.15 K/UL — HIGH (ref 3.8–10.5)

## 2021-02-15 PROCEDURE — 99233 SBSQ HOSP IP/OBS HIGH 50: CPT | Mod: CS,GC

## 2021-02-15 RX ORDER — SODIUM CHLORIDE 9 MG/ML
1000 INJECTION, SOLUTION INTRAVENOUS
Refills: 0 | Status: DISCONTINUED | OUTPATIENT
Start: 2021-02-15 | End: 2021-02-16

## 2021-02-15 RX ADMIN — Medication 5 MILLIGRAM(S): at 23:01

## 2021-02-15 RX ADMIN — HEPARIN SODIUM 1700 UNIT(S)/HR: 5000 INJECTION INTRAVENOUS; SUBCUTANEOUS at 10:49

## 2021-02-15 RX ADMIN — BUDESONIDE AND FORMOTEROL FUMARATE DIHYDRATE 2 PUFF(S): 160; 4.5 AEROSOL RESPIRATORY (INHALATION) at 18:37

## 2021-02-15 RX ADMIN — PIPERACILLIN AND TAZOBACTAM 25 GRAM(S): 4; .5 INJECTION, POWDER, LYOPHILIZED, FOR SOLUTION INTRAVENOUS at 00:17

## 2021-02-15 RX ADMIN — Medication 5 MILLIGRAM(S): at 18:36

## 2021-02-15 RX ADMIN — Medication 5 MILLIGRAM(S): at 00:17

## 2021-02-15 RX ADMIN — PIPERACILLIN AND TAZOBACTAM 25 GRAM(S): 4; .5 INJECTION, POWDER, LYOPHILIZED, FOR SOLUTION INTRAVENOUS at 08:56

## 2021-02-15 RX ADMIN — Medication 5 MILLIGRAM(S): at 04:50

## 2021-02-15 RX ADMIN — Medication 5 MILLIGRAM(S): at 11:40

## 2021-02-15 RX ADMIN — PIPERACILLIN AND TAZOBACTAM 25 GRAM(S): 4; .5 INJECTION, POWDER, LYOPHILIZED, FOR SOLUTION INTRAVENOUS at 16:37

## 2021-02-15 RX ADMIN — HEPARIN SODIUM 6500 UNIT(S): 5000 INJECTION INTRAVENOUS; SUBCUTANEOUS at 10:51

## 2021-02-15 RX ADMIN — Medication 1: at 23:48

## 2021-02-15 RX ADMIN — Medication 1: at 00:17

## 2021-02-15 RX ADMIN — PANTOPRAZOLE SODIUM 40 MILLIGRAM(S): 20 TABLET, DELAYED RELEASE ORAL at 00:17

## 2021-02-15 RX ADMIN — BUDESONIDE AND FORMOTEROL FUMARATE DIHYDRATE 2 PUFF(S): 160; 4.5 AEROSOL RESPIRATORY (INHALATION) at 04:50

## 2021-02-15 RX ADMIN — PIPERACILLIN AND TAZOBACTAM 25 GRAM(S): 4; .5 INJECTION, POWDER, LYOPHILIZED, FOR SOLUTION INTRAVENOUS at 23:25

## 2021-02-15 RX ADMIN — SODIUM CHLORIDE 50 MILLILITER(S): 9 INJECTION, SOLUTION INTRAVENOUS at 16:25

## 2021-02-15 RX ADMIN — Medication 1: at 12:41

## 2021-02-15 RX ADMIN — HEPARIN SODIUM 1400 UNIT(S)/HR: 5000 INJECTION INTRAVENOUS; SUBCUTANEOUS at 23:49

## 2021-02-15 RX ADMIN — PANTOPRAZOLE SODIUM 40 MILLIGRAM(S): 20 TABLET, DELAYED RELEASE ORAL at 11:40

## 2021-02-15 RX ADMIN — HEPARIN SODIUM 0 UNIT(S)/HR: 5000 INJECTION INTRAVENOUS; SUBCUTANEOUS at 22:39

## 2021-02-15 RX ADMIN — PANTOPRAZOLE SODIUM 40 MILLIGRAM(S): 20 TABLET, DELAYED RELEASE ORAL at 23:25

## 2021-02-15 NOTE — PROGRESS NOTE ADULT - SUBJECTIVE AND OBJECTIVE BOX
Subjective: Patient seen and examined. No new events except as noted.   Converted to SR    REVIEW OF SYSTEMS:    CONSTITUTIONAL: + weakness, fevers or chills  EYES/ENT: No visual changes;  No vertigo or throat pain   NECK: No pain or stiffness  RESPIRATORY: No cough, wheezing, hemoptysis; No shortness of breath  CARDIOVASCULAR: No chest pain or palpitations  GASTROINTESTINAL: No abdominal or epigastric pain. No nausea, vomiting, or hematemesis; No diarrhea or constipation. No melena or hematochezia.  GENITOURINARY: No dysuria, frequency or hematuria  NEUROLOGICAL: No numbness or weakness  SKIN: No itching, burning, rashes, or lesions   All other review of systems is negative unless indicated above.    MEDICATIONS:  MEDICATIONS  (STANDING):  aMIOdarone Infusion 0.5 mG/Min (16.7 mL/Hr) IV Continuous <Continuous>  budesonide 160 MICROgram(s)/formoterol 4.5 MICROgram(s) Inhaler 2 Puff(s) Inhalation two times a day  dextrose 40% Gel 15 Gram(s) Oral once  dextrose 5%. 1000 milliLiter(s) (50 mL/Hr) IV Continuous <Continuous>  dextrose 5%. 1000 milliLiter(s) (100 mL/Hr) IV Continuous <Continuous>  dextrose 50% Injectable 25 Gram(s) IV Push once  dextrose 50% Injectable 12.5 Gram(s) IV Push once  dextrose 50% Injectable 25 Gram(s) IV Push once  glucagon  Injectable 1 milliGRAM(s) IntraMuscular once  heparin  Infusion.  Unit(s)/Hr (14 mL/Hr) IV Continuous <Continuous>  insulin lispro (ADMELOG) corrective regimen sliding scale   SubCutaneous every 6 hours  metoprolol tartrate Injectable 5 milliGRAM(s) IV Push every 6 hours  pantoprazole  Injectable 40 milliGRAM(s) IV Push every 12 hours  piperacillin/tazobactam IVPB.. 3.375 Gram(s) IV Intermittent every 8 hours      PHYSICAL EXAM:  T(C): 37.3 (02-15-21 @ 04:10), Max: 37.3 (02-15-21 @ 04:10)  HR: 90 (02-15-21 @ 04:10) (90 - 122)  BP: 124/82 (02-15-21 @ 04:10) (124/82 - 136/76)  RR: 18 (02-15-21 @ 04:10) (18 - 20)  SpO2: 97% (02-15-21 @ 04:10) (92% - 98%)  Wt(kg): --  I&O's Summary    14 Feb 2021 07:01  -  15 Feb 2021 07:00  --------------------------------------------------------  IN: 1017.8 mL / OUT: 1550 mL / NET: -532.2 mL    15 Feb 2021 07:01  -  15 Feb 2021 10:44  --------------------------------------------------------  IN: 100 mL / OUT: 500 mL / NET: -400 mL          Appearance: NAD +NGT   HEENT:  dry oral mucosa, PERRL, EOMI	  Lymphatic: No lymphadenopathy , no edema  Cardiovascular: Normal S1 S2, No JVD, No murmurs , Peripheral pulses palpable 2+ bilaterally  Respiratory: Lungs clear to auscultation, normal effort 	  Gastrointestinal:  Soft, Non-tender, + BS	  Skin: No rashes, No ecchymoses, No cyanosis, warm to touch  Musculoskeletal: Normal range of motion, normal strength  Psychiatry:  Mood & affect appropriate  Ext: No edema      LABS:    CARDIAC MARKERS:                                10.0   19.15 )-----------( 282      ( 15 Feb 2021 06:03 )             32.9     02-15    149<H>  |  109<H>  |  34<H>  ----------------------------<  174<H>  3.8   |  24  |  0.98    Ca    8.8      15 Feb 2021 05:59  Phos  2.0     02-15  Mg     2.4     02-15    TPro  6.8  /  Alb  2.9<L>  /  TBili  0.5  /  DBili  x   /  AST  25  /  ALT  40  /  AlkPhos  103  02-15    proBNP:   Lipid Profile:   HgA1c:   TSH:             TELEMETRY: 	  SR  PACs  ECG:  	  RADIOLOGY:    DIAGNOSTIC TESTING:  [ ] Echocardiogram:  [ ]  Catheterization:  [ ] Stress Test:    OTHER:

## 2021-02-15 NOTE — PROGRESS NOTE ADULT - PROBLEM SELECTOR PLAN 4
- will treat +U/A given inability to assess symptoms. On Zosyn for SBO as above.  - ucx 10-49k yeast like cells

## 2021-02-15 NOTE — PROGRESS NOTE ADULT - PROBLEM SELECTOR PLAN 1
New afib with RVR on 2/13 with HR 130s-150s. s/p amio load and gtt. Completed 18 hour gtt load on 2/14, s/w cardiology, given SBO, c/w amiodarone gtt at 0.5 mg/min, cardiology to re-assess.  -cards consulted, appreciate recs  -monitor on tele New afib with RVR on 2/13 with HR 130s-150s. s/p amio load and gtt. Completed 18 hour gtt load on 2/14. Converted to SR, amio gtt now dc'd   -cards consulted, appreciate recs  -monitor on tele

## 2021-02-15 NOTE — PROGRESS NOTE ADULT - ASSESSMENT
75M PMH dementia, DM2, HTN, ?HF, HLD, recent admission for COPD pneumonia requiring intubation (11/2020 [place]), COVID in January 2021 (not intubated) currently being treated for pneumonia presents from nursing home for abdominal pain and vomiting, found to have multifocal PNA (unclear if sequela of prior COVID) and high-grade SBO. Course c/b new afib with RVR s/p amiodarone gtt, on standing Lopressor.

## 2021-02-15 NOTE — PROGRESS NOTE ADULT - SUBJECTIVE AND OBJECTIVE BOX
GENERAL SURGERY DAILY PROGRESS NOTE:    Subjective:  Patient seen and examined. Denies abdominal pain, N/V. on sips and chips. reports passing flatus, -BM    Vital Signs Last 24 Hrs  T(C): 37.3 (15 Feb 2021 04:10), Max: 37.3 (15 Feb 2021 04:10)  T(F): 99.1 (15 Feb 2021 04:10), Max: 99.1 (15 Feb 2021 04:10)  HR: 90 (15 Feb 2021 04:10) (90 - 122)  BP: 124/82 (15 Feb 2021 04:10) (124/82 - 136/76)  BP(mean): --  RR: 18 (15 Feb 2021 04:10) (18 - 20)  SpO2: 97% (15 Feb 2021 04:10) (92% - 98%)    Exam:  Gen: NAD, resting in bed, alert and responding appropriately  Resp: Airway patent, non-labored respirations  Abd: Soft, ND, NT, no rebound or guarding. NGT draining bilious fluid  Ext: No edema, WWP    I&O's Detail    14 Feb 2021 07:01  -  15 Feb 2021 07:00  --------------------------------------------------------  IN:    Amiodarone: 108.6 mL    Amiodarone: 267.2 mL    Heparin Infusion: 342 mL    IV PiggyBack: 300 mL  Total IN: 1017.8 mL    OUT:    Nasogastric/Oral tube (mL): 800 mL    Voided (mL): 750 mL  Total OUT: 1550 mL    Total NET: -532.2 mL      15 Feb 2021 07:01  -  15 Feb 2021 08:47  --------------------------------------------------------  IN:  Total IN: 0 mL    OUT:    Nasogastric/Oral tube (mL): 500 mL  Total OUT: 500 mL    Total NET: -500 mL          Daily     Daily     MEDICATIONS  (STANDING):  aMIOdarone Infusion 0.5 mG/Min (16.7 mL/Hr) IV Continuous <Continuous>  budesonide 160 MICROgram(s)/formoterol 4.5 MICROgram(s) Inhaler 2 Puff(s) Inhalation two times a day  dextrose 40% Gel 15 Gram(s) Oral once  dextrose 5%. 1000 milliLiter(s) (50 mL/Hr) IV Continuous <Continuous>  dextrose 5%. 1000 milliLiter(s) (100 mL/Hr) IV Continuous <Continuous>  dextrose 50% Injectable 25 Gram(s) IV Push once  dextrose 50% Injectable 12.5 Gram(s) IV Push once  dextrose 50% Injectable 25 Gram(s) IV Push once  glucagon  Injectable 1 milliGRAM(s) IntraMuscular once  heparin  Infusion.  Unit(s)/Hr (14 mL/Hr) IV Continuous <Continuous>  insulin lispro (ADMELOG) corrective regimen sliding scale   SubCutaneous every 6 hours  metoprolol tartrate Injectable 5 milliGRAM(s) IV Push every 6 hours  pantoprazole  Injectable 40 milliGRAM(s) IV Push every 12 hours  piperacillin/tazobactam IVPB.. 3.375 Gram(s) IV Intermittent every 8 hours    MEDICATIONS  (PRN):  ALBUTerol    90 MICROgram(s) HFA Inhaler 1 Puff(s) Inhalation every 4 hours PRN Shortness of Breath and/or Wheezing  heparin   Injectable 6500 Unit(s) IV Push every 6 hours PRN For aPTT less than 40  heparin   Injectable 3000 Unit(s) IV Push every 6 hours PRN For aPTT between 40 - 57  OLANZapine Injectable 2 milliGRAM(s) IntraMuscular every 6 hours PRN agitation      LABS:                        10.0   19.15 )-----------( 282      ( 15 Feb 2021 06:03 )             32.9     02-15    149<H>  |  109<H>  |  34<H>  ----------------------------<  174<H>  3.8   |  24  |  0.98    Ca    8.8      15 Feb 2021 05:59  Phos  2.0     02-15  Mg     2.4     02-15    TPro  6.8  /  Alb  2.9<L>  /  TBili  0.5  /  DBili  x   /  AST  25  /  ALT  40  /  AlkPhos  103  02-15    PTT - ( 14 Feb 2021 22:49 )  PTT:71.6 sec

## 2021-02-15 NOTE — PROGRESS NOTE ADULT - PROBLEM SELECTOR PLAN 10
- NPO with NG tube  - heparin subQ  - eventual PT eval  - DM2 - home regimen 10u Basaglar qHS and SSI at nursing home - will give SSI here given NPO and poor po intake  - full code per daughter

## 2021-02-15 NOTE — PROGRESS NOTE ADULT - ASSESSMENT
75M hx of dementia, DM2, HTN, ?HF, HLD, recent admission for COVID requiring intubation (11/2020 [place]), currently treated for pneumonia presents from nursing home for abdominal pain and vomiting, found to have multifocal PNA, and high-grade SBO    Recommendation:  - Given patient's extensive comorbidities, patient is high risk for mortality/complication from surgical intervention  - Recommend nonoperative management with NPO, NGT decompression, IVF resuscitation    [id number] 75M hx of dementia, DM2, HTN, ?HF, HLD, recent admission for COVID requiring intubation (11/2020 [place]), currently treated for pneumonia presents from nursing home for abdominal pain and vomiting, found to have multifocal PNA, and high-grade SBO    Recommendation:  - Given patient's extensive comorbidities, patient is high risk for mortality/complication from surgical intervention  - Recommend nonoperative management with NPO, NGT decompression, IVF resuscitation  - if NGT output remains high consider rpt CT in next  1-2 days to assess status sbo as pt remains high risk for sx intervention    [id number]

## 2021-02-15 NOTE — PROGRESS NOTE ADULT - ASSESSMENT
75M hx of dementia, DM2, HTN, ?HF, HLD, recent admission for COVID requiring intubation (11/2020 [place]), currently treated for pneumonia presents from nursing home for abdominal pain and vomiting, found to have multifocal PNA, and high-grade SBO with TP LUQ, c/f internal hernia on CT. Complicated by Afib RVR

## 2021-02-15 NOTE — PROGRESS NOTE ADULT - PROBLEM SELECTOR PLAN 5
- baseline 2L NC at home  - satting well 4L here  - multifocal PNA known from prior admission  - unclear if warrants treatment but due to possible aspiration, will cover until cultures negative  - f/u bcx  - O2 sat 88-93% - baseline 2L NC at home  - satting well on 2L here  - multifocal PNA known from prior admission  - unclear if warrants treatment but due to possible aspiration, will cover until cultures negative  - f/u bcx  - O2 sat 88-93%

## 2021-02-15 NOTE — PROGRESS NOTE ADULT - PROBLEM SELECTOR PLAN 1
DC amiodarone gtt   s/p Digoxin 02.5 mg IV x 1.   Lopressor 5 mg IV q6 hours  Heparin gtt  check CTA chest

## 2021-02-15 NOTE — PROGRESS NOTE ADULT - SUBJECTIVE AND OBJECTIVE BOX
PROGRESS NOTE:   Authored by Dr. Rebecca Gutierrez MD, Pager [phone] [facility], [address] [facility]     Patient is a 75y old  Male who presents with a chief complaint of abdominal pain (14 Feb 2021 11:00)      SUBJECTIVE / OVERNIGHT EVENTS: No events overnight.    ADDITIONAL REVIEW OF SYSTEMS: Denies fevers, chills, n/v.    MEDICATIONS  (STANDING):  aMIOdarone Infusion 0.5 mG/Min (16.7 mL/Hr) IV Continuous <Continuous>  budesonide 160 MICROgram(s)/formoterol 4.5 MICROgram(s) Inhaler 2 Puff(s) Inhalation two times a day  dextrose 40% Gel 15 Gram(s) Oral once  dextrose 5%. 1000 milliLiter(s) (50 mL/Hr) IV Continuous <Continuous>  dextrose 5%. 1000 milliLiter(s) (100 mL/Hr) IV Continuous <Continuous>  dextrose 50% Injectable 25 Gram(s) IV Push once  dextrose 50% Injectable 12.5 Gram(s) IV Push once  dextrose 50% Injectable 25 Gram(s) IV Push once  glucagon  Injectable 1 milliGRAM(s) IntraMuscular once  heparin  Infusion.  Unit(s)/Hr (14 mL/Hr) IV Continuous <Continuous>  insulin lispro (ADMELOG) corrective regimen sliding scale   SubCutaneous every 6 hours  metoprolol tartrate Injectable 5 milliGRAM(s) IV Push every 6 hours  pantoprazole  Injectable 40 milliGRAM(s) IV Push every 12 hours  piperacillin/tazobactam IVPB.. 3.375 Gram(s) IV Intermittent every 8 hours    MEDICATIONS  (PRN):  ALBUTerol    90 MICROgram(s) HFA Inhaler 1 Puff(s) Inhalation every 4 hours PRN Shortness of Breath and/or Wheezing  heparin   Injectable 6500 Unit(s) IV Push every 6 hours PRN For aPTT less than 40  heparin   Injectable 3000 Unit(s) IV Push every 6 hours PRN For aPTT between 40 - 57  OLANZapine Injectable 2 milliGRAM(s) IntraMuscular every 6 hours PRN agitation      CAPILLARY BLOOD GLUCOSE      POCT Blood Glucose.: 171 mg/dL (15 Feb 2021 05:52)  POCT Blood Glucose.: 158 mg/dL (15 Feb 2021 00:10)  POCT Blood Glucose.: 173 mg/dL (14 Feb 2021 17:43)  POCT Blood Glucose.: 217 mg/dL (14 Feb 2021 12:51)    I&O's Summary    14 Feb 2021 07:01  -  15 Feb 2021 07:00  --------------------------------------------------------  IN: 1017.8 mL / OUT: 1550 mL / NET: -532.2 mL        PHYSICAL EXAM:  Vital Signs Last 24 Hrs  T(C): 37.3 (15 Feb 2021 04:10), Max: 37.3 (15 Feb 2021 04:10)  T(F): 99.1 (15 Feb 2021 04:10), Max: 99.1 (15 Feb 2021 04:10)  HR: 90 (15 Feb 2021 04:10) (90 - 122)  BP: 124/82 (15 Feb 2021 04:10) (124/82 - 145/77)  BP(mean): --  RR: 18 (15 Feb 2021 04:10) (18 - 20)  SpO2: 97% (15 Feb 2021 04:10) (92% - 98%)    CONSTITUTIONAL: NAD, lying in bed comfortably, NGT in place  RESPIRATORY: Normal respiratory effort; CTABL posteriorly  CARDIOVASCULAR: Regular rate and rhythm, normal S1 and S2, no murmur/rub/gallop  ABDOMEN: Soft, distended, nontender to palpation, normoactive bowel sounds, no rebound/guarding  MUSCLOSKELETAL: no joint swelling or tenderness to palpation, FROM all extremities  NEURO: AAOx1-2   EXTREMITIES: no pedal edema      LABS:                        10.0   19.15 )-----------( 282      ( 15 Feb 2021 06:03 )             32.9     02-15    149<H>  |  109<H>  |  34<H>  ----------------------------<  174<H>  3.8   |  24  |  0.98    Ca    8.8      15 Feb 2021 05:59  Phos  2.0     02-15  Mg     2.4     02-15    TPro  6.8  /  Alb  2.9<L>  /  TBili  0.5  /  DBili  x   /  AST  25  /  ALT  40  /  AlkPhos  103  02-15    PTT - ( 14 Feb 2021 22:49 )  PTT:71.6 sec          Culture - Urine (collected 12 Feb 2021 15:06)  Source: .Urine Clean Catch (Midstream)  Final Report (14 Feb 2021 22:14):    10,000 - 49,000 CFU/mL Yeast-like cells, presumptively not Candida    albicans "Susceptibilities not performed"    <10,000 CFU/ml Normal Urogenital jefferson present    Culture - Blood (collected 12 Feb 2021 14:59)  Source: .Blood Blood-Peripheral  Preliminary Report (13 Feb 2021 15:05):    No growth to date.    Culture - Blood (collected 12 Feb 2021 14:59)  Source: .Blood Blood-Peripheral  Preliminary Report (13 Feb 2021 15:05):    No growth to date.        RADIOLOGY & ADDITIONAL TESTS:     PROGRESS NOTE:   Authored by Dr. Rebecca Gutierrez MD, Pager [phone] [facility], [address] [facility]     Patient is a 75y old  Male who presents with a chief complaint of abdominal pain (14 Feb 2021 11:00)      SUBJECTIVE / OVERNIGHT EVENTS: Agitated overnight, placed on restraints. Converted to SR. AAOx1 this AM.    ADDITIONAL REVIEW OF SYSTEMS: Denies fevers, chills, n/v.    MEDICATIONS  (STANDING):  aMIOdarone Infusion 0.5 mG/Min (16.7 mL/Hr) IV Continuous <Continuous>  budesonide 160 MICROgram(s)/formoterol 4.5 MICROgram(s) Inhaler 2 Puff(s) Inhalation two times a day  dextrose 40% Gel 15 Gram(s) Oral once  dextrose 5%. 1000 milliLiter(s) (50 mL/Hr) IV Continuous <Continuous>  dextrose 5%. 1000 milliLiter(s) (100 mL/Hr) IV Continuous <Continuous>  dextrose 50% Injectable 25 Gram(s) IV Push once  dextrose 50% Injectable 12.5 Gram(s) IV Push once  dextrose 50% Injectable 25 Gram(s) IV Push once  glucagon  Injectable 1 milliGRAM(s) IntraMuscular once  heparin  Infusion.  Unit(s)/Hr (14 mL/Hr) IV Continuous <Continuous>  insulin lispro (ADMELOG) corrective regimen sliding scale   SubCutaneous every 6 hours  metoprolol tartrate Injectable 5 milliGRAM(s) IV Push every 6 hours  pantoprazole  Injectable 40 milliGRAM(s) IV Push every 12 hours  piperacillin/tazobactam IVPB.. 3.375 Gram(s) IV Intermittent every 8 hours    MEDICATIONS  (PRN):  ALBUTerol    90 MICROgram(s) HFA Inhaler 1 Puff(s) Inhalation every 4 hours PRN Shortness of Breath and/or Wheezing  heparin   Injectable 6500 Unit(s) IV Push every 6 hours PRN For aPTT less than 40  heparin   Injectable 3000 Unit(s) IV Push every 6 hours PRN For aPTT between 40 - 57  OLANZapine Injectable 2 milliGRAM(s) IntraMuscular every 6 hours PRN agitation      CAPILLARY BLOOD GLUCOSE      POCT Blood Glucose.: 171 mg/dL (15 Feb 2021 05:52)  POCT Blood Glucose.: 158 mg/dL (15 Feb 2021 00:10)  POCT Blood Glucose.: 173 mg/dL (14 Feb 2021 17:43)  POCT Blood Glucose.: 217 mg/dL (14 Feb 2021 12:51)    I&O's Summary    14 Feb 2021 07:01  -  15 Feb 2021 07:00  --------------------------------------------------------  IN: 1017.8 mL / OUT: 1550 mL / NET: -532.2 mL        PHYSICAL EXAM:  Vital Signs Last 24 Hrs  T(C): 37.3 (15 Feb 2021 04:10), Max: 37.3 (15 Feb 2021 04:10)  T(F): 99.1 (15 Feb 2021 04:10), Max: 99.1 (15 Feb 2021 04:10)  HR: 90 (15 Feb 2021 04:10) (90 - 122)  BP: 124/82 (15 Feb 2021 04:10) (124/82 - 145/77)  BP(mean): --  RR: 18 (15 Feb 2021 04:10) (18 - 20)  SpO2: 97% (15 Feb 2021 04:10) (92% - 98%)    CONSTITUTIONAL: NAD, lying in bed comfortably, NGT in place  RESPIRATORY: Normal respiratory effort; CTABL posteriorly  CARDIOVASCULAR: Regular rate and rhythm, normal S1 and S2, no murmur/rub/gallop  ABDOMEN: Soft, mildly distended, nontender to palpation, normoactive bowel sounds, no rebound/guarding  MUSCLOSKELETAL: no joint swelling or tenderness to palpation, FROM all extremities  NEURO: AAOx1-2   EXTREMITIES: no pedal edema      LABS:                        10.0   19.15 )-----------( 282      ( 15 Feb 2021 06:03 )             32.9     02-15    149<H>  |  109<H>  |  34<H>  ----------------------------<  174<H>  3.8   |  24  |  0.98    Ca    8.8      15 Feb 2021 05:59  Phos  2.0     02-15  Mg     2.4     02-15    TPro  6.8  /  Alb  2.9<L>  /  TBili  0.5  /  DBili  x   /  AST  25  /  ALT  40  /  AlkPhos  103  02-15    PTT - ( 14 Feb 2021 22:49 )  PTT:71.6 sec          Culture - Urine (collected 12 Feb 2021 15:06)  Source: .Urine Clean Catch (Midstream)  Final Report (14 Feb 2021 22:14):    10,000 - 49,000 CFU/mL Yeast-like cells, presumptively not Candida    albicans "Susceptibilities not performed"    <10,000 CFU/ml Normal Urogenital jefferson present    Culture - Blood (collected 12 Feb 2021 14:59)  Source: .Blood Blood-Peripheral  Preliminary Report (13 Feb 2021 15:05):    No growth to date.    Culture - Blood (collected 12 Feb 2021 14:59)  Source: .Blood Blood-Peripheral  Preliminary Report (13 Feb 2021 15:05):    No growth to date.        RADIOLOGY & ADDITIONAL TESTS:

## 2021-02-15 NOTE — PROGRESS NOTE ADULT - PROBLEM SELECTOR PLAN 2
- per surgery, conservative management, precipitant ?hernia - no known prior abdominal surgeries  - NPO  - NG tube low intermittent  - trend i/o  - GI ppx with Protonix  - continue Zosyn (2/12 - )  - 2/14: still high output from NG tube, no BMs, c/w NG tube, as per surgery

## 2021-02-15 NOTE — PROGRESS NOTE ADULT - PROBLEM SELECTOR PLAN 3
Cr 0.68 Jan 2021, upon admission SCr 3. Now downtrending s/p 3L NS. Likely prerenal.   - 2/14: Not retaining on bladder scan, incontinent making I&Os, not fully reliable; SARAH continues to improve. Trend UOP and SCr. If UOP truly dropping, give IVF.

## 2021-02-16 ENCOUNTER — TRANSCRIPTION ENCOUNTER (OUTPATIENT)
Age: 76
End: 2021-02-16

## 2021-02-16 LAB
ALBUMIN SERPL ELPH-MCNC: 2.8 G/DL — LOW (ref 3.3–5)
ALP SERPL-CCNC: 94 U/L — SIGNIFICANT CHANGE UP (ref 40–120)
ALT FLD-CCNC: 30 U/L — SIGNIFICANT CHANGE UP (ref 10–45)
ANION GAP SERPL CALC-SCNC: 10 MMOL/L — SIGNIFICANT CHANGE UP (ref 5–17)
ANION GAP SERPL CALC-SCNC: 16 MMOL/L — SIGNIFICANT CHANGE UP (ref 5–17)
APTT BLD: 69.6 SEC — HIGH (ref 27.5–35.5)
APTT BLD: 71.6 SEC — HIGH (ref 27.5–35.5)
AST SERPL-CCNC: 15 U/L — SIGNIFICANT CHANGE UP (ref 10–40)
BILIRUB SERPL-MCNC: 0.4 MG/DL — SIGNIFICANT CHANGE UP (ref 0.2–1.2)
BUN SERPL-MCNC: 28 MG/DL — HIGH (ref 7–23)
BUN SERPL-MCNC: 30 MG/DL — HIGH (ref 7–23)
CALCIUM SERPL-MCNC: 8.6 MG/DL — SIGNIFICANT CHANGE UP (ref 8.4–10.5)
CALCIUM SERPL-MCNC: 9 MG/DL — SIGNIFICANT CHANGE UP (ref 8.4–10.5)
CHLORIDE SERPL-SCNC: 112 MMOL/L — HIGH (ref 96–108)
CHLORIDE SERPL-SCNC: 112 MMOL/L — HIGH (ref 96–108)
CO2 SERPL-SCNC: 25 MMOL/L — SIGNIFICANT CHANGE UP (ref 22–31)
CO2 SERPL-SCNC: 27 MMOL/L — SIGNIFICANT CHANGE UP (ref 22–31)
CREAT SERPL-MCNC: 1.01 MG/DL — SIGNIFICANT CHANGE UP (ref 0.5–1.3)
CREAT SERPL-MCNC: 1.03 MG/DL — SIGNIFICANT CHANGE UP (ref 0.5–1.3)
GLUCOSE BLDC GLUCOMTR-MCNC: 169 MG/DL — HIGH (ref 70–99)
GLUCOSE BLDC GLUCOMTR-MCNC: 211 MG/DL — HIGH (ref 70–99)
GLUCOSE SERPL-MCNC: 146 MG/DL — HIGH (ref 70–99)
GLUCOSE SERPL-MCNC: 197 MG/DL — HIGH (ref 70–99)
HCT VFR BLD CALC: 32.8 % — LOW (ref 39–50)
HGB BLD-MCNC: 10.1 G/DL — LOW (ref 13–17)
MAGNESIUM SERPL-MCNC: 2.4 MG/DL — SIGNIFICANT CHANGE UP (ref 1.6–2.6)
MCHC RBC-ENTMCNC: 22.5 PG — LOW (ref 27–34)
MCHC RBC-ENTMCNC: 30.8 GM/DL — LOW (ref 32–36)
MCV RBC AUTO: 73.1 FL — LOW (ref 80–100)
NRBC # BLD: 0 /100 WBCS — SIGNIFICANT CHANGE UP (ref 0–0)
PHOSPHATE SERPL-MCNC: 3.2 MG/DL — SIGNIFICANT CHANGE UP (ref 2.5–4.5)
PLATELET # BLD AUTO: 341 K/UL — SIGNIFICANT CHANGE UP (ref 150–400)
POTASSIUM SERPL-MCNC: 3.5 MMOL/L — SIGNIFICANT CHANGE UP (ref 3.5–5.3)
POTASSIUM SERPL-MCNC: 3.8 MMOL/L — SIGNIFICANT CHANGE UP (ref 3.5–5.3)
POTASSIUM SERPL-SCNC: 3.5 MMOL/L — SIGNIFICANT CHANGE UP (ref 3.5–5.3)
POTASSIUM SERPL-SCNC: 3.8 MMOL/L — SIGNIFICANT CHANGE UP (ref 3.5–5.3)
PROT SERPL-MCNC: 6.6 G/DL — SIGNIFICANT CHANGE UP (ref 6–8.3)
RBC # BLD: 4.49 M/UL — SIGNIFICANT CHANGE UP (ref 4.2–5.8)
RBC # FLD: 20.6 % — HIGH (ref 10.3–14.5)
SODIUM SERPL-SCNC: 149 MMOL/L — HIGH (ref 135–145)
SODIUM SERPL-SCNC: 153 MMOL/L — HIGH (ref 135–145)
WBC # BLD: 14.83 K/UL — HIGH (ref 3.8–10.5)
WBC # FLD AUTO: 14.83 K/UL — HIGH (ref 3.8–10.5)

## 2021-02-16 PROCEDURE — 71275 CT ANGIOGRAPHY CHEST: CPT | Mod: 26

## 2021-02-16 PROCEDURE — 74177 CT ABD & PELVIS W/CONTRAST: CPT | Mod: 26

## 2021-02-16 PROCEDURE — 99233 SBSQ HOSP IP/OBS HIGH 50: CPT | Mod: CS,GC

## 2021-02-16 RX ORDER — DEXTROSE MONOHYDRATE, SODIUM CHLORIDE, AND POTASSIUM CHLORIDE 50; .745; 4.5 G/1000ML; G/1000ML; G/1000ML
1000 INJECTION, SOLUTION INTRAVENOUS
Refills: 0 | Status: DISCONTINUED | OUTPATIENT
Start: 2021-02-16 | End: 2021-02-17

## 2021-02-16 RX ORDER — SODIUM CHLORIDE 9 MG/ML
1000 INJECTION, SOLUTION INTRAVENOUS
Refills: 0 | Status: DISCONTINUED | OUTPATIENT
Start: 2021-02-16 | End: 2021-02-16

## 2021-02-16 RX ORDER — DEXTROSE MONOHYDRATE, SODIUM CHLORIDE, AND POTASSIUM CHLORIDE 50; .745; 4.5 G/1000ML; G/1000ML; G/1000ML
1000 INJECTION, SOLUTION INTRAVENOUS
Refills: 0 | Status: DISCONTINUED | OUTPATIENT
Start: 2021-02-16 | End: 2021-02-16

## 2021-02-16 RX ADMIN — Medication 2: at 17:35

## 2021-02-16 RX ADMIN — PANTOPRAZOLE SODIUM 40 MILLIGRAM(S): 20 TABLET, DELAYED RELEASE ORAL at 22:41

## 2021-02-16 RX ADMIN — Medication 5 MILLIGRAM(S): at 05:20

## 2021-02-16 RX ADMIN — Medication 5 MILLIGRAM(S): at 22:41

## 2021-02-16 RX ADMIN — Medication 1: at 23:58

## 2021-02-16 RX ADMIN — PIPERACILLIN AND TAZOBACTAM 25 GRAM(S): 4; .5 INJECTION, POWDER, LYOPHILIZED, FOR SOLUTION INTRAVENOUS at 17:35

## 2021-02-16 RX ADMIN — BUDESONIDE AND FORMOTEROL FUMARATE DIHYDRATE 2 PUFF(S): 160; 4.5 AEROSOL RESPIRATORY (INHALATION) at 18:23

## 2021-02-16 RX ADMIN — Medication 5 MILLIGRAM(S): at 18:22

## 2021-02-16 RX ADMIN — DEXTROSE MONOHYDRATE, SODIUM CHLORIDE, AND POTASSIUM CHLORIDE 75 MILLILITER(S): 50; .745; 4.5 INJECTION, SOLUTION INTRAVENOUS at 11:00

## 2021-02-16 RX ADMIN — BUDESONIDE AND FORMOTEROL FUMARATE DIHYDRATE 2 PUFF(S): 160; 4.5 AEROSOL RESPIRATORY (INHALATION) at 05:54

## 2021-02-16 RX ADMIN — PANTOPRAZOLE SODIUM 40 MILLIGRAM(S): 20 TABLET, DELAYED RELEASE ORAL at 11:36

## 2021-02-16 RX ADMIN — HEPARIN SODIUM 1400 UNIT(S)/HR: 5000 INJECTION INTRAVENOUS; SUBCUTANEOUS at 16:06

## 2021-02-16 RX ADMIN — HEPARIN SODIUM 1400 UNIT(S)/HR: 5000 INJECTION INTRAVENOUS; SUBCUTANEOUS at 07:41

## 2021-02-16 RX ADMIN — Medication 1: at 12:37

## 2021-02-16 RX ADMIN — Medication 5 MILLIGRAM(S): at 11:36

## 2021-02-16 RX ADMIN — PIPERACILLIN AND TAZOBACTAM 25 GRAM(S): 4; .5 INJECTION, POWDER, LYOPHILIZED, FOR SOLUTION INTRAVENOUS at 08:57

## 2021-02-16 RX ADMIN — PIPERACILLIN AND TAZOBACTAM 25 GRAM(S): 4; .5 INJECTION, POWDER, LYOPHILIZED, FOR SOLUTION INTRAVENOUS at 22:41

## 2021-02-16 NOTE — DIETITIAN INITIAL EVALUATION ADULT. - PROBLEM SELECTOR PLAN 4
- baseline 2L NC at home  - satting well 4L here  - multifocal PNA known rom prior admission  - unclear if warrants treatement but due to possible aspiration, will cover until cultures negative  - O2 sat 88-93%

## 2021-02-16 NOTE — PROGRESS NOTE ADULT - ASSESSMENT
75M hx of dementia, DM2, HTN, ?HF, HLD, recent admission for COVID requiring intubation (11/2020 [place]), currently treated for pneumonia presents from nursing home for abdominal pain and vomiting, found to have multifocal PNA, and high-grade SBO with TP LUQ, c/f internal hernia on CT. Complicated by Afib RVR

## 2021-02-16 NOTE — DISCHARGE NOTE PROVIDER - INSTRUCTIONS
Swallow evaluation:  Liberalized diet of regular solids / thin liquids ; SUGGEST RN/Caregiver to modify solids (cut or chop) as needed; 100% dependent.

## 2021-02-16 NOTE — PROGRESS NOTE ADULT - SUBJECTIVE AND OBJECTIVE BOX
GENERAL SURGERY DAILY PROGRESS NOTE:    Subjective:  Patient seen and examined. Denies abdominal pain, N/V. NPO, no BM per RN    Vital Signs Last 24 Hrs  T(C): 36.8 (2021 09:12), Max: 37.1 (15 Feb 2021 23:45)  T(F): 98.2 (2021 09:12), Max: 98.7 (15 Feb 2021 23:45)  HR: 70 (2021 09:12) (70 - 87)  BP: 136/84 (2021 09:12) (121/78 - 164/63)  BP(mean): --  RR: 19 (2021 09:12) (19 - 22)  SpO2: 98% (2021 09:12) (92% - 98%)    Exam:  Gen: NAD, resting in bed, alert and responding appropriately  Resp: Airway patent, non-labored respirations  Abd: Soft, ND, NT, no rebound or guarding, NGT draining blood tinged output  Ext: No edema, WWP  Neuro: AAOx3, no focal deficits    I&O's Detail    15 Feb 2021 07:01  -  2021 07:00  --------------------------------------------------------  IN:    Heparin Infusion: 180 mL    IV PiggyBack: 100 mL    Lactated Ringers: 150 mL  Total IN: 430 mL    OUT:    Nasogastric/Oral tube (mL): 800 mL    Voided (mL): 0 mL  Total OUT: 800 mL    Total NET: -370 mL      2021 07:01  -  2021 09:26  --------------------------------------------------------  IN:    IV PiggyBack: 100 mL  Total IN: 100 mL    OUT:  Total OUT: 0 mL    Total NET: 100 mL          Daily     Daily Weight in k.6 (15 Feb 2021 13:56)    MEDICATIONS  (STANDING):  budesonide 160 MICROgram(s)/formoterol 4.5 MICROgram(s) Inhaler 2 Puff(s) Inhalation two times a day  dextrose 40% Gel 15 Gram(s) Oral once  dextrose 5% with potassium chloride 20 mEq/L 1000 milliLiter(s) (75 mL/Hr) IV Continuous <Continuous>  dextrose 5%. 1000 milliLiter(s) (50 mL/Hr) IV Continuous <Continuous>  dextrose 5%. 1000 milliLiter(s) (100 mL/Hr) IV Continuous <Continuous>  dextrose 50% Injectable 25 Gram(s) IV Push once  dextrose 50% Injectable 12.5 Gram(s) IV Push once  dextrose 50% Injectable 25 Gram(s) IV Push once  glucagon  Injectable 1 milliGRAM(s) IntraMuscular once  heparin  Infusion.  Unit(s)/Hr (14 mL/Hr) IV Continuous <Continuous>  insulin lispro (ADMELOG) corrective regimen sliding scale   SubCutaneous every 6 hours  metoprolol tartrate Injectable 5 milliGRAM(s) IV Push every 6 hours  pantoprazole  Injectable 40 milliGRAM(s) IV Push every 12 hours  piperacillin/tazobactam IVPB.. 3.375 Gram(s) IV Intermittent every 8 hours    MEDICATIONS  (PRN):  ALBUTerol    90 MICROgram(s) HFA Inhaler 1 Puff(s) Inhalation every 4 hours PRN Shortness of Breath and/or Wheezing  heparin   Injectable 6500 Unit(s) IV Push every 6 hours PRN For aPTT less than 40  heparin   Injectable 3000 Unit(s) IV Push every 6 hours PRN For aPTT between 40 - 57  OLANZapine Injectable 2 milliGRAM(s) IntraMuscular every 6 hours PRN agitation      LABS:                        10.1   14.83 )-----------( 341      ( 2021 06:45 )             32.8     02-16    153<H>  |  112<H>  |  30<H>  ----------------------------<  146<H>  3.5   |  25  |  1.01    Ca    9.0      2021 06:43  Phos  3.2     02-16  Mg     2.4     02-16    TPro  6.6  /  Alb  2.8<L>  /  TBili  0.4  /  DBili  x   /  AST  15  /  ALT  30  /  AlkPhos  94  02-16    PTT - ( 2021 06:46 )  PTT:71.6 sec       GENERAL SURGERY DAILY PROGRESS NOTE:    Subjective:  Patient seen and examined. Denies abdominal pain, N/V. NPO, no BM per RN    Vital Signs Last 24 Hrs  T(C): 36.8 (2021 09:12), Max: 37.1 (15 Feb 2021 23:45)  T(F): 98.2 (2021 09:12), Max: 98.7 (15 Feb 2021 23:45)  HR: 70 (2021 09:12) (70 - 87)  BP: 136/84 (2021 09:12) (121/78 - 164/63)  BP(mean): --  RR: 19 (2021 09:12) (19 - 22)  SpO2: 98% (2021 09:12) (92% - 98%)    Exam:  Gen: NAD, resting in bed, alert and responding appropriately  Resp: Airway patent, non-labored respirations  Abd: Soft, ND, NT, no rebound or guarding, NGT draining blood tinged output  Ext: No edema, WWP    I&O's Detail    15 Feb 2021 07:01  -  2021 07:00  --------------------------------------------------------  IN:    Heparin Infusion: 180 mL    IV PiggyBack: 100 mL    Lactated Ringers: 150 mL  Total IN: 430 mL    OUT:    Nasogastric/Oral tube (mL): 800 mL    Voided (mL): 0 mL  Total OUT: 800 mL    Total NET: -370 mL      2021 07:01  -  2021 09:26  --------------------------------------------------------  IN:    IV PiggyBack: 100 mL  Total IN: 100 mL    OUT:  Total OUT: 0 mL    Total NET: 100 mL          Daily     Daily Weight in k.6 (15 Feb 2021 13:56)    MEDICATIONS  (STANDING):  budesonide 160 MICROgram(s)/formoterol 4.5 MICROgram(s) Inhaler 2 Puff(s) Inhalation two times a day  dextrose 40% Gel 15 Gram(s) Oral once  dextrose 5% with potassium chloride 20 mEq/L 1000 milliLiter(s) (75 mL/Hr) IV Continuous <Continuous>  dextrose 5%. 1000 milliLiter(s) (50 mL/Hr) IV Continuous <Continuous>  dextrose 5%. 1000 milliLiter(s) (100 mL/Hr) IV Continuous <Continuous>  dextrose 50% Injectable 25 Gram(s) IV Push once  dextrose 50% Injectable 12.5 Gram(s) IV Push once  dextrose 50% Injectable 25 Gram(s) IV Push once  glucagon  Injectable 1 milliGRAM(s) IntraMuscular once  heparin  Infusion.  Unit(s)/Hr (14 mL/Hr) IV Continuous <Continuous>  insulin lispro (ADMELOG) corrective regimen sliding scale   SubCutaneous every 6 hours  metoprolol tartrate Injectable 5 milliGRAM(s) IV Push every 6 hours  pantoprazole  Injectable 40 milliGRAM(s) IV Push every 12 hours  piperacillin/tazobactam IVPB.. 3.375 Gram(s) IV Intermittent every 8 hours    MEDICATIONS  (PRN):  ALBUTerol    90 MICROgram(s) HFA Inhaler 1 Puff(s) Inhalation every 4 hours PRN Shortness of Breath and/or Wheezing  heparin   Injectable 6500 Unit(s) IV Push every 6 hours PRN For aPTT less than 40  heparin   Injectable 3000 Unit(s) IV Push every 6 hours PRN For aPTT between 40 - 57  OLANZapine Injectable 2 milliGRAM(s) IntraMuscular every 6 hours PRN agitation      LABS:                        10.1   14.83 )-----------( 341      ( 2021 06:45 )             32.8     02-16    153<H>  |  112<H>  |  30<H>  ----------------------------<  146<H>  3.5   |  25  |  1.01    Ca    9.0      2021 06:43  Phos  3.2     02-16  Mg     2.4     02-16    TPro  6.6  /  Alb  2.8<L>  /  TBili  0.4  /  DBili  x   /  AST  15  /  ALT  30  /  AlkPhos  94  02-16    PTT - ( 2021 06:46 )  PTT:71.6 sec

## 2021-02-16 NOTE — DIETITIAN INITIAL EVALUATION ADULT. - PHYSCIAL ASSESSMENT
Please note that height used above is an estimation due to lack of height records. Visual observations reveal moderate-severe muscle loss to temples, clavicles, thighs, calves. Moderate fat loss to buccal region.

## 2021-02-16 NOTE — DIETITIAN INITIAL EVALUATION ADULT. - OTHER INFO
This admission:    Confirms NKFA, no nausea/vomiting, no difficulty chewing/swallowing, no GI distress, no micronutrient supplementation PTA, last BM ___.    Weight Hx: This admission: Pt remains NPO with NGT in place, suctioning. Initially, 1.1L of fluid was removed in beginning of admission.    NGT Output:   2/15: 800mL  2/14: 1600mL  2/13: 1025mL    Chart shows NKFA and Multivitamin, vitamin C, ferrous sulfate, calcium and vitamin D supplementation PTA.    Weight Hx: No height available in paper chart or EMR. Per nursing home records, the pt's most recent wt was 136 pounds. This admission, pt's dosing wt is a stated weight of 175 pounds, and recent wt is 153 pounds per bed scale weight. ?Accuracy of bed scale weights. This admission: Pt remains NPO with NGT in place, suctioning. Initially, 1.1L of fluid was removed in beginning of admission. PCA reports pt can receive ice chips, notified PCA pt was requesting them. The pt is distressed at time of interview, he continuously asks for water despite explaining why he can't drink water at this time. Also pt is a poor historian.    NGT Output:   2/15: 800mL  2/14: 1600mL  2/13: 1025mL    Chart shows NKFA and Multivitamin, vitamin C, ferrous sulfate, calcium and vitamin D supplementation PTA.    Weight Hx: No height available in paper chart or EMR. Per nursing home records, the pt's most recent wt was 136 pounds. This admission, pt's dosing wt is a stated weight of 175 pounds, and recent wt is 153 pounds (2/15) per bed scale weight. RD obtained bed scale weight at time of interview which read 126 pounds (2/16)- appears more accurate with consideration for muscle/fat losses. Suspect pt has weight loss since last weight was taken in NH. Also, ?accuracy of bed scale weights.

## 2021-02-16 NOTE — DIETITIAN INITIAL EVALUATION ADULT. - OTHER CALCULATIONS
Pt's nursing home wt was used for calculations. Needs consider pt's medical condition COPD and malnourished status.

## 2021-02-16 NOTE — DISCHARGE NOTE PROVIDER - NSDCMRMEDTOKEN_GEN_ALL_CORE_FT
Acidophilus oral capsule: 2 cap(s) orally 2 times a day for 21 days.  aspirin 81 mg oral tablet, chewable: 1 tab(s) orally once a day (with meals)  Basaglar KwikPen 100 units/mL subcutaneous solution: 10 unit(s) subcutaneous once a day (at bedtime)  Colace 100 mg oral capsule: 3 cap(s) orally once a day (at bedtime)  Cozaar 25 mg oral tablet: 1 tab(s) orally once a day  Cymbalta 30 mg oral delayed release capsule: 1 cap(s) orally once a day  ferrous sulfate 325 mg (65 mg elemental iron) oral tablet: 1 tab(s) orally once a day  fluticasone 50 mcg/inh nasal spray: 1 spray(s) nasal 2 times a day  folic acid 1 mg oral tablet: 1 tab(s) orally once a day  furosemide 40 mg oral tablet: 1 tab(s) orally once a day  HumaLOG 100 units/mL injectable solution: sliding scale subcutaneously 3 times daily before meals.  Incruse Ellipta 62.5 mcg/inh inhalation powder: 1 puff(s) inhaled every 24 hours  Lipitor 80 mg oral tablet: 1 tab(s) orally once a day (at bedtime)  Lovenox 40 mg/0.4 mL injectable solution: 40 milligram(s) injectable once a day for 25 days.  Melatonin 1 mg oral tablet: 5 tab(s) orally once a day (at bedtime)  metoprolol succinate 25 mg oral tablet, extended release: 1 tab(s) orally once a day (with meals)  Multiple Vitamins oral tablet: 1 tab(s) orally once a day  Oyster Shell Calcium with Vitamin D 250 mg-125 intl units (3.125 mcg) oral tablet: 2 tab(s) orally 2 times a day  polyethylene glycol 3350 oral powder for reconstitution: 17 gram(s) orally once a day  Senna 8.6 mg oral tablet: 2 tab(s) orally once a day (at bedtime)  SEROquel 50 mg oral tablet: 1 tab(s) orally 3 times a day  Symbicort 160 mcg-4.5 mcg/inh inhalation aerosol: 2 puff(s) inhaled 2 times a day  Tylenol 325 mg oral tablet: 2 tab(s) orally every 6 hours, As Needed  Ventolin HFA 90 mcg/inh inhalation aerosol: 1 puff(s) inhaled every 4 hours, As Needed  Vitamin C 250 mg oral tablet: 1 tab(s) orally once a day   Acidophilus oral capsule: 2 cap(s) orally 2 times a day for 21 days.  albuterol 90 mcg/inh inhalation aerosol: 1 puff(s) inhaled every 4 hours, As needed, Shortness of Breath and/or Wheezing  aspirin 81 mg oral tablet: orally once a day  aspirin 81 mg oral tablet, chewable: 1 tab(s) orally once a day (with meals)  Basaglar KwikPen 100 units/mL subcutaneous solution: 10 unit(s) subcutaneous once a day (at bedtime)  Colace 100 mg oral capsule: 3 cap(s) orally once a day (at bedtime)  Colace 100 mg oral capsule: 3 cap(s) orally once a day (at bedtime)  Cozaar 25 mg oral tablet: 1 tab(s) orally once a day  Cozaar 25 mg oral tablet: 1 tab(s) orally once a day  Cymbalta 30 mg oral delayed release capsule: 1 cap(s) orally once a day  DuoNeb 0.5 mg-2.5 mg/3 mL inhalation solution: 3 milliliter(s) inhaled 4 times a day, As Needed  enoxaparin: 40 milligram(s) subcutaneous once a day  ferrous sulfate 324 mg (65 mg elemental iron) oral delayed release tablet: 1 tab(s) orally once a day  ferrous sulfate 325 mg (65 mg elemental iron) oral tablet: 1 tab(s) orally once a day  fluticasone 50 mcg/inh inhalation powder: 1 puff(s) inhaled 2 times a day  fluticasone 50 mcg/inh nasal spray: 1 spray(s) nasal 2 times a day  fluticasone 50 mcg/inh nasal spray: 1 spray(s) nasal 2 times a day  folic acid 1 mg oral tablet: 1 tab(s) orally once a day  folic acid 1 mg oral tablet: 1 tab(s) orally once a day  furosemide 40 mg oral tablet: 1 tab(s) orally once a day  HumaLOG 100 units/mL injectable solution: sliding scale subcutaneously 3 times daily before meals.  Incruse Ellipta 62.5 mcg/inh inhalation powder: 1 puff(s) inhaled every 24 hours  Incruse Ellipta 62.5 mcg/inh inhalation powder: 1 puff(s) inhaled every 24 hours  insulin glargine: 10 unit(s) subcutaneous once a day (at bedtime)  Lasix 40 mg oral tablet: 1 tab(s) orally once a day  Lipitor 80 mg oral tablet: 1 tab(s) orally once a day  Lipitor 80 mg oral tablet: 1 tab(s) orally once a day (at bedtime)  Lovenox 40 mg/0.4 mL injectable solution: 40 milligram(s) injectable once a day for 25 days.  Melatonin 1 mg oral tablet: 5 tab(s) orally once a day (at bedtime)  melatonin 1 mg oral tablet: 1 tab(s) orally once a day (at bedtime)  metoprolol succinate 25 mg oral tablet, extended release: 1 tab(s) orally once a day (with meals)  Metoprolol Succinate ER 25 mg oral tablet, extended release: 1 tab(s) orally once a day  Multiple Vitamins oral tablet: 1 tab(s) orally once a day  Multiple Vitamins oral tablet: 1 tab(s) orally once a day  nystatin 100,000 units/g topical powder: Apply topically to affected area 2 times a day  Oyster Shell Calcium with Vitamin D 250 mg-125 intl units (3.125 mcg) oral tablet: 2 tab(s) orally 2 times a day  Oyster Shell Calcium with Vitamin D 250 mg-125 intl units (3.125 mcg) oral tablet: 1 tab(s) orally 2 times a day  polyethylene glycol 3350 oral powder for reconstitution: orally once a day  polyethylene glycol 3350 oral powder for reconstitution: 17 gram(s) orally once a day  Senna 8.6 mg oral tablet: 2 tab(s) orally once a day (at bedtime)  Senna 8.6 mg oral tablet: 2 tab(s) orally once a day (at bedtime), As Needed  SEROquel 25 mg oral tablet: 1 tab(s) orally once a day  SEROquel 50 mg oral tablet: 1 tab(s) orally once a day (at bedtime)  SEROquel 50 mg oral tablet: 1 tab(s) orally 3 times a day  Symbicort 160 mcg-4.5 mcg/inh inhalation aerosol: 2 puff(s) inhaled 2 times a day  Symbicort 160 mcg-4.5 mcg/inh inhalation aerosol: 2 puff(s) inhaled 2 times a day  Tylenol 325 mg oral tablet: 2 tab(s) orally every 6 hours, As Needed  Ventolin HFA 90 mcg/inh inhalation aerosol: 1 puff(s) inhaled every 4 hours, As Needed  Vitamin C 250 mg oral tablet: 1 tab(s) orally once a day  Vitamin C 250 mg oral tablet: 1 tab(s) orally once a day   acetaminophen 500 mg oral tablet: 2 tab(s) orally every 8 hours  albuterol 90 mcg/inh inhalation aerosol: 1 puff(s) inhaled every 4 hours, As needed, Shortness of Breath and/or Wheezing  apixaban 5 mg oral tablet: 1 tab(s) orally every 12 hours  budesonide-formoterol 160 mcg-4.5 mcg/inh inhalation aerosol: 2 puff(s) inhaled 2 times a day  fluticasone 50 mcg/inh nasal spray: 1 spray(s) nasal 2 times a day  insulin lispro 100 units/mL injectable solution: Insulin sliding scale:  1 Unit(s) if Glucose 151 - 200  2 Unit(s) if Glucose 201 - 250  3 Unit(s) if Glucose 251 - 300  4 Unit(s) if Glucose 301 - 350  5 Unit(s) if Glucose 351 - 400  6 Unit(s) if Glucose Greater Than 400  melatonin 3 mg oral tablet: 1 tab(s) orally once a day (at bedtime)  metoprolol tartrate 25 mg oral tablet: 1 tab(s) orally 2 times a day  ocular lubricant ophthalmic solution: 1 drop(s) to each affected eye 3 times a day  polyethylene glycol 3350 oral powder for reconstitution: 17 gram(s) orally once a day  traMADol 50 mg oral tablet: 0.5 tab(s) orally every 8 hours, As needed, Moderate Pain (4 - 6)

## 2021-02-16 NOTE — DIETITIAN INITIAL EVALUATION ADULT. - ORAL INTAKE PTA/DIET HISTORY
Pt presented for nursing home. Pt presented for nursing home. Per nursing home records, the pt has hx of dysphagia and was on a chopped diet with no concentrated sweets and cardiac restrictions. Pt presented for nursing home. Per nursing home records, the pt has hx of dysphagia and was on a chopped diet with no concentrated sweets and cardiac restrictions. HbA1c 7.5% (2/13) indicating good glycemic control for pt's age/condition.

## 2021-02-16 NOTE — DIETITIAN INITIAL EVALUATION ADULT. - PROBLEM SELECTOR PLAN 9
- NPO  - heparin subQ  - PT eval  - DM2 - home regimen 10u Basaglar qHS and SSI at nursing home - will give SSI here given NPO and poor po intake  - full code per daughter

## 2021-02-16 NOTE — DIETITIAN INITIAL EVALUATION ADULT. - PERTINENT MEDS FT
MEDICATIONS  (STANDING):  budesonide 160 MICROgram(s)/formoterol 4.5 MICROgram(s) Inhaler 2 Puff(s) Inhalation two times a day  dextrose 40% Gel 15 Gram(s) Oral once  dextrose 5% with potassium chloride 20 mEq/L 1000 milliLiter(s) (75 mL/Hr) IV Continuous <Continuous>  dextrose 5%. 1000 milliLiter(s) (100 mL/Hr) IV Continuous <Continuous>  dextrose 5%. 1000 milliLiter(s) (50 mL/Hr) IV Continuous <Continuous>  dextrose 50% Injectable 25 Gram(s) IV Push once  dextrose 50% Injectable 12.5 Gram(s) IV Push once  dextrose 50% Injectable 25 Gram(s) IV Push once  glucagon  Injectable 1 milliGRAM(s) IntraMuscular once  heparin  Infusion.  Unit(s)/Hr (14 mL/Hr) IV Continuous <Continuous>  insulin lispro (ADMELOG) corrective regimen sliding scale   SubCutaneous every 6 hours  metoprolol tartrate Injectable 5 milliGRAM(s) IV Push every 6 hours  pantoprazole  Injectable 40 milliGRAM(s) IV Push every 12 hours  piperacillin/tazobactam IVPB.. 3.375 Gram(s) IV Intermittent every 8 hours

## 2021-02-16 NOTE — PROGRESS NOTE ADULT - ASSESSMENT
75M PMH dementia, DM2, HTN, ?HF, HLD, recent admission for COPD pneumonia requiring intubation (11/2020 [place]), COVID in January 2021 (not intubated) currently being treated for pneumonia presents from nursing home for abdominal pain and vomiting, found to have multifocal PNA (unclear if sequela of prior COVID) and high-grade SBO. Course c/b new afib with RVR s/p amiodarone gtt, on standing Lopressor.

## 2021-02-16 NOTE — DIETITIAN INITIAL EVALUATION ADULT. - CHIEF COMPLAINT
75M hx of dementia, DM2, HTN, ?HF, HLD, recent admission for COVID requiring intubation (11/2020 [place]), currently treated for pneumonia presents from nursing home for abdominal pain and vomiting, found to have multifocal PNA, and high-grade SBO with TP LUQ, c/f internal hernia on CT. Complicated by Afib RVR. NGT remains to suction.

## 2021-02-16 NOTE — PROGRESS NOTE ADULT - PROBLEM SELECTOR PLAN 5
- baseline 2L NC at home  - satting well on 2L here  - multifocal PNA known from prior admission  - unclear if warrants treatment but due to possible aspiration, will cover until cultures negative  - f/u bcx  - O2 sat 88-93%

## 2021-02-16 NOTE — PROGRESS NOTE ADULT - PROBLEM SELECTOR PLAN 1
New afib with RVR on 2/13 with HR 130s-150s. s/p amio load and gtt. Completed 18 hour gtt load on 2/14. Converted to SR, amio gtt now dc'd   -cards consulted, appreciate recs  -monitor on tele - per surgery, conservative management, precipitant ?hernia - no known prior abdominal surgeries  - NPO  - NG tube low intermittent  - trend i/o  - GI ppx with Protonix  - continue Zosyn (2/12 - )  - still high output from NG tube, no BMs  - CT AP ordered today eval SBO

## 2021-02-16 NOTE — DIETITIAN INITIAL EVALUATION ADULT. - PERTINENT LABORATORY DATA
02-16 Na153 mmol/L<H> Glu 146 mg/dL<H> K+ 3.5 mmol/L Cr  1.01 mg/dL BUN 30 mg/dL<H> Phos 3.2 mg/dL Alb 2.8 g/dL<L> PAB n/a    POCT Blood Glucose.: 146 mg/dL (02-16-21 @ 05:44)  POCT Blood Glucose.: 158 mg/dL (02-15-21 @ 23:45)  POCT Blood Glucose.: 144 mg/dL (02-15-21 @ 17:44)  POCT Blood Glucose.: 175 mg/dL (02-15-21 @ 12:32)

## 2021-02-16 NOTE — DISCHARGE NOTE PROVIDER - DETAILS OF MALNUTRITION DIAGNOSIS/DIAGNOSES
This patient has been assessed with a concern for Malnutrition and was treated during this hospitalization for the following Nutrition diagnosis/diagnoses:     -  02/16/2021: Severe protein-calorie malnutrition   This patient has been assessed with a concern for Malnutrition and was treated during this hospitalization for the following Nutrition diagnosis/diagnoses:     -  02/16/2021: Severe protein-calorie malnutrition    This patient has been assessed with a concern for Malnutrition and was treated during this hospitalization for the following Nutrition diagnosis/diagnoses:     -  02/16/2021: Severe protein-calorie malnutrition   This patient has been assessed with a concern for Malnutrition and was treated during this hospitalization for the following Nutrition diagnosis/diagnoses:     -  02/16/2021: Severe protein-calorie malnutrition    This patient has been assessed with a concern for Malnutrition and was treated during this hospitalization for the following Nutrition diagnosis/diagnoses:     -  02/16/2021: Severe protein-calorie malnutrition    This patient has been assessed with a concern for Malnutrition and was treated during this hospitalization for the following Nutrition diagnosis/diagnoses:     -  02/16/2021: Severe protein-calorie malnutrition   This patient has been assessed with a concern for Malnutrition and was treated during this hospitalization for the following Nutrition diagnosis/diagnoses:     -  02/16/2021: Severe protein-calorie malnutrition    This patient has been assessed with a concern for Malnutrition and was treated during this hospitalization for the following Nutrition diagnosis/diagnoses:     -  02/16/2021: Severe protein-calorie malnutrition    This patient has been assessed with a concern for Malnutrition and was treated during this hospitalization for the following Nutrition diagnosis/diagnoses:     -  02/16/2021: Severe protein-calorie malnutrition    This patient has been assessed with a concern for Malnutrition and was treated during this hospitalization for the following Nutrition diagnosis/diagnoses:     -  02/16/2021: Severe protein-calorie malnutrition

## 2021-02-16 NOTE — PROGRESS NOTE ADULT - PROBLEM SELECTOR PLAN 2
- per surgery, conservative management, precipitant ?hernia - no known prior abdominal surgeries  - NPO  - NG tube low intermittent  - trend i/o  - GI ppx with Protonix  - continue Zosyn (2/12 - )  - 2/14: still high output from NG tube, no BMs, c/w NG tube, as per surgery New afib with RVR on 2/13 with HR 130s-150s. s/p amio load and gtt. Completed 18 hour gtt load on 2/14. Converted to SR, amio gtt now dc'd   -cards consulted, appreciate recs  -monitor on tele  - CTA chest ordered today

## 2021-02-16 NOTE — DIETITIAN INITIAL EVALUATION ADULT. - REASON INDICATOR FOR ASSESSMENT
Pt seen for NPO status x5 days.  Source: Pt, EMR Pt seen for NPO status x5 days.  Source: Pt, EMR. Visited pt at bedside. The pt is AAO x1-2 per PCA. Attempted to interview pt, pt is not a good participant at this time.

## 2021-02-16 NOTE — PROGRESS NOTE ADULT - SUBJECTIVE AND OBJECTIVE BOX
PROGRESS NOTE:   Authored by Dr. Rebecca Gutierrez MD, Pager [phone] [facility], [id number] [facility]     Patient is a 75y old  Male who presents with a chief complaint of abdominal pain (15 Feb 2021 10:44)      SUBJECTIVE / OVERNIGHT EVENTS: No events overnight.    ADDITIONAL REVIEW OF SYSTEMS: Denies fevers, chills, n/v.    MEDICATIONS  (STANDING):  budesonide 160 MICROgram(s)/formoterol 4.5 MICROgram(s) Inhaler 2 Puff(s) Inhalation two times a day  dextrose 40% Gel 15 Gram(s) Oral once  dextrose 5%. 1000 milliLiter(s) (50 mL/Hr) IV Continuous <Continuous>  dextrose 5%. 1000 milliLiter(s) (100 mL/Hr) IV Continuous <Continuous>  dextrose 50% Injectable 25 Gram(s) IV Push once  dextrose 50% Injectable 12.5 Gram(s) IV Push once  dextrose 50% Injectable 25 Gram(s) IV Push once  glucagon  Injectable 1 milliGRAM(s) IntraMuscular once  heparin  Infusion.  Unit(s)/Hr (14 mL/Hr) IV Continuous <Continuous>  insulin lispro (ADMELOG) corrective regimen sliding scale   SubCutaneous every 6 hours  lactated ringers. 1000 milliLiter(s) (50 mL/Hr) IV Continuous <Continuous>  metoprolol tartrate Injectable 5 milliGRAM(s) IV Push every 6 hours  pantoprazole  Injectable 40 milliGRAM(s) IV Push every 12 hours  piperacillin/tazobactam IVPB.. 3.375 Gram(s) IV Intermittent every 8 hours    MEDICATIONS  (PRN):  ALBUTerol    90 MICROgram(s) HFA Inhaler 1 Puff(s) Inhalation every 4 hours PRN Shortness of Breath and/or Wheezing  heparin   Injectable 6500 Unit(s) IV Push every 6 hours PRN For aPTT less than 40  heparin   Injectable 3000 Unit(s) IV Push every 6 hours PRN For aPTT between 40 - 57  OLANZapine Injectable 2 milliGRAM(s) IntraMuscular every 6 hours PRN agitation      CAPILLARY BLOOD GLUCOSE      POCT Blood Glucose.: 146 mg/dL (16 Feb 2021 05:44)  POCT Blood Glucose.: 158 mg/dL (15 Feb 2021 23:45)  POCT Blood Glucose.: 144 mg/dL (15 Feb 2021 17:44)  POCT Blood Glucose.: 175 mg/dL (15 Feb 2021 12:32)    I&O's Summary    15 Feb 2021 07:01  -  16 Feb 2021 07:00  --------------------------------------------------------  IN: 430 mL / OUT: 800 mL / NET: -370 mL        PHYSICAL EXAM:  Vital Signs Last 24 Hrs  T(C): 36.9 (16 Feb 2021 04:50), Max: 37.1 (15 Feb 2021 23:45)  T(F): 98.4 (16 Feb 2021 04:50), Max: 98.7 (15 Feb 2021 23:45)  HR: 86 (16 Feb 2021 04:50) (81 - 87)  BP: 121/78 (16 Feb 2021 04:50) (121/78 - 164/63)  BP(mean): --  RR: 19 (16 Feb 2021 04:50) (19 - 22)  SpO2: 98% (16 Feb 2021 04:50) (92% - 98%)    CONSTITUTIONAL: NAD, lying in bed comfortably, NGT in place  RESPIRATORY: Normal respiratory effort; CTABL posteriorly  CARDIOVASCULAR: Regular rate and rhythm, normal S1 and S2, no murmur/rub/gallop  ABDOMEN: Soft, mildly distended, nontender to palpation, normoactive bowel sounds, no rebound/guarding  MUSCLOSKELETAL: no joint swelling or tenderness to palpation, FROM all extremities  NEURO: AAOx1-2   EXTREMITIES: no pedal edema    LABS:                        10.1   14.83 )-----------( 341      ( 16 Feb 2021 06:45 )             32.8     02-15    149<H>  |  109<H>  |  34<H>  ----------------------------<  174<H>  3.8   |  24  |  0.98    Ca    8.8      15 Feb 2021 05:59  Phos  2.0     02-15  Mg     2.4     02-15    TPro  6.8  /  Alb  2.9<L>  /  TBili  0.5  /  DBili  x   /  AST  25  /  ALT  40  /  AlkPhos  103  02-15    PTT - ( 15 Feb 2021 21:11 )  PTT:145.3 sec            RADIOLOGY & ADDITIONAL TESTS:     PROGRESS NOTE:   Authored by Dr. Rebecca Gutierrez MD, Pager [phone] [facility], [id number] [address]     Patient is a 75y old  Male who presents with a chief complaint of abdominal pain (15 Feb 2021 10:44)      SUBJECTIVE / OVERNIGHT EVENTS: No events overnight. AAOx2 to name and year. No abd pain.    ADDITIONAL REVIEW OF SYSTEMS:     MEDICATIONS  (STANDING):  budesonide 160 MICROgram(s)/formoterol 4.5 MICROgram(s) Inhaler 2 Puff(s) Inhalation two times a day  dextrose 40% Gel 15 Gram(s) Oral once  dextrose 5%. 1000 milliLiter(s) (50 mL/Hr) IV Continuous <Continuous>  dextrose 5%. 1000 milliLiter(s) (100 mL/Hr) IV Continuous <Continuous>  dextrose 50% Injectable 25 Gram(s) IV Push once  dextrose 50% Injectable 12.5 Gram(s) IV Push once  dextrose 50% Injectable 25 Gram(s) IV Push once  glucagon  Injectable 1 milliGRAM(s) IntraMuscular once  heparin  Infusion.  Unit(s)/Hr (14 mL/Hr) IV Continuous <Continuous>  insulin lispro (ADMELOG) corrective regimen sliding scale   SubCutaneous every 6 hours  lactated ringers. 1000 milliLiter(s) (50 mL/Hr) IV Continuous <Continuous>  metoprolol tartrate Injectable 5 milliGRAM(s) IV Push every 6 hours  pantoprazole  Injectable 40 milliGRAM(s) IV Push every 12 hours  piperacillin/tazobactam IVPB.. 3.375 Gram(s) IV Intermittent every 8 hours    MEDICATIONS  (PRN):  ALBUTerol    90 MICROgram(s) HFA Inhaler 1 Puff(s) Inhalation every 4 hours PRN Shortness of Breath and/or Wheezing  heparin   Injectable 6500 Unit(s) IV Push every 6 hours PRN For aPTT less than 40  heparin   Injectable 3000 Unit(s) IV Push every 6 hours PRN For aPTT between 40 - 57  OLANZapine Injectable 2 milliGRAM(s) IntraMuscular every 6 hours PRN agitation      CAPILLARY BLOOD GLUCOSE      POCT Blood Glucose.: 146 mg/dL (16 Feb 2021 05:44)  POCT Blood Glucose.: 158 mg/dL (15 Feb 2021 23:45)  POCT Blood Glucose.: 144 mg/dL (15 Feb 2021 17:44)  POCT Blood Glucose.: 175 mg/dL (15 Feb 2021 12:32)    I&O's Summary    15 Feb 2021 07:01  -  16 Feb 2021 07:00  --------------------------------------------------------  IN: 430 mL / OUT: 800 mL / NET: -370 mL        PHYSICAL EXAM:  Vital Signs Last 24 Hrs  T(C): 36.9 (16 Feb 2021 04:50), Max: 37.1 (15 Feb 2021 23:45)  T(F): 98.4 (16 Feb 2021 04:50), Max: 98.7 (15 Feb 2021 23:45)  HR: 86 (16 Feb 2021 04:50) (81 - 87)  BP: 121/78 (16 Feb 2021 04:50) (121/78 - 164/63)  BP(mean): --  RR: 19 (16 Feb 2021 04:50) (19 - 22)  SpO2: 98% (16 Feb 2021 04:50) (92% - 98%)    CONSTITUTIONAL: NAD, lying in bed comfortably, NGT in place  RESPIRATORY: Normal respiratory effort; CTABL posteriorly  CARDIOVASCULAR: Regular rate and rhythm, normal S1 and S2, no murmur/rub/gallop  ABDOMEN: Soft, mildly distended, nontender to palpation, normoactive bowel sounds, no rebound/guarding  MUSCLOSKELETAL: no joint swelling or tenderness to palpation, FROM all extremities  NEURO: AAOx1-2   EXTREMITIES: no pedal edema    LABS:                        10.1   14.83 )-----------( 341      ( 16 Feb 2021 06:45 )             32.8     02-15    149<H>  |  109<H>  |  34<H>  ----------------------------<  174<H>  3.8   |  24  |  0.98    Ca    8.8      15 Feb 2021 05:59  Phos  2.0     02-15  Mg     2.4     02-15    TPro  6.8  /  Alb  2.9<L>  /  TBili  0.5  /  DBili  x   /  AST  25  /  ALT  40  /  AlkPhos  103  02-15    PTT - ( 15 Feb 2021 21:11 )  PTT:145.3 sec            RADIOLOGY & ADDITIONAL TESTS:

## 2021-02-16 NOTE — DIETITIAN INITIAL EVALUATION ADULT. - PROBLEM SELECTOR PLAN 7
- titrate spO2 88-93%  - continue home meds  - unlikely exacerbation, daughter denies cough, phlegm, fevers

## 2021-02-16 NOTE — PROGRESS NOTE ADULT - PROBLEM SELECTOR PLAN 10
- NPO with NG tube  - heparin subQ  - eventual PT eval  - DM2 - home regimen 10u Basaglar qHS and SSI at nursing home - will give SSI here given NPO and poor po intake  - full code per daughter - NPO with NG tube  - heparin gtt  - eventual PT eval  - DM2 - home regimen 10u Basaglar qHS and SSI at nursing home - will give SSI here given NPO and poor po intake  - full code per daughter

## 2021-02-16 NOTE — DIETITIAN INITIAL EVALUATION ADULT. - PROBLEM SELECTOR PLAN 5
- send iron studies  - FOBT+ but at baseline - unclear significance given interuser variability  - Hb 9.8 in Jan 2021  - active T&S  - tx Hb < 7 or if hemodynamic instability

## 2021-02-16 NOTE — DISCHARGE NOTE PROVIDER - NSDCCPCAREPLAN_GEN_ALL_CORE_FT
PRINCIPAL DISCHARGE DIAGNOSIS  Diagnosis: Small bowel obstruction  Assessment and Plan of Treatment:       SECONDARY DISCHARGE DIAGNOSES  Diagnosis: Atrial fibrillation  Assessment and Plan of Treatment:      PRINCIPAL DISCHARGE DIAGNOSIS  Diagnosis: Small bowel obstruction  Assessment and Plan of Treatment: You were brought to the hospital due to abdominal pain. A CT scan of your abdomen was done which showed an obstruction in your small bowels (SBO). IT is unclear what caused this obstruction. You had a nasogastric tube placed to relieve fluid from your stomach to let your bowels rest.      SECONDARY DISCHARGE DIAGNOSES  Diagnosis: Atrial fibrillation  Assessment and Plan of Treatment: You went into atrial fibrillation (afib) during the day of admission where your heart started to beat very fast and irregularly. You were evaluated by the cardiologist and you were started on heart medications to slow down your heart rate. Your heart converted back to normal sinus rhythm.     PRINCIPAL DISCHARGE DIAGNOSIS  Diagnosis: Small bowel obstruction  Assessment and Plan of Treatment: You were brought to the hospital due to abdominal pain. A CT scan of your abdomen was done which showed an obstruction in your small bowels (SBO). IT is unclear what caused this obstruction. You had a nasogastric tube placed to relieve fluid from your stomach to let your bowels rest. You eventually started to have bowel movements and tolerated food with resolution of the SBO.      SECONDARY DISCHARGE DIAGNOSES  Diagnosis: Atrial fibrillation  Assessment and Plan of Treatment: You went into atrial fibrillation (afib) during the day of admission where your heart started to beat very fast and irregularly. You were evaluated by the cardiologist and you were started on heart medications to slow down your heart rate. Your heart converted back to normal sinus rhythm. You were started on a blood thinner, Eliquis, to prevent strokes. You were also started on a medication to slow down your heart rate, Please continue to take Eliquis 5mg twice a day and     PRINCIPAL DISCHARGE DIAGNOSIS  Diagnosis: Small bowel obstruction  Assessment and Plan of Treatment: You were brought to the hospital due to abdominal pain. A CT scan of your abdomen was done which showed an obstruction in your small bowels (SBO). IT is unclear what caused this obstruction. You had a nasogastric tube placed to relieve fluid from your stomach to let your bowels rest. You eventually started to have bowel movements and tolerated food with resolution of the SBO.      SECONDARY DISCHARGE DIAGNOSES  Diagnosis: Atrial fibrillation  Assessment and Plan of Treatment: You went into atrial fibrillation (afib) during the day of admission where your heart started to beat very fast and irregularly. You were evaluated by the cardiologist and you were started on heart medications to slow down your heart rate. Your heart converted back to normal sinus rhythm. You were started on a blood thinner, Eliquis, to prevent strokes. You were also started on a medication to slow down your heart rate, Please continue to take Eliquis 5mg twice a day and    Diagnosis: HTN (hypertension)  Assessment and Plan of Treatment: Continue medication as prescribed.    Diagnosis: T2DM (type 2 diabetes mellitus)  Assessment and Plan of Treatment: A1C 7.5 on admission.  Maintained on insulin sliding scale inpatient.    Diagnosis: Dementia  Assessment and Plan of Treatment: A&O x 1-2 at baseline.  History of CVA    Diagnosis: SARAH (acute kidney injury)  Assessment and Plan of Treatment: Resolved after IVF.    Diagnosis: Functional quadriplegia  Assessment and Plan of Treatment: Assist with ADLs including cutting food for PO nutrition.    Diagnosis: COPD (chronic obstructive pulmonary disease)  Assessment and Plan of Treatment: Continue with medications as prescribed.    Diagnosis: UTI (urinary tract infection)  Assessment and Plan of Treatment: Completed course of antibiotics (Zosyn).

## 2021-02-16 NOTE — DIETITIAN INITIAL EVALUATION ADULT. - PROBLEM SELECTOR PLAN 1
- per surgery, conservative management, precipitant ?hernia - no known prior abdominal surgeries  - NPO  - NG tube low intermittent  - trend i/o  - GI ppx with Protonix  - Zosyn (2/12 - )

## 2021-02-16 NOTE — PROGRESS NOTE ADULT - ASSESSMENT
75M hx of dementia, DM2, HTN, ?HF, HLD, recent admission for COVID requiring intubation (11/2020 [facility]), currently treated for pneumonia presents from nursing home for abdominal pain and vomiting, found to have multifocal PNA, and high-grade SBO    Recommendation:  - Recommend nonoperative management with NPO, NGT decompression, IVF resuscitation  - continue with protonix BID  - if NGT output remains high consider rpt CT in next  1-2 days to assess status sbo as pt remains high risk for sx intervention    [id number]

## 2021-02-16 NOTE — DISCHARGE NOTE PROVIDER - HOSPITAL COURSE
75 year old male with hx of dementia, DM2, HTN, ?HF, HLD, recent admission for COVID in 1/2021 and COPD w/ superimposed PNA requiring intubation in 11/2020 at [place], currently treated for pneumonia presents from nursing home for abdominal pain x 1 week and vomiting x 1 day. CT A/P in ED showed multifocal PNA (known from January), high-grade SBO with TP LUQ, c/f internal hernia. NGT was placed with immediate return of 1.1L stomach content. Received 3l fluid boluses and meropenem in ED, subsequently switched to zosyn, and admitted for further management of SBO.     Surgery consulted, no surgical intervention given co-morbidities and high risk of surgery. On day of admission pt went into new afib with RVR, s/p amio gtt, digoxin 0.25 IVP x1, and standing lopressor with conversion back to SR. NGT with continued output, repeat CTAP showed _____. CTA chest _____. 75 year old male with hx of dementia, DM2, HTN, ?HF, HLD, recent admission for COVID in 1/2021 and COPD w/ superimposed PNA requiring intubation in 11/2020 at [place], currently treated for pneumonia presents from nursing home for abdominal pain x 1 week and vomiting x 1 day. CT A/P in ED showed multifocal PNA (known from January), high-grade SBO with TP LUQ, c/f internal hernia. NGT was placed with immediate return of 1.1L stomach content. Received 3l fluid boluses and meropenem in ED, subsequently switched to zosyn, and admitted for further management of SBO.     Surgery consulted, no surgical intervention given co-morbidities and high risk of surgery. On day of admission pt went into new afib with RVR, s/p amio gtt, digoxin 0.25 IVP x1, and standing lopressor with conversion back to SR. NGT with continued output, repeat CTAP showed persistent SBO. CTA chest w/o PE. Pt started to have bowel movements. NGT clamped on 2/19. 75 year old male with hx of dementia, DM2, HTN, ?HF, HLD, recent admission for COVID in 1/2021 and COPD w/ superimposed PNA requiring intubation in 11/2020 at [place], currently treated for pneumonia presents from nursing home for abdominal pain x 1 week and vomiting x 1 day. CT A/P in ED showed multifocal PNA (known from January), high-grade SBO with TP LUQ, c/f internal hernia. NGT was placed with immediate return of 1.1L stomach content. Received 3l fluid boluses and meropenem in ED, subsequently switched to zosyn, and admitted for further management of SBO.     Surgery consulted, no surgical intervention given co-morbidities and high risk of surgery. On day of admission pt went into new afib with RVR, s/p amio gtt, digoxin 0.25 IVP x1, and standing lopressor with conversion back to SR. Heparin gtt was started and was transitioned to Eliquis. Repeat CTAP 2/16 showed persistent SBO however SB less dilated and without internal hernia. CTA chest w/o PE. Pt started to have bowel movements and NGT clamped on 2/19. Tolerated regular diet with resolution of SBO.    PT recommended subacute rehab. 75 year old male with hx of dementia, DM2, HTN, ?HF, HLD, recent admission for COVID in 1/2021 and COPD w/ superimposed PNA requiring intubation in 11/2020 at [facility], currently treated for pneumonia presents from nursing home for abdominal pain x 1 week and vomiting x 1 day. CT A/P in ED showed multifocal PNA (known from January), high-grade SBO with TP LUQ, c/f internal hernia. NGT was placed with immediate return of 1.1L stomach content. Received 3l fluid boluses and meropenem in ED, subsequently switched to zosyn, and admitted for further management of SBO.     Surgery consulted, no surgical intervention given co-morbidities and high risk of surgery. On day of admission pt went into new afib with RVR, s/p amio gtt, digoxin 0.25 IVP x1, and standing lopressor with conversion back to SR. Heparin gtt was started and was transitioned to Eliquis. Repeat CTAP 2/16 showed persistent SBO however SB less dilated and without internal hernia. CTA chest w/o PE. Pt started to have bowel movements and NGT clamped on 2/19. Tolerated regular diet with resolution of SBO.    Underwent swallow eval on 2/24/21 with the following recommendations: Liberalized diet of regular solids / thin liquids ; SUGGEST RN/Caregiver to modify solids (cut or chop) as needed; 100% dependent;     PT recommended subacute rehab.

## 2021-02-16 NOTE — DIETITIAN INITIAL EVALUATION ADULT. - ADD RECOMMEND
When made PO, please obtain swallow eval for hx of dysphagia (pt was on chopped diet at NH). Please also place low sodium, Consistent Carbohydrate diet restriction. Add Glucerna BID.

## 2021-02-16 NOTE — PROGRESS NOTE ADULT - SUBJECTIVE AND OBJECTIVE BOX
Subjective: Patient seen and examined. No new events except as noted.   NGT remains   Maintaining SR      REVIEW OF SYSTEMS:    CONSTITUTIONAL: +weakness, fevers or chills  EYES/ENT: No visual changes;  No vertigo or throat pain   NECK: No pain or stiffness  RESPIRATORY: No cough, wheezing, hemoptysis; No shortness of breath  CARDIOVASCULAR: No chest pain or palpitations  GASTROINTESTINAL: No abdominal or epigastric pain. No nausea, vomiting, or hematemesis; No diarrhea or constipation. No melena or hematochezia.  GENITOURINARY: No dysuria, frequency or hematuria  NEUROLOGICAL: No numbness or weakness  SKIN: No itching, burning, rashes, or lesions   All other review of systems is negative unless indicated above.    MEDICATIONS:  MEDICATIONS  (STANDING):  budesonide 160 MICROgram(s)/formoterol 4.5 MICROgram(s) Inhaler 2 Puff(s) Inhalation two times a day  dextrose 40% Gel 15 Gram(s) Oral once  dextrose 5% with potassium chloride 20 mEq/L 1000 milliLiter(s) (75 mL/Hr) IV Continuous <Continuous>  dextrose 5%. 1000 milliLiter(s) (100 mL/Hr) IV Continuous <Continuous>  dextrose 5%. 1000 milliLiter(s) (50 mL/Hr) IV Continuous <Continuous>  dextrose 50% Injectable 25 Gram(s) IV Push once  dextrose 50% Injectable 12.5 Gram(s) IV Push once  dextrose 50% Injectable 25 Gram(s) IV Push once  glucagon  Injectable 1 milliGRAM(s) IntraMuscular once  heparin  Infusion.  Unit(s)/Hr (14 mL/Hr) IV Continuous <Continuous>  insulin lispro (ADMELOG) corrective regimen sliding scale   SubCutaneous every 6 hours  metoprolol tartrate Injectable 5 milliGRAM(s) IV Push every 6 hours  pantoprazole  Injectable 40 milliGRAM(s) IV Push every 12 hours  piperacillin/tazobactam IVPB.. 3.375 Gram(s) IV Intermittent every 8 hours      PHYSICAL EXAM:  T(C): 36.8 (02-16-21 @ 09:12), Max: 37.1 (02-15-21 @ 23:45)  HR: 70 (02-16-21 @ 09:12) (70 - 87)  BP: 136/84 (02-16-21 @ 09:12) (121/78 - 164/63)  RR: 19 (02-16-21 @ 09:12) (19 - 22)  SpO2: 98% (02-16-21 @ 09:12) (92% - 98%)  Wt(kg): --  I&O's Summary    15 Feb 2021 07:01  -  16 Feb 2021 07:00  --------------------------------------------------------  IN: 430 mL / OUT: 800 mL / NET: -370 mL    16 Feb 2021 07:01  -  16 Feb 2021 10:03  --------------------------------------------------------  IN: 100 mL / OUT: 0 mL / NET: 100 mL          Appearance: NAD +NGT   HEENT:  dry oral mucosa, PERRL, EOMI	  Lymphatic: No lymphadenopathy , no edema  Cardiovascular: Normal S1 S2, No JVD, No murmurs , Peripheral pulses palpable 2+ bilaterally  Respiratory: Lungs clear to auscultation, normal effort 	  Gastrointestinal:  Soft, Non-tender, + BS	  Skin: No rashes, No ecchymoses, No cyanosis, warm to touch  Musculoskeletal: Normal range of motion, normal strength  Psychiatry:  Mood & affect appropriate  Ext: No edema      LABS:    CARDIAC MARKERS:                                10.1   14.83 )-----------( 341      ( 16 Feb 2021 06:45 )             32.8     02-16    153<H>  |  112<H>  |  30<H>  ----------------------------<  146<H>  3.5   |  25  |  1.01    Ca    9.0      16 Feb 2021 06:43  Phos  3.2     02-16  Mg     2.4     02-16    TPro  6.6  /  Alb  2.8<L>  /  TBili  0.4  /  DBili  x   /  AST  15  /  ALT  30  /  AlkPhos  94  02-16    proBNP:   Lipid Profile:   HgA1c:   TSH:             TELEMETRY: SR 	    ECG:  	  RADIOLOGY:   DIAGNOSTIC TESTING:  [ ] Echocardiogram:  [ ]  Catheterization:  [ ] Stress Test:    OTHER:

## 2021-02-16 NOTE — DISCHARGE NOTE PROVIDER - CARE PROVIDER_API CALL
LILIAM MOARLES  Internal Medicine  [address]  [place] [address]  Phone: [phone]  Fax: [phone]  Follow Up Time:

## 2021-02-17 PROBLEM — J18.9 PNEUMONIA, UNSPECIFIED ORGANISM: Chronic | Status: ACTIVE | Noted: 2020-12-24

## 2021-02-17 PROBLEM — E78.5 HYPERLIPIDEMIA, UNSPECIFIED: Chronic | Status: ACTIVE | Noted: 2020-12-24

## 2021-02-17 PROBLEM — Z86.79 PERSONAL HISTORY OF OTHER DISEASES OF THE CIRCULATORY SYSTEM: Chronic | Status: ACTIVE | Noted: 2020-12-24

## 2021-02-17 PROBLEM — I10 ESSENTIAL (PRIMARY) HYPERTENSION: Chronic | Status: ACTIVE | Noted: 2020-12-24

## 2021-02-17 PROBLEM — Z87.09 PERSONAL HISTORY OF OTHER DISEASES OF THE RESPIRATORY SYSTEM: Chronic | Status: ACTIVE | Noted: 2020-12-24

## 2021-02-17 LAB
ALBUMIN SERPL ELPH-MCNC: 2.6 G/DL — LOW (ref 3.3–5)
ALP SERPL-CCNC: 85 U/L — SIGNIFICANT CHANGE UP (ref 40–120)
ALT FLD-CCNC: 20 U/L — SIGNIFICANT CHANGE UP (ref 10–45)
ANION GAP SERPL CALC-SCNC: 15 MMOL/L — SIGNIFICANT CHANGE UP (ref 5–17)
APTT BLD: 79.7 SEC — HIGH (ref 27.5–35.5)
AST SERPL-CCNC: 12 U/L — SIGNIFICANT CHANGE UP (ref 10–40)
BILIRUB SERPL-MCNC: 0.4 MG/DL — SIGNIFICANT CHANGE UP (ref 0.2–1.2)
BLD GP AB SCN SERPL QL: NEGATIVE — SIGNIFICANT CHANGE UP
BUN SERPL-MCNC: 22 MG/DL — SIGNIFICANT CHANGE UP (ref 7–23)
CALCIUM SERPL-MCNC: 8.7 MG/DL — SIGNIFICANT CHANGE UP (ref 8.4–10.5)
CHLORIDE SERPL-SCNC: 112 MMOL/L — HIGH (ref 96–108)
CO2 SERPL-SCNC: 26 MMOL/L — SIGNIFICANT CHANGE UP (ref 22–31)
CREAT SERPL-MCNC: 1.02 MG/DL — SIGNIFICANT CHANGE UP (ref 0.5–1.3)
CULTURE RESULTS: SIGNIFICANT CHANGE UP
CULTURE RESULTS: SIGNIFICANT CHANGE UP
GLUCOSE BLDC GLUCOMTR-MCNC: 167 MG/DL — HIGH (ref 70–99)
GLUCOSE BLDC GLUCOMTR-MCNC: 172 MG/DL — HIGH (ref 70–99)
GLUCOSE BLDC GLUCOMTR-MCNC: 200 MG/DL — HIGH (ref 70–99)
GLUCOSE BLDC GLUCOMTR-MCNC: 227 MG/DL — HIGH (ref 70–99)
GLUCOSE SERPL-MCNC: 175 MG/DL — HIGH (ref 70–99)
HCT VFR BLD CALC: 31.3 % — LOW (ref 39–50)
HGB BLD-MCNC: 9.5 G/DL — LOW (ref 13–17)
MAGNESIUM SERPL-MCNC: 2.2 MG/DL — SIGNIFICANT CHANGE UP (ref 1.6–2.6)
MCHC RBC-ENTMCNC: 22.6 PG — LOW (ref 27–34)
MCHC RBC-ENTMCNC: 30.4 GM/DL — LOW (ref 32–36)
MCV RBC AUTO: 74.3 FL — LOW (ref 80–100)
NRBC # BLD: 0 /100 WBCS — SIGNIFICANT CHANGE UP (ref 0–0)
PHOSPHATE SERPL-MCNC: 3.1 MG/DL — SIGNIFICANT CHANGE UP (ref 2.5–4.5)
PLATELET # BLD AUTO: 309 K/UL — SIGNIFICANT CHANGE UP (ref 150–400)
POTASSIUM SERPL-MCNC: 3.9 MMOL/L — SIGNIFICANT CHANGE UP (ref 3.5–5.3)
POTASSIUM SERPL-SCNC: 3.9 MMOL/L — SIGNIFICANT CHANGE UP (ref 3.5–5.3)
PROCALCITONIN SERPL-MCNC: 0.24 NG/ML — HIGH (ref 0.02–0.1)
PROT SERPL-MCNC: 6.4 G/DL — SIGNIFICANT CHANGE UP (ref 6–8.3)
RBC # BLD: 4.21 M/UL — SIGNIFICANT CHANGE UP (ref 4.2–5.8)
RBC # FLD: 20.7 % — HIGH (ref 10.3–14.5)
RH IG SCN BLD-IMP: POSITIVE — SIGNIFICANT CHANGE UP
SODIUM SERPL-SCNC: 149 MMOL/L — HIGH (ref 135–145)
SODIUM SERPL-SCNC: 153 MMOL/L — HIGH (ref 135–145)
SPECIMEN SOURCE: SIGNIFICANT CHANGE UP
SPECIMEN SOURCE: SIGNIFICANT CHANGE UP
WBC # BLD: 15.62 K/UL — HIGH (ref 3.8–10.5)
WBC # FLD AUTO: 15.62 K/UL — HIGH (ref 3.8–10.5)

## 2021-02-17 PROCEDURE — 99233 SBSQ HOSP IP/OBS HIGH 50: CPT | Mod: CS,GC

## 2021-02-17 PROCEDURE — 99231 SBSQ HOSP IP/OBS SF/LOW 25: CPT | Mod: GC

## 2021-02-17 RX ORDER — SODIUM CHLORIDE 9 MG/ML
1000 INJECTION, SOLUTION INTRAVENOUS
Refills: 0 | Status: DISCONTINUED | OUTPATIENT
Start: 2021-02-17 | End: 2021-02-17

## 2021-02-17 RX ORDER — DEXTROSE MONOHYDRATE, SODIUM CHLORIDE, AND POTASSIUM CHLORIDE 50; .745; 4.5 G/1000ML; G/1000ML; G/1000ML
1000 INJECTION, SOLUTION INTRAVENOUS
Refills: 0 | Status: DISCONTINUED | OUTPATIENT
Start: 2021-02-17 | End: 2021-02-18

## 2021-02-17 RX ADMIN — Medication 1: at 23:45

## 2021-02-17 RX ADMIN — Medication 5 MILLIGRAM(S): at 13:19

## 2021-02-17 RX ADMIN — Medication 1: at 06:42

## 2021-02-17 RX ADMIN — SODIUM CHLORIDE 75 MILLILITER(S): 9 INJECTION, SOLUTION INTRAVENOUS at 09:30

## 2021-02-17 RX ADMIN — OLANZAPINE 2 MILLIGRAM(S): 15 TABLET, FILM COATED ORAL at 01:20

## 2021-02-17 RX ADMIN — PIPERACILLIN AND TAZOBACTAM 25 GRAM(S): 4; .5 INJECTION, POWDER, LYOPHILIZED, FOR SOLUTION INTRAVENOUS at 23:46

## 2021-02-17 RX ADMIN — Medication 5 MILLIGRAM(S): at 18:14

## 2021-02-17 RX ADMIN — HEPARIN SODIUM 1400 UNIT(S)/HR: 5000 INJECTION INTRAVENOUS; SUBCUTANEOUS at 11:38

## 2021-02-17 RX ADMIN — Medication 5 MILLIGRAM(S): at 05:42

## 2021-02-17 RX ADMIN — Medication 1: at 18:42

## 2021-02-17 RX ADMIN — Medication 5 MILLIGRAM(S): at 23:46

## 2021-02-17 RX ADMIN — PIPERACILLIN AND TAZOBACTAM 25 GRAM(S): 4; .5 INJECTION, POWDER, LYOPHILIZED, FOR SOLUTION INTRAVENOUS at 09:23

## 2021-02-17 RX ADMIN — DEXTROSE MONOHYDRATE, SODIUM CHLORIDE, AND POTASSIUM CHLORIDE 50 MILLILITER(S): 50; .745; 4.5 INJECTION, SOLUTION INTRAVENOUS at 17:00

## 2021-02-17 RX ADMIN — BUDESONIDE AND FORMOTEROL FUMARATE DIHYDRATE 2 PUFF(S): 160; 4.5 AEROSOL RESPIRATORY (INHALATION) at 05:19

## 2021-02-17 RX ADMIN — PANTOPRAZOLE SODIUM 40 MILLIGRAM(S): 20 TABLET, DELAYED RELEASE ORAL at 12:44

## 2021-02-17 RX ADMIN — PIPERACILLIN AND TAZOBACTAM 25 GRAM(S): 4; .5 INJECTION, POWDER, LYOPHILIZED, FOR SOLUTION INTRAVENOUS at 17:00

## 2021-02-17 RX ADMIN — PANTOPRAZOLE SODIUM 40 MILLIGRAM(S): 20 TABLET, DELAYED RELEASE ORAL at 23:46

## 2021-02-17 RX ADMIN — BUDESONIDE AND FORMOTEROL FUMARATE DIHYDRATE 2 PUFF(S): 160; 4.5 AEROSOL RESPIRATORY (INHALATION) at 18:42

## 2021-02-17 RX ADMIN — DEXTROSE MONOHYDRATE, SODIUM CHLORIDE, AND POTASSIUM CHLORIDE 75 MILLILITER(S): 50; .745; 4.5 INJECTION, SOLUTION INTRAVENOUS at 14:00

## 2021-02-17 RX ADMIN — Medication 2: at 11:51

## 2021-02-17 NOTE — PROGRESS NOTE ADULT - PROBLEM SELECTOR PLAN 1
- per surgery, conservative management, precipitant ?hernia - no known prior abdominal surgeries  - NPO  - NG tube low intermittent  - trend i/o  - GI ppx with Protonix  - continue Zosyn (2/12 - )  - still high output from NG tube, no BMs  - f/u CT AP eval SBO - per surgery, conservative management, precipitant ?hernia - no known prior abdominal surgeries  - NPO  - NG tube low intermittent  - trend i/o  - GI ppx with Protonix  - continue Zosyn (2/12 - )  - still high output from NG tube, no BMs  - repeat CTAP 2/16 still with persistent SBO

## 2021-02-17 NOTE — PROGRESS NOTE ADULT - SUBJECTIVE AND OBJECTIVE BOX
Subjective: Patient seen and examined. No new events except as noted.   Denies abdominal pain, N/V. NPO with NGT draining 550 bilious over 24 hours    REVIEW OF SYSTEMS:    CONSTITUTIONAL: + weakness, fevers or chills  EYES/ENT: No visual changes;  No vertigo or throat pain   NECK: No pain or stiffness  RESPIRATORY: No cough, wheezing, hemoptysis; No shortness of breath  CARDIOVASCULAR: No chest pain or palpitations  GASTROINTESTINAL: No abdominal or epigastric pain. No nausea, vomiting, or hematemesis; No diarrhea or constipation. No melena or hematochezia.  GENITOURINARY: No dysuria, frequency or hematuria  NEUROLOGICAL: No numbness or weakness  SKIN: No itching, burning, rashes, or lesions   All other review of systems is negative unless indicated above.    MEDICATIONS:  MEDICATIONS  (STANDING):  budesonide 160 MICROgram(s)/formoterol 4.5 MICROgram(s) Inhaler 2 Puff(s) Inhalation two times a day  dextrose 40% Gel 15 Gram(s) Oral once  dextrose 5% with potassium chloride 20 mEq/L 1000 milliLiter(s) (50 mL/Hr) IV Continuous <Continuous>  dextrose 5%. 1000 milliLiter(s) (50 mL/Hr) IV Continuous <Continuous>  dextrose 5%. 1000 milliLiter(s) (100 mL/Hr) IV Continuous <Continuous>  dextrose 50% Injectable 25 Gram(s) IV Push once  dextrose 50% Injectable 12.5 Gram(s) IV Push once  dextrose 50% Injectable 25 Gram(s) IV Push once  glucagon  Injectable 1 milliGRAM(s) IntraMuscular once  heparin  Infusion.  Unit(s)/Hr (14 mL/Hr) IV Continuous <Continuous>  insulin lispro (ADMELOG) corrective regimen sliding scale   SubCutaneous every 6 hours  metoprolol tartrate Injectable 5 milliGRAM(s) IV Push every 6 hours  pantoprazole  Injectable 40 milliGRAM(s) IV Push every 12 hours  piperacillin/tazobactam IVPB.. 3.375 Gram(s) IV Intermittent every 8 hours      PHYSICAL EXAM:  T(C): 36.8 (02-17-21 @ 12:58), Max: 37.1 (02-17-21 @ 09:05)  HR: 88 (02-17-21 @ 18:14) (75 - 92)  BP: 111/64 (02-17-21 @ 18:14) (111/64 - 153/71)  RR: 20 (02-17-21 @ 12:58) (18 - 20)  SpO2: 96% (02-17-21 @ 12:58) (96% - 99%)  Wt(kg): --  I&O's Summary    16 Feb 2021 07:01  -  17 Feb 2021 07:00  --------------------------------------------------------  IN: 788 mL / OUT: 700 mL / NET: 88 mL    17 Feb 2021 07:01  -  17 Feb 2021 19:55  --------------------------------------------------------  IN: 0 mL / OUT: 125 mL / NET: -125 mL          Appearance: NAD +NGT   HEENT:   Normal oral mucosa, PERRL, EOMI	  Lymphatic: No lymphadenopathy , no edema  Cardiovascular: Normal S1 S2, No JVD, No murmurs , Peripheral pulses palpable 2+ bilaterally  Respiratory: Decreased bs 	  Gastrointestinal:  Soft, Non-tender, + BS	  Skin: No rashes, No ecchymoses, No cyanosis, warm to touch  Musculoskeletal: Normal range of motion, normal strength  Psychiatry:  Mood & affect appropriate  Ext: No edema      LABS:    CARDIAC MARKERS:                                9.5    15.62 )-----------( 309      ( 17 Feb 2021 08:00 )             31.3     02-17    149<H>  |  x   |  x   ----------------------------<  x   x    |  x   |  x     Ca    8.7      17 Feb 2021 08:00  Phos  3.1     02-17  Mg     2.2     02-17    TPro  6.4  /  Alb  2.6<L>  /  TBili  0.4  /  DBili  x   /  AST  12  /  ALT  20  /  AlkPhos  85  02-17    proBNP:   Lipid Profile:   HgA1c:   TSH:             TELEMETRY: 	 SR   ECG:  	  RADIOLOGY: < from: CT Angio Chest w/ IV Cont (02.16.21 @ 20:43) >    EXAM:  CT ANGIO CHEST (W)AW IC                          EXAM:  CT ABDOMEN AND PELVIS IC                            PROCEDURE DATE:  02/16/2021            INTERPRETATION:  CLINICAL INFORMATION: 75-year-old man with small bowel obstruction and new atrial fibrillation with rapid ventricular response    COMPARISON: CT scan 02/12/2021    PROCEDURE:  CT Angiography of the Chest was performed followed by portal venous phase imaging of the Abdomen and Pelvis.  IV Contrast: 90 ml of Omnipaque 350 was injected intravenously. 10 ml were discarded.  Oral contrast: None.  Sagittal and coronal reformats were performed as well as 3D (MIP) reconstructions.    FINDINGS:  CHEST:  LUNGS AND LARGE AIRWAYS: Patent central airways. Patchy consolidation bilaterallower lobes, lingula, and right middle lobe, most marked in the right lower lobe consistent with multifocal pneumonia.  PLEURA: No pleural effusion.  VESSELS: No pulmonary embolus. Mild dilatation main pulmonary artery to 3.4 cm.  HEART: Moderate cardiomegaly. Small pericardial effusion.  MEDIASTINUM AND SHO: No lymphadenopathy.  CHEST WALL AND LOWER NECK: Within normal limits.    ABDOMEN AND PELVIS:  LIVER: Within normal limits.  BILE DUCTS: Normal caliber.  GALLBLADDER: Within normal limits.  SPLEEN: Within normal limits.  PANCREAS: Within normal limits.  ADRENALS: Within normal limits.  KIDNEYS/URETERS: Horseshoe kidney. Nonobstructing 4 mm right renal calculus.    BLADDER: Within normal limits.  REPRODUCTIVE ORGANS: Mildly enlarged prostate.    BOWEL: Appendix not visualized. Mild decrease in small bowel dilatation with persistent transition into in the anterior left lower quadrant (6:104). Mild small bowel mural thickening with normal enhancement. Normal caliber colon. Esophagogastric tube ending in the stomach.  PERITONEUM: Trace ascites.  VESSELS: Atherosclerotic change  RETROPERITONEUM/LYMPH NODES: No lymphadenopathy.  ABDOMINAL WALL: Within normal limits.  BONES: Degenerative change.    IMPRESSION:  Decrease in small bowel dilatation with persistent small bowel obstruction. Normal caliber colon.  No current pulmonary embolus.  Moderate cardiomegaly.  Multifocal pneumonia most marked in the right lower lobe                  DELORES JUNG MD; Attending Radiologist  This document has been electronically signed. Feb 17 2021  9:30AM    < end of copied text >    DIAGNOSTIC TESTING:  [ ] Echocardiogram:  [ ]  Catheterization:  [ ] Stress Test:    OTHER:

## 2021-02-17 NOTE — PROGRESS NOTE ADULT - SUBJECTIVE AND OBJECTIVE BOX
GENERAL SURGERY DAILY PROGRESS NOTE:    Subjective:  Patient seen and examined. s/p CT C/A/P overnight. Denies abdominal pain, N/V. NPO with NGT draining 550 bilious over 24 hours    Vital Signs Last 24 Hrs  T(C): 36.9 (17 Feb 2021 05:16), Max: 37 (16 Feb 2021 23:59)  T(F): 98.5 (17 Feb 2021 05:16), Max: 98.6 (16 Feb 2021 23:59)  HR: 75 (17 Feb 2021 05:16) (70 - 91)  BP: 123/68 (17 Feb 2021 05:16) (112/65 - 141/86)  BP(mean): --  RR: 18 (17 Feb 2021 05:16) (18 - 19)  SpO2: 99% (17 Feb 2021 05:16) (96% - 99%)    Exam:  Gen: NAD, resting in bed, alert and responding appropriately  Resp: Airway patent, non-labored respirations  Abd: Soft, ND, NT, no rebound or guarding. NGT draining bilious  Ext: No edema, WWP    I&O's Detail    16 Feb 2021 07:01  -  17 Feb 2021 07:00  --------------------------------------------------------  IN:    dextrose 5% with potassium chloride 20 mEq/L: 520 mL    Heparin Infusion: 168 mL    IV PiggyBack: 100 mL  Total IN: 788 mL    OUT:    Nasogastric/Oral tube (mL): 50 mL  Total OUT: 50 mL    Total NET: 738 mL          Daily     Daily     MEDICATIONS  (STANDING):  budesonide 160 MICROgram(s)/formoterol 4.5 MICROgram(s) Inhaler 2 Puff(s) Inhalation two times a day  dextrose 40% Gel 15 Gram(s) Oral once  dextrose 5% with potassium chloride 20 mEq/L 1000 milliLiter(s) (75 mL/Hr) IV Continuous <Continuous>  dextrose 5%. 1000 milliLiter(s) (50 mL/Hr) IV Continuous <Continuous>  dextrose 5%. 1000 milliLiter(s) (100 mL/Hr) IV Continuous <Continuous>  dextrose 50% Injectable 25 Gram(s) IV Push once  dextrose 50% Injectable 12.5 Gram(s) IV Push once  dextrose 50% Injectable 25 Gram(s) IV Push once  glucagon  Injectable 1 milliGRAM(s) IntraMuscular once  heparin  Infusion.  Unit(s)/Hr (14 mL/Hr) IV Continuous <Continuous>  insulin lispro (ADMELOG) corrective regimen sliding scale   SubCutaneous every 6 hours  metoprolol tartrate Injectable 5 milliGRAM(s) IV Push every 6 hours  pantoprazole  Injectable 40 milliGRAM(s) IV Push every 12 hours  piperacillin/tazobactam IVPB.. 3.375 Gram(s) IV Intermittent every 8 hours    MEDICATIONS  (PRN):  ALBUTerol    90 MICROgram(s) HFA Inhaler 1 Puff(s) Inhalation every 4 hours PRN Shortness of Breath and/or Wheezing  heparin   Injectable 6500 Unit(s) IV Push every 6 hours PRN For aPTT less than 40  heparin   Injectable 3000 Unit(s) IV Push every 6 hours PRN For aPTT between 40 - 57  OLANZapine Injectable 2 milliGRAM(s) IntraMuscular every 6 hours PRN agitation      LABS:                        10.1   14.83 )-----------( 341      ( 16 Feb 2021 06:45 )             32.8     02-16    149<H>  |  112<H>  |  28<H>  ----------------------------<  197<H>  3.8   |  27  |  1.03    Ca    8.6      16 Feb 2021 13:55  Phos  3.2     02-16  Mg     2.4     02-16    TPro  6.6  /  Alb  2.8<L>  /  TBili  0.4  /  DBili  x   /  AST  15  /  ALT  30  /  AlkPhos  94  02-16    PTT - ( 16 Feb 2021 13:56 )  PTT:69.6 sec

## 2021-02-17 NOTE — PROGRESS NOTE ADULT - PROBLEM SELECTOR PLAN 2
New afib with RVR on 2/13 with HR 130s-150s. s/p amio load and gtt. Completed 18 hour gtt load on 2/14. Converted to SR, amio gtt now dc'd   -cards consulted, appreciate recs  -monitor on tele  - f/u CTA chest New afib with RVR on 2/13 with HR 130s-150s. s/p amio load and gtt. Completed 18 hour gtt load on 2/14. Converted to SR, amio gtt now dc'd   -cards consulted, appreciate recs  -monitor on tele  - CTA chest no PE

## 2021-02-17 NOTE — PROGRESS NOTE ADULT - PROBLEM SELECTOR PLAN 10
- NPO with NG tube  - heparin gtt  - eventual PT eval  - DM2 - home regimen 10u Basaglar qHS and SSI at nursing home - will give SSI here given NPO and poor po intake  - full code per daughter - NPO with NG tube  - heparin gtt  - eventual PT eval - pt lives at long term care facility [facility]  - DM2 - home regimen 10u Basaglar qHS and SSI at nursing home - will give SSI here given NPO and poor po intake  - full code per daughter

## 2021-02-17 NOTE — PROGRESS NOTE ADULT - PROBLEM SELECTOR PLAN 5
- baseline 2L NC at home  - satting well on 2L here  - multifocal PNA known from prior admission  - unclear if warrants treatment but due to possible aspiration, will cover until cultures negative  - f/u bcx  - O2 sat 88-93% - baseline 2L NC at home  - satting well on 2L here  - multifocal PNA known from prior admission  - unclear if warrants treatment but due to possible aspiration, will cover until cultures negative  - f/u bcx  - O2 sat 88-93%  - procal 0.24

## 2021-02-17 NOTE — PROGRESS NOTE ADULT - SUBJECTIVE AND OBJECTIVE BOX
PROGRESS NOTE:   Authored by Dr. Rebecca Gutierrez MD, Pager [phone] [facility], [id number] [facility]     Patient is a 75y old  Male who presents with a chief complaint of abdominal pain (17 Feb 2021 07:00)      SUBJECTIVE / OVERNIGHT EVENTS: No events overnight.    ADDITIONAL REVIEW OF SYSTEMS: Denies fevers, chills, n/v.    MEDICATIONS  (STANDING):  budesonide 160 MICROgram(s)/formoterol 4.5 MICROgram(s) Inhaler 2 Puff(s) Inhalation two times a day  dextrose 40% Gel 15 Gram(s) Oral once  dextrose 5% with potassium chloride 20 mEq/L 1000 milliLiter(s) (75 mL/Hr) IV Continuous <Continuous>  dextrose 5%. 1000 milliLiter(s) (50 mL/Hr) IV Continuous <Continuous>  dextrose 5%. 1000 milliLiter(s) (100 mL/Hr) IV Continuous <Continuous>  dextrose 50% Injectable 25 Gram(s) IV Push once  dextrose 50% Injectable 12.5 Gram(s) IV Push once  dextrose 50% Injectable 25 Gram(s) IV Push once  glucagon  Injectable 1 milliGRAM(s) IntraMuscular once  heparin  Infusion.  Unit(s)/Hr (14 mL/Hr) IV Continuous <Continuous>  insulin lispro (ADMELOG) corrective regimen sliding scale   SubCutaneous every 6 hours  metoprolol tartrate Injectable 5 milliGRAM(s) IV Push every 6 hours  pantoprazole  Injectable 40 milliGRAM(s) IV Push every 12 hours  piperacillin/tazobactam IVPB.. 3.375 Gram(s) IV Intermittent every 8 hours    MEDICATIONS  (PRN):  ALBUTerol    90 MICROgram(s) HFA Inhaler 1 Puff(s) Inhalation every 4 hours PRN Shortness of Breath and/or Wheezing  heparin   Injectable 6500 Unit(s) IV Push every 6 hours PRN For aPTT less than 40  heparin   Injectable 3000 Unit(s) IV Push every 6 hours PRN For aPTT between 40 - 57  OLANZapine Injectable 2 milliGRAM(s) IntraMuscular every 6 hours PRN agitation      CAPILLARY BLOOD GLUCOSE      POCT Blood Glucose.: 167 mg/dL (17 Feb 2021 05:34)  POCT Blood Glucose.: 169 mg/dL (16 Feb 2021 23:44)  POCT Blood Glucose.: 211 mg/dL (16 Feb 2021 17:17)  POCT Blood Glucose.: 194 mg/dL (16 Feb 2021 11:55)    I&O's Summary    16 Feb 2021 07:01  -  17 Feb 2021 07:00  --------------------------------------------------------  IN: 788 mL / OUT: 50 mL / NET: 738 mL        PHYSICAL EXAM:  Vital Signs Last 24 Hrs  T(C): 36.9 (17 Feb 2021 05:16), Max: 37 (16 Feb 2021 23:59)  T(F): 98.5 (17 Feb 2021 05:16), Max: 98.6 (16 Feb 2021 23:59)  HR: 75 (17 Feb 2021 05:16) (70 - 91)  BP: 123/68 (17 Feb 2021 05:16) (112/65 - 141/86)  BP(mean): --  RR: 18 (17 Feb 2021 05:16) (18 - 19)  SpO2: 99% (17 Feb 2021 05:16) (96% - 99%)    CONSTITUTIONAL: NAD, lying in bed comfortably, NGT in place  RESPIRATORY: Normal respiratory effort; CTABL posteriorly  CARDIOVASCULAR: Regular rate and rhythm, normal S1 and S2, no murmur/rub/gallop  ABDOMEN: Soft, mildly distended, nontender to palpation, normoactive bowel sounds, no rebound/guarding  MUSCLOSKELETAL: no joint swelling or tenderness to palpation, FROM all extremities  NEURO: AAOx1-2   EXTREMITIES: no pedal edema    LABS:                        10.1   14.83 )-----------( 341      ( 16 Feb 2021 06:45 )             32.8     02-16    149<H>  |  112<H>  |  28<H>  ----------------------------<  197<H>  3.8   |  27  |  1.03    Ca    8.6      16 Feb 2021 13:55  Phos  3.2     02-16  Mg     2.4     02-16    TPro  6.6  /  Alb  2.8<L>  /  TBili  0.4  /  DBili  x   /  AST  15  /  ALT  30  /  AlkPhos  94  02-16    PTT - ( 16 Feb 2021 13:56 )  PTT:69.6 sec            RADIOLOGY & ADDITIONAL TESTS:     PROGRESS NOTE:   Authored by Dr. Rebecca Gutierrez MD, Pager [phone] [facility], [id number] [facility]     Patient is a 75y old  Male who presents with a chief complaint of abdominal pain (17 Feb 2021 07:00)      SUBJECTIVE / OVERNIGHT EVENTS: No events overnight. AAOx1 to name.    ADDITIONAL REVIEW OF SYSTEMS: Denies fevers, chills, n/v.    MEDICATIONS  (STANDING):  budesonide 160 MICROgram(s)/formoterol 4.5 MICROgram(s) Inhaler 2 Puff(s) Inhalation two times a day  dextrose 40% Gel 15 Gram(s) Oral once  dextrose 5% with potassium chloride 20 mEq/L 1000 milliLiter(s) (75 mL/Hr) IV Continuous <Continuous>  dextrose 5%. 1000 milliLiter(s) (50 mL/Hr) IV Continuous <Continuous>  dextrose 5%. 1000 milliLiter(s) (100 mL/Hr) IV Continuous <Continuous>  dextrose 50% Injectable 25 Gram(s) IV Push once  dextrose 50% Injectable 12.5 Gram(s) IV Push once  dextrose 50% Injectable 25 Gram(s) IV Push once  glucagon  Injectable 1 milliGRAM(s) IntraMuscular once  heparin  Infusion.  Unit(s)/Hr (14 mL/Hr) IV Continuous <Continuous>  insulin lispro (ADMELOG) corrective regimen sliding scale   SubCutaneous every 6 hours  metoprolol tartrate Injectable 5 milliGRAM(s) IV Push every 6 hours  pantoprazole  Injectable 40 milliGRAM(s) IV Push every 12 hours  piperacillin/tazobactam IVPB.. 3.375 Gram(s) IV Intermittent every 8 hours    MEDICATIONS  (PRN):  ALBUTerol    90 MICROgram(s) HFA Inhaler 1 Puff(s) Inhalation every 4 hours PRN Shortness of Breath and/or Wheezing  heparin   Injectable 6500 Unit(s) IV Push every 6 hours PRN For aPTT less than 40  heparin   Injectable 3000 Unit(s) IV Push every 6 hours PRN For aPTT between 40 - 57  OLANZapine Injectable 2 milliGRAM(s) IntraMuscular every 6 hours PRN agitation      CAPILLARY BLOOD GLUCOSE      POCT Blood Glucose.: 167 mg/dL (17 Feb 2021 05:34)  POCT Blood Glucose.: 169 mg/dL (16 Feb 2021 23:44)  POCT Blood Glucose.: 211 mg/dL (16 Feb 2021 17:17)  POCT Blood Glucose.: 194 mg/dL (16 Feb 2021 11:55)    I&O's Summary    16 Feb 2021 07:01  -  17 Feb 2021 07:00  --------------------------------------------------------  IN: 788 mL / OUT: 50 mL / NET: 738 mL        PHYSICAL EXAM:  Vital Signs Last 24 Hrs  T(C): 36.9 (17 Feb 2021 05:16), Max: 37 (16 Feb 2021 23:59)  T(F): 98.5 (17 Feb 2021 05:16), Max: 98.6 (16 Feb 2021 23:59)  HR: 75 (17 Feb 2021 05:16) (70 - 91)  BP: 123/68 (17 Feb 2021 05:16) (112/65 - 141/86)  BP(mean): --  RR: 18 (17 Feb 2021 05:16) (18 - 19)  SpO2: 99% (17 Feb 2021 05:16) (96% - 99%)    CONSTITUTIONAL: NAD, lying in bed comfortably, NGT in place  RESPIRATORY: Normal respiratory effort; CTABL posteriorly  CARDIOVASCULAR: Regular rate and rhythm, normal S1 and S2, no murmur/rub/gallop  ABDOMEN: Soft, mildly distended, nontender to palpation, normoactive bowel sounds, no rebound/guarding  MUSCLOSKELETAL: no joint swelling or tenderness to palpation, FROM all extremities  NEURO: AAOx1-2   EXTREMITIES: no pedal edema    LABS:                        10.1   14.83 )-----------( 341      ( 16 Feb 2021 06:45 )             32.8     02-16    149<H>  |  112<H>  |  28<H>  ----------------------------<  197<H>  3.8   |  27  |  1.03    Ca    8.6      16 Feb 2021 13:55  Phos  3.2     02-16  Mg     2.4     02-16    TPro  6.6  /  Alb  2.8<L>  /  TBili  0.4  /  DBili  x   /  AST  15  /  ALT  30  /  AlkPhos  94  02-16    PTT - ( 16 Feb 2021 13:56 )  PTT:69.6 sec      RADIOLOGY & ADDITIONAL TESTS:    < from: CT Abdomen and Pelvis w/ IV Cont (02.16.21 @ 20:42) >  IMPRESSION:  Decrease in small bowel dilatation with persistent small bowel obstruction. Normal caliber colon.  No current pulmonary embolus.  Moderate cardiomegaly.  Multifocal pneumonia most marked in the right lower lobe    < end of copied text >    < from: CT Angio Chest w/ IV Cont (02.16.21 @ 20:43) >  IMPRESSION:  Decrease in small bowel dilatation with persistent small bowel obstruction. Normal caliber colon.  No current pulmonary embolus.  Moderate cardiomegaly.  Multifocal pneumonia most marked in the right lower lobe    < end of copied text >

## 2021-02-17 NOTE — PROGRESS NOTE ADULT - ASSESSMENT
75M PMH dementia, DM2, HTN, ?HF, HLD, recent admission for COPD pneumonia requiring intubation (11/2020 [place]), COVID in January 2021 (not intubated) currently being treated for pneumonia presents from nursing home for abdominal pain and vomiting, found to have multifocal PNA (unclear if sequela of prior COVID) and high-grade SBO. Course c/b new afib with RVR s/p amiodarone gtt, on standing Lopressor.

## 2021-02-17 NOTE — PROGRESS NOTE ADULT - ASSESSMENT
75M hx of dementia, DM2, HTN, ?HF, HLD, recent admission for COVID requiring intubation (11/2020 [place]), currently treated for pneumonia presents from nursing home for abdominal pain and vomiting, found to have multifocal PNA, and high-grade SBO    Recommendation:  - nonoperative management with NPO, NGT decompression, IVF resuscitation  - continue with protonix BID  - CT C/A/p prelim shows decreased dilated SB loops without TP to suggest SBO, f/u final read    [id number]

## 2021-02-17 NOTE — PROGRESS NOTE ADULT - ASSESSMENT
75M hx of dementia, DM2, HTN, ?HF, HLD, recent admission for COVID requiring intubation (11/2020 [place]), currently treated for pneumonia presents from nursing home for abdominal pain and vomiting, found to have multifocal PNA, and high-grade SBO with TP LUQ, c/f internal hernia on CT. Complicated by Afib RVR

## 2021-02-18 LAB
ALBUMIN SERPL ELPH-MCNC: 2.9 G/DL — LOW (ref 3.3–5)
ALP SERPL-CCNC: 81 U/L — SIGNIFICANT CHANGE UP (ref 40–120)
ALT FLD-CCNC: 17 U/L — SIGNIFICANT CHANGE UP (ref 10–45)
ANION GAP SERPL CALC-SCNC: 15 MMOL/L — SIGNIFICANT CHANGE UP (ref 5–17)
APTT BLD: 73 SEC — HIGH (ref 27.5–35.5)
AST SERPL-CCNC: 12 U/L — SIGNIFICANT CHANGE UP (ref 10–40)
BILIRUB SERPL-MCNC: 0.5 MG/DL — SIGNIFICANT CHANGE UP (ref 0.2–1.2)
BUN SERPL-MCNC: 17 MG/DL — SIGNIFICANT CHANGE UP (ref 7–23)
CALCIUM SERPL-MCNC: 8.7 MG/DL — SIGNIFICANT CHANGE UP (ref 8.4–10.5)
CHLORIDE SERPL-SCNC: 108 MMOL/L — SIGNIFICANT CHANGE UP (ref 96–108)
CO2 SERPL-SCNC: 26 MMOL/L — SIGNIFICANT CHANGE UP (ref 22–31)
CREAT SERPL-MCNC: 1 MG/DL — SIGNIFICANT CHANGE UP (ref 0.5–1.3)
GLUCOSE BLDC GLUCOMTR-MCNC: 162 MG/DL — HIGH (ref 70–99)
GLUCOSE BLDC GLUCOMTR-MCNC: 166 MG/DL — HIGH (ref 70–99)
GLUCOSE BLDC GLUCOMTR-MCNC: 172 MG/DL — HIGH (ref 70–99)
GLUCOSE BLDC GLUCOMTR-MCNC: 194 MG/DL — HIGH (ref 70–99)
GLUCOSE SERPL-MCNC: 165 MG/DL — HIGH (ref 70–99)
HCT VFR BLD CALC: 30.6 % — LOW (ref 39–50)
HGB BLD-MCNC: 9.3 G/DL — LOW (ref 13–17)
MAGNESIUM SERPL-MCNC: 2 MG/DL — SIGNIFICANT CHANGE UP (ref 1.6–2.6)
MCHC RBC-ENTMCNC: 22.5 PG — LOW (ref 27–34)
MCHC RBC-ENTMCNC: 30.4 GM/DL — LOW (ref 32–36)
MCV RBC AUTO: 73.9 FL — LOW (ref 80–100)
NRBC # BLD: 0 /100 WBCS — SIGNIFICANT CHANGE UP (ref 0–0)
PHOSPHATE SERPL-MCNC: 3.4 MG/DL — SIGNIFICANT CHANGE UP (ref 2.5–4.5)
PLATELET # BLD AUTO: 311 K/UL — SIGNIFICANT CHANGE UP (ref 150–400)
POTASSIUM SERPL-MCNC: 3.1 MMOL/L — LOW (ref 3.5–5.3)
POTASSIUM SERPL-MCNC: 3.6 MMOL/L — SIGNIFICANT CHANGE UP (ref 3.5–5.3)
POTASSIUM SERPL-SCNC: 3.1 MMOL/L — LOW (ref 3.5–5.3)
POTASSIUM SERPL-SCNC: 3.6 MMOL/L — SIGNIFICANT CHANGE UP (ref 3.5–5.3)
PROT SERPL-MCNC: 6.5 G/DL — SIGNIFICANT CHANGE UP (ref 6–8.3)
RBC # BLD: 4.14 M/UL — LOW (ref 4.2–5.8)
RBC # FLD: 20.5 % — HIGH (ref 10.3–14.5)
SODIUM SERPL-SCNC: 145 MMOL/L — SIGNIFICANT CHANGE UP (ref 135–145)
SODIUM SERPL-SCNC: 149 MMOL/L — HIGH (ref 135–145)
WBC # BLD: 13.55 K/UL — HIGH (ref 3.8–10.5)
WBC # FLD AUTO: 13.55 K/UL — HIGH (ref 3.8–10.5)

## 2021-02-18 PROCEDURE — 93306 TTE W/DOPPLER COMPLETE: CPT | Mod: 26

## 2021-02-18 PROCEDURE — 99233 SBSQ HOSP IP/OBS HIGH 50: CPT | Mod: CS,GC

## 2021-02-18 RX ORDER — DEXTROSE MONOHYDRATE, SODIUM CHLORIDE, AND POTASSIUM CHLORIDE 50; .745; 4.5 G/1000ML; G/1000ML; G/1000ML
1000 INJECTION, SOLUTION INTRAVENOUS
Refills: 0 | Status: DISCONTINUED | OUTPATIENT
Start: 2021-02-18 | End: 2021-02-18

## 2021-02-18 RX ORDER — POTASSIUM CHLORIDE 20 MEQ
10 PACKET (EA) ORAL
Refills: 0 | Status: COMPLETED | OUTPATIENT
Start: 2021-02-18 | End: 2021-02-18

## 2021-02-18 RX ORDER — DEXTROSE MONOHYDRATE, SODIUM CHLORIDE, AND POTASSIUM CHLORIDE 50; .745; 4.5 G/1000ML; G/1000ML; G/1000ML
1000 INJECTION, SOLUTION INTRAVENOUS
Refills: 0 | Status: DISCONTINUED | OUTPATIENT
Start: 2021-02-18 | End: 2021-02-19

## 2021-02-18 RX ADMIN — BUDESONIDE AND FORMOTEROL FUMARATE DIHYDRATE 2 PUFF(S): 160; 4.5 AEROSOL RESPIRATORY (INHALATION) at 18:07

## 2021-02-18 RX ADMIN — HEPARIN SODIUM 1400 UNIT(S)/HR: 5000 INJECTION INTRAVENOUS; SUBCUTANEOUS at 10:23

## 2021-02-18 RX ADMIN — Medication 1: at 05:01

## 2021-02-18 RX ADMIN — PIPERACILLIN AND TAZOBACTAM 25 GRAM(S): 4; .5 INJECTION, POWDER, LYOPHILIZED, FOR SOLUTION INTRAVENOUS at 23:42

## 2021-02-18 RX ADMIN — PANTOPRAZOLE SODIUM 40 MILLIGRAM(S): 20 TABLET, DELAYED RELEASE ORAL at 13:45

## 2021-02-18 RX ADMIN — DEXTROSE MONOHYDRATE, SODIUM CHLORIDE, AND POTASSIUM CHLORIDE 75 MILLILITER(S): 50; .745; 4.5 INJECTION, SOLUTION INTRAVENOUS at 23:43

## 2021-02-18 RX ADMIN — Medication 100 MILLIEQUIVALENT(S): at 12:21

## 2021-02-18 RX ADMIN — PIPERACILLIN AND TAZOBACTAM 25 GRAM(S): 4; .5 INJECTION, POWDER, LYOPHILIZED, FOR SOLUTION INTRAVENOUS at 16:45

## 2021-02-18 RX ADMIN — DEXTROSE MONOHYDRATE, SODIUM CHLORIDE, AND POTASSIUM CHLORIDE 75 MILLILITER(S): 50; .745; 4.5 INJECTION, SOLUTION INTRAVENOUS at 09:29

## 2021-02-18 RX ADMIN — Medication 1: at 18:07

## 2021-02-18 RX ADMIN — Medication 5 MILLIGRAM(S): at 05:01

## 2021-02-18 RX ADMIN — Medication 100 MILLIEQUIVALENT(S): at 10:30

## 2021-02-18 RX ADMIN — Medication 100 MILLIEQUIVALENT(S): at 09:30

## 2021-02-18 RX ADMIN — Medication 5 MILLIGRAM(S): at 23:42

## 2021-02-18 RX ADMIN — BUDESONIDE AND FORMOTEROL FUMARATE DIHYDRATE 2 PUFF(S): 160; 4.5 AEROSOL RESPIRATORY (INHALATION) at 05:01

## 2021-02-18 RX ADMIN — PANTOPRAZOLE SODIUM 40 MILLIGRAM(S): 20 TABLET, DELAYED RELEASE ORAL at 23:42

## 2021-02-18 RX ADMIN — PIPERACILLIN AND TAZOBACTAM 25 GRAM(S): 4; .5 INJECTION, POWDER, LYOPHILIZED, FOR SOLUTION INTRAVENOUS at 08:00

## 2021-02-18 RX ADMIN — Medication 1: at 23:42

## 2021-02-18 RX ADMIN — Medication 1: at 12:20

## 2021-02-18 RX ADMIN — Medication 5 MILLIGRAM(S): at 13:45

## 2021-02-18 RX ADMIN — Medication 5 MILLIGRAM(S): at 17:38

## 2021-02-18 NOTE — PROGRESS NOTE ADULT - PROBLEM SELECTOR PLAN 3
Cr 0.68 Jan 2021, upon admission SCr 3. Now downtrending s/p 3L NS. Likely prerenal.   - 2/14: Not retaining on bladder scan, incontinent making I&Os, not fully reliable; SARAH continues to improve. Trend UOP and SCr. If UOP truly dropping, give IVF. Cr 0.68 Jan 2021, upon admission SCr 3. Now downtrending s/p 3L NS. Likely prerenal.   - 2/14: Not retaining on bladder scan, incontinent making I&Os, not fully reliable; SARAH continues to improve. Trend UOP and SCr. If UOP truly dropping, give IVF  -c/w d5w for hypernatremia, when normalizaed, switch to LR

## 2021-02-18 NOTE — PROGRESS NOTE ADULT - SUBJECTIVE AND OBJECTIVE BOX
PROGRESS NOTE:     CONTACT INFO:   Ricardo (Xiao) Elizabeth   NS: [phone]/[facility]: [id number]  PGY-2    Patient is a 75y old  Male who presents with a chief complaint of abdominal pain (18 Feb 2021 05:00)      SUBJECTIVE / OVERNIGHT EVENTS: no acute event overnight. NGT outpt 825 for the past 12 hrs.    ADDITIONAL REVIEW OF SYSTEMS:    MEDICATIONS  (STANDING):  budesonide 160 MICROgram(s)/formoterol 4.5 MICROgram(s) Inhaler 2 Puff(s) Inhalation two times a day  dextrose 40% Gel 15 Gram(s) Oral once  dextrose 5% with potassium chloride 20 mEq/L 1000 milliLiter(s) (75 mL/Hr) IV Continuous <Continuous>  dextrose 5%. 1000 milliLiter(s) (50 mL/Hr) IV Continuous <Continuous>  dextrose 5%. 1000 milliLiter(s) (100 mL/Hr) IV Continuous <Continuous>  dextrose 50% Injectable 25 Gram(s) IV Push once  dextrose 50% Injectable 12.5 Gram(s) IV Push once  dextrose 50% Injectable 25 Gram(s) IV Push once  glucagon  Injectable 1 milliGRAM(s) IntraMuscular once  heparin  Infusion.  Unit(s)/Hr (14 mL/Hr) IV Continuous <Continuous>  insulin lispro (ADMELOG) corrective regimen sliding scale   SubCutaneous every 6 hours  metoprolol tartrate Injectable 5 milliGRAM(s) IV Push every 6 hours  pantoprazole  Injectable 40 milliGRAM(s) IV Push every 12 hours  piperacillin/tazobactam IVPB.. 3.375 Gram(s) IV Intermittent every 8 hours  potassium chloride  10 mEq/100 mL IVPB 10 milliEquivalent(s) IV Intermittent every 1 hour    MEDICATIONS  (PRN):  ALBUTerol    90 MICROgram(s) HFA Inhaler 1 Puff(s) Inhalation every 4 hours PRN Shortness of Breath and/or Wheezing  heparin   Injectable 6500 Unit(s) IV Push every 6 hours PRN For aPTT less than 40  heparin   Injectable 3000 Unit(s) IV Push every 6 hours PRN For aPTT between 40 - 57  OLANZapine Injectable 2 milliGRAM(s) IntraMuscular every 6 hours PRN agitation      CAPILLARY BLOOD GLUCOSE      POCT Blood Glucose.: 162 mg/dL (18 Feb 2021 04:40)  POCT Blood Glucose.: 172 mg/dL (17 Feb 2021 23:41)  POCT Blood Glucose.: 200 mg/dL (17 Feb 2021 18:33)  POCT Blood Glucose.: 227 mg/dL (17 Feb 2021 11:40)    I&O's Summary    17 Feb 2021 07:01  -  18 Feb 2021 07:00  --------------------------------------------------------  IN: 0 mL / OUT: 950 mL / NET: -950 mL        PHYSICAL EXAM:  Vital Signs Last 24 Hrs  T(C): 36.7 (18 Feb 2021 04:10), Max: 37.1 (17 Feb 2021 09:05)  T(F): 98 (18 Feb 2021 04:10), Max: 98.7 (17 Feb 2021 09:05)  HR: 78 (18 Feb 2021 04:10) (78 - 92)  BP: 150/83 (18 Feb 2021 04:10) (111/64 - 153/71)  BP(mean): --  RR: 20 (18 Feb 2021 04:10) (18 - 20)  SpO2: 96% (18 Feb 2021 04:10) (96% - 98%)    CONSTITUTIONAL: NAD, well-developed  RESPIRATORY: Normal respiratory effort; lungs are clear to auscultation bilaterally  CARDIOVASCULAR: Regular rate and rhythm, normal S1 and S2, no murmur/rub/gallop; No lower extremity edema; Peripheral pulses are 2+ bilaterally  ABDOMEN: Nontender to palpation, normoactive bowel sounds, no rebound/guarding; No hepatosplenomegaly  MUSCLOSKELETAL: no clubbing or cyanosis of digits; no joint swelling or tenderness to palpation  PSYCH: A+O to person, place, and time; affect appropriate    LABS:                        9.3    13.55 )-----------( 311      ( 18 Feb 2021 05:28 )             30.6     02-18    149<H>  |  108  |  17  ----------------------------<  165<H>  3.1<L>   |  26  |  1.00    Ca    8.7      18 Feb 2021 05:28  Phos  3.4     02-18  Mg     2.0     02-18    TPro  6.5  /  Alb  2.9<L>  /  TBili  0.5  /  DBili  x   /  AST  12  /  ALT  17  /  AlkPhos  81  02-18    PTT - ( 17 Feb 2021 10:30 )  PTT:79.7 sec            RADIOLOGY & ADDITIONAL TESTS:  Results Reviewed:   Imaging Personally Reviewed:  Electrocardiogram Personally Reviewed:    COORDINATION OF CARE:  Care Discussed with Consultants/Other Providers [Y/N]:  Prior or Outpatient Records Reviewed [Y/N]:   PROGRESS NOTE:     CONTACT INFO:   Ricardo (Xiao) Elizabeth   NS: [phone]/J: [id number]  PGY-2    Patient is a 75y old  Male who presents with a chief complaint of abdominal pain (18 Feb 2021 05:00)      SUBJECTIVE / OVERNIGHT EVENTS: no acute event overnight. NGT outpt 825 for the past 12 hrs. This am, reports feeling okay. No complaints. No abd pain.     ADDITIONAL REVIEW OF SYSTEMS:    MEDICATIONS  (STANDING):  budesonide 160 MICROgram(s)/formoterol 4.5 MICROgram(s) Inhaler 2 Puff(s) Inhalation two times a day  dextrose 40% Gel 15 Gram(s) Oral once  dextrose 5% with potassium chloride 20 mEq/L 1000 milliLiter(s) (75 mL/Hr) IV Continuous <Continuous>  dextrose 5%. 1000 milliLiter(s) (50 mL/Hr) IV Continuous <Continuous>  dextrose 5%. 1000 milliLiter(s) (100 mL/Hr) IV Continuous <Continuous>  dextrose 50% Injectable 25 Gram(s) IV Push once  dextrose 50% Injectable 12.5 Gram(s) IV Push once  dextrose 50% Injectable 25 Gram(s) IV Push once  glucagon  Injectable 1 milliGRAM(s) IntraMuscular once  heparin  Infusion.  Unit(s)/Hr (14 mL/Hr) IV Continuous <Continuous>  insulin lispro (ADMELOG) corrective regimen sliding scale   SubCutaneous every 6 hours  metoprolol tartrate Injectable 5 milliGRAM(s) IV Push every 6 hours  pantoprazole  Injectable 40 milliGRAM(s) IV Push every 12 hours  piperacillin/tazobactam IVPB.. 3.375 Gram(s) IV Intermittent every 8 hours  potassium chloride  10 mEq/100 mL IVPB 10 milliEquivalent(s) IV Intermittent every 1 hour    MEDICATIONS  (PRN):  ALBUTerol    90 MICROgram(s) HFA Inhaler 1 Puff(s) Inhalation every 4 hours PRN Shortness of Breath and/or Wheezing  heparin   Injectable 6500 Unit(s) IV Push every 6 hours PRN For aPTT less than 40  heparin   Injectable 3000 Unit(s) IV Push every 6 hours PRN For aPTT between 40 - 57  OLANZapine Injectable 2 milliGRAM(s) IntraMuscular every 6 hours PRN agitation      CAPILLARY BLOOD GLUCOSE      POCT Blood Glucose.: 162 mg/dL (18 Feb 2021 04:40)  POCT Blood Glucose.: 172 mg/dL (17 Feb 2021 23:41)  POCT Blood Glucose.: 200 mg/dL (17 Feb 2021 18:33)  POCT Blood Glucose.: 227 mg/dL (17 Feb 2021 11:40)    I&O's Summary    17 Feb 2021 07:01  -  18 Feb 2021 07:00  --------------------------------------------------------  IN: 0 mL / OUT: 950 mL / NET: -950 mL        PHYSICAL EXAM:  Vital Signs Last 24 Hrs  T(C): 36.7 (18 Feb 2021 04:10), Max: 37.1 (17 Feb 2021 09:05)  T(F): 98 (18 Feb 2021 04:10), Max: 98.7 (17 Feb 2021 09:05)  HR: 78 (18 Feb 2021 04:10) (78 - 92)  BP: 150/83 (18 Feb 2021 04:10) (111/64 - 153/71)  BP(mean): --  RR: 20 (18 Feb 2021 04:10) (18 - 20)  SpO2: 96% (18 Feb 2021 04:10) (96% - 98%)    CONSTITUTIONAL: NAD, well-developed  RESPIRATORY: Normal respiratory effort; lungs are clear to auscultation bilaterally  CARDIOVASCULAR: Regular rate and rhythm, normal S1 and S2, no murmur/rub/gallop; No lower extremity edema; Peripheral pulses are 2+ bilaterally  ABDOMEN: Nontender to palpation, normoactive bowel sounds, no rebound/guarding; No hepatosplenomegaly  MUSCLOSKELETAL: no clubbing or cyanosis of digits; no joint swelling or tenderness to palpation  PSYCH: A+O to person, place, and time; affect appropriate    LABS:                        9.3    13.55 )-----------( 311      ( 18 Feb 2021 05:28 )             30.6     02-18    149<H>  |  108  |  17  ----------------------------<  165<H>  3.1<L>   |  26  |  1.00    Ca    8.7      18 Feb 2021 05:28  Phos  3.4     02-18  Mg     2.0     02-18    TPro  6.5  /  Alb  2.9<L>  /  TBili  0.5  /  DBili  x   /  AST  12  /  ALT  17  /  AlkPhos  81  02-18    PTT - ( 17 Feb 2021 10:30 )  PTT:79.7 sec            RADIOLOGY & ADDITIONAL TESTS:  Results Reviewed:   Imaging Personally Reviewed:  Electrocardiogram Personally Reviewed:    COORDINATION OF CARE:  Care Discussed with Consultants/Other Providers [Y/N]:  Prior or Outpatient Records Reviewed [Y/N]:   PROGRESS NOTE:     CONTACT INFO:   Ricardo (Xiao) Elizabeth   NS: [phone]/ELIZABETHJ: [id number]  PGY-2    Patient is a 75y old  Male who presents with a chief complaint of abdominal pain (18 Feb 2021 05:00)      SUBJECTIVE / OVERNIGHT EVENTS: no acute event overnight. NGT outpt 825 for the past 12 hrs. This am, reports feeling okay. No complaints. No abd pain. Per nursing, patient had a BM later in the afternoon    ADDITIONAL REVIEW OF SYSTEMS:    MEDICATIONS  (STANDING):  budesonide 160 MICROgram(s)/formoterol 4.5 MICROgram(s) Inhaler 2 Puff(s) Inhalation two times a day  dextrose 40% Gel 15 Gram(s) Oral once  dextrose 5% with potassium chloride 20 mEq/L 1000 milliLiter(s) (75 mL/Hr) IV Continuous <Continuous>  dextrose 5%. 1000 milliLiter(s) (50 mL/Hr) IV Continuous <Continuous>  dextrose 5%. 1000 milliLiter(s) (100 mL/Hr) IV Continuous <Continuous>  dextrose 50% Injectable 25 Gram(s) IV Push once  dextrose 50% Injectable 12.5 Gram(s) IV Push once  dextrose 50% Injectable 25 Gram(s) IV Push once  glucagon  Injectable 1 milliGRAM(s) IntraMuscular once  heparin  Infusion.  Unit(s)/Hr (14 mL/Hr) IV Continuous <Continuous>  insulin lispro (ADMELOG) corrective regimen sliding scale   SubCutaneous every 6 hours  metoprolol tartrate Injectable 5 milliGRAM(s) IV Push every 6 hours  pantoprazole  Injectable 40 milliGRAM(s) IV Push every 12 hours  piperacillin/tazobactam IVPB.. 3.375 Gram(s) IV Intermittent every 8 hours  potassium chloride  10 mEq/100 mL IVPB 10 milliEquivalent(s) IV Intermittent every 1 hour    MEDICATIONS  (PRN):  ALBUTerol    90 MICROgram(s) HFA Inhaler 1 Puff(s) Inhalation every 4 hours PRN Shortness of Breath and/or Wheezing  heparin   Injectable 6500 Unit(s) IV Push every 6 hours PRN For aPTT less than 40  heparin   Injectable 3000 Unit(s) IV Push every 6 hours PRN For aPTT between 40 - 57  OLANZapine Injectable 2 milliGRAM(s) IntraMuscular every 6 hours PRN agitation      CAPILLARY BLOOD GLUCOSE      POCT Blood Glucose.: 162 mg/dL (18 Feb 2021 04:40)  POCT Blood Glucose.: 172 mg/dL (17 Feb 2021 23:41)  POCT Blood Glucose.: 200 mg/dL (17 Feb 2021 18:33)  POCT Blood Glucose.: 227 mg/dL (17 Feb 2021 11:40)    I&O's Summary    17 Feb 2021 07:01  -  18 Feb 2021 07:00  --------------------------------------------------------  IN: 0 mL / OUT: 950 mL / NET: -950 mL        PHYSICAL EXAM:  Vital Signs Last 24 Hrs  T(C): 36.7 (18 Feb 2021 04:10), Max: 37.1 (17 Feb 2021 09:05)  T(F): 98 (18 Feb 2021 04:10), Max: 98.7 (17 Feb 2021 09:05)  HR: 78 (18 Feb 2021 04:10) (78 - 92)  BP: 150/83 (18 Feb 2021 04:10) (111/64 - 153/71)  BP(mean): --  RR: 20 (18 Feb 2021 04:10) (18 - 20)  SpO2: 96% (18 Feb 2021 04:10) (96% - 98%)    CONSTITUTIONAL: NAD, well-developed  RESPIRATORY: Normal respiratory effort; lungs are clear to auscultation bilaterally  CARDIOVASCULAR: Regular rate and rhythm, normal S1 and S2, no murmur/rub/gallop; No lower extremity edema; Peripheral pulses are 2+ bilaterally  ABDOMEN: Nontender to palpation, normoactive bowel sounds, no rebound/guarding; NGT in place  MUSCLOSKELETAL: no clubbing or cyanosis of digits; no joint swelling or tenderness to palpation  PSYCH: A+O to person; affect appropriate    LABS:                        9.3    13.55 )-----------( 311      ( 18 Feb 2021 05:28 )             30.6     02-18    149<H>  |  108  |  17  ----------------------------<  165<H>  3.1<L>   |  26  |  1.00    Ca    8.7      18 Feb 2021 05:28  Phos  3.4     02-18  Mg     2.0     02-18    TPro  6.5  /  Alb  2.9<L>  /  TBili  0.5  /  DBili  x   /  AST  12  /  ALT  17  /  AlkPhos  81  02-18    PTT - ( 17 Feb 2021 10:30 )  PTT:79.7 sec            RADIOLOGY & ADDITIONAL TESTS:  Results Reviewed:   Imaging Personally Reviewed:  Electrocardiogram Personally Reviewed:    COORDINATION OF CARE:  Care Discussed with Consultants/Other Providers [Y/N]:  Prior or Outpatient Records Reviewed [Y/N]:

## 2021-02-18 NOTE — PROGRESS NOTE ADULT - SUBJECTIVE AND OBJECTIVE BOX
Subjective: Patient seen and examined. No new events except as noted.     REVIEW OF SYSTEMS:    CONSTITUTIONAL: + weakness, fevers or chills  EYES/ENT: No visual changes;  No vertigo or throat pain   NECK: No pain or stiffness  RESPIRATORY: No cough, wheezing, hemoptysis; No shortness of breath  CARDIOVASCULAR: No chest pain or palpitations  GASTROINTESTINAL: No abdominal or epigastric pain. No nausea, vomiting, or hematemesis; No diarrhea or constipation. No melena or hematochezia.  GENITOURINARY: No dysuria, frequency or hematuria  NEUROLOGICAL: No numbness or weakness  SKIN: No itching, burning, rashes, or lesions   All other review of systems is negative unless indicated above.    MEDICATIONS:  MEDICATIONS  (STANDING):  budesonide 160 MICROgram(s)/formoterol 4.5 MICROgram(s) Inhaler 2 Puff(s) Inhalation two times a day  dextrose 40% Gel 15 Gram(s) Oral once  dextrose 5% with potassium chloride 20 mEq/L 1000 milliLiter(s) (75 mL/Hr) IV Continuous <Continuous>  dextrose 5%. 1000 milliLiter(s) (50 mL/Hr) IV Continuous <Continuous>  dextrose 5%. 1000 milliLiter(s) (100 mL/Hr) IV Continuous <Continuous>  dextrose 50% Injectable 25 Gram(s) IV Push once  dextrose 50% Injectable 12.5 Gram(s) IV Push once  dextrose 50% Injectable 25 Gram(s) IV Push once  glucagon  Injectable 1 milliGRAM(s) IntraMuscular once  heparin  Infusion.  Unit(s)/Hr (14 mL/Hr) IV Continuous <Continuous>  insulin lispro (ADMELOG) corrective regimen sliding scale   SubCutaneous every 6 hours  metoprolol tartrate Injectable 5 milliGRAM(s) IV Push every 6 hours  pantoprazole  Injectable 40 milliGRAM(s) IV Push every 12 hours  piperacillin/tazobactam IVPB.. 3.375 Gram(s) IV Intermittent every 8 hours  potassium chloride  10 mEq/100 mL IVPB 10 milliEquivalent(s) IV Intermittent every 1 hour      PHYSICAL EXAM:  T(C): 36.9 (02-18-21 @ 08:25), Max: 36.9 (02-18-21 @ 08:25)  HR: 80 (02-18-21 @ 08:25) (78 - 92)  BP: 132/71 (02-18-21 @ 08:25) (111/64 - 150/83)  RR: 20 (02-18-21 @ 08:25) (20 - 20)  SpO2: 94% (02-18-21 @ 08:25) (94% - 96%)  Wt(kg): --  I&O's Summary    17 Feb 2021 07:01  -  18 Feb 2021 07:00  --------------------------------------------------------  IN: 0 mL / OUT: 950 mL / NET: -950 mL          Appearance: NAD +NGT    HEENT:   Normal oral mucosa, PERRL, EOMI	  Lymphatic: No lymphadenopathy , no edema  Cardiovascular: Normal S1 S2, No JVD, No murmurs , Peripheral pulses palpable 2+ bilaterally  Respiratory: decreased bs   Gastrointestinal:  Soft, Non-tender, + BS	  Skin: No rashes, No ecchymoses, No cyanosis, warm to touch  Musculoskeletal: Normal range of motion, normal strength  Psychiatry:  Mood & affect appropriate  Ext: No edema      LABS:    CARDIAC MARKERS:                                9.3    13.55 )-----------( 311      ( 18 Feb 2021 05:28 )             30.6     02-18    149<H>  |  108  |  17  ----------------------------<  165<H>  3.1<L>   |  26  |  1.00    Ca    8.7      18 Feb 2021 05:28  Phos  3.4     02-18  Mg     2.0     02-18    TPro  6.5  /  Alb  2.9<L>  /  TBili  0.5  /  DBili  x   /  AST  12  /  ALT  17  /  AlkPhos  81  02-18    proBNP:   Lipid Profile:   HgA1c:   TSH:             TELEMETRY: 	  SR PACs, PVCs  ECG:  	  RADIOLOGY:   DIAGNOSTIC TESTING:  [ ] Echocardiogram:  [ ]  Catheterization:  [ ] Stress Test:    OTHER:

## 2021-02-18 NOTE — PROGRESS NOTE ADULT - PROBLEM SELECTOR PLAN 10
- NPO with NG tube  - heparin gtt  - eventual PT eval - pt lives at long term care facility [facility]  - DM2 - home regimen 10u Basaglar qHS and SSI at nursing home - will give SSI here given NPO and poor po intake  - full code per daughter

## 2021-02-18 NOTE — PROGRESS NOTE ADULT - PROBLEM SELECTOR PLAN 5
- baseline 2L NC at home  - satting well on 2L here  - multifocal PNA known from prior admission  - unclear if warrants treatment but due to possible aspiration, will cover until cultures negative  - f/u bcx  - O2 sat 88-93%  - procal 0.24 resolved  -satting well on room air

## 2021-02-18 NOTE — PROGRESS NOTE ADULT - PROBLEM SELECTOR PLAN 2
New afib with RVR on 2/13 with HR 130s-150s. s/p amio load and gtt. Completed 18 hour gtt load on 2/14. Converted to SR, amio gtt now dc'd   -cards consulted, appreciate recs  -monitor on tele  - CTA chest no PE New afib with RVR on 2/13 with HR 130s-150s. s/p amio load and gtt. Completed 18 hour gtt load on 2/14. Converted to SR, amio gtt now dc'd   -cards consulted, appreciate recs  -monitor on tele  - CTA chest no PE  - on heparin gtt

## 2021-02-18 NOTE — PROGRESS NOTE ADULT - ASSESSMENT
75M hx of dementia, DM2, HTN, ?HF, HLD, recent admission for COVID requiring intubation (11/2020 [place]), currently treated for pneumonia presents from nursing home for abdominal pain and vomiting, found to have multifocal PNA, and high-grade SBO with TP LUQ, c/f internal hernia on CT. Complicated by Afib RVR

## 2021-02-18 NOTE — PROGRESS NOTE ADULT - ASSESSMENT
75M hx of dementia, DM2, HTN, ?HF, HLD, recent admission for COVID requiring intubation (11/2020 [place]), currently treated for pneumonia presents from nursing home for abdominal pain and vomiting, found to have multifocal PNA, and high-grade SBO    Recommendation:  - Repeat CT shows persistent SBO but SB less dilated, without internal hernia  - Continue with nonoperative management with NPO, NGT decompression, IVF resuscitation in the setting of tenuous respiratory status to avoid intubation if possible  - continue with protonix BID  - consider repeat CT next week if no improvement  - consider palliative consult for GOC  - consider TPN evaluation for prolonged NPO    [id number]

## 2021-02-18 NOTE — PROGRESS NOTE ADULT - SUBJECTIVE AND OBJECTIVE BOX
GENERAL SURGERY DAILY PROGRESS NOTE:      Subjective:  Patient seen and examined. No active complaint this AM    Vital Signs Last 24 Hrs  T(C): 36.7 (18 Feb 2021 04:10), Max: 37.1 (17 Feb 2021 09:05)  T(F): 98 (18 Feb 2021 04:10), Max: 98.7 (17 Feb 2021 09:05)  HR: 78 (18 Feb 2021 04:10) (75 - 92)  BP: 150/83 (18 Feb 2021 04:10) (111/64 - 153/71)  BP(mean): --  RR: 20 (18 Feb 2021 04:10) (18 - 20)  SpO2: 96% (18 Feb 2021 04:10) (96% - 99%)    Exam:  Gen: NAD, resting in bed, alert and responding appropriately  Resp: Airway patent, non-labored respirations  Abd: Soft, ND, NT, no rebound or guarding. NGT draining bilious  Ext: No edema, WWP    I&O's Detail    16 Feb 2021 07:01  -  17 Feb 2021 07:00  --------------------------------------------------------  IN:    dextrose 5% with potassium chloride 20 mEq/L: 520 mL    Heparin Infusion: 168 mL    IV PiggyBack: 100 mL  Total IN: 788 mL    OUT:    Nasogastric/Oral tube (mL): 700 mL  Total OUT: 700 mL    Total NET: 88 mL      17 Feb 2021 07:01  -  18 Feb 2021 05:00  --------------------------------------------------------  IN:  Total IN: 0 mL    OUT:    Nasogastric/Oral tube (mL): 825 mL  Total OUT: 825 mL    Total NET: -825 mL          Daily     Daily     MEDICATIONS  (STANDING):  budesonide 160 MICROgram(s)/formoterol 4.5 MICROgram(s) Inhaler 2 Puff(s) Inhalation two times a day  dextrose 40% Gel 15 Gram(s) Oral once  dextrose 5%. 1000 milliLiter(s) (50 mL/Hr) IV Continuous <Continuous>  dextrose 5%. 1000 milliLiter(s) (100 mL/Hr) IV Continuous <Continuous>  dextrose 50% Injectable 25 Gram(s) IV Push once  dextrose 50% Injectable 12.5 Gram(s) IV Push once  dextrose 50% Injectable 25 Gram(s) IV Push once  glucagon  Injectable 1 milliGRAM(s) IntraMuscular once  heparin  Infusion.  Unit(s)/Hr (14 mL/Hr) IV Continuous <Continuous>  insulin lispro (ADMELOG) corrective regimen sliding scale   SubCutaneous every 6 hours  metoprolol tartrate Injectable 5 milliGRAM(s) IV Push every 6 hours  pantoprazole  Injectable 40 milliGRAM(s) IV Push every 12 hours  piperacillin/tazobactam IVPB.. 3.375 Gram(s) IV Intermittent every 8 hours    MEDICATIONS  (PRN):  ALBUTerol    90 MICROgram(s) HFA Inhaler 1 Puff(s) Inhalation every 4 hours PRN Shortness of Breath and/or Wheezing  heparin   Injectable 6500 Unit(s) IV Push every 6 hours PRN For aPTT less than 40  heparin   Injectable 3000 Unit(s) IV Push every 6 hours PRN For aPTT between 40 - 57  OLANZapine Injectable 2 milliGRAM(s) IntraMuscular every 6 hours PRN agitation      LABS:                        9.5    15.62 )-----------( 309      ( 17 Feb 2021 08:00 )             31.3     02-17    149<H>  |  x   |  x   ----------------------------<  x   x    |  x   |  x     Ca    8.7      17 Feb 2021 08:00  Phos  3.1     02-17  Mg     2.2     02-17    TPro  6.4  /  Alb  2.6<L>  /  TBili  0.4  /  DBili  x   /  AST  12  /  ALT  20  /  AlkPhos  85  02-17    PTT - ( 17 Feb 2021 10:30 )  PTT:79.7 sec

## 2021-02-18 NOTE — PROGRESS NOTE ADULT - PROBLEM SELECTOR PLAN 1
- per surgery, conservative management, precipitant ?hernia - no known prior abdominal surgeries  - NPO  - NG tube low intermittent  - trend i/o  - GI ppx with Protonix  - continue Zosyn (2/12 - )  - still high output from NG tube, no BMs  - repeat CTAP 2/16 still with persistent SBO - per surgery, conservative management, precipitant ?hernia - no known prior abdominal surgeries  - NPO  - NG tube low intermittent  - trend i/o  - GI ppx with Protonix  - continue Zosyn (2/12 - )  - repeat CTAP 2/16 still with persistent SBO  - palliative consulted and TPN consulted  - still high output from NG tube, BM x1 on 2/18, continue to monitor  - if sbo not improved, repeat CT abd next week

## 2021-02-19 DIAGNOSIS — R53.81 OTHER MALAISE: ICD-10-CM

## 2021-02-19 DIAGNOSIS — Z71.89 OTHER SPECIFIED COUNSELING: ICD-10-CM

## 2021-02-19 DIAGNOSIS — J18.9 PNEUMONIA, UNSPECIFIED ORGANISM: ICD-10-CM

## 2021-02-19 DIAGNOSIS — Z51.5 ENCOUNTER FOR PALLIATIVE CARE: ICD-10-CM

## 2021-02-19 LAB
ANION GAP SERPL CALC-SCNC: 11 MMOL/L — SIGNIFICANT CHANGE UP (ref 5–17)
ANION GAP SERPL CALC-SCNC: 11 MMOL/L — SIGNIFICANT CHANGE UP (ref 5–17)
APTT BLD: 166.6 SEC — CRITICAL HIGH (ref 27.5–35.5)
APTT BLD: 37.9 SEC — HIGH (ref 27.5–35.5)
BUN SERPL-MCNC: 11 MG/DL — SIGNIFICANT CHANGE UP (ref 7–23)
BUN SERPL-MCNC: 13 MG/DL — SIGNIFICANT CHANGE UP (ref 7–23)
CALCIUM SERPL-MCNC: 8.3 MG/DL — LOW (ref 8.4–10.5)
CALCIUM SERPL-MCNC: 8.5 MG/DL — SIGNIFICANT CHANGE UP (ref 8.4–10.5)
CHLORIDE SERPL-SCNC: 105 MMOL/L — SIGNIFICANT CHANGE UP (ref 96–108)
CHLORIDE SERPL-SCNC: 106 MMOL/L — SIGNIFICANT CHANGE UP (ref 96–108)
CO2 SERPL-SCNC: 25 MMOL/L — SIGNIFICANT CHANGE UP (ref 22–31)
CO2 SERPL-SCNC: 26 MMOL/L — SIGNIFICANT CHANGE UP (ref 22–31)
CREAT SERPL-MCNC: 0.8 MG/DL — SIGNIFICANT CHANGE UP (ref 0.5–1.3)
CREAT SERPL-MCNC: 0.83 MG/DL — SIGNIFICANT CHANGE UP (ref 0.5–1.3)
GLUCOSE BLDC GLUCOMTR-MCNC: 125 MG/DL — HIGH (ref 70–99)
GLUCOSE BLDC GLUCOMTR-MCNC: 136 MG/DL — HIGH (ref 70–99)
GLUCOSE BLDC GLUCOMTR-MCNC: 143 MG/DL — HIGH (ref 70–99)
GLUCOSE BLDC GLUCOMTR-MCNC: 282 MG/DL — HIGH (ref 70–99)
GLUCOSE SERPL-MCNC: 121 MG/DL — HIGH (ref 70–99)
GLUCOSE SERPL-MCNC: 190 MG/DL — HIGH (ref 70–99)
HCT VFR BLD CALC: 28.5 % — LOW (ref 39–50)
HGB BLD-MCNC: 8.9 G/DL — LOW (ref 13–17)
MAGNESIUM SERPL-MCNC: 1.8 MG/DL — SIGNIFICANT CHANGE UP (ref 1.6–2.6)
MCHC RBC-ENTMCNC: 22.8 PG — LOW (ref 27–34)
MCHC RBC-ENTMCNC: 31.2 GM/DL — LOW (ref 32–36)
MCV RBC AUTO: 73.1 FL — LOW (ref 80–100)
NRBC # BLD: 0 /100 WBCS — SIGNIFICANT CHANGE UP (ref 0–0)
PHOSPHATE SERPL-MCNC: 3.2 MG/DL — SIGNIFICANT CHANGE UP (ref 2.5–4.5)
PLATELET # BLD AUTO: 300 K/UL — SIGNIFICANT CHANGE UP (ref 150–400)
POTASSIUM SERPL-MCNC: 3.6 MMOL/L — SIGNIFICANT CHANGE UP (ref 3.5–5.3)
POTASSIUM SERPL-MCNC: 3.7 MMOL/L — SIGNIFICANT CHANGE UP (ref 3.5–5.3)
POTASSIUM SERPL-SCNC: 3.6 MMOL/L — SIGNIFICANT CHANGE UP (ref 3.5–5.3)
POTASSIUM SERPL-SCNC: 3.7 MMOL/L — SIGNIFICANT CHANGE UP (ref 3.5–5.3)
RBC # BLD: 3.9 M/UL — LOW (ref 4.2–5.8)
RBC # FLD: 20.2 % — HIGH (ref 10.3–14.5)
SODIUM SERPL-SCNC: 142 MMOL/L — SIGNIFICANT CHANGE UP (ref 135–145)
SODIUM SERPL-SCNC: 142 MMOL/L — SIGNIFICANT CHANGE UP (ref 135–145)
SODIUM SERPL-SCNC: 144 MMOL/L — SIGNIFICANT CHANGE UP (ref 135–145)
WBC # BLD: 13.46 K/UL — HIGH (ref 3.8–10.5)
WBC # FLD AUTO: 13.46 K/UL — HIGH (ref 3.8–10.5)

## 2021-02-19 PROCEDURE — 99223 1ST HOSP IP/OBS HIGH 75: CPT | Mod: GC

## 2021-02-19 PROCEDURE — 99233 SBSQ HOSP IP/OBS HIGH 50: CPT | Mod: CS,GC

## 2021-02-19 RX ORDER — SODIUM CHLORIDE 9 MG/ML
1000 INJECTION, SOLUTION INTRAVENOUS
Refills: 0 | Status: DISCONTINUED | OUTPATIENT
Start: 2021-02-19 | End: 2021-02-19

## 2021-02-19 RX ORDER — SODIUM CHLORIDE 9 MG/ML
1000 INJECTION, SOLUTION INTRAVENOUS
Refills: 0 | Status: DISCONTINUED | OUTPATIENT
Start: 2021-02-19 | End: 2021-02-20

## 2021-02-19 RX ORDER — POTASSIUM CHLORIDE 20 MEQ
10 PACKET (EA) ORAL
Refills: 0 | Status: COMPLETED | OUTPATIENT
Start: 2021-02-19 | End: 2021-02-19

## 2021-02-19 RX ADMIN — Medication 3: at 06:21

## 2021-02-19 RX ADMIN — HEPARIN SODIUM 6500 UNIT(S): 5000 INJECTION INTRAVENOUS; SUBCUTANEOUS at 23:04

## 2021-02-19 RX ADMIN — Medication 5 MILLIGRAM(S): at 15:26

## 2021-02-19 RX ADMIN — BUDESONIDE AND FORMOTEROL FUMARATE DIHYDRATE 2 PUFF(S): 160; 4.5 AEROSOL RESPIRATORY (INHALATION) at 06:21

## 2021-02-19 RX ADMIN — PIPERACILLIN AND TAZOBACTAM 25 GRAM(S): 4; .5 INJECTION, POWDER, LYOPHILIZED, FOR SOLUTION INTRAVENOUS at 08:17

## 2021-02-19 RX ADMIN — PANTOPRAZOLE SODIUM 40 MILLIGRAM(S): 20 TABLET, DELAYED RELEASE ORAL at 15:26

## 2021-02-19 RX ADMIN — Medication 5 MILLIGRAM(S): at 06:21

## 2021-02-19 RX ADMIN — Medication 5 MILLIGRAM(S): at 18:38

## 2021-02-19 RX ADMIN — Medication 100 MILLIEQUIVALENT(S): at 06:21

## 2021-02-19 RX ADMIN — BUDESONIDE AND FORMOTEROL FUMARATE DIHYDRATE 2 PUFF(S): 160; 4.5 AEROSOL RESPIRATORY (INHALATION) at 18:37

## 2021-02-19 RX ADMIN — Medication 100 MILLIEQUIVALENT(S): at 08:16

## 2021-02-19 RX ADMIN — SODIUM CHLORIDE 75 MILLILITER(S): 9 INJECTION, SOLUTION INTRAVENOUS at 05:02

## 2021-02-19 RX ADMIN — Medication 5 MILLIGRAM(S): at 22:29

## 2021-02-19 RX ADMIN — Medication 100 MILLIEQUIVALENT(S): at 05:02

## 2021-02-19 RX ADMIN — PIPERACILLIN AND TAZOBACTAM 25 GRAM(S): 4; .5 INJECTION, POWDER, LYOPHILIZED, FOR SOLUTION INTRAVENOUS at 18:37

## 2021-02-19 RX ADMIN — HEPARIN SODIUM 0 UNIT(S)/HR: 5000 INJECTION INTRAVENOUS; SUBCUTANEOUS at 11:21

## 2021-02-19 RX ADMIN — HEPARIN SODIUM 1100 UNIT(S)/HR: 5000 INJECTION INTRAVENOUS; SUBCUTANEOUS at 15:25

## 2021-02-19 RX ADMIN — HEPARIN SODIUM 1400 UNIT(S)/HR: 5000 INJECTION INTRAVENOUS; SUBCUTANEOUS at 23:00

## 2021-02-19 RX ADMIN — PANTOPRAZOLE SODIUM 40 MILLIGRAM(S): 20 TABLET, DELAYED RELEASE ORAL at 22:29

## 2021-02-19 NOTE — PROGRESS NOTE ADULT - PROBLEM SELECTOR PLAN 10
- NPO with NG tube  - heparin gtt  - eventual PT eval - pt lives at long term care facility [facility]  - DM2 - home regimen 10u Basaglar qHS and SSI at nursing home - will give SSI here given NPO and poor po intake  - full code per daughter

## 2021-02-19 NOTE — CONSULT NOTE ADULT - ASSESSMENT
75M PMH dementia, DM2, HTN, ?HF, HLD, recent admission for COPD pneumonia requiring intubation (11/2020 [place]), COVID in January 2021 (not intubated) currently being treated for pneumonia presents from nursing home for abdominal pain and vomiting, found to have multifocal PNA (unclear if sequela of prior COVID) and high-grade SBO being medically managed.

## 2021-02-19 NOTE — PROGRESS NOTE ADULT - ASSESSMENT
75M PMH dementia, DM2, HTN, ?HF, HLD, recent admission for COPD pneumonia requiring intubation (11/2020 [place]), COVID in January 2021 (not intubated) currently being treated for pneumonia presents from nursing home for abdominal pain and vomiting, found to have multifocal PNA (unclear if sequela of prior COVID) and high-grade SBO. Course c/b new afib with RVR s/p amiodarone gtt, on standing Lopressor.

## 2021-02-19 NOTE — CHART NOTE - NSCHARTNOTEFT_GEN_A_CORE
Nutrition Follow Up Note  Patient seen for: 1st x malnutrition f/u    · Reason for Admission	abdominal pain   chart reviewed, events noted      As per red surgery  Repeat CT shows persistent SBO but SB less dilated, without internal hernia  - Continue with nonoperative management   - recommend NGT clamp trial as output is low and patient with BMs  - continue with protonix BID    Patient remains acute on 2 L nasal cannula for dyspnea.  NG tube to low wall suction for small bowel obstruction.  Zosyn IVPB  administered for UTI every 8 hours.    Source: EMR    Diet : Diet, NPO:   Except Medications (21 @ 20:15) [Active]          Patient reports: pt with dementia. pt is disoriented to situation          Daily Weight in k (), Weight in k.6 (02-15)  % Weight Change: 13% weight gain since 2/15    Pertinent Medications: MEDICATIONS  (STANDING):  budesonide 160 MICROgram(s)/formoterol 4.5 MICROgram(s) Inhaler 2 Puff(s) Inhalation two times a day  dextrose 40% Gel 15 Gram(s) Oral once  dextrose 5%. 1000 milliLiter(s) (50 mL/Hr) IV Continuous <Continuous>  dextrose 5%. 1000 milliLiter(s) (100 mL/Hr) IV Continuous <Continuous>  dextrose 50% Injectable 25 Gram(s) IV Push once  dextrose 50% Injectable 12.5 Gram(s) IV Push once  dextrose 50% Injectable 25 Gram(s) IV Push once  glucagon  Injectable 1 milliGRAM(s) IntraMuscular once  heparin  Infusion.  Unit(s)/Hr (14 mL/Hr) IV Continuous <Continuous>  insulin lispro (ADMELOG) corrective regimen sliding scale   SubCutaneous every 6 hours  lactated ringers. 1000 milliLiter(s) (75 mL/Hr) IV Continuous <Continuous>  metoprolol tartrate Injectable 5 milliGRAM(s) IV Push every 6 hours  pantoprazole  Injectable 40 milliGRAM(s) IV Push every 12 hours  piperacillin/tazobactam IVPB.. 3.375 Gram(s) IV Intermittent every 8 hours    MEDICATIONS  (PRN):  ALBUTerol    90 MICROgram(s) HFA Inhaler 1 Puff(s) Inhalation every 4 hours PRN Shortness of Breath and/or Wheezing  heparin   Injectable 6500 Unit(s) IV Push every 6 hours PRN For aPTT less than 40  heparin   Injectable 3000 Unit(s) IV Push every 6 hours PRN For aPTT between 40 - 57  OLANZapine Injectable 2 milliGRAM(s) IntraMuscular every 6 hours PRN agitation    Pertinent Labs:  @ 02:54: Na 142, BUN 13, Cr 0.83, <H>, K+ 3.6   @ 19:14: Na 145, K+ 3.6    Finger Sticks:  POCT Blood Glucose.: 282 mg/dL ( @ 06:16)  POCT Blood Glucose.: 172 mg/dL ( @ 23:36)  POCT Blood Glucose.: 194 mg/dL ( @ 17:54)  POCT Blood Glucose.: 166 mg/dL ( @ 12:03)      Skin per nursing documentation: none  Edema: +1 generalized     Estimated Needs:   [x ] no change since previous assessment  [ ] recalculated:    Estimated Energy Needs:  · Weight (lbs)	136 lb  · Weight (kg)	61.6 kg  · Enter From (cathryn/kg)	30   · Enter To (cathryn/kg)	35   · Calculated From (cathryn/kg)	1848   · Calculated To (cathryn/kg)	2156      Estimated Protein Needs:  · Weight (lbs)	136 lb  · Weight (kg)	61.6 kg  · Enter From (g/kg)	1.2   · Enter To (g/kg)	1.4   · Calculated From (g/kg)	73.92   · Calculated To (g/kg)	86.24      Estimated Fluid Needs:  · Weight (lbs)	136 lb  · Weight (kg)	61.6 kg  · Enter From (ml/kg)	25   · Enter To (ml/kg)	30   · Calculated From (ml/kg)	1540   · Calculated To (ml/kg)	1848       · Other Calculations	Pt's nursing home wt was used for calculations. Needs consider pt's medical condition COPD and malnourished status.        Previous Nutrition Diagnosis: mild malnutrition   Nutrition Diagnosis is: ongoing-pt continues to be NPO        Recommend  When made PO, please obtain swallow eval for hx of dysphagia (pt was on chopped diet at NH). Please also place low sodium, Consistent Carbohydrate diet restriction. Add Glucerna BID    Monitoring and Evaluation:     Continue to monitor Nutritional intake, Tolerance to diet prescription, weights, labs, skin integrity    RD remains available upon request and will follow up per protocol  Sade Larsen MA, RD, CDN #[phone]

## 2021-02-19 NOTE — CONSULT NOTE ADULT - SUBJECTIVE AND OBJECTIVE BOX
HPI:  75 year old male with hx of dementia, DM2, HTN, ?HF, HLD, recent admission for COVID requiring intubation (11/2020 [place]), currently treated for pneumonia presents from nursing home for abdominal pain x 1 week and vomiting x 1 day. History obtained from chart review and son over the phone, due to patient's mental status (AAOx1 at baseline since intubation in Nov 2020 due COPD with superimposed pneumonia), which son confirms. States patient has been having abdominal pain over last week or so, and then had nausea and vomiting. Unsure about fevers, but not mentioned by nursing home staff. Used 2 L at nursing most days, has been getting rehab at the nursing home. This week has not been participating as well. Per daughter, patient has not had any other issues apart from last week, otherwise at baseline since November. Per daughter no cough or SOB, or phlegm that she knows of. No known surgeries previously per daughter    In ED, patient was tachycardic to 114, satting at 95% on 3L NC. Lab significant for leukocytosis of 15.5, v lactate of 2.3, with +UA. CT A/P shows multifocal PNA (known from January), high-grade SBO with TP LUQ, c/f internal hernia. NGT was placed with immediate return of 1.1L stomach content. Received 3l fluid boluses and meropenem (12 Feb 2021 20:03)    Admitted for SBO, NGT placed. Surgery consulted but opted for non-operative management due  to comorbidities. Has been on Zosyn for aspiration PNA. Repeat CT A/P showing persistent SBO. Remains NPO and with NGT in place. Pt seen and examined at bedside. He denies abdominal pain, has been passing multiple BMs today. Feels hungry. He is very loquacious and recalls his career in the [employer] industry, worked for [employer] for 30 years. He does not remember being intubated last November. However, when asked if he would want a breathing tube again he states he would not. When asked if he would want chest compressions in the event of his heart stopping, he says he would want everything done to save his life. Has a girlfriend named Has two children, son Broderick Sifuentes Jr, and daughter Sienna Martinez who he would like to appoint as his primary HCP. Spoke with both Sienna and Broderick Davidson. They report that dad has had a difficult year in terms of his health. Had fallen ill in November and was hospitalized at [facility] for PNA, required intubation. Per daughter was on ventilator for 6 days then recovered and again dc'd to [facility]. Unfortunately caught COVID last month and had to be admitted again. Sienna states pt never seems to be able to go a few weeks without having to be admitted. She reports that prior to November admission dad was pretty functional. Able to live independently, with girlfriend, ambulate, care for self. Has been declining since that time.     PERTINENT PM/SXH:   COPD (chronic obstructive pulmonary disease)    CVA (cerebral vascular accident)    HLD (hyperlipidemia)    T2DM (type 2 diabetes mellitus)    HTN (hypertension)    UTI (urinary tract infection)    Dementia    HLD (hyperlipidemia)    HTN (hypertension)    H/O heart failure    Pneumonia    History of acute respiratory distress syndrome (ARDS)    No pertinent past medical history      No significant past surgical history    No significant past surgical history    No significant past surgical history      FAMILY HISTORY:  No pertinent family history in first degree relatives    FH: HTN (hypertension)  mother      ITEMS NOT CHECKED ARE NOT PRESENT    SOCIAL HISTORY:   Significant other/partner[ ]  Children[ ]  Scientologist/Spirituality:  Substance hx:  [ ]   Tobacco hx:  [ ]   Alcohol hx: [ ]   Home Opioid hx:  [ ] I-Stop Reference No:  Living Situation: [ ]Home  [ ]Long term care  [ ]Rehab [ ]Other    ADVANCE DIRECTIVES:    DNR  MOLST  [ ]  Living Will  [ ]   DECISION MAKER(s):  [ ] Health Care Proxy(s)  [ ] Surrogate(s)  [ ] Guardian           Name(s): Phone Number(s):    BASELINE (I)ADL(s) (prior to admission):  [place]: [ ]Total  [ ] Moderate [ ]Dependent    Allergies    No Known Allergies    Intolerances    MEDICATIONS  (STANDING):  budesonide 160 MICROgram(s)/formoterol 4.5 MICROgram(s) Inhaler 2 Puff(s) Inhalation two times a day  dextrose 40% Gel 15 Gram(s) Oral once  dextrose 5%. 1000 milliLiter(s) (50 mL/Hr) IV Continuous <Continuous>  dextrose 5%. 1000 milliLiter(s) (100 mL/Hr) IV Continuous <Continuous>  dextrose 50% Injectable 25 Gram(s) IV Push once  dextrose 50% Injectable 12.5 Gram(s) IV Push once  dextrose 50% Injectable 25 Gram(s) IV Push once  glucagon  Injectable 1 milliGRAM(s) IntraMuscular once  heparin  Infusion.  Unit(s)/Hr (14 mL/Hr) IV Continuous <Continuous>  insulin lispro (ADMELOG) corrective regimen sliding scale   SubCutaneous every 6 hours  lactated ringers. 1000 milliLiter(s) (75 mL/Hr) IV Continuous <Continuous>  metoprolol tartrate Injectable 5 milliGRAM(s) IV Push every 6 hours  pantoprazole  Injectable 40 milliGRAM(s) IV Push every 12 hours  piperacillin/tazobactam IVPB.. 3.375 Gram(s) IV Intermittent every 8 hours    MEDICATIONS  (PRN):  ALBUTerol    90 MICROgram(s) HFA Inhaler 1 Puff(s) Inhalation every 4 hours PRN Shortness of Breath and/or Wheezing  heparin   Injectable 6500 Unit(s) IV Push every 6 hours PRN For aPTT less than 40  heparin   Injectable 3000 Unit(s) IV Push every 6 hours PRN For aPTT between 40 - 57  OLANZapine Injectable 2 milliGRAM(s) IntraMuscular every 6 hours PRN agitation    PRESENT SYMPTOMS: [ ]Unable to obtain due to poor mentation   Source if other than patient:  [ ]Family   [ ]Team     Pain: [ ]yes [ ]no  QOL impact -   Location -                    Aggravating factors -  Quality -  Radiation -  Timing-  Severity (0-10 scale):  Minimal acceptable level (0-10 scale):     CPOT:    https://www.[facility].org/getattachment/[email or web address]/Critical-Care-Pain-Observation-Tool-(CPOT)      PAIN AD Score:     http://geriatrictoolkit.[place].[facility]/cog/painad.pdf (press ctrl +  left click to view)    Dyspnea:                           [ ]Mild [ ]Moderate [ ]Severe  Anxiety:                             [ ]Mild [ ]Moderate [ ]Severe  Fatigue:                             [ ]Mild [ ]Moderate [ ]Severe  Nausea:                             [ ]Mild [ ]Moderate [ ]Severe  Loss of appetite:              [ ]Mild [ ]Moderate [ ]Severe  Constipation:                    [ ]Mild [ ]Moderate [ ]Severe    Other Symptoms:  [ ]All other review of systems negative     Palliative Performance Status Version 2:         %    http://npcrc.org/files/news/palliative_performance_scale_ppsv2.pdf  PHYSICAL EXAM:  Vital Signs Last 24 Hrs  T(C): 36.4 (19 Feb 2021 05:00), Max: 36.7 (18 Feb 2021 21:00)  T(F): 97.6 (19 Feb 2021 05:00), Max: 98 (18 Feb 2021 21:00)  HR: 80 (19 Feb 2021 05:00) (76 - 81)  BP: 116/69 (19 Feb 2021 05:00) (114/72 - 132/79)  BP(mean): --  RR: 18 (19 Feb 2021 05:00) (18 - 18)  SpO2: 98% (19 Feb 2021 05:00) (96% - 98%) I&O's Summary    18 Feb 2021 07:01  -  19 Feb 2021 07:00  --------------------------------------------------------  IN: 1368 mL / OUT: 250 mL / NET: 1118 mL      GENERAL:  [x ]Alert  [x ]Oriented x 3  [ ]Lethargic  [ ]Cachexia  [ ]Unarousable  [ ]Verbal  [ ]Non-Verbal  Behavioral:   [ ] Anxiety  [ ] Delirium [ ] Agitation [ ] Other  HEENT:  [ ]Normal   [ ]Dry mouth   [ ]ET Tube/Trach  [ ]Oral lesions [X] NGT  PULMONARY:   [ ]Clear [ ]Tachypnea  [ ]Audible excessive secretions   [X ]Rhonchi        [ ]Right [ ]Left [ ]Bilateral  [ ]Crackles        [ ]Right [ ]Left [ ]Bilateral  [ ]Wheezing     [ ]Right [ ]Left [ ]Bilateral  [ ]Diminished breath sounds [ ]right [ ]left [ ]bilateral  CARDIOVASCULAR:    [X ]Regular [ ]Irregular [ ]Tachy  [ ]Chin [ ]Murmur [ ]Other  GASTROINTESTINAL:  [X ]Soft  [ ]Distended   [ ]+BS  [X ]Non tender [ ]Tender  [ ]PEG [X ]OGT/ NGT  Last BM:     GENITOURINARY:  [X ]Normal [ ] Incontinent   [ ]Oliguria/Anuria   [ ]Jones  MUSCULOSKELETAL:   [X ]Normal   [ ]Weakness  [ ]Bed/Wheelchair bound [ ]Edema  NEUROLOGIC:   [X ]No focal deficits  [ ]Cognitive impairment  [ ]Dysphagia [ ]Dysarthria [ ]Paresis [ ]Other   SKIN:   [X ]Normal    [ ]Rash  [ ]Pressure ulcer(s)       Present on admission [ ]y [ ]n    CRITICAL CARE:  [ ] Shock Present  [ ]Septic [ ]Cardiogenic [ ]Neurologic [ ]Hypovolemic  [ ]  Vasopressors [ ]  Inotropes   [ ]Respiratory failure present [ ]Mechanical ventilation [ ]Non-invasive ventilatory support [ ]High flow    [ ]Acute  [ ]Chronic [ ]Hypoxic  [ ]Hypercarbic [ ]Other  [ ]Other organ failure     LABS:                        8.9    13.46 )-----------( 300      ( 19 Feb 2021 10:37 )             28.5   02-19    142  |  106  |  11  ----------------------------<  121<H>  3.7   |  25  |  0.80    Ca    8.3<L>      19 Feb 2021 10:37  Phos  3.2     02-19  Mg     1.8     02-19    TPro  6.5  /  Alb  2.9<L>  /  TBili  0.5  /  DBili  x   /  AST  12  /  ALT  17  /  AlkPhos  81  02-18  PTT - ( 19 Feb 2021 10:37 )  PTT:166.6 sec      RADIOLOGY & ADDITIONAL STUDIES:    PROTEIN CALORIE MALNUTRITION PRESENT: [ ]mild [ ]moderate [ ]severe [ ]underweight [ ]morbid obesity  https://www.andeal.org/vault/7020/web/files/ONC/Table_Clinical%20Characteristics%20to%20Document%20Malnutrition-White%20JV%20et%20al%202012.pdf    Height (cm): 167.6 (12-24-20 @ 23:30)  Weight (kg): 79.4 (02-12-21 @ 10:27), 62 (12-24-20 @ 23:30)  BMI (kg/m2): 28.3 (02-12-21 @ 10:27), 22.1 (12-24-20 @ 23:30)    [ ]PPSV2 < or = to 30% [ ]significant weight loss  [ ]poor nutritional intake  [ ]anasarca     Albumin, Serum: 2.9 g/dL (02-18-21 @ 05:28)   [ ]Artificial Nutrition      REFERRALS:   [ ]Chaplaincy  [ ]Hospice  [ ]Child Life  [ ]Social Work  [ ]Case management [ ]Holistic Therapy     Goals of Care Document:  HPI:  75 year old male with hx of dementia, DM2, HTN, ?HF, HLD, recent admission for COVID requiring intubation (11/2020 [place]), currently treated for pneumonia presents from nursing home for abdominal pain x 1 week and vomiting x 1 day. History obtained from chart review and son over the phone, due to patient's mental status (AAOx1 at baseline since intubation in Nov 2020 due COPD with superimposed pneumonia), which son confirms. States patient has been having abdominal pain over last week or so, and then had nausea and vomiting. Unsure about fevers, but not mentioned by nursing home staff. Used 2 L at nursing most days, has been getting rehab at the nursing home. This week has not been participating as well. Per daughter, patient has not had any other issues apart from last week, otherwise at baseline since November. Per daughter no cough or SOB, or phlegm that she knows of. No known surgeries previously per daughter    In ED, patient was tachycardic to 114, satting at 95% on 3L NC. Lab significant for leukocytosis of 15.5, v lactate of 2.3, with +UA. CT A/P shows multifocal PNA (known from January), high-grade SBO with TP LUQ, c/f internal hernia. NGT was placed with immediate return of 1.1L stomach content. Received 3l fluid boluses and meropenem (12 Feb 2021 20:03)    Admitted for small bowel obstruction (SBO), NGT placed. Surgery consulted but opted for non-operative management due  to comorbidities. Has been on Zosyn for aspiration PNA. Repeat CT A/P showing persistent SBO. Remains NPO and with NGT in place. Pt seen and examined at bedside. He denies abdominal pain, has been passing multiple BMs today. Feels hungry. He is very loquacious and recalls his career in the "[employer] industry, worked for [employer] for 30 years. He does not remember being intubated last November. However, when asked if he would want a breathing tube again he states he would not. When asked if he would want chest compressions in the event of his heart stopping, he says he would want everything done to save his life. Has a girlfriend named Has two children, son Broderick Sifuentes Jr, and daughter Sienna Martinez who he would like to appoint as his primary HCP. Spoke with both Sienna and Broderick Davidson. They report that dad has had a difficult year in terms of his health. Had fallen ill in November and was hospitalized at [facility] for PNA, required intubation. Per daughter was on ventilator for 6 days then recovered and again dc'd to [facility]. Unfortunately caught COVID last month and had to be admitted again. Sienna states pt never seems to be able to go a few weeks without having to be admitted. She reports that prior to November admission dad was pretty functional. Able to live independently, with girlfriend, ambulate, care for self. Has been declining since that time.     PERTINENT PM/SXH:   COPD (chronic obstructive pulmonary disease)    CVA (cerebral vascular accident)    HLD (hyperlipidemia)    T2DM (type 2 diabetes mellitus)    HTN (hypertension)    UTI (urinary tract infection)    Dementia    HLD (hyperlipidemia)    HTN (hypertension)    H/O heart failure    Pneumonia    History of acute respiratory distress syndrome (ARDS)    No pertinent past medical history      No significant past surgical history    No significant past surgical history    No significant past surgical history      FAMILY HISTORY:  No pertinent family history in first degree relatives    FH: HTN (hypertension)  mother      ITEMS NOT CHECKED ARE NOT PRESENT    SOCIAL HISTORY:   Significant other/partner[ ]  Children[ ]  Moravian/Spirituality:  Substance hx:  [ ]   Tobacco hx:  [ ]   Alcohol hx: [ ]   Home Opioid hx:  [ ] I-Stop Reference No:  Living Situation: [ ]Home  [ ]Long term care  [ ]Rehab [ ]Other     Patient transferred from [facility] where patient was for short term  rehabilitation.  As per nursing supervisor Hemal at [facility], patient  received services from 12-16 to 12-24, and then again from 2-3 to 2-12.           Sienna stated that patient hasn't been home since November.           Patient resided in an apartment with his significant other.  He only  owns a cane and no other equipment.  He was not receiving any services at home,  nor was he on home O2.  Sienna would like patient to be discharged to a LTC  facility.  She is requesting patient not to be discharged back to [facility]. ADVANCE DIRECTIVES:    DNR  MOLST  [ ]  Living Will  [ ]   DECISION MAKER(s):  [x ] Health Care Proxy(s)  [ ] Surrogate(s)  [ ] Guardian           Name(s): Phone Number(s): Children as above.     BASELINE (I)ADL(s) (prior to admission):  [place]: [ ]Total  [ ] Moderate [ ]Dependent    Allergies    No Known Allergies    Intolerances    MEDICATIONS  (STANDING):  budesonide 160 MICROgram(s)/formoterol 4.5 MICROgram(s) Inhaler 2 Puff(s) Inhalation two times a day  dextrose 40% Gel 15 Gram(s) Oral once  dextrose 5%. 1000 milliLiter(s) (50 mL/Hr) IV Continuous <Continuous>  dextrose 5%. 1000 milliLiter(s) (100 mL/Hr) IV Continuous <Continuous>  dextrose 50% Injectable 25 Gram(s) IV Push once  dextrose 50% Injectable 12.5 Gram(s) IV Push once  dextrose 50% Injectable 25 Gram(s) IV Push once  glucagon  Injectable 1 milliGRAM(s) IntraMuscular once  heparin  Infusion.  Unit(s)/Hr (14 mL/Hr) IV Continuous <Continuous>  insulin lispro (ADMELOG) corrective regimen sliding scale   SubCutaneous every 6 hours  lactated ringers. 1000 milliLiter(s) (75 mL/Hr) IV Continuous <Continuous>  metoprolol tartrate Injectable 5 milliGRAM(s) IV Push every 6 hours  pantoprazole  Injectable 40 milliGRAM(s) IV Push every 12 hours  piperacillin/tazobactam IVPB.. 3.375 Gram(s) IV Intermittent every 8 hours    MEDICATIONS  (PRN):  ALBUTerol    90 MICROgram(s) HFA Inhaler 1 Puff(s) Inhalation every 4 hours PRN Shortness of Breath and/or Wheezing  heparin   Injectable 6500 Unit(s) IV Push every 6 hours PRN For aPTT less than 40  heparin   Injectable 3000 Unit(s) IV Push every 6 hours PRN For aPTT between 40 - 57  OLANZapine Injectable 2 milliGRAM(s) IntraMuscular every 6 hours PRN agitation    PRESENT SYMPTOMS: [ ]Unable to obtain due to poor mentation   Source if other than patient:  [ ]Family   [ ]Team     Pain: [ ]yes [x ]no  QOL impact -   Location -                    Aggravating factors -  Quality -  Radiation -  Timing-  Severity (0-10 scale):  Minimal acceptable level (0-10 scale):     CPOT:    https://www.[facility].org/getattachment/[email or web address]/Critical-Care-Pain-Observation-Tool-(CPOT)      PAIN AD Score:     http://geriatrictoolkit.[facility]/cog/painad.pdf (press ctrl +  left click to view)    Dyspnea:                           [ ]Mild [ ]Moderate [ ]Severe  Anxiety:                             [ ]Mild [ ]Moderate [ ]Severe  Fatigue:                             [ ]Mild [ ]Moderate [ ]Severe  Nausea:                             [ ]Mild [ ]Moderate [ ]Severe  Loss of appetite:              [ ]Mild [ ]Moderate [ ]Severe  Constipation:                    [ ]Mild [ ]Moderate [ ]Severe    Other Symptoms:  [x ]All other review of systems negative  but as per HPI     Palliative Performance Status Version 2:    30      %    http://[facility].org/files/news/palliative_performance_scale_ppsv2.pdf  PHYSICAL EXAM:  Vital Signs Last 24 Hrs  T(C): 36.4 (19 Feb 2021 05:00), Max: 36.7 (18 Feb 2021 21:00)  T(F): 97.6 (19 Feb 2021 05:00), Max: 98 (18 Feb 2021 21:00)  HR: 80 (19 Feb 2021 05:00) (76 - 81)  BP: 116/69 (19 Feb 2021 05:00) (114/72 - 132/79)  BP(mean): --  RR: 18 (19 Feb 2021 05:00) (18 - 18)  SpO2: 98% (19 Feb 2021 05:00) (96% - 98%) I&O's Summary    18 Feb 2021 07:01  -  19 Feb 2021 07:00  --------------------------------------------------------  IN: 1368 mL / OUT: 250 mL / NET: 1118 mL      GENERAL:  [x ]Alert  [x ]Oriented x 3  [ ]Lethargic  [ ]Cachexia  [ ]Unarousable  [x ]Verbal  [ ]Non-Verbal  Behavioral:   [ ] Anxiety  [ ] Delirium [ ] Agitation [ ] Other  HEENT:  [ ]Normal   [ ]Dry mouth   [ ]ET Tube/Trach  [ ]Oral lesions [X] NGT  PULMONARY:   [ ]Clear [ ]Tachypnea  [ ]Audible excessive secretions   [X ]Rhonchi        [ ]Right [ ]Left [ ]Bilateral  [ ]Crackles        [ ]Right [ ]Left [ ]Bilateral  [ ]Wheezing     [ ]Right [ ]Left [ ]Bilateral  [ ]Diminished breath sounds [ ]right [ ]left [ ]bilateral  CARDIOVASCULAR:    [X ]Regular [ ]Irregular [ ]Tachy  [ ]Chin [ ]Murmur [ ]Other  GASTROINTESTINAL:  [X ]Soft  [ ]Distended   [ ]+BS  [X ]Non tender [ ]Tender  [ ]PEG [X ]OGT/ NGT  Last BM: Multiple on 2/19     GENITOURINARY:  [X ]Normal [ ] Incontinent   [ ]Oliguria/Anuria   [ ]Jones  MUSCULOSKELETAL:   [X ]Normal   [ ]Weakness  [ ]Bed/Wheelchair bound [ ]Edema  NEUROLOGIC:   [X ]No focal deficits  [ ]Cognitive impairment  [ ]Dysphagia [ ]Dysarthria [ ]Paresis [ ]Other   SKIN:   [X ]Normal    [ ]Rash  [ ]Pressure ulcer(s)       Present on admission [ ]y [ ]n    CRITICAL CARE:  [ ] Shock Present  [ ]Septic [ ]Cardiogenic [ ]Neurologic [ ]Hypovolemic  [ ]  Vasopressors [ ]  Inotropes   [ ]Respiratory failure present [ ]Mechanical ventilation [ ]Non-invasive ventilatory support [ ]High flow    [ ]Acute  [ ]Chronic [ ]Hypoxic  [ ]Hypercarbic [ ]Other  [ ]Other organ failure     LABS:                        8.9    13.46 )-----------( 300      ( 19 Feb 2021 10:37 )             28.5   02-19    142  |  106  |  11  ----------------------------<  121<H>  3.7   |  25  |  0.80    Ca    8.3<L>      19 Feb 2021 10:37  Phos  3.2     02-19  Mg     1.8     02-19    TPro  6.5  /  Alb  2.9<L>  /  TBili  0.5  /  DBili  x   /  AST  12  /  ALT  17  /  AlkPhos  81  02-18  PTT - ( 19 Feb 2021 10:37 )  PTT:166.6 sec      RADIOLOGY & ADDITIONAL STUDIES: Reviewed     PROTEIN CALORIE MALNUTRITION PRESENT: [ ]mild [ ]moderate [ ]severe [ ]underweight [ ]morbid obesity  https://www.andeal.org/vault/2440/web/files/ONC/Table_Clinical%20Characteristics%20to%20Document%20Malnutrition-White%20JV%20et%20al%202012.pdf    Height (cm): 167.6 (12-24-20 @ 23:30)  Weight (kg): 79.4 (02-12-21 @ 10:27), 62 (12-24-20 @ 23:30)  BMI (kg/m2): 28.3 (02-12-21 @ 10:27), 22.1 (12-24-20 @ 23:30)    [ ]PPSV2 < or = to 30% [ ]significant weight loss  [ ]poor nutritional intake  [ ]anasarca     Albumin, Serum: 2.9 g/dL (02-18-21 @ 05:28)   [ ]Artificial Nutrition      REFERRALS:   [ ]Chaplaincy  [ ]Hospice  [ ]Child Life  [ ]Social Work  [ ]Case management [ ]Holistic Therapy     Goals of Care Document:

## 2021-02-19 NOTE — CONSULT NOTE ADULT - PROBLEM SELECTOR RECOMMENDATION 3
not on home O2, has had prior intubation for PNA in 11/2020. On RA currently and satting well. Responsive to therapies. not on home O2, has had prior intubation for PNA in 11/2020. On RA currently and saturating well. Responsive to therapies.

## 2021-02-19 NOTE — CONSULT NOTE ADULT - PROBLEM SELECTOR RECOMMENDATION 9
Presented from [facility] with abd pain, nausea, vomiting. Found to have SBO, non-operatively managed with NGT and bowel rest. Repeat CT shows persistent SBO but SB less dilated, without internal hernia.   Has had some clinical improvement, currently having BMs. Surgery recommending continue non-operative management and will trial NGT clamp today.   -care as per surgery and primary team. Presented from [facility] with abd pain, nausea, vomiting. Found to have SBO, non-operatively managed with NGT and bowel rest. Repeat CT shows persistent SBO but SB less dilated, without internal hernia.   Has had some clinical improvement, currently having BMs. Surgery recommending continue non-operative management and will trial NGT clamp today.   -care as per surgery and primary team. can consider trial of octreotide Presented from [facility] with abd pain, nausea, vomiting. Found to have SBO, non-operatively managed with NGT and bowel rest. Repeat CT shows persistent SBO but SB less dilated, without internal hernia.   Has had some clinical improvement, currently having BMs. Surgery recommending continue non-operative management and will trial NGT clamp today.   -care as per surgery and primary team. can consider trial of octreotide if symptoms/SBO were to be recurrent.

## 2021-02-19 NOTE — PROGRESS NOTE ADULT - PROBLEM SELECTOR PLAN 3
Cr 0.68 Jan 2021, upon admission SCr 3. Now downtrending s/p 3L NS. Likely prerenal.   - 2/14: Not retaining on bladder scan, incontinent making I&Os, not fully reliable; SARAH continues to improve. Trend UOP and SCr. If UOP truly dropping, give IVF  -c/w d5w for hypernatremia, when normalizaed, switch to LR Cr 0.68 Jan 2021, upon admission SCr 3. Now downtrending s/p 3L NS. Likely prerenal.   - 2/14: Not retaining on bladder scan, incontinent making I&Os, not fully reliable; SARAH continues to improve. Trend UOP and SCr. If UOP truly dropping, give IVF  -now on LR, can switch to D5 if continues to be hypernatremic

## 2021-02-19 NOTE — CONSULT NOTE ADULT - PROBLEM SELECTOR RECOMMENDATION 4
Pt with frequent readmissions for COPD, PNA, now with SBO and multifocal PNA. Discussed GOC with pt at bedside. He states his goal is to make it back to [place] and ultimately back home to live with his girlfriend. Right now PPSV2 low given debility from recurrent hospitalizations and inability to complete full [place]. When faced with the prospect of requiring intubation in the future, the pt declines, and states he would not want to go through that again. However, when asked about resuscitation for cardiac arrest, he states he would want those measures even though they include intubation. Has designated his daughter Sienna Monae as his HCP. Pt with frequent readmissions for COPD, PNA, now with SBO and multifocal PNA. Discussed GOC with pt at bedside. He states his goal is to make it back to [place] and ultimately back home to live with his girlfriend. Right now PPSV2 low given debility from recurrent hospitalizations and inability to complete full [place]. When faced with the prospect of requiring intubation in the future, the pt declines, and states he would not want to go through that again. However, when asked about resuscitation for cardiac arrest, he states he would want those measures even though they include intubation. Has designated his daughter Sienna Monae as his HCP. Family is also requesting a psych evaluation for purpose of determining capacity. They are interested in a power of  and will need formal documentation of capacity assessment. Pt with frequent readmissions for COPD, PNA, now with SBO and multifocal PNA. Discussed GOC with pt at bedside. He states his goal is to make it back to [place] and ultimately back home to live with his girlfriend. Right now PPSV2 low given debility from recurrent hospitalizations and inability to complete full [place]. When faced with the prospect of requiring intubation in the future, the pt declines, and states he would not want to go through that again. However, when asked about resuscitation for cardiac arrest, he states he would want those measures even though they include intubation. Has designated his daughter Sienna Monae as his HCP. Family is also requesting a psych evaluation for purpose of determining capacity. They are interested in a power of  and will need formal documentation of capacity assessment. They state they will think about DNR/DNI status more but for now, they wish for pt to be full code. Pt with frequent readmissions for COPD, PNA, now with SBO and multifocal PNA. Discussed GOC with pt at bedside. He states his goal is to make it back to [place] and ultimately back home to live with his girlfriend. Right now PPSV2 low given debility from recurrent hospitalizations and inability to complete full [place]. When faced with the prospect of requiring intubation in the future, the pt declines, and states he would not want to go through that again. However, when asked about resuscitation for cardiac arrest, he states he would want those measures even though they include intubation. Has designated his daughter Sienna Monae as his HCP. They state they will think about DNR/DNI status more but for now, they wish for pt to be full code. PPSv2 30 %. Patient needs full nursing care.   PT korin

## 2021-02-19 NOTE — PROGRESS NOTE ADULT - SUBJECTIVE AND OBJECTIVE BOX
PROGRESS NOTE:   Authored by Dr. Rebecca Gutierrez MD, Pager [phone] [facility], [id number] [facility]     Patient is a 75y old  Male who presents with a chief complaint of abdominal pain (18 Feb 2021 11:52)      SUBJECTIVE / OVERNIGHT EVENTS: No events overnight.    ADDITIONAL REVIEW OF SYSTEMS: Denies fevers, chills, n/v.    MEDICATIONS  (STANDING):  budesonide 160 MICROgram(s)/formoterol 4.5 MICROgram(s) Inhaler 2 Puff(s) Inhalation two times a day  dextrose 40% Gel 15 Gram(s) Oral once  dextrose 5%. 1000 milliLiter(s) (50 mL/Hr) IV Continuous <Continuous>  dextrose 5%. 1000 milliLiter(s) (100 mL/Hr) IV Continuous <Continuous>  dextrose 50% Injectable 25 Gram(s) IV Push once  dextrose 50% Injectable 12.5 Gram(s) IV Push once  dextrose 50% Injectable 25 Gram(s) IV Push once  glucagon  Injectable 1 milliGRAM(s) IntraMuscular once  heparin  Infusion.  Unit(s)/Hr (14 mL/Hr) IV Continuous <Continuous>  insulin lispro (ADMELOG) corrective regimen sliding scale   SubCutaneous every 6 hours  lactated ringers. 1000 milliLiter(s) (75 mL/Hr) IV Continuous <Continuous>  metoprolol tartrate Injectable 5 milliGRAM(s) IV Push every 6 hours  pantoprazole  Injectable 40 milliGRAM(s) IV Push every 12 hours  piperacillin/tazobactam IVPB.. 3.375 Gram(s) IV Intermittent every 8 hours  potassium chloride  10 mEq/100 mL IVPB 10 milliEquivalent(s) IV Intermittent every 1 hour    MEDICATIONS  (PRN):  ALBUTerol    90 MICROgram(s) HFA Inhaler 1 Puff(s) Inhalation every 4 hours PRN Shortness of Breath and/or Wheezing  heparin   Injectable 6500 Unit(s) IV Push every 6 hours PRN For aPTT less than 40  heparin   Injectable 3000 Unit(s) IV Push every 6 hours PRN For aPTT between 40 - 57  OLANZapine Injectable 2 milliGRAM(s) IntraMuscular every 6 hours PRN agitation      CAPILLARY BLOOD GLUCOSE      POCT Blood Glucose.: 282 mg/dL (19 Feb 2021 06:16)  POCT Blood Glucose.: 172 mg/dL (18 Feb 2021 23:36)  POCT Blood Glucose.: 194 mg/dL (18 Feb 2021 17:54)  POCT Blood Glucose.: 166 mg/dL (18 Feb 2021 12:03)    I&O's Summary    18 Feb 2021 07:01  -  19 Feb 2021 07:00  --------------------------------------------------------  IN: 1368 mL / OUT: 250 mL / NET: 1118 mL        PHYSICAL EXAM:  Vital Signs Last 24 Hrs  T(C): 36.4 (19 Feb 2021 05:00), Max: 36.9 (18 Feb 2021 08:25)  T(F): 97.6 (19 Feb 2021 05:00), Max: 98.5 (18 Feb 2021 08:25)  HR: 80 (19 Feb 2021 05:00) (76 - 90)  BP: 116/69 (19 Feb 2021 05:00) (114/72 - 133/80)  BP(mean): --  RR: 18 (19 Feb 2021 05:00) (18 - 20)  SpO2: 98% (19 Feb 2021 05:00) (94% - 98%)    CONSTITUTIONAL: NAD, lying in bed comfortably, NGT in place  RESPIRATORY: Normal respiratory effort; CTABL posteriorly  CARDIOVASCULAR: Regular rate and rhythm, normal S1 and S2, no murmur/rub/gallop  ABDOMEN: Soft, mildly distended, nontender to palpation, normoactive bowel sounds, no rebound/guarding  MUSCLOSKELETAL: no joint swelling or tenderness to palpation, FROM all extremities  NEURO: AAOx1-2   EXTREMITIES: no pedal edema      LABS:                        9.3    13.55 )-----------( 311      ( 18 Feb 2021 05:28 )             30.6     02-19    142  |  105  |  13  ----------------------------<  190<H>  3.6   |  26  |  0.83    Ca    8.5      19 Feb 2021 02:54  Phos  3.4     02-18  Mg     2.0     02-18    TPro  6.5  /  Alb  2.9<L>  /  TBili  0.5  /  DBili  x   /  AST  12  /  ALT  17  /  AlkPhos  81  02-18    PTT - ( 18 Feb 2021 09:01 )  PTT:73.0 sec            RADIOLOGY & ADDITIONAL TESTS:     PROGRESS NOTE:   Authored by Dr. Rebecca Gutierrez MD, Pager [phone] [facility], [id number] [facility]     Patient is a 75y old  Male who presents with a chief complaint of abdominal pain (18 Feb 2021 11:52)      SUBJECTIVE / OVERNIGHT EVENTS: No events overnight. Pt c/o wanting to eat this AM. Had 2 additional BMs (3 total thus far).    ADDITIONAL REVIEW OF SYSTEMS: Denies fevers, chills, n/v.    MEDICATIONS  (STANDING):  budesonide 160 MICROgram(s)/formoterol 4.5 MICROgram(s) Inhaler 2 Puff(s) Inhalation two times a day  dextrose 40% Gel 15 Gram(s) Oral once  dextrose 5%. 1000 milliLiter(s) (50 mL/Hr) IV Continuous <Continuous>  dextrose 5%. 1000 milliLiter(s) (100 mL/Hr) IV Continuous <Continuous>  dextrose 50% Injectable 25 Gram(s) IV Push once  dextrose 50% Injectable 12.5 Gram(s) IV Push once  dextrose 50% Injectable 25 Gram(s) IV Push once  glucagon  Injectable 1 milliGRAM(s) IntraMuscular once  heparin  Infusion.  Unit(s)/Hr (14 mL/Hr) IV Continuous <Continuous>  insulin lispro (ADMELOG) corrective regimen sliding scale   SubCutaneous every 6 hours  lactated ringers. 1000 milliLiter(s) (75 mL/Hr) IV Continuous <Continuous>  metoprolol tartrate Injectable 5 milliGRAM(s) IV Push every 6 hours  pantoprazole  Injectable 40 milliGRAM(s) IV Push every 12 hours  piperacillin/tazobactam IVPB.. 3.375 Gram(s) IV Intermittent every 8 hours  potassium chloride  10 mEq/100 mL IVPB 10 milliEquivalent(s) IV Intermittent every 1 hour    MEDICATIONS  (PRN):  ALBUTerol    90 MICROgram(s) HFA Inhaler 1 Puff(s) Inhalation every 4 hours PRN Shortness of Breath and/or Wheezing  heparin   Injectable 6500 Unit(s) IV Push every 6 hours PRN For aPTT less than 40  heparin   Injectable 3000 Unit(s) IV Push every 6 hours PRN For aPTT between 40 - 57  OLANZapine Injectable 2 milliGRAM(s) IntraMuscular every 6 hours PRN agitation      CAPILLARY BLOOD GLUCOSE      POCT Blood Glucose.: 282 mg/dL (19 Feb 2021 06:16)  POCT Blood Glucose.: 172 mg/dL (18 Feb 2021 23:36)  POCT Blood Glucose.: 194 mg/dL (18 Feb 2021 17:54)  POCT Blood Glucose.: 166 mg/dL (18 Feb 2021 12:03)    I&O's Summary    18 Feb 2021 07:01  -  19 Feb 2021 07:00  --------------------------------------------------------  IN: 1368 mL / OUT: 250 mL / NET: 1118 mL        PHYSICAL EXAM:  Vital Signs Last 24 Hrs  T(C): 36.4 (19 Feb 2021 05:00), Max: 36.9 (18 Feb 2021 08:25)  T(F): 97.6 (19 Feb 2021 05:00), Max: 98.5 (18 Feb 2021 08:25)  HR: 80 (19 Feb 2021 05:00) (76 - 90)  BP: 116/69 (19 Feb 2021 05:00) (114/72 - 133/80)  BP(mean): --  RR: 18 (19 Feb 2021 05:00) (18 - 20)  SpO2: 98% (19 Feb 2021 05:00) (94% - 98%)    CONSTITUTIONAL: NAD, lying in bed comfortably, NGT in place  RESPIRATORY: Normal respiratory effort; CTABL posteriorly  CARDIOVASCULAR: Regular rate and rhythm, normal S1 and S2, no murmur/rub/gallop  ABDOMEN: Soft, mildly distended, nontender to palpation, normoactive bowel sounds, no rebound/guarding  MUSCLOSKELETAL: no joint swelling or tenderness to palpation, FROM all extremities  NEURO: AAOx1-2   EXTREMITIES: no pedal edema      LABS:                        9.3    13.55 )-----------( 311      ( 18 Feb 2021 05:28 )             30.6     02-19    142  |  105  |  13  ----------------------------<  190<H>  3.6   |  26  |  0.83    Ca    8.5      19 Feb 2021 02:54  Phos  3.4     02-18  Mg     2.0     02-18    TPro  6.5  /  Alb  2.9<L>  /  TBili  0.5  /  DBili  x   /  AST  12  /  ALT  17  /  AlkPhos  81  02-18    PTT - ( 18 Feb 2021 09:01 )  PTT:73.0 sec            RADIOLOGY & ADDITIONAL TESTS:

## 2021-02-19 NOTE — CONSULT NOTE ADULT - PROBLEM SELECTOR RECOMMENDATION 2
Surgery consulted   s/p NGT
Had been treated for PNA prior to admission, CT showing multifocal PNA, and currently on Zosyn given concern for aspiration during vomiting episodes.   -abx as per primary team

## 2021-02-19 NOTE — CHART NOTE - NSCHARTNOTEFT_GEN_A_CORE
SURGERY DAILY PROGRESS NOTE:       OBJECTIVE:    Vital Signs Last 24 Hrs  T(C): 36.4 (2021 05:00), Max: 36.8 (2021 12:14)  T(F): 97.6 (2021 05:00), Max: 98.3 (2021 12:14)  HR: 80 (2021 05:00) (76 - 90)  BP: 116/69 (2021 05:00) (114/72 - 133/80)  BP(mean): --  RR: 18 (2021 05:00) (18 - 20)  SpO2: 98% (2021 05:00) (95% - 98%)  I&O's Detail    2021 07:01  -  2021 07:00  --------------------------------------------------------  IN:    dextrose 5% with potassium chloride 20 mEq/L: 675 mL    Heparin Infusion: 168 mL    IV PiggyBack: 200 mL    IV PiggyBack: 100 mL    Lactated Ringers: 225 mL  Total IN: 1368 mL    OUT:    Nasogastric/Oral tube (mL): 250 mL  Total OUT: 250 mL    Total NET: 1118 mL        Daily     Daily Weight in k (2021 05:00)  MEDICATIONS  (STANDING):  budesonide 160 MICROgram(s)/formoterol 4.5 MICROgram(s) Inhaler 2 Puff(s) Inhalation two times a day  dextrose 40% Gel 15 Gram(s) Oral once  dextrose 5%. 1000 milliLiter(s) (50 mL/Hr) IV Continuous <Continuous>  dextrose 5%. 1000 milliLiter(s) (100 mL/Hr) IV Continuous <Continuous>  dextrose 50% Injectable 25 Gram(s) IV Push once  dextrose 50% Injectable 12.5 Gram(s) IV Push once  dextrose 50% Injectable 25 Gram(s) IV Push once  glucagon  Injectable 1 milliGRAM(s) IntraMuscular once  heparin  Infusion.  Unit(s)/Hr (14 mL/Hr) IV Continuous <Continuous>  insulin lispro (ADMELOG) corrective regimen sliding scale   SubCutaneous every 6 hours  lactated ringers. 1000 milliLiter(s) (75 mL/Hr) IV Continuous <Continuous>  metoprolol tartrate Injectable 5 milliGRAM(s) IV Push every 6 hours  pantoprazole  Injectable 40 milliGRAM(s) IV Push every 12 hours  piperacillin/tazobactam IVPB.. 3.375 Gram(s) IV Intermittent every 8 hours    MEDICATIONS  (PRN):  ALBUTerol    90 MICROgram(s) HFA Inhaler 1 Puff(s) Inhalation every 4 hours PRN Shortness of Breath and/or Wheezing  heparin   Injectable 6500 Unit(s) IV Push every 6 hours PRN For aPTT less than 40  heparin   Injectable 3000 Unit(s) IV Push every 6 hours PRN For aPTT between 40 - 57  OLANZapine Injectable 2 milliGRAM(s) IntraMuscular every 6 hours PRN agitation      LABS:                        9.3    13.55 )-----------( 311      ( 2021 05:28 )             30.6     02-19    142  |  105  |  13  ----------------------------<  190<H>  3.6   |  26  |  0.83    Ca    8.5      2021 02:54  Phos  3.4     02-18  Mg     2.0     02-18    TPro  6.5  /  Alb  2.9<L>  /  TBili  0.5  /  DBili  x   /  AST  12  /  ALT  17  /  AlkPhos  81  02-18    PTT - ( 2021 09:01 )  PTT:73.0 sec        Assessment:   75M hx of dementia, DM2, HTN, ?HF, HLD, recent admission for COVID requiring intubation (2020 [place]), currently treated for pneumonia presents from nursing home for abdominal pain and vomiting, found to have multifocal PNA, and high-grade SBO. Chart reviewed and patient with recorded bowel movements and NGT output 250 ml x 24 hours.       Plan:   - Repeat CT shows persistent SBO but SB less dilated, without internal hernia  - Continue with nonoperative management   - recommend NGT clamp trial as output is low and patient with BMs  - continue with protonix BID      Red Surgery   [id number] SURGERY DAILY PROGRESS NOTE:       OBJECTIVE:    Vital Signs Last 24 Hrs  T(C): 36.4 (2021 05:00), Max: 36.8 (2021 12:14)  T(F): 97.6 (2021 05:00), Max: 98.3 (2021 12:14)  HR: 80 (2021 05:00) (76 - 90)  BP: 116/69 (2021 05:00) (114/72 - 133/80)  BP(mean): --  RR: 18 (2021 05:00) (18 - 20)  SpO2: 98% (2021 05:00) (95% - 98%)  I&O's Detail    2021 07:01  -  2021 07:00  --------------------------------------------------------  IN:    dextrose 5% with potassium chloride 20 mEq/L: 675 mL    Heparin Infusion: 168 mL    IV PiggyBack: 200 mL    IV PiggyBack: 100 mL    Lactated Ringers: 225 mL  Total IN: 1368 mL    OUT:    Nasogastric/Oral tube (mL): 250 mL  Total OUT: 250 mL    Total NET: 1118 mL        Daily     Daily Weight in k (2021 05:00)  MEDICATIONS  (STANDING):  budesonide 160 MICROgram(s)/formoterol 4.5 MICROgram(s) Inhaler 2 Puff(s) Inhalation two times a day  dextrose 40% Gel 15 Gram(s) Oral once  dextrose 5%. 1000 milliLiter(s) (50 mL/Hr) IV Continuous <Continuous>  dextrose 5%. 1000 milliLiter(s) (100 mL/Hr) IV Continuous <Continuous>  dextrose 50% Injectable 25 Gram(s) IV Push once  dextrose 50% Injectable 12.5 Gram(s) IV Push once  dextrose 50% Injectable 25 Gram(s) IV Push once  glucagon  Injectable 1 milliGRAM(s) IntraMuscular once  heparin  Infusion.  Unit(s)/Hr (14 mL/Hr) IV Continuous <Continuous>  insulin lispro (ADMELOG) corrective regimen sliding scale   SubCutaneous every 6 hours  lactated ringers. 1000 milliLiter(s) (75 mL/Hr) IV Continuous <Continuous>  metoprolol tartrate Injectable 5 milliGRAM(s) IV Push every 6 hours  pantoprazole  Injectable 40 milliGRAM(s) IV Push every 12 hours  piperacillin/tazobactam IVPB.. 3.375 Gram(s) IV Intermittent every 8 hours    MEDICATIONS  (PRN):  ALBUTerol    90 MICROgram(s) HFA Inhaler 1 Puff(s) Inhalation every 4 hours PRN Shortness of Breath and/or Wheezing  heparin   Injectable 6500 Unit(s) IV Push every 6 hours PRN For aPTT less than 40  heparin   Injectable 3000 Unit(s) IV Push every 6 hours PRN For aPTT between 40 - 57  OLANZapine Injectable 2 milliGRAM(s) IntraMuscular every 6 hours PRN agitation      LABS:                        9.3    13.55 )-----------( 311      ( 2021 05:28 )             30.6     02-19    142  |  105  |  13  ----------------------------<  190<H>  3.6   |  26  |  0.83    Ca    8.5      2021 02:54  Phos  3.4     02-18  Mg     2.0     02-18    TPro  6.5  /  Alb  2.9<L>  /  TBili  0.5  /  DBili  x   /  AST  12  /  ALT  17  /  AlkPhos  81  02-18    PTT - ( 2021 09:01 )  PTT:73.0 sec        Assessment:   75M hx of dementia, DM2, HTN, ?HF, HLD, recent admission for COVID requiring intubation (2020 [place]), currently treated for pneumonia presents from nursing home for abdominal pain and vomiting, found to have multifocal PNA, and high-grade SBO. Chart reviewed and patient with recorded bowel movements and NGT output 250 ml x 24 hours.       Plan:   - Repeat CT shows persistent SBO but SB less dilated, without internal hernia  - Continue with nonoperative management   - recommend NGT clamp trial as output is low and patient with BMs  - continue with protonix BID      Red Surgery   [id number]    sx attending  pt seen and examined  agree with above  +bm, + flatus  soft, min distended, NT no r/g  f/u ngt clamp trial  possible removal ngt and start CLD  will follow with you  pt remains poor sx candidate

## 2021-02-19 NOTE — PROGRESS NOTE ADULT - SUBJECTIVE AND OBJECTIVE BOX
Subjective: Patient seen and examined. No new events except as noted.   Moved out of COVID unit    REVIEW OF SYSTEMS:    CONSTITUTIONAL: No weakness, fevers or chills  EYES/ENT: No visual changes;  No vertigo or throat pain   NECK: No pain or stiffness  RESPIRATORY: No cough, wheezing, hemoptysis; No shortness of breath  CARDIOVASCULAR: No chest pain or palpitations  GASTROINTESTINAL: No abdominal or epigastric pain. No nausea, vomiting, or hematemesis; No diarrhea or constipation. No melena or hematochezia.  GENITOURINARY: No dysuria, frequency or hematuria  NEUROLOGICAL: No numbness or weakness  SKIN: No itching, burning, rashes, or lesions   All other review of systems is negative unless indicated above.    MEDICATIONS:  MEDICATIONS  (STANDING):  budesonide 160 MICROgram(s)/formoterol 4.5 MICROgram(s) Inhaler 2 Puff(s) Inhalation two times a day  dextrose 40% Gel 15 Gram(s) Oral once  dextrose 5%. 1000 milliLiter(s) (50 mL/Hr) IV Continuous <Continuous>  dextrose 5%. 1000 milliLiter(s) (100 mL/Hr) IV Continuous <Continuous>  dextrose 50% Injectable 25 Gram(s) IV Push once  dextrose 50% Injectable 12.5 Gram(s) IV Push once  dextrose 50% Injectable 25 Gram(s) IV Push once  glucagon  Injectable 1 milliGRAM(s) IntraMuscular once  heparin  Infusion.  Unit(s)/Hr (14 mL/Hr) IV Continuous <Continuous>  insulin lispro (ADMELOG) corrective regimen sliding scale   SubCutaneous every 6 hours  lactated ringers. 1000 milliLiter(s) (75 mL/Hr) IV Continuous <Continuous>  metoprolol tartrate Injectable 5 milliGRAM(s) IV Push every 6 hours  pantoprazole  Injectable 40 milliGRAM(s) IV Push every 12 hours  piperacillin/tazobactam IVPB.. 3.375 Gram(s) IV Intermittent every 8 hours      PHYSICAL EXAM:  T(C): 36.4 (02-19-21 @ 05:00), Max: 36.7 (02-18-21 @ 21:00)  HR: 80 (02-19-21 @ 05:00) (76 - 81)  BP: 116/69 (02-19-21 @ 05:00) (114/72 - 132/79)  RR: 18 (02-19-21 @ 05:00) (18 - 18)  SpO2: 98% (02-19-21 @ 05:00) (96% - 98%)  Wt(kg): --  I&O's Summary    18 Feb 2021 07:01  -  19 Feb 2021 07:00  --------------------------------------------------------  IN: 1368 mL / OUT: 250 mL / NET: 1118 mL          Appearance: NAD +NGT  	  HEENT:   Normal oral mucosa, PERRL, EOMI	  Lymphatic: No lymphadenopathy , no edema  Cardiovascular: Normal S1 S2, No JVD, No murmurs , Peripheral pulses palpable 2+ bilaterally  Respiratory: Lungs clear to auscultation, normal effort 	  Gastrointestinal:  Soft, Non-tender, + BS	  Skin: No rashes, No ecchymoses, No cyanosis, warm to touch  Musculoskeletal: Normal range of motion, normal strength  Psychiatry:  Mood & affect appropriate  Ext: No edema      LABS:    CARDIAC MARKERS:    COVID-19 IgG Antibody Interpretation: Positive: This test has not been reviewed by the FDA by the standard review                             8.9    13.46 )-----------( 300      ( 19 Feb 2021 10:37 )             28.5     02-19    142  |  106  |  11  ----------------------------<  121<H>  3.7   |  25  |  0.80    Ca    8.3<L>      19 Feb 2021 10:37  Phos  3.2     02-19  Mg     1.8     02-19    TPro  6.5  /  Alb  2.9<L>  /  TBili  0.5  /  DBili  x   /  AST  12  /  ALT  17  /  AlkPhos  81  02-18    proBNP:   Lipid Profile:   HgA1c:   TSH:             TELEMETRY: SR 	    ECG:  	  RADIOLOGY:   DIAGNOSTIC TESTING:  [ ] Echocardiogram:  [ ]  Catheterization:  [ ] Stress Test:    OTHER:

## 2021-02-19 NOTE — CONSULT NOTE ADULT - PROBLEM SELECTOR RECOMMENDATION 5
Will continue to follow for GOC, support, resources. Pt with frequent readmissions for COPD, PNA, now with SBO and multifocal PNA. Discussed GOC with pt at bedside. He states his goal is to make it back to [place] and ultimately back home to live with his girlfriend. Right now PPSV2 low given debility from recurrent hospitalizations and inability to complete full [place]. When faced with the prospect of requiring intubation in the future, the pt declines, and states he would not want to go through that again. However, when asked about resuscitation for cardiac arrest, he states he would want those measures even though they include intubation. Has designated his daughter Sienna Monae as his HCP. They state they will think about DNR/DNI status more but for now, they wish for pt to be full code.

## 2021-02-19 NOTE — PROGRESS NOTE ADULT - ASSESSMENT
75M hx of dementia, DM2, HTN, ?HF, HLD, recent admission for COVID requiring intubation (11/2020 [place]), currently treated for pneumonia presents from nursing home for abdominal pain and vomiting, found to have multifocal PNA, and high-grade SBO with TP LUQ, c/f internal hernia on CT. Complicated by Afib RVR

## 2021-02-19 NOTE — PROGRESS NOTE ADULT - PROBLEM SELECTOR PLAN 2
New afib with RVR on 2/13 with HR 130s-150s. s/p amio load and gtt. Completed 18 hour gtt load on 2/14. Converted to SR, amio gtt now dc'd   -cards consulted, appreciate recs  -monitor on tele  - CTA chest no PE  - on heparin gtt

## 2021-02-19 NOTE — PROGRESS NOTE ADULT - PROBLEM SELECTOR PLAN 1
- per surgery, conservative management, precipitant ?hernia - no known prior abdominal surgeries  - NPO  - NG tube low intermittent  - trend i/o  - GI ppx with Protonix  - continue Zosyn (2/12 - )  - repeat CTAP 2/16 still with persistent SBO  - palliative consulted and TPN consulted  - still high output from NG tube, BM x1 on 2/18, continue to monitor  - if sbo not improved, repeat CT abd next week Per surgery, conservative management, precipitant ?hernia - no known prior abdominal surgeries  - NPO  - NG tube low intermittent, for clamping trial today as pt now with 3 BMs   - trend i/o  - GI ppx with Protonix  - continue Zosyn (2/12 - 2/19), to end today  - repeat CTAP 2/16 still with persistent SBO  - palliative consulted and TPN consulted  - if sbo not improved, repeat CT abd next week

## 2021-02-19 NOTE — CONSULT NOTE ADULT - ATTENDING COMMENTS
Advanced care planning was discussed with patient and family.  Advanced care planning forms were reviewed and discussed as appropriate.  Differential diagnosis and plan of care discussed with patient after the evaluation.   Pain assessed and judicious use of narcotics when appropriate was discussed.  Importance of Fall prevention discussed.  Counseling on Smoking and Alcohol cessation was offered when appropriate.  Counseling on Diet, exercise, and medication compliance was done.
pt seen and examined  agree with above  pt with high grade SBO with dementia and poor pulmonary fxn with recurrent covid pna/o2 dependent  pt is poor operative candidate  agree with npo, ngt, ivf, abx per med/ID  condition guarded, prognosis is poor  pt needs urgent GOC/palliative evaluation to determine overall plan of care  will follow with you  d/w ER team in detail.
75M with dementia (appears to be mild but will need a formal MMSE to determine so), DM2, HTN, ?HF, HLD, recent admission for COPD pneumonia requiring intubation (11/2020 [place]), COVID in January 2021 (not intubated) currently being treated for pneumonia presents from nursing home for abdominal pain and vomiting, found to have multifocal PNA (unclear if sequela of prior COVID) and high-grade SBO being medically managed.     Symptoms associated to SBO appeared to be improving and this diagnosis is being addressed by Sx.   Seen by S&S on 12/27/20 that Recommend Mechanical soft with thin liquids + aspiration precautions. Since currently with PNA and ? new aspiration, may want to have a S&S re-eval if and when able to advanced diet. Continue aspiration precautions.   Will need to further eval cognition and capacity since code status (DNI but DNR which anyway is not recommended vs.  full code [for now though his HCPs are to further discuss about it]) and dispo planning ([facility] then home vs. LTC [as per care coordination notes])  appeared not to fully agree with what the patient and the HCPs are requesting. Will also need to further eval for OP resources so the patient's QOL does not continue to be impacted by recurrent readmissions.         Wesley Castelan MD   Geriatrics and Palliative Care (GAP) Consult Service    of Geriatric and Palliative Medicine  [facility]      Please page the following number for clinical matters between the hours of 9 am and 5 pm from Monday through Friday : [phone]    After 5pm and on weekends, please see the contact information below:    In the event of newly developing, evolving, or worsening symptoms, please contact the Palliative Medicine team via pager (if the patient is at [facility] #[phone] or if the patient is at [facility] #[phone]) The Geriatric and Palliative Medicine service has coverage 24 hours a day/ 7 days a week to provide medical recommendations regarding symptom management needs via telephone

## 2021-02-20 DIAGNOSIS — R53.2 FUNCTIONAL QUADRIPLEGIA: ICD-10-CM

## 2021-02-20 LAB
ALBUMIN SERPL ELPH-MCNC: 2.7 G/DL — LOW (ref 3.3–5)
ALP SERPL-CCNC: 74 U/L — SIGNIFICANT CHANGE UP (ref 40–120)
ALT FLD-CCNC: 10 U/L — SIGNIFICANT CHANGE UP (ref 10–45)
ANION GAP SERPL CALC-SCNC: 14 MMOL/L — SIGNIFICANT CHANGE UP (ref 5–17)
APTT BLD: 167.3 SEC — CRITICAL HIGH (ref 27.5–35.5)
APTT BLD: 56.8 SEC — HIGH (ref 27.5–35.5)
AST SERPL-CCNC: 12 U/L — SIGNIFICANT CHANGE UP (ref 10–40)
BILIRUB SERPL-MCNC: 0.3 MG/DL — SIGNIFICANT CHANGE UP (ref 0.2–1.2)
BUN SERPL-MCNC: 11 MG/DL — SIGNIFICANT CHANGE UP (ref 7–23)
CALCIUM SERPL-MCNC: 8.7 MG/DL — SIGNIFICANT CHANGE UP (ref 8.4–10.5)
CHLORIDE SERPL-SCNC: 107 MMOL/L — SIGNIFICANT CHANGE UP (ref 96–108)
CO2 SERPL-SCNC: 23 MMOL/L — SIGNIFICANT CHANGE UP (ref 22–31)
CREAT SERPL-MCNC: 0.83 MG/DL — SIGNIFICANT CHANGE UP (ref 0.5–1.3)
GLUCOSE BLDC GLUCOMTR-MCNC: 112 MG/DL — HIGH (ref 70–99)
GLUCOSE BLDC GLUCOMTR-MCNC: 124 MG/DL — HIGH (ref 70–99)
GLUCOSE BLDC GLUCOMTR-MCNC: 139 MG/DL — HIGH (ref 70–99)
GLUCOSE BLDC GLUCOMTR-MCNC: 169 MG/DL — HIGH (ref 70–99)
GLUCOSE SERPL-MCNC: 111 MG/DL — HIGH (ref 70–99)
HCT VFR BLD CALC: 29.6 % — LOW (ref 39–50)
HGB BLD-MCNC: 9.2 G/DL — LOW (ref 13–17)
MAGNESIUM SERPL-MCNC: 1.7 MG/DL — SIGNIFICANT CHANGE UP (ref 1.6–2.6)
MCHC RBC-ENTMCNC: 22.8 PG — LOW (ref 27–34)
MCHC RBC-ENTMCNC: 31.1 GM/DL — LOW (ref 32–36)
MCV RBC AUTO: 73.4 FL — LOW (ref 80–100)
NRBC # BLD: 0 /100 WBCS — SIGNIFICANT CHANGE UP (ref 0–0)
PHOSPHATE SERPL-MCNC: 3.1 MG/DL — SIGNIFICANT CHANGE UP (ref 2.5–4.5)
PLATELET # BLD AUTO: 319 K/UL — SIGNIFICANT CHANGE UP (ref 150–400)
POTASSIUM SERPL-MCNC: 3.6 MMOL/L — SIGNIFICANT CHANGE UP (ref 3.5–5.3)
POTASSIUM SERPL-SCNC: 3.6 MMOL/L — SIGNIFICANT CHANGE UP (ref 3.5–5.3)
PROT SERPL-MCNC: 6.1 G/DL — SIGNIFICANT CHANGE UP (ref 6–8.3)
RBC # BLD: 4.03 M/UL — LOW (ref 4.2–5.8)
RBC # FLD: 20.3 % — HIGH (ref 10.3–14.5)
SODIUM SERPL-SCNC: 144 MMOL/L — SIGNIFICANT CHANGE UP (ref 135–145)
WBC # BLD: 14.66 K/UL — HIGH (ref 3.8–10.5)
WBC # FLD AUTO: 14.66 K/UL — HIGH (ref 3.8–10.5)

## 2021-02-20 PROCEDURE — 93010 ELECTROCARDIOGRAM REPORT: CPT

## 2021-02-20 PROCEDURE — 99232 SBSQ HOSP IP/OBS MODERATE 35: CPT | Mod: GC

## 2021-02-20 RX ORDER — INSULIN LISPRO 100/ML
VIAL (ML) SUBCUTANEOUS
Refills: 0 | Status: DISCONTINUED | OUTPATIENT
Start: 2021-02-20 | End: 2021-02-25

## 2021-02-20 RX ADMIN — HEPARIN SODIUM 1300 UNIT(S)/HR: 5000 INJECTION INTRAVENOUS; SUBCUTANEOUS at 21:43

## 2021-02-20 RX ADMIN — HEPARIN SODIUM 1100 UNIT(S)/HR: 5000 INJECTION INTRAVENOUS; SUBCUTANEOUS at 15:21

## 2021-02-20 RX ADMIN — Medication 1: at 21:41

## 2021-02-20 RX ADMIN — Medication 5 MILLIGRAM(S): at 11:31

## 2021-02-20 RX ADMIN — Medication 5 MILLIGRAM(S): at 23:51

## 2021-02-20 RX ADMIN — PANTOPRAZOLE SODIUM 40 MILLIGRAM(S): 20 TABLET, DELAYED RELEASE ORAL at 11:30

## 2021-02-20 RX ADMIN — BUDESONIDE AND FORMOTEROL FUMARATE DIHYDRATE 2 PUFF(S): 160; 4.5 AEROSOL RESPIRATORY (INHALATION) at 18:15

## 2021-02-20 RX ADMIN — Medication 5 MILLIGRAM(S): at 18:15

## 2021-02-20 RX ADMIN — Medication 5 MILLIGRAM(S): at 06:45

## 2021-02-20 RX ADMIN — HEPARIN SODIUM 3000 UNIT(S): 5000 INJECTION INTRAVENOUS; SUBCUTANEOUS at 21:46

## 2021-02-20 RX ADMIN — PANTOPRAZOLE SODIUM 40 MILLIGRAM(S): 20 TABLET, DELAYED RELEASE ORAL at 23:51

## 2021-02-20 RX ADMIN — HEPARIN SODIUM 0 UNIT(S)/HR: 5000 INJECTION INTRAVENOUS; SUBCUTANEOUS at 13:56

## 2021-02-20 RX ADMIN — BUDESONIDE AND FORMOTEROL FUMARATE DIHYDRATE 2 PUFF(S): 160; 4.5 AEROSOL RESPIRATORY (INHALATION) at 06:45

## 2021-02-20 NOTE — PROGRESS NOTE ADULT - SUBJECTIVE AND OBJECTIVE BOX
PROGRESS NOTE:   Authored by Dr. Rebecca Gutierrez MD, Pager [phone] [facility], [address] [address]     Patient is a 75y old  Male who presents with a chief complaint of abdominal pain (19 Feb 2021 13:16)      SUBJECTIVE / OVERNIGHT EVENTS: asymptomatic svt for 1.4seconds overnight.    ADDITIONAL REVIEW OF SYSTEMS: Denies fevers, chills, n/v.    MEDICATIONS  (STANDING):  budesonide 160 MICROgram(s)/formoterol 4.5 MICROgram(s) Inhaler 2 Puff(s) Inhalation two times a day  dextrose 40% Gel 15 Gram(s) Oral once  dextrose 50% Injectable 25 Gram(s) IV Push once  dextrose 50% Injectable 12.5 Gram(s) IV Push once  dextrose 50% Injectable 25 Gram(s) IV Push once  glucagon  Injectable 1 milliGRAM(s) IntraMuscular once  heparin  Infusion.  Unit(s)/Hr (14 mL/Hr) IV Continuous <Continuous>  insulin lispro (ADMELOG) corrective regimen sliding scale   SubCutaneous every 6 hours  lactated ringers. 1000 milliLiter(s) (75 mL/Hr) IV Continuous <Continuous>  metoprolol tartrate Injectable 5 milliGRAM(s) IV Push every 6 hours  pantoprazole  Injectable 40 milliGRAM(s) IV Push every 12 hours    MEDICATIONS  (PRN):  ALBUTerol    90 MICROgram(s) HFA Inhaler 1 Puff(s) Inhalation every 4 hours PRN Shortness of Breath and/or Wheezing  heparin   Injectable 6500 Unit(s) IV Push every 6 hours PRN For aPTT less than 40  heparin   Injectable 3000 Unit(s) IV Push every 6 hours PRN For aPTT between 40 - 57  OLANZapine Injectable 2 milliGRAM(s) IntraMuscular every 6 hours PRN agitation      CAPILLARY BLOOD GLUCOSE      POCT Blood Glucose.: 124 mg/dL (20 Feb 2021 06:44)  POCT Blood Glucose.: 112 mg/dL (20 Feb 2021 01:28)  POCT Blood Glucose.: 143 mg/dL (19 Feb 2021 21:51)  POCT Blood Glucose.: 136 mg/dL (19 Feb 2021 17:20)  POCT Blood Glucose.: 125 mg/dL (19 Feb 2021 12:04)    I&O's Summary    19 Feb 2021 07:01  -  20 Feb 2021 07:00  --------------------------------------------------------  IN: 0 mL / OUT: 0 mL / NET: 0 mL        PHYSICAL EXAM:  Vital Signs Last 24 Hrs  T(C): 37 (20 Feb 2021 03:17), Max: 37 (20 Feb 2021 03:17)  T(F): 98.6 (20 Feb 2021 03:17), Max: 98.6 (20 Feb 2021 03:17)  HR: 81 (20 Feb 2021 03:17) (73 - 81)  BP: 150/66 (20 Feb 2021 03:17) (129/73 - 150/66)  BP(mean): --  RR: 18 (20 Feb 2021 03:17) (18 - 18)  SpO2: 96% (20 Feb 2021 03:17) (96% - 98%)    CONSTITUTIONAL: NAD, lying in bed comfortably, NGT in place  RESPIRATORY: Normal respiratory effort; CTABL posteriorly  CARDIOVASCULAR: Regular rate and rhythm, normal S1 and S2, no murmur/rub/gallop  ABDOMEN: Soft, mildly distended, nontender to palpation, normoactive bowel sounds, no rebound/guarding  MUSCLOSKELETAL: no joint swelling or tenderness to palpation, FROM all extremities  NEURO: AAOx1-2   EXTREMITIES: no pedal edema    LABS:                        9.2    14.66 )-----------( 319      ( 20 Feb 2021 06:37 )             29.6     02-19    144  |  x   |  x   ----------------------------<  x   x    |  x   |  x     Ca    8.3<L>      19 Feb 2021 10:37  Phos  3.2     02-19  Mg     1.8     02-19      PTT - ( 19 Feb 2021 21:59 )  PTT:37.9 sec            RADIOLOGY & ADDITIONAL TESTS:     PROGRESS NOTE:   Authored by Dr. Rebecca Gutierrez MD, Pager [phone] [facility], [address] [address]     Patient is a 75y old  Male who presents with a chief complaint of abdominal pain (19 Feb 2021 13:16)      SUBJECTIVE / OVERNIGHT EVENTS: asymptomatic svt for 1.4seconds overnight. wants food this AM. No abd pain.     ADDITIONAL REVIEW OF SYSTEMS: Denies fevers, chills, n/v.    MEDICATIONS  (STANDING):  budesonide 160 MICROgram(s)/formoterol 4.5 MICROgram(s) Inhaler 2 Puff(s) Inhalation two times a day  dextrose 40% Gel 15 Gram(s) Oral once  dextrose 50% Injectable 25 Gram(s) IV Push once  dextrose 50% Injectable 12.5 Gram(s) IV Push once  dextrose 50% Injectable 25 Gram(s) IV Push once  glucagon  Injectable 1 milliGRAM(s) IntraMuscular once  heparin  Infusion.  Unit(s)/Hr (14 mL/Hr) IV Continuous <Continuous>  insulin lispro (ADMELOG) corrective regimen sliding scale   SubCutaneous every 6 hours  lactated ringers. 1000 milliLiter(s) (75 mL/Hr) IV Continuous <Continuous>  metoprolol tartrate Injectable 5 milliGRAM(s) IV Push every 6 hours  pantoprazole  Injectable 40 milliGRAM(s) IV Push every 12 hours    MEDICATIONS  (PRN):  ALBUTerol    90 MICROgram(s) HFA Inhaler 1 Puff(s) Inhalation every 4 hours PRN Shortness of Breath and/or Wheezing  heparin   Injectable 6500 Unit(s) IV Push every 6 hours PRN For aPTT less than 40  heparin   Injectable 3000 Unit(s) IV Push every 6 hours PRN For aPTT between 40 - 57  OLANZapine Injectable 2 milliGRAM(s) IntraMuscular every 6 hours PRN agitation      CAPILLARY BLOOD GLUCOSE      POCT Blood Glucose.: 124 mg/dL (20 Feb 2021 06:44)  POCT Blood Glucose.: 112 mg/dL (20 Feb 2021 01:28)  POCT Blood Glucose.: 143 mg/dL (19 Feb 2021 21:51)  POCT Blood Glucose.: 136 mg/dL (19 Feb 2021 17:20)  POCT Blood Glucose.: 125 mg/dL (19 Feb 2021 12:04)    I&O's Summary    19 Feb 2021 07:01  -  20 Feb 2021 07:00  --------------------------------------------------------  IN: 0 mL / OUT: 0 mL / NET: 0 mL        PHYSICAL EXAM:  Vital Signs Last 24 Hrs  T(C): 37 (20 Feb 2021 03:17), Max: 37 (20 Feb 2021 03:17)  T(F): 98.6 (20 Feb 2021 03:17), Max: 98.6 (20 Feb 2021 03:17)  HR: 81 (20 Feb 2021 03:17) (73 - 81)  BP: 150/66 (20 Feb 2021 03:17) (129/73 - 150/66)  BP(mean): --  RR: 18 (20 Feb 2021 03:17) (18 - 18)  SpO2: 96% (20 Feb 2021 03:17) (96% - 98%)    CONSTITUTIONAL: NAD, lying in bed comfortably, NGT in place  RESPIRATORY: Normal respiratory effort; CTABL posteriorly  CARDIOVASCULAR: Regular rate and rhythm, normal S1 and S2, no murmur/rub/gallop  ABDOMEN: Soft, mildly distended, nontender to palpation, normoactive bowel sounds, no rebound/guarding  MUSCLOSKELETAL: no joint swelling or tenderness to palpation, FROM all extremities  NEURO: AAOx1-2   EXTREMITIES: no pedal edema    LABS:                        9.2    14.66 )-----------( 319      ( 20 Feb 2021 06:37 )             29.6     02-19    144  |  x   |  x   ----------------------------<  x   x    |  x   |  x     Ca    8.3<L>      19 Feb 2021 10:37  Phos  3.2     02-19  Mg     1.8     02-19      PTT - ( 19 Feb 2021 21:59 )  PTT:37.9 sec            RADIOLOGY & ADDITIONAL TESTS:

## 2021-02-20 NOTE — PHYSICAL THERAPY INITIAL EVALUATION ADULT - GAIT TRAINING, PT EVAL
GOAL: Pt will ambulate 100 feet with least restrictive device as appropriate and supervision within 4 weeks

## 2021-02-20 NOTE — PHYSICAL THERAPY INITIAL EVALUATION ADULT - TRANSFER TRAINING, PT EVAL
GOAL: Pt will transfer sit to/from stand with least restrictive device as appropriate and supervision within 4 weeks

## 2021-02-20 NOTE — PROGRESS NOTE ADULT - PROBLEM SELECTOR PLAN 10
- NPO with NG tube  - heparin gtt  - eventual PT eval - pt lives at long term care facility [facility]  - DM2 - home regimen 10u Basaglar qHS and SSI at nursing home - will give SSI here given NPO and poor po intake  - full code per daughter - Diet: CLD ADAT   - DVT ppx: heparin gtt  - PT eval - pt lives at long term care facility [facility]  - DM2 - home regimen 10u Basaglar qHS and SSI at nursing home - will give SSI here given poor po intake, uptitrate prn  - full code per daughter  - palliative consulted, appreciate recs

## 2021-02-20 NOTE — PROGRESS NOTE ADULT - ASSESSMENT
75M hx of dementia, DM2, HTN, ?HF, HLD, recent admission for COVID requiring intubation (11/2020 [place]), currently treated for pneumonia presents from nursing home for abdominal pain and vomiting, found to have multifocal PNA, and high-grade SBO with TP LUQ, c/f internal hernia on CT. Complicated by Afib RVR

## 2021-02-20 NOTE — PROGRESS NOTE ADULT - PROBLEM SELECTOR PLAN 1
Per surgery, conservative management, precipitant ?hernia - no known prior abdominal surgeries  - NPO  - NG tube low intermittent, for clamping trial today as pt now with 3 BMs   - trend i/o  - GI ppx with Protonix  - continue Zosyn (2/12 - 2/19), to end today  - repeat CTAP 2/16 still with persistent SBO  - palliative consulted and TPN consulted  - if sbo not improved, repeat CT abd next week Per surgery, conservative management, precipitant ?hernia - no known prior abdominal surgeries  - pt now with BMs, NGT dc'd today 2/20  - trend i/o  - GI ppx with Protonix  - s/p 7d course Zosyn (2/12 - 2/19)  - repeat CTAP 2/16 still with persistent SBO but less dilated, resolution of internal hernia  - CLD today, ADAT  - nutrition consulted

## 2021-02-20 NOTE — PHYSICAL THERAPY INITIAL EVALUATION ADULT - ADDITIONAL COMMENTS
pt reports being admitted from rehab prior to admission. pt expressing wishes to go to a different rehab facility. pt states prior to rehab living with significant other in an apartment with flight of steps to enter. pt unable to give any further background. per care coordinator note, Patient resided in an apartment with his significant other.  He only owns a cane and no other equipment.  He was not receiving any services at home, nor was he on home O2.  [place] would like patient to be discharged to a LTC facility.

## 2021-02-20 NOTE — PROGRESS NOTE ADULT - SUBJECTIVE AND OBJECTIVE BOX
Subjective: Patient seen and examined. No new events except as noted.   NGT was discontinued this morning  REVIEW OF SYSTEMS:    CONSTITUTIONAL: + weakness, fevers or chills  EYES/ENT: No visual changes;  No vertigo or throat pain   NECK: No pain or stiffness  RESPIRATORY: No cough, wheezing, hemoptysis; No shortness of breath  CARDIOVASCULAR: No chest pain or palpitations  GASTROINTESTINAL: No abdominal or epigastric pain. No nausea, vomiting, or hematemesis; No diarrhea or constipation. No melena or hematochezia.  GENITOURINARY: No dysuria, frequency or hematuria  NEUROLOGICAL: No numbness or weakness  SKIN: No itching, burning, rashes, or lesions   All other review of systems is negative unless indicated above.    MEDICATIONS:  MEDICATIONS  (STANDING):  budesonide 160 MICROgram(s)/formoterol 4.5 MICROgram(s) Inhaler 2 Puff(s) Inhalation two times a day  dextrose 40% Gel 15 Gram(s) Oral once  dextrose 50% Injectable 25 Gram(s) IV Push once  dextrose 50% Injectable 12.5 Gram(s) IV Push once  dextrose 50% Injectable 25 Gram(s) IV Push once  glucagon  Injectable 1 milliGRAM(s) IntraMuscular once  heparin  Infusion.  Unit(s)/Hr (14 mL/Hr) IV Continuous <Continuous>  insulin lispro (ADMELOG) corrective regimen sliding scale   SubCutaneous Before meals and at bedtime  metoprolol tartrate Injectable 5 milliGRAM(s) IV Push every 6 hours  pantoprazole  Injectable 40 milliGRAM(s) IV Push every 12 hours      PHYSICAL EXAM:  T(C): 37.1 (02-20-21 @ 11:53), Max: 37.1 (02-20-21 @ 11:53)  HR: 68 (02-20-21 @ 11:53) (68 - 81)  BP: 143/75 (02-20-21 @ 11:53) (132/81 - 150/66)  RR: 18 (02-20-21 @ 11:53) (18 - 18)  SpO2: 96% (02-20-21 @ 11:53) (96% - 98%)  Wt(kg): --  I&O's Summary    19 Feb 2021 07:01  -  20 Feb 2021 07:00  --------------------------------------------------------  IN: 1056 mL / OUT: 0 mL / NET: 1056 mL    20 Feb 2021 07:01  -  20 Feb 2021 19:52  --------------------------------------------------------  IN: 319 mL / OUT: 0 mL / NET: 319 mL          Appearance: NAD  HEENT:   Normal oral mucosa, PERRL, EOMI	  Lymphatic: No lymphadenopathy , no edema  Cardiovascular: Normal S1 S2, No JVD, No murmurs , Peripheral pulses palpable 2+ bilaterally  Respiratory: Lungs clear to auscultation, normal effort 	  Gastrointestinal:  Soft, Non-tender, + BS	  Skin: No rashes, No ecchymoses, No cyanosis, warm to touch  Musculoskeletal: Normal range of motion, normal strength  Psychiatry:  Mood & affect appropriate  Ext: No edema      LABS:    CARDIAC MARKERS:                                9.2    14.66 )-----------( 319      ( 20 Feb 2021 06:37 )             29.6     02-20    144  |  107  |  11  ----------------------------<  111<H>  3.6   |  23  |  0.83    Ca    8.7      20 Feb 2021 06:37  Phos  3.1     02-20  Mg     1.7     02-20    TPro  6.1  /  Alb  2.7<L>  /  TBili  0.3  /  DBili  x   /  AST  12  /  ALT  10  /  AlkPhos  74  02-20    proBNP:   Lipid Profile:   HgA1c:   TSH:             TELEMETRY: 	SR PACs    ECG:  	  RADIOLOGY:  < from: CT Angio Chest w/ IV Cont (02.16.21 @ 20:43) >    EXAM:  CT ANGIO CHEST (W)AW IC                          EXAM:  CT ABDOMEN AND PELVIS IC                            PROCEDURE DATE:  02/16/2021            INTERPRETATION:  CLINICAL INFORMATION: 75-year-old man with small bowel obstruction and new atrial fibrillation with rapid ventricular response    COMPARISON: CT scan 02/12/2021    PROCEDURE:  CT Angiography of the Chest was performed followed by portal venous phase imaging of the Abdomen and Pelvis.  IV Contrast: 90 ml of Omnipaque 350 was injected intravenously. 10 ml were discarded.  Oral contrast: None.  Sagittal and coronal reformats were performed as well as 3D (MIP) reconstructions.    FINDINGS:  CHEST:  LUNGS AND LARGE AIRWAYS: Patent central airways. Patchy consolidation bilaterallower lobes, lingula, and right middle lobe, most marked in the right lower lobe consistent with multifocal pneumonia.  PLEURA: No pleural effusion.  VESSELS: No pulmonary embolus. Mild dilatation main pulmonary artery to 3.4 cm.  HEART: Moderate cardiomegaly. Small pericardial effusion.  MEDIASTINUM AND SHO: No lymphadenopathy.  CHEST WALL AND LOWER NECK: Within normal limits.    ABDOMEN AND PELVIS:  LIVER: Within normal limits.  BILE DUCTS: Normal caliber.  GALLBLADDER: Within normal limits.  SPLEEN: Within normal limits.  PANCREAS: Within normal limits.  ADRENALS: Within normal limits.  KIDNEYS/URETERS: Horseshoe kidney. Nonobstructing 4 mm right renal calculus.    BLADDER: Within normal limits.  REPRODUCTIVE ORGANS: Mildly enlarged prostate.    BOWEL: Appendix not visualized. Mild decrease in small bowel dilatation with persistent transition into in the anterior left lower quadrant (6:104). Mild small bowel mural thickening with normal enhancement. Normal caliber colon. Esophagogastric tube ending in the stomach.  PERITONEUM: Trace ascites.  VESSELS: Atherosclerotic change  RETROPERITONEUM/LYMPH NODES: No lymphadenopathy.  ABDOMINAL WALL: Within normal limits.  BONES: Degenerative change.    IMPRESSION:  Decrease in small bowel dilatation with persistent small bowel obstruction. Normal caliber colon.  No current pulmonary embolus.  Moderate cardiomegaly.  Multifocal pneumonia most marked in the right lower lobe                  DELORES JUNG MD; Attending Radiologist  This document has been electronically signed. Feb 17 2021  9:30AM    < end of copied text >    DIAGNOSTIC TESTING:  [ ] Echocardiogram:  [ ]  Catheterization:  [ ] Stress Test:    OTHER:

## 2021-02-20 NOTE — PHYSICAL THERAPY INITIAL EVALUATION ADULT - GENERAL OBSERVATIONS, REHAB EVAL
Brief Postoperative Note      Patient: Cary Nguyen  YOB: 1945  MRN: [id number]    Date of Procedure: 11/12/2020    Pre-Op Diagnosis:   1. Right lower lobe Pulmonary nodule  2. Mediastinal and Hilar Lymphadenopathy  3. Paroxysmal atrial fibrillation on Eliquis  4. Former smoker     Post-Op Diagnosis: same       Procedure(s):  1. DIAGNOSTIC RIGHT VIDEO-ASSISTED THORACOSCOPY  2. RIGHT LOWER LOBE WEDGE BIOPSY  3. MEDIASTINAL LYMPH NODE BIOSPIES   4. CRYOABLATION INTERCOSTAL NERVE BLOCK 4-8  AND EXPAREL  5. THERAPEUTIC BRONCHOSCOPY    Surgeon(s):  Xander Lam MD    Assistant:  First Assistant: Una Mcdaniel    Anesthesia: General    Estimated Blood Loss (mL): less than 50     Complications: None    Specimens:   ID Type Source Tests Collected by Time Destination   1 :  Tissue Lung CULTURE, FUNGUS, CULTURE, TISSUE, CULTURE WITH SMEAR, ACID FAST BACILLIUS Xander Lam MD 11/12/2020 1049    A : RIGHT LOWER  LUNG DIAGNOSTIC WEDGE  Tissue Lung SURGICAL PATHOLOGY Xander Lam MD 11/12/2020 0853    B :  LYMPH NODE LEVEL 9  Tissue Lymph Node SURGICAL PATHOLOGY Xander Lma MD 11/12/2020 0907    C : LYMPH NODE LEVEL 7  Tissue Lymph Node SURGICAL PATHOLOGY Xander Lam MD 11/12/2020 0913    D : LYMPH NODE LEVEL 10 Tissue Lymph Node SURGICAL PATHOLOGY Xander Lam MD 11/12/2020 9993    E : LYMPH NODE LEVEL 4  Tissue Lymph Node SURGICAL PATHOLOGY Xander Lam MD 11/12/2020 5734        Implants:  * No implants in log *      Drains:   Chest Tube 1 Right Pleural 32 [place] (Active)   Suction -20 cm H2O 11/12/20 1050   Drainage Description Bright red 11/12/20 1050   Dressing Status Clean;Dry; Intact 11/12/20 1050   Site Assessment Clean;Dry; Intact 11/12/20 1050       Findings: INTRA OP FROZEN SECTION \"NECROTIZING GRANULOMA\" PER PATHOLOGIST MD    Electronically signed by Shawn Galdamez MD on 11/12/2020 at 11:08 AM
pt rody 40 min eval fair. pt with h/o dementia, legally blind, presents from nursing home with abdominal pain and vomiting, a/w SBO and PNA. pt rec'd in bed, bed alarm, tele, enhanced supervision, heparin gtt, peripheral IV line, NAD. pt agreed to session, A&Ox3, follows one step commands, confused at times, appeared to be poor historian.

## 2021-02-20 NOTE — CHART NOTE - NSCHARTNOTEFT_GEN_A_CORE
Red Surgery      SURGERY DAILY PROGRESS NOTE:     pt states I want to eat. +BM, + flatus per RN.      OBJECTIVE:    ICU Vital Signs Last 24 Hrs  T(C): 37 (2021 03:17), Max: 37 (2021 03:17)  T(F): 98.6 (2021 03:17), Max: 98.6 (2021 03:17)  HR: 81 (2021 03:17) (73 - 81)  BP: 150/66 (2021 03:17) (129/73 - 150/66)  BP(mean): --  ABP: --  ABP(mean): --  RR: 18 (2021 03:17) (18 - 18)  SpO2: 96% (2021 03:17) (96% - 98%)    I&O's Detail    2021 07:01  -  2021 07:00  --------------------------------------------------------  IN:    Heparin Infusion: 156 mL    Lactated Ringers: 900 mL  Total IN: 1056 mL    OUT:    Oral Fluid: 0 mL  Total OUT: 0 mL    Total NET: 1056 mL               Daily     Daily Weight in k (2021 05:00)  MEDICATIONS  (STANDING):  budesonide 160 MICROgram(s)/formoterol 4.5 MICROgram(s) Inhaler 2 Puff(s) Inhalation two times a day  dextrose 40% Gel 15 Gram(s) Oral once  dextrose 5%. 1000 milliLiter(s) (50 mL/Hr) IV Continuous <Continuous>  dextrose 5%. 1000 milliLiter(s) (100 mL/Hr) IV Continuous <Continuous>  dextrose 50% Injectable 25 Gram(s) IV Push once  dextrose 50% Injectable 12.5 Gram(s) IV Push once  dextrose 50% Injectable 25 Gram(s) IV Push once  glucagon  Injectable 1 milliGRAM(s) IntraMuscular once  heparin  Infusion.  Unit(s)/Hr (14 mL/Hr) IV Continuous <Continuous>  insulin lispro (ADMELOG) corrective regimen sliding scale   SubCutaneous every 6 hours  lactated ringers. 1000 milliLiter(s) (75 mL/Hr) IV Continuous <Continuous>  metoprolol tartrate Injectable 5 milliGRAM(s) IV Push every 6 hours  pantoprazole  Injectable 40 milliGRAM(s) IV Push every 12 hours  piperacillin/tazobactam IVPB.. 3.375 Gram(s) IV Intermittent every 8 hours    MEDICATIONS  (PRN):  ALBUTerol    90 MICROgram(s) HFA Inhaler 1 Puff(s) Inhalation every 4 hours PRN Shortness of Breath and/or Wheezing  heparin   Injectable 6500 Unit(s) IV Push every 6 hours PRN For aPTT less than 40  heparin   Injectable 3000 Unit(s) IV Push every 6 hours PRN For aPTT between 40 - 57  OLANZapine Injectable 2 milliGRAM(s) IntraMuscular every 6 hours PRN agitation      LABS:                                   9.2    14.66 )-----------( 319      ( 2021 06:37 )             29.6     02-20    144  |  107  |  11  ----------------------------<  111<H>  3.6   |  23  |  0.83    Ca    8.7      2021 06:37  Phos  3.1     02-20  Mg     1.7     02-20    TPro  6.1  /  Alb  2.7<L>  /  TBili  0.3  /  DBili  x   /  AST  12  /  ALT  10  /  AlkPhos  74  02-20      PTT - ( 2021 21:59 )  PTT:37.9 sec      Assessment:   75M hx of dementia, DM2, HTN, ?HF, HLD, recent admission for COVID requiring intubation (2020 [place]), currently treated for pneumonia presents from nursing home for abdominal pain and vomiting, found to have multifocal PNA, and high-grade SBO. Chart reviewed and patient with recorded bowel movements and NGT output 250 ml x 24 hours.       Plan:   - Repeat CT shows persistent SBO but SB less dilated, without internal hernia  - Continue with nonoperative management   - recommend NGT clamp trial as output is low and patient with BMs  - continue with protonix BID      Red Surgery   [id number]    sx attending  pt seen and examined  agree with above  +bm, + flatus  soft, min distended, NT no r/g  dc ngt, and start CLD  will follow with you  pt remains poor sx candidate.

## 2021-02-20 NOTE — PROGRESS NOTE ADULT - ASSESSMENT
75M hx of dementia, DM2, HTN, ?HF, HLD, recent admission for COVID requiring intubation (11/2020 [place]), currently treated for pneumonia presents from nursing home for abdominal pain and vomiting, found to have multifocal PNA, and high-grade SBO    Recommendation:  - Diet: recommend advancing to CLD  - Repeat CT shows persistent SBO but SB less dilated, without internal hernia  - Continue with nonoperative management  - Continue protonix  - Return of bowel function  - NGT was discontinued this morning  - Will follow with you      Patient seen and consulted with Dr. Shaikh and Dr. Ybarra Chief Resident  [place] PGY 1  Red Team Surgery  [id number]

## 2021-02-20 NOTE — PHYSICAL THERAPY INITIAL EVALUATION ADULT - BALANCE TRAINING, PT EVAL
GOAL: Pt will increase static/dynamic sitting and standing balance to at least "fair plus" within 4 weeks

## 2021-02-20 NOTE — PROGRESS NOTE ADULT - PROBLEM SELECTOR PLAN 3
Cr 0.68 Jan 2021, upon admission SCr 3. Now downtrending s/p 3L NS. Likely prerenal.   - 2/14: Not retaining on bladder scan, incontinent making I&Os, not fully reliable; SARAH continues to improve. Trend UOP and SCr. If UOP truly dropping, give IVF  -now on LR, can switch to D5 if continues to be hypernatremic Cr 0.68 Jan 2021, upon admission SCr 3. Now downtrending s/p 3L NS. Likely prerenal.   - 2/14: Not retaining on bladder scan, incontinent making I&Os, not fully reliable; SARAH continues to improve. Trend UOP and SCr. If UOP truly dropping, give IVF  -d/c LR 2/20 as pt now on PO diet

## 2021-02-20 NOTE — PROVIDER CONTACT NOTE (OTHER) - ACTION/TREATMENT ORDERED:
Medicare Wellness Visit  Plan for Preventive Care    A good way for you to stay healthy is to use preventive care.  Medicare covers many services that can help you stay healthy.* The goal of these services is to find any health problems as quickly as possible. Finding problems early can help make them easier to treat.  Your personal plan below lists the services you may need and when they are due.     Health Maintenance Summary     Diabetes Eye Exam (Yearly)  Overdue since 2/15/2019    Diabetes Foot Exam (Yearly)  Overdue since 9/21/2019    Medicare Wellness Visit (Yearly)  Overdue since 3/25/2020    DM/CKD Microalbumin (Yearly)  Ordered on 10/13/2020    Diabetes A1C (Every 6 Months)  Ordered on 10/13/2020    DTaP/Tdap/Td Vaccine (2 - Td)  Next due on 3/30/2021    Colonoscopy Risk (Every 5 Years)  Order placed this encounter    DM/CKD GFR (Yearly)  Ordered on 10/13/2020    Depression Screening (Yearly)  Next due on 2/16/2022    Hepatitis C Screening   Completed    Pneumococcal Vaccine 65+   Completed    Influenza Vaccine   Completed    Meningococcal Vaccine   Aged Out    HPV Vaccine   Aged Out           Preventive Care for Women and Men    Heart Screenings (Cardiovascular):  · Blood tests are used to check your cholesterol, lipid and triglyceride levels. High levels can increase your risk for heart disease and stroke. High levels can be treated with medications, diet and exercise. Lowering your levels can help keep your heart and blood vessels healthy.  Your provider will order these tests if they are needed.    · An ultrasound is done to see if you have an abdominal aortic aneurysm (AAA).  This is an enlargement of one of the main blood vessels that delivers blood to the body.   In the [place], 9,000 deaths are caused by AAA.  You may not even know you have this problem and as many as 1 in 3 people will have a serious problem if it is not treated.  Early diagnosis allows for more effective treatment and  MD made aware, ordered EKG, and notify him if patient becomes symptomatic or develops further ectopies. cure.  If you have a family history of AAA or are a male age 65-75 who has smoked, you are at higher risk of an AAA.  Your provider can order this test, if needed.    Colorectal Screening:  · There are many tests that are used to check for cancer of your colon and rectum. You and your provider should discuss what test is best for you and when to have it done.  Options include:  · Screening Colonoscopy: exam of the entire colon, seen through a flexible lighted tube.  · Flexible Sigmoidoscopy: exam of the last third (sigmoid portion) of the colon and rectum, seen through a flexible lighted tube.  · Cologuard DNA stool test: a sample of your stool is used to screen for cancer and unseen blood in your stool.  · Fecal Occult Blood Test: a sample of your stool is studied to find any unseen blood    Flu Shot:  · An immunization that helps to prevent influenza (the flu). You should get this every year. The best time to get the shot is in the fall.    Pneumococcal Shot:  • Vaccines are available that can help prevent pneumococcal disease, which is any type of infection caused by Streptococcus pneumoniae bacteria.   Their use can prevent some cases of pneumonia, meningitis, and sepsis. There are two types of pneumococcal vaccines:   o Conjugate vaccines (PCV-13 or Prevnar 13®) - helps protect against the 13 types of pneumococcal bacteria that are the most common causes of serious infections in children and adults.    o Polysaccharide vaccine (PPSV23 or Nkubmgeoo23®) - helps protect against 23 types of pneumococcal bacteria for patients who are recommended to get it.  These vaccines should be given at least 12 months apart.  A booster is usually not needed.     Hepatitis B Shot:  · An immunization that helps to protect people from getting Hepatitis B. Hepatitis B is a virus that spreads through contact with infected blood or body fluids. Many people with the virus do not have symptoms.  The virus can lead to serious problems,  such as liver disease. Some people are at higher risk than others. Your doctor will tell you if you need this shot.     Diabetes Screening:  · A test to measure sugar (glucose) in your blood is called a fasting blood sugar. Fasting means you cannot have food or drink for at least 8 hours before the test. This test can detect diabetes long before you may notice symptoms.    Glaucoma Screening:  · Glaucoma screening is performed by your eye doctor. The test measures the fluid pressure inside your eyes to determine if you have glaucoma.     Hepatitis C Screening:  · A blood test to see if you have the hepatitis C virus.  Hepatitis C attacks the liver and is a major cause of chronic liver disease.  Medicare will cover a single screening for all adults born between 1945 & 1965, or high risk patients (people who have injected illegal drugs or people who have had blood transfusions).  High risk patients who continue to inject illegal drugs can be screened for Hepatitis C every year.    Smoking and Tobacco-Use Cessation Counseling:  · Tobacco is the single greatest cause of disease and early death in our country today. Medication and counseling together can increase a person’s chance of quitting for good.   · Medicare covers two quitting attempts per year, with four counseling sessions per attempt (eight sessions in a 12 month period)    Preventive Screening tests for Women    Screening Mammograms and Breast Exams:  · An x-ray of your breasts to check for breast cancer before you or your doctor may be able to feel it.  If breast cancer is found early it can usually be treated with success.    Pelvic Exams and Pap Tests:  · An exam to check for cervical and vaginal cancer. A Pap test is a lab test in which cells are taken from your cervix and sent to the lab to look for signs of cervical cancer. If cancer of the cervix is found early, chances for a cure are good. Testing can generally end at age 65, or if a woman has a  hysterectomy for a benign condition. Your provider may recommend more frequent testing if certain abnormal results are found.    Bone Mass Measurements:  · A painless x-ray of your bone density to see if you are at risk for a broken bone. Bone density refers to the thickness of bones or how tightly the bone tissue is packed.    Preventive Screening tests for Men    Prostate Screening:  · Should you have a prostate cancer test (PSA)?  It is up to you to decide if you want a prostate cancer test. Talk to your clinician to find out if the test is right for you.  Things for you to consider and talk about should include:  · Benefits and harms of the test  · Your family history  · How your race/ethnicity may influence the test  · If the test may impact other medical conditions you have  · Your values on screenings and treatments    *Medicare pays for many preventive services to keep you healthy. For some of these services, you might have to pay a deductible, coinsurance, and / or copayment.  The amounts vary depending on the type of services you need and the kind of Medicare health plan you have.

## 2021-02-20 NOTE — PHYSICAL THERAPY INITIAL EVALUATION ADULT - PERTINENT HX OF CURRENT PROBLEM, REHAB EVAL
89 yo female with pmhx dementia, DM, HTN, CHF, multiple myeloma, DVT on AC, presenting with AMS, fever, and leg swelling. Per EMS, patient brought from [facility] with concerns for change in mental status, increased leg swelling. Was found to be 103 F rectally in ED, and tachycardic. 75M PMH dementia, DM2, HTN, ?HF, HLD, recent admission for COPD pneumonia requiring intubation (11/2020 [place]), COVID in January 2021 (not intubated) currently being treated for pneumonia presents from nursing home for abdominal pain and vomiting, found to have multifocal PNA (unclear if sequela of prior COVID) and high-grade SBO. Course c/b new afib with RVR s/p amiodarone gtt, on standing Lopressor.

## 2021-02-20 NOTE — PROGRESS NOTE ADULT - SUBJECTIVE AND OBJECTIVE BOX
GENERAL SURGERY PROGRESS NOTE   ___________________________________________________________________    PHIL ESQUIVEL | [id number] | 75y Male | [facility] 349 W1 | LOS 8d    Attending: Osiel Marin    ___________________________________________________________________      SUBJECTIVE:   Patient seen today during morning rounds at bedside and without acute distress. Denies chest pain, fever, severe pain, or SOB.     Diet, Clear Liquid:   Consistent Carbohydrate Evening Snack (CSTCHOSN)  Low Sodium  Supplement Feeding Modality:  Oral  Glucerna Shake Cans or Servings Per Day:  1       Frequency:  Two Times a day (02-20-21 @ 11:54) [Active]          PMH:   COPD (chronic obstructive pulmonary disease)    CVA (cerebral vascular accident)    HLD (hyperlipidemia)    T2DM (type 2 diabetes mellitus)    HTN (hypertension)    UTI (urinary tract infection)    Dementia    HLD (hyperlipidemia)    HTN (hypertension)    H/O heart failure    Pneumonia    History of acute respiratory distress syndrome (ARDS)    No significant past surgical history    No significant past surgical history          OBJECTIVE:    Allegies:  NKDA    MEDICATIONS  (STANDING):  budesonide 160 MICROgram(s)/formoterol 4.5 MICROgram(s) Inhaler 2 Puff(s) Inhalation two times a day  dextrose 40% Gel 15 Gram(s) Oral once  dextrose 50% Injectable 25 Gram(s) IV Push once  dextrose 50% Injectable 12.5 Gram(s) IV Push once  dextrose 50% Injectable 25 Gram(s) IV Push once  glucagon  Injectable 1 milliGRAM(s) IntraMuscular once  heparin  Infusion.  Unit(s)/Hr (14 mL/Hr) IV Continuous <Continuous>  insulin lispro (ADMELOG) corrective regimen sliding scale   SubCutaneous Before meals and at bedtime  metoprolol tartrate Injectable 5 milliGRAM(s) IV Push every 6 hours  pantoprazole  Injectable 40 milliGRAM(s) IV Push every 12 hours    MEDICATIONS  (PRN):  ALBUTerol    90 MICROgram(s) HFA Inhaler 1 Puff(s) Inhalation every 4 hours PRN Shortness of Breath and/or Wheezing  heparin   Injectable 6500 Unit(s) IV Push every 6 hours PRN For aPTT less than 40  heparin   Injectable 3000 Unit(s) IV Push every 6 hours PRN For aPTT between 40 - 57  OLANZapine Injectable 2 milliGRAM(s) IntraMuscular every 6 hours PRN agitation      Vitals:    T(C): 37 (02-20-21 @ 03:17), Max: 37 (02-20-21 @ 03:17)  HR: 81 (02-20-21 @ 03:17) (73 - 81)  BP: 150/66 (02-20-21 @ 03:17) (129/73 - 150/66)  RR: 18 (02-20-21 @ 03:17) (18 - 18)  SpO2: 96% (02-20-21 @ 03:17) (96% - 98%)      Physical Exam:   Gen: NAD, resting in bed, alert and responding appropriately  Resp: Airway patent, non-labored respirations  Abd: Soft, ND, NT, no rebound or guarding. NGT draining bilious  Ext: No edema, WWP    Intake&Output:  Totals:    02-19-21 @ 07:01  -  02-20-21 @ 07:00  --------------------------------------------------------  IN: 1056 mL / OUT: 0 mL / NET: 1056 mL      Outputs:    02-19-21 @ 07:01  -  02-20-21 @ 07:00  --------------------------------------------------------  OUT:    Oral Fluid: 0 mL  Total OUT: 0 mL        Urine Output:        Laboratory:                        9.2    14.66 )-----------( 319      ( 20 Feb 2021 06:37 )             29.6               02-20    144  |  107  |  11  ----------------------------<  111<H>  3.6   |  23  |  0.83    Ca    8.7      20 Feb 2021 06:37  Phos  3.1     02-20  Mg     1.7     02-20    TPro  6.1  /  Alb  2.7<L>  /  TBili  0.3  /  DBili  x   /  AST  12  /  ALT  10  /  AlkPhos  74  02-20    LIVER FUNCTIONS - ( 20 Feb 2021 06:37 )  Alb: 2.7 g/dL / Pro: 6.1 g/dL / ALK PHOS: 74 U/L / ALT: 10 U/L / AST: 12 U/L / GGT: x           PTT - ( 19 Feb 2021 21:59 )  PTT:37.9 sec    COVID-19 PCR: Detected (12 Feb 2021 12:01)  COVID-19 PCR: Detected (10 Tony 2021 11:43)  COVID-19 PCR: Detected (06 Jan 2021 15:54)  COVID-19 PCR: Detected (03 Jan 2021 13:48)  COVID-19 PCR: Detected (01 Jan 2021 11:20)  COVID-19 PCR: Detected (29 Dec 2020 11:40)  COVID-19 PCR: Detected (24 Dec 2020 16:05)  ,   ,   CAPILLARY BLOOD GLUCOSE      POCT Blood Glucose.: 124 mg/dL (20 Feb 2021 06:44)  POCT Blood Glucose.: 112 mg/dL (20 Feb 2021 01:28)  POCT Blood Glucose.: 143 mg/dL (19 Feb 2021 21:51)  POCT Blood Glucose.: 136 mg/dL (19 Feb 2021 17:20)        Recent Imaging:      Reviewed laboratory and imaging

## 2021-02-20 NOTE — PHYSICAL THERAPY INITIAL EVALUATION ADULT - PRECAUTIONS/LIMITATIONS, REHAB EVAL
HISTORY AND RESULTS CONTINUED: CTA CHEST/ABDOMEN/PELVIS 2/17: decrease in small bowel dilatation with persisent SBO. no PE. multifocal pneumonia most marked in the right lower lobe./fall precautions

## 2021-02-20 NOTE — PROGRESS NOTE ADULT - ASSESSMENT
75M PMH dementia, DM2, HTN, ?HF, HLD, recent admission for COPD pneumonia requiring intubation (11/2020 [place]), COVID in January 2021 (not intubated) currently being treated for pneumonia presents from nursing home for abdominal pain and vomiting, found to have multifocal PNA (unclear if sequela of prior COVID) and high-grade SBO. Course c/b new afib with RVR s/p amiodarone gtt, on standing Lopressor.

## 2021-02-21 LAB
ALBUMIN SERPL ELPH-MCNC: 2.3 G/DL — LOW (ref 3.3–5)
ALP SERPL-CCNC: 63 U/L — SIGNIFICANT CHANGE UP (ref 40–120)
ALT FLD-CCNC: 5 U/L — LOW (ref 10–45)
ANION GAP SERPL CALC-SCNC: 12 MMOL/L — SIGNIFICANT CHANGE UP (ref 5–17)
APTT BLD: 140 SEC — CRITICAL HIGH (ref 27.5–35.5)
AST SERPL-CCNC: 8 U/L — LOW (ref 10–40)
BILIRUB SERPL-MCNC: 0.2 MG/DL — SIGNIFICANT CHANGE UP (ref 0.2–1.2)
BLD GP AB SCN SERPL QL: NEGATIVE — SIGNIFICANT CHANGE UP
BUN SERPL-MCNC: 8 MG/DL — SIGNIFICANT CHANGE UP (ref 7–23)
CALCIUM SERPL-MCNC: 7.8 MG/DL — LOW (ref 8.4–10.5)
CHLORIDE SERPL-SCNC: 106 MMOL/L — SIGNIFICANT CHANGE UP (ref 96–108)
CO2 SERPL-SCNC: 24 MMOL/L — SIGNIFICANT CHANGE UP (ref 22–31)
CREAT SERPL-MCNC: 0.77 MG/DL — SIGNIFICANT CHANGE UP (ref 0.5–1.3)
GLUCOSE BLDC GLUCOMTR-MCNC: 120 MG/DL — HIGH (ref 70–99)
GLUCOSE BLDC GLUCOMTR-MCNC: 121 MG/DL — HIGH (ref 70–99)
GLUCOSE BLDC GLUCOMTR-MCNC: 136 MG/DL — HIGH (ref 70–99)
GLUCOSE BLDC GLUCOMTR-MCNC: 150 MG/DL — HIGH (ref 70–99)
GLUCOSE SERPL-MCNC: 122 MG/DL — HIGH (ref 70–99)
HCT VFR BLD CALC: 25.5 % — LOW (ref 39–50)
HCT VFR BLD CALC: 26.8 % — LOW (ref 39–50)
HGB BLD-MCNC: 7.8 G/DL — LOW (ref 13–17)
HGB BLD-MCNC: 8.2 G/DL — LOW (ref 13–17)
MAGNESIUM SERPL-MCNC: 1.5 MG/DL — LOW (ref 1.6–2.6)
MCHC RBC-ENTMCNC: 22.6 PG — LOW (ref 27–34)
MCHC RBC-ENTMCNC: 22.7 PG — LOW (ref 27–34)
MCHC RBC-ENTMCNC: 30.6 GM/DL — LOW (ref 32–36)
MCHC RBC-ENTMCNC: 30.6 GM/DL — LOW (ref 32–36)
MCV RBC AUTO: 73.8 FL — LOW (ref 80–100)
MCV RBC AUTO: 74.1 FL — LOW (ref 80–100)
NRBC # BLD: 0 /100 WBCS — SIGNIFICANT CHANGE UP (ref 0–0)
NRBC # BLD: 0 /100 WBCS — SIGNIFICANT CHANGE UP (ref 0–0)
PHOSPHATE SERPL-MCNC: 2.7 MG/DL — SIGNIFICANT CHANGE UP (ref 2.5–4.5)
PLATELET # BLD AUTO: 277 K/UL — SIGNIFICANT CHANGE UP (ref 150–400)
PLATELET # BLD AUTO: 286 K/UL — SIGNIFICANT CHANGE UP (ref 150–400)
POTASSIUM SERPL-MCNC: 3.3 MMOL/L — LOW (ref 3.5–5.3)
POTASSIUM SERPL-SCNC: 3.3 MMOL/L — LOW (ref 3.5–5.3)
PROT SERPL-MCNC: 5.7 G/DL — LOW (ref 6–8.3)
RBC # BLD: 3.44 M/UL — LOW (ref 4.2–5.8)
RBC # BLD: 3.63 M/UL — LOW (ref 4.2–5.8)
RBC # FLD: 20.1 % — HIGH (ref 10.3–14.5)
RBC # FLD: 20.3 % — HIGH (ref 10.3–14.5)
RH IG SCN BLD-IMP: POSITIVE — SIGNIFICANT CHANGE UP
SODIUM SERPL-SCNC: 142 MMOL/L — SIGNIFICANT CHANGE UP (ref 135–145)
WBC # BLD: 10.21 K/UL — SIGNIFICANT CHANGE UP (ref 3.8–10.5)
WBC # BLD: 11.27 K/UL — HIGH (ref 3.8–10.5)
WBC # FLD AUTO: 10.21 K/UL — SIGNIFICANT CHANGE UP (ref 3.8–10.5)
WBC # FLD AUTO: 11.27 K/UL — HIGH (ref 3.8–10.5)

## 2021-02-21 PROCEDURE — 99232 SBSQ HOSP IP/OBS MODERATE 35: CPT | Mod: GC

## 2021-02-21 RX ORDER — METOPROLOL TARTRATE 50 MG
25 TABLET ORAL
Refills: 0 | Status: DISCONTINUED | OUTPATIENT
Start: 2021-02-21 | End: 2021-02-21

## 2021-02-21 RX ORDER — METOPROLOL TARTRATE 50 MG
25 TABLET ORAL
Refills: 0 | Status: DISCONTINUED | OUTPATIENT
Start: 2021-02-21 | End: 2021-02-25

## 2021-02-21 RX ORDER — APIXABAN 2.5 MG/1
5 TABLET, FILM COATED ORAL EVERY 12 HOURS
Refills: 0 | Status: DISCONTINUED | OUTPATIENT
Start: 2021-02-21 | End: 2021-02-25

## 2021-02-21 RX ORDER — ACETAMINOPHEN 500 MG
650 TABLET ORAL ONCE
Refills: 0 | Status: COMPLETED | OUTPATIENT
Start: 2021-02-21 | End: 2021-02-21

## 2021-02-21 RX ORDER — POTASSIUM CHLORIDE 20 MEQ
40 PACKET (EA) ORAL
Refills: 0 | Status: COMPLETED | OUTPATIENT
Start: 2021-02-21 | End: 2021-02-21

## 2021-02-21 RX ORDER — MAGNESIUM SULFATE 500 MG/ML
2 VIAL (ML) INJECTION ONCE
Refills: 0 | Status: COMPLETED | OUTPATIENT
Start: 2021-02-21 | End: 2021-02-21

## 2021-02-21 RX ADMIN — APIXABAN 5 MILLIGRAM(S): 2.5 TABLET, FILM COATED ORAL at 22:19

## 2021-02-21 RX ADMIN — Medication 5 MILLIGRAM(S): at 06:27

## 2021-02-21 RX ADMIN — BUDESONIDE AND FORMOTEROL FUMARATE DIHYDRATE 2 PUFF(S): 160; 4.5 AEROSOL RESPIRATORY (INHALATION) at 06:27

## 2021-02-21 RX ADMIN — HEPARIN SODIUM 1000 UNIT(S)/HR: 5000 INJECTION INTRAVENOUS; SUBCUTANEOUS at 05:49

## 2021-02-21 RX ADMIN — BUDESONIDE AND FORMOTEROL FUMARATE DIHYDRATE 2 PUFF(S): 160; 4.5 AEROSOL RESPIRATORY (INHALATION) at 18:14

## 2021-02-21 RX ADMIN — PANTOPRAZOLE SODIUM 40 MILLIGRAM(S): 20 TABLET, DELAYED RELEASE ORAL at 12:21

## 2021-02-21 RX ADMIN — Medication 650 MILLIGRAM(S): at 23:11

## 2021-02-21 RX ADMIN — Medication 40 MILLIEQUIVALENT(S): at 10:51

## 2021-02-21 RX ADMIN — Medication 260 MILLIGRAM(S): at 06:26

## 2021-02-21 RX ADMIN — Medication 40 MILLIEQUIVALENT(S): at 14:28

## 2021-02-21 RX ADMIN — APIXABAN 5 MILLIGRAM(S): 2.5 TABLET, FILM COATED ORAL at 12:21

## 2021-02-21 RX ADMIN — HEPARIN SODIUM 0 UNIT(S)/HR: 5000 INJECTION INTRAVENOUS; SUBCUTANEOUS at 04:48

## 2021-02-21 RX ADMIN — Medication 25 MILLIGRAM(S): at 18:14

## 2021-02-21 RX ADMIN — Medication 5 MILLIGRAM(S): at 12:22

## 2021-02-21 RX ADMIN — PANTOPRAZOLE SODIUM 40 MILLIGRAM(S): 20 TABLET, DELAYED RELEASE ORAL at 22:19

## 2021-02-21 RX ADMIN — Medication 50 GRAM(S): at 10:23

## 2021-02-21 RX ADMIN — Medication 1 DROP(S): at 22:19

## 2021-02-21 NOTE — PROVIDER CONTACT NOTE (OTHER) - NAME OF MD/NP/PA/DO NOTIFIED:
Unit leadership and bed board.
Dr TORRES
MD Desai
T7 MD Maren Castellanos
Team 7
Lars ROSALES: team 7

## 2021-02-21 NOTE — PROGRESS NOTE ADULT - PROBLEM SELECTOR PLAN 10
- Diet: CLD ADAT   - DVT ppx: heparin gtt  - PT eval - pt lives at long term care facility [facility]  - DM2 - home regimen 10u Basaglar qHS and SSI at nursing home - will give SSI here given poor po intake, uptitrate prn  - full code per daughter  - palliative consulted, appreciate recs - Diet: CLD ADAT   - DVT ppx: eliquis  - PT eval - pt lives at long term care facility [facility] - recs subacute rehab  - DM2 - home regimen 10u Basaglar qHS and SSI at nursing home - will give SSI here given poor po intake, uptitrate prn  - full code per daughter  - palliative consulted, appreciate recs

## 2021-02-21 NOTE — PROVIDER CONTACT NOTE (CRITICAL VALUE NOTIFICATION) - ASSESSMENT
Pt with no s/s of bleeding, VSS.
No signs and symptoms of bleeding.
Pt a&ox 2. no s/s of bleeding. vss
No s/s of bleeding.

## 2021-02-21 NOTE — PROGRESS NOTE ADULT - PROBLEM SELECTOR PLAN 3
Cr 0.68 Jan 2021, upon admission SCr 3. Now downtrending s/p 3L NS. Likely prerenal.   - 2/14: Not retaining on bladder scan, incontinent making I&Os, not fully reliable; SARAH continues to improve. Trend UOP and SCr. If UOP truly dropping, give IVF  -d/c LR 2/20 as pt now on PO diet

## 2021-02-21 NOTE — PROVIDER CONTACT NOTE (OTHER) - ASSESSMENT
Pt alert laying in bed at of event. Pt asymptomatic, denies chest pain, sob, palpations, and dizziness. /65, hr 76, t- 98.5, rr 18, 98%.

## 2021-02-21 NOTE — PROVIDER CONTACT NOTE (OTHER) - BACKGROUND
covid +, intestinal obstruction with ng tube to low wall suction, cva dementia, dm  , .
pt admitted for SBO c/b Afib rvr
Elevated WBC and lymphocytopenia. PCR negative on 2/2, positive on 2/12 as well as on 1/26, and 1/20 - consistent with shedding.
Pt admitted from nursing home for abdominal pain and vomiting, found to have multifocal PNA
admitted with sbo
admitted for Instestinal obstruction, PNA, AFIB  Hx: Dementia, CVA, DM2, UTI, HLD, HTN, COPD,

## 2021-02-21 NOTE — PROGRESS NOTE ADULT - PROBLEM SELECTOR PLAN 1
Per surgery, conservative management, precipitant ?hernia - no known prior abdominal surgeries  - pt now with BMs, NGT dc'd 2/20, monitor off NGT  - trend i/o  - GI ppx with Protonix  - s/p 7d course Zosyn (2/12 - 2/19)  - repeat CTAP 2/16 still with persistent SBO but less dilated, resolution of internal hernia  - CLD, ADAT  - nutrition consulted Per surgery, conservative management, precipitant ?hernia - no known prior abdominal surgeries  - pt now with BMs, NGT dc'd 2/20, monitor off NGT  - trend i/o  - GI ppx with Protonix  - s/p 7d course Zosyn (2/12 - 2/19)  - repeat CTAP 2/16 still with persistent SBO but less dilated, resolution of internal hernia  - trial soft diet, ADAT  - nutrition consulted

## 2021-02-21 NOTE — PROGRESS NOTE ADULT - SUBJECTIVE AND OBJECTIVE BOX
Surgery Progress Note    Seen and examined at bedside. No new complaints. Tolerated clears.    Vital Signs Last 24 Hrs  T(C): 36.5 (02-21-21 @ 09:02), Max: 37.1 (02-20-21 @ 11:53)  T(F): 97.7 (02-21-21 @ 09:02), Max: 98.7 (02-20-21 @ 11:53)  HR: 67 (02-21-21 @ 09:02) (67 - 87)  BP: 136/75 (02-21-21 @ 09:02) (110/65 - 154/70)  BP(mean): --  RR: 18 (02-21-21 @ 09:02) (18 - 18)  SpO2: 100% (02-21-21 @ 09:02) (94% - 100%)  I&O's Detail    20 Feb 2021 07:01  -  21 Feb 2021 07:00  --------------------------------------------------------  IN:    Heparin Infusion: 249 mL    Oral Fluid: 540 mL  Total IN: 789 mL    OUT:  Total OUT: 0 mL    Total NET: 789 mL      PE    GEN: NAD  Abd: soft, appropriately tender, nondistended. no rebound or guarding                            7.8    11.27 )-----------( 286      ( 21 Feb 2021 07:13 )             25.5     02-21    142  |  106  |  8   ----------------------------<  122<H>  3.3<L>   |  24  |  0.77    Ca    7.8<L>      21 Feb 2021 07:08  Phos  2.7     02-21  Mg     1.5     02-21    TPro  5.7<L>  /  Alb  2.3<L>  /  TBili  0.2  /  DBili  x   /  AST  8<L>  /  ALT  5<L>  /  AlkPhos  63  02-21    PTT - ( 21 Feb 2021 03:44 )  PTT:140.0 sec  CAPILLARY BLOOD GLUCOSE      POCT Blood Glucose.: 120 mg/dL (21 Feb 2021 09:05)  POCT Blood Glucose.: 169 mg/dL (20 Feb 2021 21:00)  POCT Blood Glucose.: 139 mg/dL (20 Feb 2021 17:28)      MEDICATIONS  (STANDING):  budesonide 160 MICROgram(s)/formoterol 4.5 MICROgram(s) Inhaler 2 Puff(s) Inhalation two times a day  dextrose 40% Gel 15 Gram(s) Oral once  dextrose 50% Injectable 25 Gram(s) IV Push once  dextrose 50% Injectable 12.5 Gram(s) IV Push once  dextrose 50% Injectable 25 Gram(s) IV Push once  glucagon  Injectable 1 milliGRAM(s) IntraMuscular once  heparin  Infusion.  Unit(s)/Hr (14 mL/Hr) IV Continuous <Continuous>  insulin lispro (ADMELOG) corrective regimen sliding scale   SubCutaneous Before meals and at bedtime  magnesium sulfate  IVPB 2 Gram(s) IV Intermittent once  metoprolol tartrate Injectable 5 milliGRAM(s) IV Push every 6 hours  pantoprazole  Injectable 40 milliGRAM(s) IV Push every 12 hours  potassium chloride   Powder 40 milliEquivalent(s) Oral every 2 hours    MEDICATIONS  (PRN):  ALBUTerol    90 MICROgram(s) HFA Inhaler 1 Puff(s) Inhalation every 4 hours PRN Shortness of Breath and/or Wheezing  heparin   Injectable 6500 Unit(s) IV Push every 6 hours PRN For aPTT less than 40  heparin   Injectable 3000 Unit(s) IV Push every 6 hours PRN For aPTT between 40 - 57  OLANZapine Injectable 2 milliGRAM(s) IntraMuscular every 6 hours PRN agitation

## 2021-02-21 NOTE — PROVIDER CONTACT NOTE (CRITICAL VALUE NOTIFICATION) - RECOMMENDATIONS
Notify MD
Hold heparin for 1hr and restart at 11cc/hr per heparin nomogram.
follow heparin gtt protocol

## 2021-02-21 NOTE — PROGRESS NOTE ADULT - PROBLEM SELECTOR PLAN 2
New afib with RVR on 2/13 with HR 130s-150s. s/p amio load and gtt. Completed 18 hour gtt load on 2/14. Converted to SR, amio gtt now dc'd   -cards consulted, appreciate recs  -monitor on tele  - CTA chest no PE  - on heparin gtt New afib with RVR on 2/13 with HR 130s-150s. s/p amio load and gtt. Completed 18 hour gtt load on 2/14. Converted to SR, amio gtt now dc'd   -cards consulted, appreciate recs  -monitor on tele  - CTA chest no PE  - lopressor 25 BID  - s/p heparin gtt dc'd 2/21, start Eliquis 5 BID

## 2021-02-21 NOTE — PROGRESS NOTE ADULT - SUBJECTIVE AND OBJECTIVE BOX
PROGRESS NOTE:   Authored by Dr. Rebecca Gutierrez MD, Pager [phone] [facility], [id number] [facility]     Patient is a 75y old  Male who presents with a chief complaint of abdominal pain (20 Feb 2021 19:52)      SUBJECTIVE / OVERNIGHT EVENTS: No events overnight.    ADDITIONAL REVIEW OF SYSTEMS: Denies fevers, chills, n/v.    MEDICATIONS  (STANDING):  budesonide 160 MICROgram(s)/formoterol 4.5 MICROgram(s) Inhaler 2 Puff(s) Inhalation two times a day  dextrose 40% Gel 15 Gram(s) Oral once  dextrose 50% Injectable 25 Gram(s) IV Push once  dextrose 50% Injectable 12.5 Gram(s) IV Push once  dextrose 50% Injectable 25 Gram(s) IV Push once  glucagon  Injectable 1 milliGRAM(s) IntraMuscular once  heparin  Infusion.  Unit(s)/Hr (14 mL/Hr) IV Continuous <Continuous>  insulin lispro (ADMELOG) corrective regimen sliding scale   SubCutaneous Before meals and at bedtime  metoprolol tartrate Injectable 5 milliGRAM(s) IV Push every 6 hours  pantoprazole  Injectable 40 milliGRAM(s) IV Push every 12 hours    MEDICATIONS  (PRN):  ALBUTerol    90 MICROgram(s) HFA Inhaler 1 Puff(s) Inhalation every 4 hours PRN Shortness of Breath and/or Wheezing  heparin   Injectable 6500 Unit(s) IV Push every 6 hours PRN For aPTT less than 40  heparin   Injectable 3000 Unit(s) IV Push every 6 hours PRN For aPTT between 40 - 57  OLANZapine Injectable 2 milliGRAM(s) IntraMuscular every 6 hours PRN agitation      CAPILLARY BLOOD GLUCOSE      POCT Blood Glucose.: 169 mg/dL (20 Feb 2021 21:00)  POCT Blood Glucose.: 139 mg/dL (20 Feb 2021 17:28)    I&O's Summary    20 Feb 2021 07:01  -  21 Feb 2021 07:00  --------------------------------------------------------  IN: 789 mL / OUT: 0 mL / NET: 789 mL        PHYSICAL EXAM:  Vital Signs Last 24 Hrs  T(C): 36.8 (21 Feb 2021 06:01), Max: 37.1 (20 Feb 2021 11:53)  T(F): 98.2 (21 Feb 2021 06:01), Max: 98.7 (20 Feb 2021 11:53)  HR: 84 (21 Feb 2021 06:01) (68 - 87)  BP: 130/64 (21 Feb 2021 06:01) (110/65 - 154/70)  BP(mean): --  RR: 18 (21 Feb 2021 06:01) (18 - 18)  SpO2: 96% (21 Feb 2021 06:01) (94% - 98%)    CONSTITUTIONAL: NAD, lying in bed comfortably, NGT in place  RESPIRATORY: Normal respiratory effort; CTABL posteriorly  CARDIOVASCULAR: Regular rate and rhythm, normal S1 and S2, no murmur/rub/gallop  ABDOMEN: Soft, mildly distended, nontender to palpation, normoactive bowel sounds, no rebound/guarding  MUSCLOSKELETAL: no joint swelling or tenderness to palpation, FROM all extremities  NEURO: AAOx1-2   EXTREMITIES: no pedal edema    LABS:                        9.2    14.66 )-----------( 319      ( 20 Feb 2021 06:37 )             29.6     02-20    144  |  107  |  11  ----------------------------<  111<H>  3.6   |  23  |  0.83    Ca    8.7      20 Feb 2021 06:37  Phos  3.1     02-20  Mg     1.7     02-20    TPro  6.1  /  Alb  2.7<L>  /  TBili  0.3  /  DBili  x   /  AST  12  /  ALT  10  /  AlkPhos  74  02-20    PTT - ( 21 Feb 2021 03:44 )  PTT:140.0 sec            RADIOLOGY & ADDITIONAL TESTS:     PROGRESS NOTE:   Authored by Dr. Rebecca Gutierrez MD, Pager [phone] [facility], [id number] [facility]     Patient is a 75y old  Male who presents with a chief complaint of abdominal pain (20 Feb 2021 19:52)      SUBJECTIVE / OVERNIGHT EVENTS: No events overnight. Asking for food this AM. No abd pain.     ADDITIONAL REVIEW OF SYSTEMS: Denies fevers, chills, n/v.    MEDICATIONS  (STANDING):  budesonide 160 MICROgram(s)/formoterol 4.5 MICROgram(s) Inhaler 2 Puff(s) Inhalation two times a day  dextrose 40% Gel 15 Gram(s) Oral once  dextrose 50% Injectable 25 Gram(s) IV Push once  dextrose 50% Injectable 12.5 Gram(s) IV Push once  dextrose 50% Injectable 25 Gram(s) IV Push once  glucagon  Injectable 1 milliGRAM(s) IntraMuscular once  heparin  Infusion.  Unit(s)/Hr (14 mL/Hr) IV Continuous <Continuous>  insulin lispro (ADMELOG) corrective regimen sliding scale   SubCutaneous Before meals and at bedtime  metoprolol tartrate Injectable 5 milliGRAM(s) IV Push every 6 hours  pantoprazole  Injectable 40 milliGRAM(s) IV Push every 12 hours    MEDICATIONS  (PRN):  ALBUTerol    90 MICROgram(s) HFA Inhaler 1 Puff(s) Inhalation every 4 hours PRN Shortness of Breath and/or Wheezing  heparin   Injectable 6500 Unit(s) IV Push every 6 hours PRN For aPTT less than 40  heparin   Injectable 3000 Unit(s) IV Push every 6 hours PRN For aPTT between 40 - 57  OLANZapine Injectable 2 milliGRAM(s) IntraMuscular every 6 hours PRN agitation      CAPILLARY BLOOD GLUCOSE      POCT Blood Glucose.: 169 mg/dL (20 Feb 2021 21:00)  POCT Blood Glucose.: 139 mg/dL (20 Feb 2021 17:28)    I&O's Summary    20 Feb 2021 07:01  -  21 Feb 2021 07:00  --------------------------------------------------------  IN: 789 mL / OUT: 0 mL / NET: 789 mL        PHYSICAL EXAM:  Vital Signs Last 24 Hrs  T(C): 36.8 (21 Feb 2021 06:01), Max: 37.1 (20 Feb 2021 11:53)  T(F): 98.2 (21 Feb 2021 06:01), Max: 98.7 (20 Feb 2021 11:53)  HR: 84 (21 Feb 2021 06:01) (68 - 87)  BP: 130/64 (21 Feb 2021 06:01) (110/65 - 154/70)  BP(mean): --  RR: 18 (21 Feb 2021 06:01) (18 - 18)  SpO2: 96% (21 Feb 2021 06:01) (94% - 98%)    CONSTITUTIONAL: NAD, lying in bed comfortably  RESPIRATORY: Normal respiratory effort; CTABL posteriorly  CARDIOVASCULAR: Regular rate and rhythm, normal S1 and S2, no murmur/rub/gallop  ABDOMEN: Soft, nondistended, nontender to palpation, normoactive bowel sounds, no rebound/guarding  MUSCLOSKELETAL: no joint swelling or tenderness to palpation, FROM all extremities  NEURO: AAOx1-2   EXTREMITIES: no pedal edema    LABS:                        9.2    14.66 )-----------( 319      ( 20 Feb 2021 06:37 )             29.6     02-20    144  |  107  |  11  ----------------------------<  111<H>  3.6   |  23  |  0.83    Ca    8.7      20 Feb 2021 06:37  Phos  3.1     02-20  Mg     1.7     02-20    TPro  6.1  /  Alb  2.7<L>  /  TBili  0.3  /  DBili  x   /  AST  12  /  ALT  10  /  AlkPhos  74  02-20    PTT - ( 21 Feb 2021 03:44 )  PTT:140.0 sec            RADIOLOGY & ADDITIONAL TESTS:

## 2021-02-21 NOTE — PROVIDER CONTACT NOTE (CRITICAL VALUE NOTIFICATION) - BACKGROUND
75M PMH dementia, DM2, HTN, ?HF, HLD, recent admission for COPD pneumonia requiring intubation (11/2020 [place]), COVID in January 2021 (not intubated) currently being treated for pneumonia
pt admitted for SBO c/w afib rvr
Pt admitted from nursing home for abdominal pain and vomiting, found to have multifocal PNA

## 2021-02-21 NOTE — PROGRESS NOTE ADULT - ASSESSMENT
75M hx of dementia, DM2, HTN, ?HF, HLD, recent admission for COVID requiring intubation (11/2020 [place]), currently treated for pneumonia presents from nursing home for abdominal pain and vomiting, found to have multifocal PNA, and high-grade SBO    Recommendation:  - Advance diet as tolerated    [id number]

## 2021-02-21 NOTE — PROGRESS NOTE ADULT - SUBJECTIVE AND OBJECTIVE BOX
Subjective: Patient seen and examined. No new events except as noted.     REVIEW OF SYSTEMS:    CONSTITUTIONAL: + weakness, fevers or chills  EYES/ENT: No visual changes;  No vertigo or throat pain   NECK: No pain or stiffness  RESPIRATORY: No cough, wheezing, hemoptysis; No shortness of breath  CARDIOVASCULAR: No chest pain or palpitations  GASTROINTESTINAL: No abdominal or epigastric pain. No nausea, vomiting, or hematemesis; No diarrhea or constipation. No melena or hematochezia.  GENITOURINARY: No dysuria, frequency or hematuria  NEUROLOGICAL: No numbness or weakness  SKIN: No itching, burning, rashes, or lesions   All other review of systems is negative unless indicated above.    MEDICATIONS:  MEDICATIONS  (STANDING):  budesonide 160 MICROgram(s)/formoterol 4.5 MICROgram(s) Inhaler 2 Puff(s) Inhalation two times a day  dextrose 40% Gel 15 Gram(s) Oral once  dextrose 50% Injectable 25 Gram(s) IV Push once  dextrose 50% Injectable 12.5 Gram(s) IV Push once  dextrose 50% Injectable 25 Gram(s) IV Push once  glucagon  Injectable 1 milliGRAM(s) IntraMuscular once  heparin  Infusion.  Unit(s)/Hr (14 mL/Hr) IV Continuous <Continuous>  insulin lispro (ADMELOG) corrective regimen sliding scale   SubCutaneous Before meals and at bedtime  metoprolol tartrate Injectable 5 milliGRAM(s) IV Push every 6 hours  pantoprazole  Injectable 40 milliGRAM(s) IV Push every 12 hours  potassium chloride   Powder 40 milliEquivalent(s) Oral every 2 hours      PHYSICAL EXAM:  T(C): 36.5 (02-21-21 @ 09:02), Max: 37.1 (02-20-21 @ 11:53)  HR: 67 (02-21-21 @ 09:02) (67 - 87)  BP: 136/75 (02-21-21 @ 09:02) (110/65 - 154/70)  RR: 18 (02-21-21 @ 09:02) (18 - 18)  SpO2: 100% (02-21-21 @ 09:02) (94% - 100%)  Wt(kg): --  I&O's Summary    20 Feb 2021 07:01  -  21 Feb 2021 07:00  --------------------------------------------------------  IN: 789 mL / OUT: 0 mL / NET: 789 mL    21 Feb 2021 07:01  -  21 Feb 2021 10:47  --------------------------------------------------------  IN: 360 mL / OUT: 0 mL / NET: 360 mL          Appearance: NAD	  HEENT:   Normal oral mucosa, PERRL, EOMI	  Lymphatic: No lymphadenopathy , no edema  Cardiovascular: Normal S1 S2, No JVD, No murmurs , Peripheral pulses palpable 2+ bilaterally  Respiratory: Lungs clear to auscultation, normal effort 	  Gastrointestinal:  Soft, Non-tender, + BS	  Skin: No rashes, No ecchymoses, No cyanosis, warm to touch  Musculoskeletal: Normal range of motion, normal strength  Psychiatry:  Mood & affect appropriate  Ext: No edema      LABS:    CARDIAC MARKERS:                                7.8    11.27 )-----------( 286      ( 21 Feb 2021 07:13 )             25.5     02-21    142  |  106  |  8   ----------------------------<  122<H>  3.3<L>   |  24  |  0.77    Ca    7.8<L>      21 Feb 2021 07:08  Phos  2.7     02-21  Mg     1.5     02-21    TPro  5.7<L>  /  Alb  2.3<L>  /  TBili  0.2  /  DBili  x   /  AST  8<L>  /  ALT  5<L>  /  AlkPhos  63  02-21    proBNP:   Lipid Profile:   HgA1c:   TSH:             TELEMETRY: 	    ECG:  	  RADIOLOGY:   DIAGNOSTIC TESTING:  [ ] Echocardiogram:  [ ]  Catheterization:  [ ] Stress Test:    OTHER:

## 2021-02-21 NOTE — PROGRESS NOTE ADULT - ASSESSMENT
75M hx of dementia, DM2, HTN, ?HF, HLD, recent admission for COVID requiring intubation (11/2020 [place]), currently treated for pneumonia presents from nursing home for abdominal pain and vomiting, found to have multifocal PNA, and high-grade SBO with TP LUQ, c/f internal hernia on CT. Complicated by Afib RVR

## 2021-02-21 NOTE — PROVIDER CONTACT NOTE (CRITICAL VALUE NOTIFICATION) - ACTION/TREATMENT ORDERED:
Stop for 1 hour. Restart at 11c//hr.
Hold for 1hr and restart at 11cc/hr.
MD Faye made aware. Heparin gtt decreased to 14cc/hr as per heparin nomogram protocol. continue to monitor, aptt to be re-drawn at 16:16
Follow heparin nomogram.

## 2021-02-22 ENCOUNTER — TRANSCRIPTION ENCOUNTER (OUTPATIENT)
Age: 76
End: 2021-02-22

## 2021-02-22 LAB
ALBUMIN SERPL ELPH-MCNC: 2.3 G/DL — LOW (ref 3.3–5)
ALP SERPL-CCNC: 64 U/L — SIGNIFICANT CHANGE UP (ref 40–120)
ALT FLD-CCNC: 6 U/L — LOW (ref 10–45)
ANION GAP SERPL CALC-SCNC: 10 MMOL/L — SIGNIFICANT CHANGE UP (ref 5–17)
AST SERPL-CCNC: 12 U/L — SIGNIFICANT CHANGE UP (ref 10–40)
BILIRUB SERPL-MCNC: 0.2 MG/DL — SIGNIFICANT CHANGE UP (ref 0.2–1.2)
BUN SERPL-MCNC: 7 MG/DL — SIGNIFICANT CHANGE UP (ref 7–23)
CALCIUM SERPL-MCNC: 7.9 MG/DL — LOW (ref 8.4–10.5)
CHLORIDE SERPL-SCNC: 107 MMOL/L — SIGNIFICANT CHANGE UP (ref 96–108)
CO2 SERPL-SCNC: 24 MMOL/L — SIGNIFICANT CHANGE UP (ref 22–31)
CREAT SERPL-MCNC: 0.74 MG/DL — SIGNIFICANT CHANGE UP (ref 0.5–1.3)
GLUCOSE BLDC GLUCOMTR-MCNC: 103 MG/DL — HIGH (ref 70–99)
GLUCOSE SERPL-MCNC: 91 MG/DL — SIGNIFICANT CHANGE UP (ref 70–99)
HCT VFR BLD CALC: 26.6 % — LOW (ref 39–50)
HGB BLD-MCNC: 8 G/DL — LOW (ref 13–17)
MAGNESIUM SERPL-MCNC: 1.8 MG/DL — SIGNIFICANT CHANGE UP (ref 1.6–2.6)
MCHC RBC-ENTMCNC: 22.1 PG — LOW (ref 27–34)
MCHC RBC-ENTMCNC: 30.1 GM/DL — LOW (ref 32–36)
MCV RBC AUTO: 73.5 FL — LOW (ref 80–100)
NRBC # BLD: 0 /100 WBCS — SIGNIFICANT CHANGE UP (ref 0–0)
PHOSPHATE SERPL-MCNC: 2.5 MG/DL — SIGNIFICANT CHANGE UP (ref 2.5–4.5)
PLATELET # BLD AUTO: 308 K/UL — SIGNIFICANT CHANGE UP (ref 150–400)
POTASSIUM SERPL-MCNC: 3.7 MMOL/L — SIGNIFICANT CHANGE UP (ref 3.5–5.3)
POTASSIUM SERPL-SCNC: 3.7 MMOL/L — SIGNIFICANT CHANGE UP (ref 3.5–5.3)
PROT SERPL-MCNC: 5.8 G/DL — LOW (ref 6–8.3)
RBC # BLD: 3.62 M/UL — LOW (ref 4.2–5.8)
RBC # FLD: 20.2 % — HIGH (ref 10.3–14.5)
SARS-COV-2 RNA SPEC QL NAA+PROBE: DETECTED
SODIUM SERPL-SCNC: 141 MMOL/L — SIGNIFICANT CHANGE UP (ref 135–145)
WBC # BLD: 9.73 K/UL — SIGNIFICANT CHANGE UP (ref 3.8–10.5)
WBC # FLD AUTO: 9.73 K/UL — SIGNIFICANT CHANGE UP (ref 3.8–10.5)

## 2021-02-22 PROCEDURE — 99497 ADVNCD CARE PLAN 30 MIN: CPT | Mod: 25

## 2021-02-22 PROCEDURE — 99233 SBSQ HOSP IP/OBS HIGH 50: CPT | Mod: GC

## 2021-02-22 PROCEDURE — 99232 SBSQ HOSP IP/OBS MODERATE 35: CPT | Mod: GC

## 2021-02-22 RX ORDER — ACETAMINOPHEN 500 MG
650 TABLET ORAL ONCE
Refills: 0 | Status: COMPLETED | OUTPATIENT
Start: 2021-02-22 | End: 2021-02-22

## 2021-02-22 RX ORDER — FLUTICASONE PROPIONATE 50 MCG
1 SPRAY, SUSPENSION NASAL
Refills: 0 | Status: DISCONTINUED | OUTPATIENT
Start: 2021-02-22 | End: 2021-02-25

## 2021-02-22 RX ORDER — LANOLIN ALCOHOL/MO/W.PET/CERES
3 CREAM (GRAM) TOPICAL AT BEDTIME
Refills: 0 | Status: DISCONTINUED | OUTPATIENT
Start: 2021-02-22 | End: 2021-02-25

## 2021-02-22 RX ORDER — ACETAMINOPHEN 500 MG
1000 TABLET ORAL EVERY 8 HOURS
Refills: 0 | Status: DISCONTINUED | OUTPATIENT
Start: 2021-02-22 | End: 2021-02-25

## 2021-02-22 RX ORDER — MAGNESIUM SULFATE 500 MG/ML
1 VIAL (ML) INJECTION ONCE
Refills: 0 | Status: COMPLETED | OUTPATIENT
Start: 2021-02-22 | End: 2021-02-22

## 2021-02-22 RX ADMIN — Medication 1 DROP(S): at 12:40

## 2021-02-22 RX ADMIN — Medication 25 MILLIGRAM(S): at 05:27

## 2021-02-22 RX ADMIN — Medication 3 MILLIGRAM(S): at 22:07

## 2021-02-22 RX ADMIN — BUDESONIDE AND FORMOTEROL FUMARATE DIHYDRATE 2 PUFF(S): 160; 4.5 AEROSOL RESPIRATORY (INHALATION) at 17:38

## 2021-02-22 RX ADMIN — Medication 1 DROP(S): at 22:08

## 2021-02-22 RX ADMIN — APIXABAN 5 MILLIGRAM(S): 2.5 TABLET, FILM COATED ORAL at 12:40

## 2021-02-22 RX ADMIN — PANTOPRAZOLE SODIUM 40 MILLIGRAM(S): 20 TABLET, DELAYED RELEASE ORAL at 22:07

## 2021-02-22 RX ADMIN — Medication 1 DROP(S): at 05:27

## 2021-02-22 RX ADMIN — Medication 1000 MILLIGRAM(S): at 12:40

## 2021-02-22 RX ADMIN — BUDESONIDE AND FORMOTEROL FUMARATE DIHYDRATE 2 PUFF(S): 160; 4.5 AEROSOL RESPIRATORY (INHALATION) at 05:26

## 2021-02-22 RX ADMIN — Medication 650 MILLIGRAM(S): at 08:53

## 2021-02-22 RX ADMIN — Medication 1 SPRAY(S): at 17:38

## 2021-02-22 RX ADMIN — Medication 25 MILLIGRAM(S): at 17:39

## 2021-02-22 RX ADMIN — APIXABAN 5 MILLIGRAM(S): 2.5 TABLET, FILM COATED ORAL at 22:07

## 2021-02-22 RX ADMIN — Medication 1000 MILLIGRAM(S): at 22:07

## 2021-02-22 RX ADMIN — PANTOPRAZOLE SODIUM 40 MILLIGRAM(S): 20 TABLET, DELAYED RELEASE ORAL at 12:40

## 2021-02-22 RX ADMIN — Medication 100 GRAM(S): at 08:54

## 2021-02-22 NOTE — PROGRESS NOTE ADULT - ASSESSMENT
75M PMH dementia, DM2, HTN, ?HF, HLD, recent admission for COPD pneumonia requiring intubation (11/2020 [place]), COVID in January 2021 (not intubated) currently being treated for pneumonia presents from nursing home for abdominal pain and vomiting, found to have multifocal PNA (unclear if sequela of prior COVID) and high-grade SBO. Course c/b new afib with RVR s/p amiodarone gtt. SBO now resolving.

## 2021-02-22 NOTE — PROGRESS NOTE ADULT - PROBLEM SELECTOR PLAN 2
Had been treated for PNA prior to admission, CT showing multifocal PNA, and currently on Zosyn given concern for aspiration during vomiting episodes.   -s/p zosyn

## 2021-02-22 NOTE — PROGRESS NOTE ADULT - ASSESSMENT
75M hx of dementia, DM2, HTN, ?HF, HLD, recent admission for COVID requiring intubation (11/2020 [place]), currently treated for pneumonia presents from nursing home for abdominal pain and vomiting, found to have multifocal PNA, and high-grade SBO with TP LUQ, c/f internal hernia on CT. Complicated by Afib RVR

## 2021-02-22 NOTE — DISCHARGE NOTE NURSING/CASE MANAGEMENT/SOCIAL WORK - PATIENT PORTAL LINK FT
You can access the FollowMyHealth Patient Portal offered by [facility] by registering at the following website: [email or web address]. By joining [facility]’s FollowMyHealth portal, you will also be able to view your health information using other applications (apps) compatible with our system.

## 2021-02-22 NOTE — PROGRESS NOTE ADULT - ASSESSMENT
75M PMH dementia, DM2, HTN, ?HF, HLD, recent admission for COPD pneumonia requiring intubation (11/2020 [place]), COVID in January 2021 (not intubated) currently being treated for pneumonia presents from nursing home for abdominal pain and vomiting, found to have multifocal PNA (unclear if sequela of prior COVID) and high-grade SBO being medically managed.

## 2021-02-22 NOTE — PROGRESS NOTE ADULT - PROBLEM SELECTOR PLAN 1
Per surgery, conservative management, precipitant ?hernia - no known prior abdominal surgeries  - pt now with BMs, NGT dc'd 2/20, monitor off NGT  - trend i/o  - GI ppx with Protonix  - s/p 7d course Zosyn (2/12 - 2/19)  - repeat CTAP 2/16 still with persistent SBO but less dilated, resolution of internal hernia  - tolerating regular diet  - nutrition consulted Per surgery, conservative management, precipitant ?hernia - no known prior abdominal surgeries  - pt now with BMs, NGT dc'd 2/20, monitor off NGT. tolerating regular diet  - trend i/o  - GI ppx with Protonix  - s/p 7d course Zosyn (2/12 - 2/19)  - repeat CTAP 2/16 still with persistent SBO but less dilated, resolution of internal hernia  - nutrition consulted

## 2021-02-22 NOTE — PROGRESS NOTE ADULT - SUBJECTIVE AND OBJECTIVE BOX
Catheter is repositioned to the ostium   right coronary artery. Angiography performed of the right coronary arteries in multiple views. Angiography performed via power injection with .  PROGRESS NOTE:   Authored by Dr. Rebecca Gutierrez MD, Pager [phone] [facility], [id number] [facility]     Patient is a 75y old  Male who presents with a chief complaint of abdominal pain (21 Feb 2021 10:47)      SUBJECTIVE / OVERNIGHT EVENTS: No events overnight. Happy he got to eat. Appears more alert, AAOx2-3.    ADDITIONAL REVIEW OF SYSTEMS: Denies fevers, chills, n/v.    MEDICATIONS  (STANDING):  apixaban 5 milliGRAM(s) Oral every 12 hours  artificial  tears Solution 1 Drop(s) Both EYES three times a day  budesonide 160 MICROgram(s)/formoterol 4.5 MICROgram(s) Inhaler 2 Puff(s) Inhalation two times a day  dextrose 40% Gel 15 Gram(s) Oral once  dextrose 50% Injectable 25 Gram(s) IV Push once  dextrose 50% Injectable 12.5 Gram(s) IV Push once  dextrose 50% Injectable 25 Gram(s) IV Push once  glucagon  Injectable 1 milliGRAM(s) IntraMuscular once  insulin lispro (ADMELOG) corrective regimen sliding scale   SubCutaneous Before meals and at bedtime  metoprolol tartrate 25 milliGRAM(s) Oral two times a day  pantoprazole  Injectable 40 milliGRAM(s) IV Push every 12 hours    MEDICATIONS  (PRN):  ALBUTerol    90 MICROgram(s) HFA Inhaler 1 Puff(s) Inhalation every 4 hours PRN Shortness of Breath and/or Wheezing  OLANZapine Injectable 2 milliGRAM(s) IntraMuscular every 6 hours PRN agitation      CAPILLARY BLOOD GLUCOSE      POCT Blood Glucose.: 103 mg/dL (22 Feb 2021 09:04)  POCT Blood Glucose.: 121 mg/dL (21 Feb 2021 21:18)  POCT Blood Glucose.: 136 mg/dL (21 Feb 2021 17:45)  POCT Blood Glucose.: 150 mg/dL (21 Feb 2021 12:16)    I&O's Summary    21 Feb 2021 07:01  -  22 Feb 2021 07:00  --------------------------------------------------------  IN: 1080 mL / OUT: 0 mL / NET: 1080 mL    22 Feb 2021 07:01  -  22 Feb 2021 09:45  --------------------------------------------------------  IN: 0 mL / OUT: 160 mL / NET: -160 mL        PHYSICAL EXAM:  Vital Signs Last 24 Hrs  T(C): 37.1 (22 Feb 2021 04:16), Max: 37.1 (22 Feb 2021 04:16)  T(F): 98.8 (22 Feb 2021 04:16), Max: 98.8 (22 Feb 2021 04:16)  HR: 82 (22 Feb 2021 04:16) (69 - 100)  BP: 137/76 (22 Feb 2021 04:16) (113/76 - 138/82)  BP(mean): --  RR: 18 (22 Feb 2021 04:16) (18 - 18)  SpO2: 97% (22 Feb 2021 04:16) (95% - 98%)    CONSTITUTIONAL: NAD, lying in bed comfortably  RESPIRATORY: Normal respiratory effort; CTABL posteriorly  CARDIOVASCULAR: Regular rate and rhythm, normal S1 and S2, no murmur/rub/gallop  ABDOMEN: Soft, nondistended, nontender to palpation, normoactive bowel sounds, no rebound/guarding  MUSCLOSKELETAL: no joint swelling or tenderness to palpation, FROM all extremities  NEURO: AAOx2-3  EXTREMITIES: no pedal edema  LABS:                        8.0    9.73  )-----------( 308      ( 22 Feb 2021 05:52 )             26.6     02-22    141  |  107  |  7   ----------------------------<  91  3.7   |  24  |  0.74    Ca    7.9<L>      22 Feb 2021 05:52  Phos  2.5     02-22  Mg     1.8     02-22    TPro  5.8<L>  /  Alb  2.3<L>  /  TBili  0.2  /  DBili  x   /  AST  12  /  ALT  6<L>  /  AlkPhos  64  02-22    PTT - ( 21 Feb 2021 03:44 )  PTT:140.0 sec            RADIOLOGY & ADDITIONAL TESTS:

## 2021-02-22 NOTE — PROGRESS NOTE ADULT - SUBJECTIVE AND OBJECTIVE BOX
Subjective: Patient seen and examined. No new events except as noted.   started metoprolol PO yesterday     REVIEW OF SYSTEMS:    CONSTITUTIONAL: +weakness, fevers or chills  EYES/ENT: No visual changes;  No vertigo or throat pain   NECK: No pain or stiffness  RESPIRATORY: No cough, wheezing, hemoptysis; No shortness of breath  CARDIOVASCULAR: No chest pain or palpitations  GASTROINTESTINAL: No abdominal or epigastric pain. No nausea, vomiting, or hematemesis; No diarrhea or constipation. No melena or hematochezia.  GENITOURINARY: No dysuria, frequency or hematuria  NEUROLOGICAL: No numbness or weakness  SKIN: No itching, burning, rashes, or lesions   All other review of systems is negative unless indicated above.    MEDICATIONS:  MEDICATIONS  (STANDING):  apixaban 5 milliGRAM(s) Oral every 12 hours  artificial  tears Solution 1 Drop(s) Both EYES three times a day  budesonide 160 MICROgram(s)/formoterol 4.5 MICROgram(s) Inhaler 2 Puff(s) Inhalation two times a day  dextrose 40% Gel 15 Gram(s) Oral once  dextrose 50% Injectable 25 Gram(s) IV Push once  dextrose 50% Injectable 12.5 Gram(s) IV Push once  dextrose 50% Injectable 25 Gram(s) IV Push once  glucagon  Injectable 1 milliGRAM(s) IntraMuscular once  insulin lispro (ADMELOG) corrective regimen sliding scale   SubCutaneous Before meals and at bedtime  metoprolol tartrate 25 milliGRAM(s) Oral two times a day  pantoprazole  Injectable 40 milliGRAM(s) IV Push every 12 hours      PHYSICAL EXAM:  T(C): 37.1 (02-22-21 @ 04:16), Max: 37.1 (02-22-21 @ 04:16)  HR: 82 (02-22-21 @ 04:16) (69 - 100)  BP: 137/76 (02-22-21 @ 04:16) (113/76 - 138/82)  RR: 18 (02-22-21 @ 04:16) (18 - 18)  SpO2: 97% (02-22-21 @ 04:16) (95% - 98%)  Wt(kg): --  I&O's Summary    21 Feb 2021 07:01  -  22 Feb 2021 07:00  --------------------------------------------------------  IN: 1080 mL / OUT: 0 mL / NET: 1080 mL    22 Feb 2021 07:01  -  22 Feb 2021 10:02  --------------------------------------------------------  IN: 100 mL / OUT: 160 mL / NET: -60 mL          Appearance: NAD  HEENT:  Dry oral mucosa, PERRL, EOMI	  Lymphatic: No lymphadenopathy , no edema  Cardiovascular: Normal S1 S2, No JVD, No murmurs , Peripheral pulses palpable 2+ bilaterally  Respiratory: Lungs clear to auscultation, normal effort 	  Gastrointestinal:  Soft, Non-tender, + BS	  Skin: No rashes, No ecchymoses, No cyanosis, warm to touch  Musculoskeletal: Normal range of motion, normal strength  Psychiatry:  Mood & affect appropriate  Ext: No edema      LABS:    CARDIAC MARKERS:                                8.0    9.73  )-----------( 308      ( 22 Feb 2021 05:52 )             26.6     02-22    141  |  107  |  7   ----------------------------<  91  3.7   |  24  |  0.74    Ca    7.9<L>      22 Feb 2021 05:52  Phos  2.5     02-22  Mg     1.8     02-22    TPro  5.8<L>  /  Alb  2.3<L>  /  TBili  0.2  /  DBili  x   /  AST  12  /  ALT  6<L>  /  AlkPhos  64  02-22    proBNP:   Lipid Profile:   HgA1c:   TSH:             TELEMETRY: 	SR PAc    ECG:  	  RADIOLOGY:   DIAGNOSTIC TESTING:  [ ] Echocardiogram:  [ ]  Catheterization:  [ ] Stress Test:    OTHER:

## 2021-02-22 NOTE — PROGRESS NOTE ADULT - CONVERSATION DETAILS
Pt with frequent readmissions for COPD, PNA, now with SBO and multifocal PNA. Discussed GOC with pt at bedside. He states his goal is to make it back to [place] and ultimately back home to live with his girlfriend. Right now PPSV2 low given debility from recurrent hospitalizations and inability to complete full [place]. On 2/20/2021, when faced with the prospect of requiring intubation in the future, the pt declines, and states he would not want to go through that again. However, when asked about resuscitation for cardiac arrest, he states he would want those measures even though they include intubation. Readdressed GOC 2/22, however, he was hesitant with having a discussion about code status. He indicated that "off course anyone would like to live" and that at this time there was not a situation that merit having such discussion. However, he recognized that quality of life was important for him. As an example he indicated when his wife was sick with cancer that he and his children needed to make difficult decisions since she was not having a real quality of life, "she was suffering." He designated his daughter Sienna Martinez as his 1ry HCP and his son, Broderick Davidson as his 2ry. HCP form completed and placed in chart. The patient continues to be full code. Pt with frequent readmissions for COPD, PNA, now with SBO and multifocal PNA. Discussed GOC with pt at bedside. He states his goal is to make it back to [place] and ultimately back home to live with his girlfriend. Right now PPSV2 low given debility from recurrent hospitalizations and inability to complete full [place]. On 2/19/2021, when faced with the prospect of requiring intubation in the future, the pt declines, and states he would not want to go through that again. However, when asked about resuscitation for cardiac arrest, he states he would want those measures even though they include intubation. Readdressed GOC 2/22, however, he was hesitant with having a discussion about code status. He indicated that "off course anyone would like to live" and that at this time there was not a situation that merit having such discussion. However, he recognized that quality of life was important for him. As an example he indicated when his wife was sick with cancer that he and his children needed to make difficult decisions since she was not having a real quality of life, "she was suffering." He designated his daughter Sienna Martinez as his 1ry HCP and his son, Broderick Davidson as his 2ry. HCP form completed and placed in chart. The patient continues to be full code.

## 2021-02-22 NOTE — PROGRESS NOTE ADULT - PROBLEM SELECTOR PLAN 5
Pt with frequent readmissions for COPD, PNA, now with SBO and multifocal PNA. Discussed GOC with pt at bedside. He states his goal is to make it back to [place] and ultimately back home to live with his girlfriend. Right now PPSV2 low given debility from recurrent hospitalizations and inability to complete full [place]. When faced with the prospect of requiring intubation in the future, the pt declines, and states he would not want to go through that again. However, when asked about resuscitation for cardiac arrest, he states he would want those measures even though they include intubation. Readdressed GOC 2/22 and he is still struggling with decision-making. Has designated his daughter Sienna Martinez as his HCP. HCP form completed and placed in chart. They state they will think about DNR/DNI status more but for now, they wish for pt to be full code. See GOC above.   20' were spent in ACP

## 2021-02-22 NOTE — PROGRESS NOTE ADULT - PROBLEM SELECTOR PLAN 2
New afib with RVR on 2/13 with HR 130s-150s. s/p amio load and gtt. Completed 18 hour gtt load on 2/14. Converted to SR, amio gtt now dc'd   -cards consulted, appreciate recs  -monitor on tele  - CTA chest no PE  - lopressor 25 BID  - s/p heparin gtt dc'd 2/21, start Eliquis 5 BID New afib with RVR on 2/13 with HR 130s-150s. s/p amio load and gtt. Completed 18 hour gtt load on 2/14. Converted to SR, amio gtt now dc'd   -cards consulted, appreciate recs  -monitor on tele  - CTA chest no PE  - lopressor 25 BID  - s/p heparin gtt dc'd 2/21, now on Eliquis 5 BID

## 2021-02-22 NOTE — PROGRESS NOTE ADULT - PROBLEM SELECTOR PLAN 1
Presented from [facility] with abd pain, nausea, vomiting. Found to have SBO, non-operatively managed with NGT and bowel rest. Repeat CT showed persistent SBO but SB less dilated, without internal hernia.   -Has done well with non-operative management. Is now s/p NGT, and appears to be tolerating PO and having BMs.   -care as per surgery and primary team.

## 2021-02-22 NOTE — PROGRESS NOTE ADULT - PROBLEM SELECTOR PLAN 3
not on home O2, has had prior intubation for PNA in 11/2020. On RA currently and saturating well. Responsive to therapies.

## 2021-02-22 NOTE — PROGRESS NOTE ADULT - SUBJECTIVE AND OBJECTIVE BOX
SUBJECTIVE AND OBJECTIVE:  INTERVAL HPI/OVERNIGHT EVENTS: No acute events overnight. Interval events noted. Has been tolerating PO. No complaints.     Allergies    No Known Allergies    Intolerances    MEDICATIONS  (STANDING):  acetaminophen   Tablet .. 1000 milliGRAM(s) Oral every 8 hours  apixaban 5 milliGRAM(s) Oral every 12 hours  artificial  tears Solution 1 Drop(s) Both EYES three times a day  budesonide 160 MICROgram(s)/formoterol 4.5 MICROgram(s) Inhaler 2 Puff(s) Inhalation two times a day  dextrose 40% Gel 15 Gram(s) Oral once  dextrose 50% Injectable 25 Gram(s) IV Push once  dextrose 50% Injectable 12.5 Gram(s) IV Push once  dextrose 50% Injectable 25 Gram(s) IV Push once  fluticasone propionate 50 MICROgram(s)/spray Nasal Spray 1 Spray(s) Both Nostrils two times a day  glucagon  Injectable 1 milliGRAM(s) IntraMuscular once  insulin lispro (ADMELOG) corrective regimen sliding scale   SubCutaneous Before meals and at bedtime  melatonin 3 milliGRAM(s) Oral at bedtime  metoprolol tartrate 25 milliGRAM(s) Oral two times a day  pantoprazole  Injectable 40 milliGRAM(s) IV Push every 12 hours    MEDICATIONS  (PRN):  ALBUTerol    90 MICROgram(s) HFA Inhaler 1 Puff(s) Inhalation every 4 hours PRN Shortness of Breath and/or Wheezing  OLANZapine Injectable 2 milliGRAM(s) IntraMuscular every 6 hours PRN agitation      ITEMS UNCHECKED ARE NOT PRESENT    PRESENT SYMPTOMS: []Unable to obtain due to poor mentation   Source if other than patient:  []Family   []Team      %  http://npcrc.org/files/news/palliative_performance_scale_[email or web address]    PHYSICAL EXAM:  Vital Signs Last 24 Hrs  T(C): 36.8 (22 Feb 2021 13:33), Max: 37.1 (22 Feb 2021 04:16)  T(F): 98.2 (22 Feb 2021 13:33), Max: 98.8 (22 Feb 2021 04:16)  HR: 70 (22 Feb 2021 13:33) (70 - 100)  BP: 152/78 (22 Feb 2021 13:33) (128/73 - 152/78)  BP(mean): --  RR: 19 (22 Feb 2021 13:33) (18 - 19)  SpO2: 99% (22 Feb 2021 13:33) (95% - 99%) I&O's Summary    21 Feb 2021 07:01  -  22 Feb 2021 07:00  --------------------------------------------------------  IN: 1080 mL / OUT: 0 mL / NET: 1080 mL    22 Feb 2021 07:01  -  22 Feb 2021 14:31  --------------------------------------------------------  IN: 340 mL / OUT: 160 mL / NET: 180 mL        GENERAL:  [x ]Alert  [x ]Oriented x 3  [ ]Lethargic  [ ]Cachexia  [ ]Unarousable  [x ]Verbal  [ ]Non-Verbal  Behavioral:   [ ] Anxiety  [ ] Delirium [ ] Agitation [ ] Other  HEENT:  [ ]Normal   [ ]Dry mouth   [ ]ET Tube/Trach  [ ]Oral lesions [X] NGT  PULMONARY:   [ ]Clear [ ]Tachypnea  [ ]Audible excessive secretions   [X ]Rhonchi        [ ]Right [ ]Left [ ]Bilateral  [ ]Crackles        [ ]Right [ ]Left [ ]Bilateral  [ ]Wheezing     [ ]Right [ ]Left [ ]Bilateral  [ ]Diminished breath sounds [ ]right [ ]left [ ]bilateral  CARDIOVASCULAR:    [X ]Regular [ ]Irregular [ ]Tachy  [ ]Chin [ ]Murmur [ ]Other  GASTROINTESTINAL:  [X ]Soft  [ ]Distended   [ ]+BS  [X ]Non tender [ ]Tender  [ ]PEG [X ]OGT/ NGT  Last BM: Multiple on 2/19     GENITOURINARY:  [X ]Normal [ ] Incontinent   [ ]Oliguria/Anuria   [ ]Jones  MUSCULOSKELETAL:   [X ]Normal   [ ]Weakness  [ ]Bed/Wheelchair bound [ ]Edema  NEUROLOGIC:   [X ]No focal deficits  [ ]Cognitive impairment  [ ]Dysphagia [ ]Dysarthria [ ]Paresis [ ]Other   SKIN:   [X ]Normal    [ ]Rash  [ ]Pressure ulcer(s)       Present on admission [ ]y [ ]n        LABS:                         8.0    9.73  )-----------( 308      ( 22 Feb 2021 05:52 )             26.6   02-22    141  |  107  |  7   ----------------------------<  91  3.7   |  24  |  0.74    Ca    7.9<L>      22 Feb 2021 05:52  Phos  2.5     02-22  Mg     1.8     02-22    TPro  5.8<L>  /  Alb  2.3<L>  /  TBili  0.2  /  DBili  x   /  AST  12  /  ALT  6<L>  /  AlkPhos  64  02-22  PTT - ( 21 Feb 2021 03:44 )  PTT:140.0 sec        RADIOLOGY & ADDITIONAL STUDIES: None new    PROTEIN CALORIE MALNUTRITION PRESENT: [ ]mild [ ]moderate [ ]severe [ ]underweight [ ]morbid obesity  [] PPSV2 < or = 30% [] significant weight loss [] poor nutritional intake [] anasarca [] catabolic state   Albumin, Serum: 2.3 g/dL (02-22-21 @ 05:52)   Artificial Nutrition []     REFERRALS:   []Chaplaincy  []Hospice  []Child Life  [x]Social Work  []Case management []Holistic Therapy []Physical Therapy []Dietary     Goals of Care Document:   Care Coordination Document: Progress Notes - Care Coordination [C. Provider] (02-22-21 @ 10:57)                                       Progress Notes    PROGRESS NOTE  Date & Time of Note   2021-02-22 10:52    Notes    Notes: Case management note: chart reviewed and noted, discharge plans in  progress. Upon interdisciplinary rounds, case managernotified that pt remains  acute due to continue monitoring of resolving SBO. PT recommends SHUBHAM. Pt  remains covid positive from 2/12/21, additional covid swab requested, Team &  Rebecca [phone] aware. Referral sent to [facility] postive facilites. CM attempted to  contact pt's daughter, Sienna Martinez , message left on voicemail,  callback pending. CM will continue to collaborate with interdisciplinary team  and remain available to andre       Electronically signed by:  Mollye Jerva Vahid  Electronically signed on:  2021-02-22  10:57       SUBJECTIVE AND OBJECTIVE:  INTERVAL HPI/OVERNIGHT EVENTS: No acute events overnight. Interval events noted. Has been tolerating PO. No complaints.     Allergies    No Known Allergies    Intolerances    MEDICATIONS  (STANDING):  acetaminophen   Tablet .. 1000 milliGRAM(s) Oral every 8 hours  apixaban 5 milliGRAM(s) Oral every 12 hours  artificial  tears Solution 1 Drop(s) Both EYES three times a day  budesonide 160 MICROgram(s)/formoterol 4.5 MICROgram(s) Inhaler 2 Puff(s) Inhalation two times a day  dextrose 40% Gel 15 Gram(s) Oral once  dextrose 50% Injectable 25 Gram(s) IV Push once  dextrose 50% Injectable 12.5 Gram(s) IV Push once  dextrose 50% Injectable 25 Gram(s) IV Push once  fluticasone propionate 50 MICROgram(s)/spray Nasal Spray 1 Spray(s) Both Nostrils two times a day  glucagon  Injectable 1 milliGRAM(s) IntraMuscular once  insulin lispro (ADMELOG) corrective regimen sliding scale   SubCutaneous Before meals and at bedtime  melatonin 3 milliGRAM(s) Oral at bedtime  metoprolol tartrate 25 milliGRAM(s) Oral two times a day  pantoprazole  Injectable 40 milliGRAM(s) IV Push every 12 hours    MEDICATIONS  (PRN):  ALBUTerol    90 MICROgram(s) HFA Inhaler 1 Puff(s) Inhalation every 4 hours PRN Shortness of Breath and/or Wheezing  OLANZapine Injectable 2 milliGRAM(s) IntraMuscular every 6 hours PRN agitation    PRESENT SYMPTOMS: [ ]Unable to obtain due to poor mentation   Source if other than patient:  [ ]Family   [ ]Team     Pain: [ ]yes [x ]no  QOL impact -   Location -                    Aggravating factors -  Quality -  Radiation -  Timing-  Severity (0-10 scale):  Minimal acceptable level (0-10 scale):     CPOT:    https://www.sccm.org/getattachment/sqt39v99-1u0t-9p2e-2y3j-4480b1380b4y/Critical-Care-Pain-Observation-Tool-(CPOT)      PAIN AD Score:     [email or web address] (press ctrl +  left click to view)    Dyspnea:                           [ ]Mild [ ]Moderate [ ]Severe  Anxiety:                             [ ]Mild [ ]Moderate [ ]Severe  Fatigue:                             [ ]Mild [ ]Moderate [ ]Severe  Nausea:                             [ ]Mild [ ]Moderate [ ]Severe  Loss of appetite:              [ ]Mild [ ]Moderate [ ]Severe  Constipation:                    [ ]Mild [ ]Moderate [ ]Severe    Other Symptoms:  [x ]All other review of systems negative  but as per HPI     Palliative Performance Status Version 2:    30      %    http://[email or web address]/[email or web address]_performance_scale_[email or web address]      PHYSICAL EXAM:  Vital Signs Last 24 Hrs  T(C): 36.8 (22 Feb 2021 13:33), Max: 37.1 (22 Feb 2021 04:16)  T(F): 98.2 (22 Feb 2021 13:33), Max: 98.8 (22 Feb 2021 04:16)  HR: 70 (22 Feb 2021 13:33) (70 - 100)  BP: 152/78 (22 Feb 2021 13:33) (128/73 - 152/78)  BP(mean): --  RR: 19 (22 Feb 2021 13:33) (18 - 19)  SpO2: 99% (22 Feb 2021 13:33) (95% - 99%) I&O's Summary    21 Feb 2021 07:01  -  22 Feb 2021 07:00  --------------------------------------------------------  IN: 1080 mL / OUT: 0 mL / NET: 1080 mL    22 Feb 2021 07:01  -  22 Feb 2021 14:31  --------------------------------------------------------  IN: 340 mL / OUT: 160 mL / NET: 180 mL        GENERAL:  [x ]Alert  [x ]Oriented x 3  [ ]Lethargic  [ ]Cachexia  [ ]Unarousable  [x ]Verbal  [ ]Non-Verbal  Behavioral:   [ ] Anxiety  [ ] Delirium [ ] Agitation [ ] Other  HEENT:  [ ]Normal   [ ]Dry mouth   [ ]ET Tube/Trach  [ ]Oral lesions [ ] 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   PULMONARY:   [x ]Clear [ ]Tachypnea  [ ]Audible excessive secretions   [ ]Rhonchi        [ ]Right [ ]Left [ ]Bilateral  [ ]Crackles        [ ]Right [ ]Left [ ]Bilateral  [ ]Wheezing     [ ]Right [ ]Left [ ]Bilateral  [ ]Diminished breath sounds [ ]right [ ]left [ ]bilateral  CARDIOVASCULAR:    [X ]Regular [ ]Irregular [ ]Tachy  [ ]Chin [ ]Murmur [ ]Other  GASTROINTESTINAL:  [X ]Soft  [ ]Distended   [ ]+BS  [X ]Non tender [ ]Tender  [ ]PEG [ ]OGT/ NGT  Last BM:  2/22    GENITOURINARY:  [X ]Normal [ ] Incontinent   [ ]Oliguria/Anuria   [ ]Jones  MUSCULOSKELETAL:   [X ]Normal   [ ]Weakness  [ ]Bed/Wheelchair bound [ ]Edema  NEUROLOGIC:   [X ]No focal deficits  [ ]Cognitive impairment  [ ]Dysphagia [ ]Dysarthria [ ]Paresis [ ]Other   SKIN:   [X ]Normal    [ ]Rash  [ ]Pressure ulcer(s)       Present on admission [ ]y [ ]n        LABS:                         8.0    9.73  )-----------( 308      ( 22 Feb 2021 05:52 )             26.6   02-22    141  |  107  |  7   ----------------------------<  91  3.7   |  24  |  0.74    Ca    7.9<L>      22 Feb 2021 05:52  Phos  2.5     02-22  Mg     1.8     02-22    TPro  5.8<L>  /  Alb  2.3<L>  /  TBili  0.2  /  DBili  x   /  AST  12  /  ALT  6<L>  /  AlkPhos  64  02-22  PTT - ( 21 Feb 2021 03:44 )  PTT:140.0 sec        RADIOLOGY & ADDITIONAL STUDIES: None new    PROTEIN CALORIE MALNUTRITION PRESENT: [ ]mild [ ]moderate [ ]severe [ ]underweight [ ]morbid obesity  [] PPSV2 < or = 30% [] significant weight loss [] poor nutritional intake [] anasarca [] catabolic state   Albumin, Serum: 2.3 g/dL (02-22-21 @ 05:52)   Artificial Nutrition []     REFERRALS:   []Chaplaincy  []Hospice  []Child Life  [x]Social Work  []Case management []Holistic Therapy []Physical Therapy []Dietary     Goals of Care Document:   Care Coordination Document: Progress Notes - Care Coordination [C. Provider] (02-22-21 @ 10:57)

## 2021-02-22 NOTE — PROGRESS NOTE ADULT - PROBLEM SELECTOR PLAN 10
- Diet: CLD ADAT   - DVT ppx: eliquis  - PT eval - pt lives at long term care facility [facility] - recs subacute rehab  - DM2 - home regimen 10u Basaglar qHS and SSI at nursing home - will give SSI here given poor po intake, uptitrate prn  - full code per daughter  - palliative consulted, appreciate recs - Diet: regular diet  - DVT ppx: eliquis  - PT eval - pt lives at long term care facility [facility] - recs subacute rehab  - DM2 - home regimen 10u Basaglar qHS and SSI at nursing home - will give SSI here given poor po intake, uptitrate prn  - full code per daughter  - palliative consulted, appreciate recs

## 2021-02-23 PROCEDURE — 99232 SBSQ HOSP IP/OBS MODERATE 35: CPT | Mod: GC

## 2021-02-23 RX ORDER — POLYETHYLENE GLYCOL 3350 17 G/17G
17 POWDER, FOR SOLUTION ORAL DAILY
Refills: 0 | Status: DISCONTINUED | OUTPATIENT
Start: 2021-02-23 | End: 2021-02-25

## 2021-02-23 RX ORDER — TRAMADOL HYDROCHLORIDE 50 MG/1
25 TABLET ORAL EVERY 8 HOURS
Refills: 0 | Status: DISCONTINUED | OUTPATIENT
Start: 2021-02-23 | End: 2021-02-25

## 2021-02-23 RX ADMIN — POLYETHYLENE GLYCOL 3350 17 GRAM(S): 17 POWDER, FOR SOLUTION ORAL at 12:30

## 2021-02-23 RX ADMIN — PANTOPRAZOLE SODIUM 40 MILLIGRAM(S): 20 TABLET, DELAYED RELEASE ORAL at 12:30

## 2021-02-23 RX ADMIN — BUDESONIDE AND FORMOTEROL FUMARATE DIHYDRATE 2 PUFF(S): 160; 4.5 AEROSOL RESPIRATORY (INHALATION) at 18:08

## 2021-02-23 RX ADMIN — Medication 1 SPRAY(S): at 18:08

## 2021-02-23 RX ADMIN — APIXABAN 5 MILLIGRAM(S): 2.5 TABLET, FILM COATED ORAL at 12:30

## 2021-02-23 RX ADMIN — Medication 3 MILLIGRAM(S): at 20:59

## 2021-02-23 RX ADMIN — Medication 25 MILLIGRAM(S): at 05:43

## 2021-02-23 RX ADMIN — Medication 1000 MILLIGRAM(S): at 20:58

## 2021-02-23 RX ADMIN — Medication 1 DROP(S): at 12:30

## 2021-02-23 RX ADMIN — Medication 1 DROP(S): at 20:59

## 2021-02-23 RX ADMIN — Medication 25 MILLIGRAM(S): at 18:08

## 2021-02-23 RX ADMIN — Medication 1000 MILLIGRAM(S): at 07:39

## 2021-02-23 NOTE — PROGRESS NOTE ADULT - PROBLEM SELECTOR PLAN 1
Per surgery, conservative management, precipitant ?hernia - no known prior abdominal surgeries  - pt now with BMs, NGT dc'd 2/20, monitor off NGT. tolerating regular diet  - trend i/o  - GI ppx with Protonix  - s/p 7d course Zosyn (2/12 - 2/19)  - repeat CTAP 2/16 still with persistent SBO but less dilated, resolution of internal hernia  - nutrition consulted

## 2021-02-23 NOTE — PROGRESS NOTE ADULT - PROBLEM SELECTOR PLAN 2
Had been treated for PNA prior to admission, CT showing multifocal PNA, and currently on Zosyn given concern for aspiration during vomiting episodes.   -s/p zosyn  -persistently COVID positive however satting well on RA, minimal resp symptoms currently.

## 2021-02-23 NOTE — PROGRESS NOTE ADULT - PROBLEM SELECTOR PLAN 6
The patient re-stated that his goal if for going to [facility] and then home with home care. He indicated that he worked for several years and that he has two pensions that will support him so he can have a HHA to get the nursing care he needs. He clearly stated he does not wan to go to a nursing home. If qualifying, after SHUBHAM, house calls may be a good asset for this patient.  -HCP form completed, will email to daughter/HCP Sienna. Having difficulty reaching her by phone however.

## 2021-02-23 NOTE — PROGRESS NOTE ADULT - PROBLEM SELECTOR PLAN 2
New afib with RVR on 2/13 with HR 130s-150s. s/p amio load and gtt. Completed 18 hour gtt load on 2/14. Converted to SR, amio gtt now dc'd   -cards consulted, appreciate recs  -monitor on tele  - CTA chest no PE  - lopressor 25 BID  - s/p heparin gtt dc'd 2/21, now on Eliquis 5 BID

## 2021-02-23 NOTE — PROGRESS NOTE ADULT - SUBJECTIVE AND OBJECTIVE BOX
SUBJECTIVE AND OBJECTIVE:  INTERVAL HPI/OVERNIGHT EVENTS: No acute events overnight. Interval events noted.     Allergies    No Known Allergies    Intolerances    MEDICATIONS  (STANDING):  acetaminophen   Tablet .. 1000 milliGRAM(s) Oral every 8 hours  apixaban 5 milliGRAM(s) Oral every 12 hours  artificial  tears Solution 1 Drop(s) Both EYES three times a day  budesonide 160 MICROgram(s)/formoterol 4.5 MICROgram(s) Inhaler 2 Puff(s) Inhalation two times a day  dextrose 40% Gel 15 Gram(s) Oral once  dextrose 50% Injectable 25 Gram(s) IV Push once  dextrose 50% Injectable 12.5 Gram(s) IV Push once  dextrose 50% Injectable 25 Gram(s) IV Push once  fluticasone propionate 50 MICROgram(s)/spray Nasal Spray 1 Spray(s) Both Nostrils two times a day  glucagon  Injectable 1 milliGRAM(s) IntraMuscular once  insulin lispro (ADMELOG) corrective regimen sliding scale   SubCutaneous Before meals and at bedtime  melatonin 3 milliGRAM(s) Oral at bedtime  metoprolol tartrate 25 milliGRAM(s) Oral two times a day  pantoprazole  Injectable 40 milliGRAM(s) IV Push every 12 hours  polyethylene glycol 3350 17 Gram(s) Oral daily    MEDICATIONS  (PRN):  ALBUTerol    90 MICROgram(s) HFA Inhaler 1 Puff(s) Inhalation every 4 hours PRN Shortness of Breath and/or Wheezing  OLANZapine Injectable 2 milliGRAM(s) IntraMuscular every 6 hours PRN agitation      ITEMS UNCHECKED ARE NOT PRESENT    PRESENT SYMPTOMS: []Unable to obtain due to poor mentation   Source if other than patient:  []Family   []Team      %  http://npcrc.org/files/news/[email or web address]_performance_scale_[email or web address]    PHYSICAL EXAM:  Vital Signs Last 24 Hrs  T(C): 36.8 (23 Feb 2021 04:57), Max: 36.8 (22 Feb 2021 13:33)  T(F): 98.2 (23 Feb 2021 04:57), Max: 98.2 (22 Feb 2021 13:33)  HR: 67 (23 Feb 2021 04:57) (67 - 73)  BP: 127/65 (23 Feb 2021 04:57) (127/65 - 152/78)  BP(mean): --  RR: 18 (23 Feb 2021 04:57) (18 - 19)  SpO2: 96% (23 Feb 2021 04:57) (95% - 99%) I&O's Summary    22 Feb 2021 07:01  -  23 Feb 2021 07:00  --------------------------------------------------------  IN: 580 mL / OUT: 360 mL / NET: 220 mL    23 Feb 2021 07:01  -  23 Feb 2021 10:11  --------------------------------------------------------  IN: 60 mL / OUT: 0 mL / NET: 60 mL      GENERAL:  [x ]Alert  [x ]Oriented x 3  [ ]Lethargic  [ ]Cachexia  [ ]Unarousable  [x ]Verbal  [ ]Non-Verbal  Behavioral:   [ ] Anxiety  [ ] Delirium [ ] Agitation [ ] Other  HEENT:  [ ]Normal   [ ]Dry mouth   [ ]ET Tube/Trach  [ ]Oral lesions [ ] 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   PULMONARY:   [x ]Clear [ ]Tachypnea  [ ]Audible excessive secretions   [ ]Rhonchi        [ ]Right [ ]Left [ ]Bilateral  [ ]Crackles        [ ]Right [ ]Left [ ]Bilateral  [ ]Wheezing     [ ]Right [ ]Left [ ]Bilateral  [ ]Diminished breath sounds [ ]right [ ]left [ ]bilateral  CARDIOVASCULAR:    [X ]Regular [ ]Irregular [ ]Tachy  [ ]Chin [ ]Murmur [ ]Other  GASTROINTESTINAL:  [X ]Soft  [ ]Distended   [ ]+BS  [X ]Non tender [ ]Tender  [ ]PEG [ ]OGT/ NGT  Last BM:  2/22    GENITOURINARY:  [X ]Normal [ ] Incontinent   [ ]Oliguria/Anuria   [ ]Jones  MUSCULOSKELETAL:   [X ]Normal   [ ]Weakness  [ ]Bed/Wheelchair bound [ ]Edema  NEUROLOGIC:   [X ]No focal deficits  [ ]Cognitive impairment  [ ]Dysphagia [ ]Dysarthria [ ]Paresis [ ]Other   SKIN:   [X ]Normal    [ ]Rash  [ ]Pressure ulcer(s)       Present on admission [ ]y [ ]n      LABS:                         8.0    9.73  )-----------( 308      ( 22 Feb 2021 05:52 )             26.6   02-22    141  |  107  |  7   ----------------------------<  91  3.7   |  24  |  0.74    Ca    7.9<L>      22 Feb 2021 05:52  Phos  2.5     02-22  Mg     1.8     02-22    TPro  5.8<L>  /  Alb  2.3<L>  /  TBili  0.2  /  DBili  x   /  AST  12  /  ALT  6<L>  /  AlkPhos  64  02-22          RADIOLOGY & ADDITIONAL STUDIES: None new    PROTEIN CALORIE MALNUTRITION PRESENT: [ ]mild [ ]moderate [ ]severe [ ]underweight [ ]morbid obesity  [] PPSV2 < or = 30% [] significant weight loss [] poor nutritional intake [] anasarca [] catabolic state   Albumin, Serum: 2.3 g/dL (02-22-21 @ 05:52)   Artificial Nutrition []     REFERRALS:   []Chaplaincy  []Hospice  []Child Life  [x]Social Work  []Case management []Holistic Therapy []Physical Therapy []Dietary     Goals of Care Document:   Care Coordination Document: Progress Notes - Care Coordination [C. Provider] (02-22-21 @ 10:57)                                       Progress Notes    PROGRESS NOTE  Date & Time of Note   2021-02-22 10:52    Notes    Notes: Case management note: chart reviewed and noted, discharge plans in  progress. Upon interdisciplinary rounds, case managernotified that pt remains  acute due to continue monitoring of resolving SBO. PT recommends SHUBHAM. Pt  remains covid positive from 2/12/21, additional covid swab requested, Team &  Rebecca [phone] aware. Referral sent to [facility] postive facilites. CM attempted to  contact pt's daughter, Sienna Martinez , message left on voicemail,  callback pending. CM will continue to collaborate with interdisciplinary team  and remain available to [email or web address]       Electronically signed by:  Tania Redding  Electronically signed on:  2021-02-22  10:57       SUBJECTIVE AND OBJECTIVE:  INTERVAL HPI/OVERNIGHT EVENTS: No acute events overnight. Pt seen and examined at bedside. No nausea, vomiting, or abd pain. Tolerating diet.     Allergies    No Known Allergies    Intolerances    MEDICATIONS  (STANDING):  acetaminophen   Tablet .. 1000 milliGRAM(s) Oral every 8 hours  apixaban 5 milliGRAM(s) Oral every 12 hours  artificial  tears Solution 1 Drop(s) Both EYES three times a day  budesonide 160 MICROgram(s)/formoterol 4.5 MICROgram(s) Inhaler 2 Puff(s) Inhalation two times a day  dextrose 40% Gel 15 Gram(s) Oral once  dextrose 50% Injectable 25 Gram(s) IV Push once  dextrose 50% Injectable 12.5 Gram(s) IV Push once  dextrose 50% Injectable 25 Gram(s) IV Push once  fluticasone propionate 50 MICROgram(s)/spray Nasal Spray 1 Spray(s) Both Nostrils two times a day  glucagon  Injectable 1 milliGRAM(s) IntraMuscular once  insulin lispro (ADMELOG) corrective regimen sliding scale   SubCutaneous Before meals and at bedtime  melatonin 3 milliGRAM(s) Oral at bedtime  metoprolol tartrate 25 milliGRAM(s) Oral two times a day  pantoprazole  Injectable 40 milliGRAM(s) IV Push every 12 hours  polyethylene glycol 3350 17 Gram(s) Oral daily    MEDICATIONS  (PRN):  ALBUTerol    90 MICROgram(s) HFA Inhaler 1 Puff(s) Inhalation every 4 hours PRN Shortness of Breath and/or Wheezing  OLANZapine Injectable 2 milliGRAM(s) IntraMuscular every 6 hours PRN agitation      ITEMS UNCHECKED ARE NOT PRESENT    PRESENT SYMPTOMS: []Unable to obtain due to poor mentation   Source if other than patient:  []Family   []Team      %  [email or web address]/files/[email or web address]    PHYSICAL EXAM:  Vital Signs Last 24 Hrs  T(C): 36.8 (23 Feb 2021 04:57), Max: 36.8 (22 Feb 2021 13:33)  T(F): 98.2 (23 Feb 2021 04:57), Max: 98.2 (22 Feb 2021 13:33)  HR: 67 (23 Feb 2021 04:57) (67 - 73)  BP: 127/65 (23 Feb 2021 04:57) (127/65 - 152/78)  BP(mean): --  RR: 18 (23 Feb 2021 04:57) (18 - 19)  SpO2: 96% (23 Feb 2021 04:57) (95% - 99%) I&O's Summary    22 Feb 2021 07:01  -  23 Feb 2021 07:00  --------------------------------------------------------  IN: 580 mL / OUT: 360 mL / NET: 220 mL    23 Feb 2021 07:01  -  23 Feb 2021 10:11  --------------------------------------------------------  IN: 60 mL / OUT: 0 mL / NET: 60 mL      GENERAL:  [x ]Alert  [x ]Oriented x 3  [ ]Lethargic  [ ]Cachexia  [ ]Unarousable  [x ]Verbal  [ ]Non-Verbal  Behavioral:   [ ] Anxiety  [ ] Delirium [ ] Agitation [ ] Other  HEENT:  [ ]Normal   [ ]Dry mouth   [ ]ET Tube/Trach  [ ]Oral lesions [ ] 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   PULMONARY:   [x ]Clear [ ]Tachypnea  [ ]Audible excessive secretions   [ ]Rhonchi        [ ]Right [ ]Left [ ]Bilateral  [ ]Crackles        [ ]Right [ ]Left [ ]Bilateral  [ ]Wheezing     [ ]Right [ ]Left [ ]Bilateral  [ ]Diminished breath sounds [ ]right [ ]left [ ]bilateral  CARDIOVASCULAR:    [X ]Regular [ ]Irregular [ ]Tachy  [ ]Chin [ ]Murmur [ ]Other  GASTROINTESTINAL:  [X ]Soft  [ ]Distended   [ ]+BS  [X ]Non tender [ ]Tender  [ ]PEG [ ]OGT/ NGT  Last BM:  2/22    GENITOURINARY:  [X ]Normal [ ] Incontinent   [ ]Oliguria/Anuria   [ ]Jones  MUSCULOSKELETAL:   [X ]Normal   [ ]Weakness  [ ]Bed/Wheelchair bound [ ]Edema  NEUROLOGIC:   [X ]No focal deficits  [ ]Cognitive impairment  [ ]Dysphagia [ ]Dysarthria [ ]Paresis [ ]Other   SKIN:   [X ]Normal    [ ]Rash  [ ]Pressure ulcer(s)       Present on admission [ ]y [ ]n      LABS:                         8.0    9.73  )-----------( 308      ( 22 Feb 2021 05:52 )             26.6   02-22    141  |  107  |  7   ----------------------------<  91  3.7   |  24  |  0.74    Ca    7.9<L>      22 Feb 2021 05:52  Phos  2.5     02-22  Mg     1.8     02-22    TPro  5.8<L>  /  Alb  2.3<L>  /  TBili  0.2  /  DBili  x   /  AST  12  /  ALT  6<L>  /  AlkPhos  64  02-22          RADIOLOGY & ADDITIONAL STUDIES: None new    PROTEIN CALORIE MALNUTRITION PRESENT: [ ]mild [ ]moderate [ ]severe [ ]underweight [ ]morbid obesity  [] PPSV2 < or = 30% [] significant weight loss [] poor nutritional intake [] anasarca [] catabolic state   Albumin, Serum: 2.3 g/dL (02-22-21 @ 05:52)   Artificial Nutrition []     REFERRALS:   []Chaplaincy  []Hospice  []Child Life  [x]Social Work  []Case management []Holistic Therapy []Physical Therapy []Dietary     Goals of Care Document:   Care Coordination Document: Progress Notes - Care Coordination [C. Provider] (02-22-21 @ 10:57)                                       Progress Notes    PROGRESS NOTE  Date & Time of Note   2021-02-22 10:52    Notes    Notes: Case management note: chart reviewed and noted, discharge plans in  progress. Upon interdisciplinary rounds, case managernotified that pt remains  acute due to continue monitoring of resolving SBO. PT recommends SHUBHAM. Pt  remains covid positive from 2/12/21, additional covid swab requested, Team &  Rebecca [phone] aware. [facility] facilites. CM attempted to  contact pt's daughter, Sienna Martinez , message left on voicemail,  callback pending. CM will continue to collaborate with interdisciplinary team  and remain available to andre       Electronically signed by:  aTnia Redding  Electronically signed on:  2021-02-22  10:57

## 2021-02-23 NOTE — PROGRESS NOTE ADULT - ASSESSMENT
75M hx of dementia, DM2, HTN, ?HF, HLD, recent admission for COVID requiring intubation (11/2020 [place]), currently treated for pneumonia presents from nursing home for abdominal pain and vomiting, found to have multifocal PNA, and high-grade SBO with TP LUQ, c/f internal hernia on CT. Complicated by Afib RVR

## 2021-02-23 NOTE — PROGRESS NOTE ADULT - SUBJECTIVE AND OBJECTIVE BOX
Subjective: Patient seen and examined. No new events except as noted.     REVIEW OF SYSTEMS:    CONSTITUTIONAL: + weakness, fevers or chills  EYES/ENT: No visual changes;  No vertigo or throat pain   NECK: No pain or stiffness  RESPIRATORY: No cough, wheezing, hemoptysis; No shortness of breath  CARDIOVASCULAR: No chest pain or palpitations  GASTROINTESTINAL: No abdominal or epigastric pain. No nausea, vomiting, or hematemesis; No diarrhea or constipation. No melena or hematochezia.  GENITOURINARY: No dysuria, frequency or hematuria  NEUROLOGICAL: No numbness or weakness  SKIN: No itching, burning, rashes, or lesions   All other review of systems is negative unless indicated above.    MEDICATIONS:  MEDICATIONS  (STANDING):  acetaminophen   Tablet .. 1000 milliGRAM(s) Oral every 8 hours  apixaban 5 milliGRAM(s) Oral every 12 hours  artificial  tears Solution 1 Drop(s) Both EYES three times a day  budesonide 160 MICROgram(s)/formoterol 4.5 MICROgram(s) Inhaler 2 Puff(s) Inhalation two times a day  dextrose 40% Gel 15 Gram(s) Oral once  dextrose 50% Injectable 25 Gram(s) IV Push once  dextrose 50% Injectable 12.5 Gram(s) IV Push once  dextrose 50% Injectable 25 Gram(s) IV Push once  fluticasone propionate 50 MICROgram(s)/spray Nasal Spray 1 Spray(s) Both Nostrils two times a day  glucagon  Injectable 1 milliGRAM(s) IntraMuscular once  insulin lispro (ADMELOG) corrective regimen sliding scale   SubCutaneous Before meals and at bedtime  melatonin 3 milliGRAM(s) Oral at bedtime  metoprolol tartrate 25 milliGRAM(s) Oral two times a day  pantoprazole  Injectable 40 milliGRAM(s) IV Push every 12 hours  polyethylene glycol 3350 17 Gram(s) Oral daily      PHYSICAL EXAM:  T(C): 36.8 (02-23-21 @ 04:57), Max: 36.8 (02-22-21 @ 13:33)  HR: 67 (02-23-21 @ 04:57) (67 - 73)  BP: 127/65 (02-23-21 @ 04:57) (127/65 - 152/78)  RR: 18 (02-23-21 @ 04:57) (18 - 19)  SpO2: 96% (02-23-21 @ 04:57) (95% - 99%)  Wt(kg): --  I&O's Summary    22 Feb 2021 07:01  -  23 Feb 2021 07:00  --------------------------------------------------------  IN: 580 mL / OUT: 360 mL / NET: 220 mL    23 Feb 2021 07:01  -  23 Feb 2021 10:50  --------------------------------------------------------  IN: 300 mL / OUT: 0 mL / NET: 300 mL          Appearance: NAD  HEENT:   Dry  oral mucosa, PERRL, EOMI	  Lymphatic: No lymphadenopathy , no edema  Cardiovascular: Normal S1 S2, No JVD, No murmurs , Peripheral pulses palpable 2+ bilaterally  Respiratory: Lungs clear to auscultation, normal effort 	  Gastrointestinal:  Soft, Non-tender, + BS	  Skin: No rashes, No ecchymoses, No cyanosis, warm to touch  Musculoskeletal: Normal range of motion, normal strength  Psychiatry:  Mood & affect appropriate  Ext: No edema      LABS:    CARDIAC MARKERS:                                8.0    9.73  )-----------( 308      ( 22 Feb 2021 05:52 )             26.6     02-22    141  |  107  |  7   ----------------------------<  91  3.7   |  24  |  0.74    Ca    7.9<L>      22 Feb 2021 05:52  Phos  2.5     02-22  Mg     1.8     02-22    TPro  5.8<L>  /  Alb  2.3<L>  /  TBili  0.2  /  DBili  x   /  AST  12  /  ALT  6<L>  /  AlkPhos  64  02-22    proBNP:   Lipid Profile:   HgA1c:   TSH:             TELEMETRY: 	    ECG:  	  RADIOLOGY:   DIAGNOSTIC TESTING:  [ ] Echocardiogram:  [ ]  Catheterization:  [ ] Stress Test:    OTHER:

## 2021-02-23 NOTE — PROGRESS NOTE ADULT - SUBJECTIVE AND OBJECTIVE BOX
PROGRESS NOTE:   Authored by Dr. Rebecca Gutierrez MD, Pager [phone] [facility], [address] [facility]     Patient is a 75y old  Male who presents with a chief complaint of abdominal pain (22 Feb 2021 14:31)      SUBJECTIVE / OVERNIGHT EVENTS: No events overnight.    ADDITIONAL REVIEW OF SYSTEMS: Denies fevers, chills, n/v.    MEDICATIONS  (STANDING):  acetaminophen   Tablet .. 1000 milliGRAM(s) Oral every 8 hours  apixaban 5 milliGRAM(s) Oral every 12 hours  artificial  tears Solution 1 Drop(s) Both EYES three times a day  budesonide 160 MICROgram(s)/formoterol 4.5 MICROgram(s) Inhaler 2 Puff(s) Inhalation two times a day  dextrose 40% Gel 15 Gram(s) Oral once  dextrose 50% Injectable 25 Gram(s) IV Push once  dextrose 50% Injectable 12.5 Gram(s) IV Push once  dextrose 50% Injectable 25 Gram(s) IV Push once  fluticasone propionate 50 MICROgram(s)/spray Nasal Spray 1 Spray(s) Both Nostrils two times a day  glucagon  Injectable 1 milliGRAM(s) IntraMuscular once  insulin lispro (ADMELOG) corrective regimen sliding scale   SubCutaneous Before meals and at bedtime  melatonin 3 milliGRAM(s) Oral at bedtime  metoprolol tartrate 25 milliGRAM(s) Oral two times a day  pantoprazole  Injectable 40 milliGRAM(s) IV Push every 12 hours    MEDICATIONS  (PRN):  ALBUTerol    90 MICROgram(s) HFA Inhaler 1 Puff(s) Inhalation every 4 hours PRN Shortness of Breath and/or Wheezing  OLANZapine Injectable 2 milliGRAM(s) IntraMuscular every 6 hours PRN agitation      CAPILLARY BLOOD GLUCOSE      POCT Blood Glucose.: 91 mg/dL (22 Feb 2021 20:59)  POCT Blood Glucose.: 132 mg/dL (22 Feb 2021 18:09)  POCT Blood Glucose.: 129 mg/dL (22 Feb 2021 12:42)  POCT Blood Glucose.: 103 mg/dL (22 Feb 2021 09:04)    I&O's Summary    22 Feb 2021 07:01  -  23 Feb 2021 07:00  --------------------------------------------------------  IN: 580 mL / OUT: 360 mL / NET: 220 mL        PHYSICAL EXAM:  Vital Signs Last 24 Hrs  T(C): 36.8 (23 Feb 2021 04:57), Max: 36.8 (22 Feb 2021 13:33)  T(F): 98.2 (23 Feb 2021 04:57), Max: 98.2 (22 Feb 2021 13:33)  HR: 67 (23 Feb 2021 04:57) (67 - 73)  BP: 127/65 (23 Feb 2021 04:57) (127/65 - 152/78)  BP(mean): --  RR: 18 (23 Feb 2021 04:57) (18 - 19)  SpO2: 96% (23 Feb 2021 04:57) (95% - 99%)    CONSTITUTIONAL: NAD, lying in bed comfortably  RESPIRATORY: Normal respiratory effort; CTABL posteriorly  CARDIOVASCULAR: Regular rate and rhythm, normal S1 and S2, no murmur/rub/gallop  ABDOMEN: Soft, nondistended, nontender to palpation, normoactive bowel sounds, no rebound/guarding  MUSCLOSKELETAL: no joint swelling or tenderness to palpation, FROM all extremities  NEURO: AAOx2-3  EXTREMITIES: no pedal edema  LABS:    LABS:                        8.0    9.73  )-----------( 308      ( 22 Feb 2021 05:52 )             26.6     02-22    141  |  107  |  7   ----------------------------<  91  3.7   |  24  |  0.74    Ca    7.9<L>      22 Feb 2021 05:52  Phos  2.5     02-22  Mg     1.8     02-22    TPro  5.8<L>  /  Alb  2.3<L>  /  TBili  0.2  /  DBili  x   /  AST  12  /  ALT  6<L>  /  AlkPhos  64  02-22                RADIOLOGY & ADDITIONAL TESTS:     PROGRESS NOTE:   Authored by Dr. Rebecca Gutierrez MD, Pager [phone] [facility], [address] [facility]     Patient is a 75y old  Male who presents with a chief complaint of abdominal pain (22 Feb 2021 14:31)      SUBJECTIVE / OVERNIGHT EVENTS: No events overnight. No complaints this AM, no abd pain.     ADDITIONAL REVIEW OF SYSTEMS: Denies fevers, chills, n/v.    MEDICATIONS  (STANDING):  acetaminophen   Tablet .. 1000 milliGRAM(s) Oral every 8 hours  apixaban 5 milliGRAM(s) Oral every 12 hours  artificial  tears Solution 1 Drop(s) Both EYES three times a day  budesonide 160 MICROgram(s)/formoterol 4.5 MICROgram(s) Inhaler 2 Puff(s) Inhalation two times a day  dextrose 40% Gel 15 Gram(s) Oral once  dextrose 50% Injectable 25 Gram(s) IV Push once  dextrose 50% Injectable 12.5 Gram(s) IV Push once  dextrose 50% Injectable 25 Gram(s) IV Push once  fluticasone propionate 50 MICROgram(s)/spray Nasal Spray 1 Spray(s) Both Nostrils two times a day  glucagon  Injectable 1 milliGRAM(s) IntraMuscular once  insulin lispro (ADMELOG) corrective regimen sliding scale   SubCutaneous Before meals and at bedtime  melatonin 3 milliGRAM(s) Oral at bedtime  metoprolol tartrate 25 milliGRAM(s) Oral two times a day  pantoprazole  Injectable 40 milliGRAM(s) IV Push every 12 hours    MEDICATIONS  (PRN):  ALBUTerol    90 MICROgram(s) HFA Inhaler 1 Puff(s) Inhalation every 4 hours PRN Shortness of Breath and/or Wheezing  OLANZapine Injectable 2 milliGRAM(s) IntraMuscular every 6 hours PRN agitation      CAPILLARY BLOOD GLUCOSE      POCT Blood Glucose.: 91 mg/dL (22 Feb 2021 20:59)  POCT Blood Glucose.: 132 mg/dL (22 Feb 2021 18:09)  POCT Blood Glucose.: 129 mg/dL (22 Feb 2021 12:42)  POCT Blood Glucose.: 103 mg/dL (22 Feb 2021 09:04)    I&O's Summary    22 Feb 2021 07:01  -  23 Feb 2021 07:00  --------------------------------------------------------  IN: 580 mL / OUT: 360 mL / NET: 220 mL        PHYSICAL EXAM:  Vital Signs Last 24 Hrs  T(C): 36.8 (23 Feb 2021 04:57), Max: 36.8 (22 Feb 2021 13:33)  T(F): 98.2 (23 Feb 2021 04:57), Max: 98.2 (22 Feb 2021 13:33)  HR: 67 (23 Feb 2021 04:57) (67 - 73)  BP: 127/65 (23 Feb 2021 04:57) (127/65 - 152/78)  BP(mean): --  RR: 18 (23 Feb 2021 04:57) (18 - 19)  SpO2: 96% (23 Feb 2021 04:57) (95% - 99%)    CONSTITUTIONAL: NAD, lying in bed comfortably  RESPIRATORY: Normal respiratory effort; CTABL posteriorly  CARDIOVASCULAR: Regular rate and rhythm, normal S1 and S2, no murmur/rub/gallop  ABDOMEN: Soft, nondistended, nontender to palpation, normoactive bowel sounds, no rebound/guarding  MUSCLOSKELETAL: no joint swelling or tenderness to palpation, FROM all extremities  NEURO: AAOx2-3  EXTREMITIES: no pedal edema  LABS:    LABS:                        8.0    9.73  )-----------( 308      ( 22 Feb 2021 05:52 )             26.6     02-22    141  |  107  |  7   ----------------------------<  91  3.7   |  24  |  0.74    Ca    7.9<L>      22 Feb 2021 05:52  Phos  2.5     02-22  Mg     1.8     02-22    TPro  5.8<L>  /  Alb  2.3<L>  /  TBili  0.2  /  DBili  x   /  AST  12  /  ALT  6<L>  /  AlkPhos  64  02-22                RADIOLOGY & ADDITIONAL TESTS:

## 2021-02-23 NOTE — PROGRESS NOTE ADULT - PROBLEM SELECTOR PLAN 10
- Diet: regular diet  - DVT ppx: eliquis  - PT eval - recs subacute rehab, pt does not want to go to nursing home, wants to go back home. Daughter also does not want pt to go back to [facility]  - DM2 - home regimen 10u Basaglar qHS and SSI at nursing home - will give SSI here given poor po intake, uptitrate prn  - full code per daughter  - palliative consulted, appreciate recs

## 2021-02-23 NOTE — PROGRESS NOTE ADULT - ASSESSMENT
75M PMH dementia, DM2, HTN, ?HF, HLD, recent admission for COPD pneumonia requiring intubation (11/2020 [place]), COVID in January 2021 (not intubated) currently being treated for pneumonia presents from nursing home for abdominal pain and vomiting, found to have multifocal PNA (unclear if sequela of prior COVID) and high-grade SBO being medically managed.

## 2021-02-23 NOTE — PROGRESS NOTE ADULT - PROBLEM SELECTOR PLAN 1
Presented from [facility] with abd pain, nausea, vomiting. Found to have SBO, non-operatively managed with NGT and bowel rest. Repeat CT showed persistent SBO but SB less dilated, without internal hernia.   -Has done well with non-operative management. Is now s/p NGT, and appears to be tolerating PO and having BMs.   -care as per surgery and primary team.

## 2021-02-23 NOTE — PROGRESS NOTE ADULT - ASSESSMENT
75M PMH dementia, DM2, HTN, ?HF, HLD, recent admission for COPD pneumonia requiring intubation (11/2020 [place]), COVID in January 2021 (not intubated) currently being treated for pneumonia presents from nursing home for abdominal pain and vomiting, found to have multifocal PNA (unclear if sequela of prior COVID) and high-grade SBO. Course c/b new afib with RVR s/p amiodarone gtt. SBO now resolving.  75M PMH dementia, DM2, HTN, ?HF, HLD, recent admission for COPD pneumonia requiring intubation (11/2020 [place]), COVID in January 2021 (not intubated) currently being treated for pneumonia presents from nursing home for abdominal pain and vomiting, found to have multifocal PNA (unclear if sequela of prior COVID) and high-grade SBO. Course c/b new afib with RVR s/p amiodarone gtt. SBO now resolved.

## 2021-02-24 LAB
ALBUMIN SERPL ELPH-MCNC: 2.2 G/DL — LOW (ref 3.3–5)
ALP SERPL-CCNC: 74 U/L — SIGNIFICANT CHANGE UP (ref 40–120)
ALT FLD-CCNC: 8 U/L — LOW (ref 10–45)
ANION GAP SERPL CALC-SCNC: 11 MMOL/L — SIGNIFICANT CHANGE UP (ref 5–17)
AST SERPL-CCNC: 16 U/L — SIGNIFICANT CHANGE UP (ref 10–40)
BILIRUB SERPL-MCNC: 0.2 MG/DL — SIGNIFICANT CHANGE UP (ref 0.2–1.2)
BUN SERPL-MCNC: 10 MG/DL — SIGNIFICANT CHANGE UP (ref 7–23)
CALCIUM SERPL-MCNC: 8.4 MG/DL — SIGNIFICANT CHANGE UP (ref 8.4–10.5)
CHLORIDE SERPL-SCNC: 108 MMOL/L — SIGNIFICANT CHANGE UP (ref 96–108)
CO2 SERPL-SCNC: 19 MMOL/L — LOW (ref 22–31)
CREAT SERPL-MCNC: 0.68 MG/DL — SIGNIFICANT CHANGE UP (ref 0.5–1.3)
GLUCOSE SERPL-MCNC: 83 MG/DL — SIGNIFICANT CHANGE UP (ref 70–99)
HCT VFR BLD CALC: 32.6 % — LOW (ref 39–50)
HGB BLD-MCNC: 9.6 G/DL — LOW (ref 13–17)
MAGNESIUM SERPL-MCNC: 1.7 MG/DL — SIGNIFICANT CHANGE UP (ref 1.6–2.6)
MCHC RBC-ENTMCNC: 22.9 PG — LOW (ref 27–34)
MCHC RBC-ENTMCNC: 29.4 GM/DL — LOW (ref 32–36)
MCV RBC AUTO: 77.8 FL — LOW (ref 80–100)
NRBC # BLD: 0 /100 WBCS — SIGNIFICANT CHANGE UP (ref 0–0)
PHOSPHATE SERPL-MCNC: 3 MG/DL — SIGNIFICANT CHANGE UP (ref 2.5–4.5)
PLATELET # BLD AUTO: 336 K/UL — SIGNIFICANT CHANGE UP (ref 150–400)
POTASSIUM SERPL-MCNC: 4.9 MMOL/L — SIGNIFICANT CHANGE UP (ref 3.5–5.3)
POTASSIUM SERPL-SCNC: 4.9 MMOL/L — SIGNIFICANT CHANGE UP (ref 3.5–5.3)
PROT SERPL-MCNC: 6 G/DL — SIGNIFICANT CHANGE UP (ref 6–8.3)
RBC # BLD: 4.19 M/UL — LOW (ref 4.2–5.8)
RBC # FLD: 21.3 % — HIGH (ref 10.3–14.5)
SODIUM SERPL-SCNC: 138 MMOL/L — SIGNIFICANT CHANGE UP (ref 135–145)
WBC # BLD: 7.33 K/UL — SIGNIFICANT CHANGE UP (ref 3.8–10.5)
WBC # FLD AUTO: 7.33 K/UL — SIGNIFICANT CHANGE UP (ref 3.8–10.5)

## 2021-02-24 PROCEDURE — 99239 HOSP IP/OBS DSCHRG MGMT >30: CPT

## 2021-02-24 RX ORDER — FOLIC ACID 0.8 MG
1 TABLET ORAL
Qty: 0 | Refills: 0 | DISCHARGE

## 2021-02-24 RX ORDER — SENNA PLUS 8.6 MG/1
2 TABLET ORAL
Qty: 0 | Refills: 0 | DISCHARGE

## 2021-02-24 RX ORDER — FUROSEMIDE 40 MG
1 TABLET ORAL
Qty: 0 | Refills: 0 | DISCHARGE

## 2021-02-24 RX ORDER — QUETIAPINE FUMARATE 200 MG/1
1 TABLET, FILM COATED ORAL
Qty: 0 | Refills: 0 | DISCHARGE

## 2021-02-24 RX ORDER — ACETAMINOPHEN 500 MG
2 TABLET ORAL
Qty: 0 | Refills: 0 | DISCHARGE

## 2021-02-24 RX ORDER — BUDESONIDE AND FORMOTEROL FUMARATE DIHYDRATE 160; 4.5 UG/1; UG/1
2 AEROSOL RESPIRATORY (INHALATION)
Qty: 0 | Refills: 0 | DISCHARGE

## 2021-02-24 RX ORDER — FERROUS SULFATE 325(65) MG
1 TABLET ORAL
Qty: 0 | Refills: 0 | DISCHARGE

## 2021-02-24 RX ORDER — ASCORBIC ACID 60 MG
1 TABLET,CHEWABLE ORAL
Qty: 0 | Refills: 0 | DISCHARGE

## 2021-02-24 RX ORDER — ALBUTEROL 90 UG/1
1 AEROSOL, METERED ORAL
Qty: 0 | Refills: 0 | DISCHARGE

## 2021-02-24 RX ORDER — FLUTICASONE PROPIONATE 50 MCG
1 SPRAY, SUSPENSION NASAL
Qty: 0 | Refills: 0 | DISCHARGE

## 2021-02-24 RX ORDER — METOPROLOL TARTRATE 50 MG
1 TABLET ORAL
Qty: 0 | Refills: 0 | DISCHARGE

## 2021-02-24 RX ORDER — BUDESONIDE AND FORMOTEROL FUMARATE DIHYDRATE 160; 4.5 UG/1; UG/1
2 AEROSOL RESPIRATORY (INHALATION)
Qty: 0 | Refills: 0 | DISCHARGE
Start: 2021-02-24

## 2021-02-24 RX ORDER — LOSARTAN POTASSIUM 100 MG/1
1 TABLET, FILM COATED ORAL
Qty: 0 | Refills: 0 | DISCHARGE

## 2021-02-24 RX ORDER — ATORVASTATIN CALCIUM 80 MG/1
1 TABLET, FILM COATED ORAL
Qty: 0 | Refills: 0 | DISCHARGE

## 2021-02-24 RX ORDER — UMECLIDINIUM 62.5 UG/1
1 AEROSOL, POWDER ORAL
Qty: 0 | Refills: 0 | DISCHARGE

## 2021-02-24 RX ORDER — LANOLIN ALCOHOL/MO/W.PET/CERES
1 CREAM (GRAM) TOPICAL
Qty: 0 | Refills: 0 | DISCHARGE
Start: 2021-02-24

## 2021-02-24 RX ORDER — INSULIN LISPRO 100/ML
0 VIAL (ML) SUBCUTANEOUS
Qty: 0 | Refills: 0 | DISCHARGE
Start: 2021-02-24

## 2021-02-24 RX ORDER — LANOLIN ALCOHOL/MO/W.PET/CERES
1 CREAM (GRAM) TOPICAL
Qty: 0 | Refills: 0 | DISCHARGE

## 2021-02-24 RX ORDER — POLYETHYLENE GLYCOL 3350 17 G/17G
17 POWDER, FOR SOLUTION ORAL
Qty: 0 | Refills: 0 | DISCHARGE
Start: 2021-02-24

## 2021-02-24 RX ORDER — METOPROLOL TARTRATE 50 MG
1 TABLET ORAL
Qty: 0 | Refills: 0 | DISCHARGE
Start: 2021-02-24

## 2021-02-24 RX ORDER — ASPIRIN/CALCIUM CARB/MAGNESIUM 324 MG
0 TABLET ORAL
Qty: 0 | Refills: 0 | DISCHARGE

## 2021-02-24 RX ORDER — FLUTICASONE PROPIONATE 220 MCG
1 AEROSOL WITH ADAPTER (GRAM) INHALATION
Qty: 0 | Refills: 0 | DISCHARGE

## 2021-02-24 RX ORDER — LANOLIN ALCOHOL/MO/W.PET/CERES
5 CREAM (GRAM) TOPICAL
Qty: 0 | Refills: 0 | DISCHARGE

## 2021-02-24 RX ORDER — NYSTATIN CREAM 100000 [USP'U]/G
1 CREAM TOPICAL
Qty: 0 | Refills: 0 | DISCHARGE

## 2021-02-24 RX ORDER — DOCUSATE SODIUM 100 MG
3 CAPSULE ORAL
Qty: 0 | Refills: 0 | DISCHARGE

## 2021-02-24 RX ORDER — INSULIN GLARGINE 100 [IU]/ML
10 INJECTION, SOLUTION SUBCUTANEOUS
Qty: 0 | Refills: 0 | DISCHARGE

## 2021-02-24 RX ORDER — INSULIN LISPRO 100/ML
0 VIAL (ML) SUBCUTANEOUS
Qty: 0 | Refills: 0 | DISCHARGE

## 2021-02-24 RX ORDER — FLUTICASONE PROPIONATE 50 MCG
1 SPRAY, SUSPENSION NASAL
Qty: 0 | Refills: 0 | DISCHARGE
Start: 2021-02-24

## 2021-02-24 RX ORDER — POLYETHYLENE GLYCOL 3350 17 G/17G
0 POWDER, FOR SOLUTION ORAL
Qty: 0 | Refills: 0 | DISCHARGE

## 2021-02-24 RX ORDER — TRAMADOL HYDROCHLORIDE 50 MG/1
0.5 TABLET ORAL
Qty: 0 | Refills: 0 | DISCHARGE
Start: 2021-02-24

## 2021-02-24 RX ORDER — POLYETHYLENE GLYCOL 3350 17 G/17G
17 POWDER, FOR SOLUTION ORAL
Qty: 0 | Refills: 0 | DISCHARGE

## 2021-02-24 RX ORDER — APIXABAN 2.5 MG/1
1 TABLET, FILM COATED ORAL
Qty: 0 | Refills: 0 | DISCHARGE
Start: 2021-02-24

## 2021-02-24 RX ORDER — ASPIRIN/CALCIUM CARB/MAGNESIUM 324 MG
1 TABLET ORAL
Qty: 0 | Refills: 0 | DISCHARGE

## 2021-02-24 RX ORDER — DULOXETINE HYDROCHLORIDE 30 MG/1
1 CAPSULE, DELAYED RELEASE ORAL
Qty: 0 | Refills: 0 | DISCHARGE

## 2021-02-24 RX ORDER — ALBUTEROL 90 UG/1
1 AEROSOL, METERED ORAL
Qty: 0 | Refills: 0 | DISCHARGE
Start: 2021-02-24

## 2021-02-24 RX ORDER — IPRATROPIUM/ALBUTEROL SULFATE 18-103MCG
3 AEROSOL WITH ADAPTER (GRAM) INHALATION
Qty: 0 | Refills: 0 | DISCHARGE

## 2021-02-24 RX ORDER — ACETAMINOPHEN 500 MG
2 TABLET ORAL
Qty: 0 | Refills: 0 | DISCHARGE
Start: 2021-02-24

## 2021-02-24 RX ADMIN — Medication 1000 MILLIGRAM(S): at 13:16

## 2021-02-24 RX ADMIN — Medication 3 MILLIGRAM(S): at 21:10

## 2021-02-24 RX ADMIN — Medication 1000 MILLIGRAM(S): at 21:10

## 2021-02-24 RX ADMIN — Medication 1 DROP(S): at 05:34

## 2021-02-24 RX ADMIN — Medication 1 SPRAY(S): at 05:35

## 2021-02-24 RX ADMIN — APIXABAN 5 MILLIGRAM(S): 2.5 TABLET, FILM COATED ORAL at 00:18

## 2021-02-24 RX ADMIN — Medication 1 SPRAY(S): at 17:55

## 2021-02-24 RX ADMIN — APIXABAN 5 MILLIGRAM(S): 2.5 TABLET, FILM COATED ORAL at 13:16

## 2021-02-24 RX ADMIN — Medication 1 DROP(S): at 21:10

## 2021-02-24 RX ADMIN — POLYETHYLENE GLYCOL 3350 17 GRAM(S): 17 POWDER, FOR SOLUTION ORAL at 13:16

## 2021-02-24 RX ADMIN — Medication 25 MILLIGRAM(S): at 17:54

## 2021-02-24 RX ADMIN — TRAMADOL HYDROCHLORIDE 25 MILLIGRAM(S): 50 TABLET ORAL at 00:37

## 2021-02-24 RX ADMIN — BUDESONIDE AND FORMOTEROL FUMARATE DIHYDRATE 2 PUFF(S): 160; 4.5 AEROSOL RESPIRATORY (INHALATION) at 05:34

## 2021-02-24 RX ADMIN — Medication 1 DROP(S): at 13:15

## 2021-02-24 RX ADMIN — Medication 1000 MILLIGRAM(S): at 08:01

## 2021-02-24 RX ADMIN — Medication 25 MILLIGRAM(S): at 05:35

## 2021-02-24 RX ADMIN — BUDESONIDE AND FORMOTEROL FUMARATE DIHYDRATE 2 PUFF(S): 160; 4.5 AEROSOL RESPIRATORY (INHALATION) at 17:53

## 2021-02-24 RX ADMIN — Medication 1: at 01:00

## 2021-02-24 RX ADMIN — APIXABAN 5 MILLIGRAM(S): 2.5 TABLET, FILM COATED ORAL at 23:58

## 2021-02-24 NOTE — SWALLOW BEDSIDE ASSESSMENT ADULT - SWALLOW EVAL: RECOMMENDED DIET
Liberalized diet of regular solids / thin liquids ; SUGGEST RN/Caregiver to modify solids (cut or chop) as needed; 100% dependent Liberalized diet of regular solids / thin liquids ; SUGGEST RN/Caregiver to modify solids (cut or chop) as needed; 100% dependent; This service will sign off.

## 2021-02-24 NOTE — SWALLOW BEDSIDE ASSESSMENT ADULT - SWALLOW EVAL: DIAGNOSIS
5M hx of dementia, DM2, HTN, ?HF, HLD, recent admission for COVID requiring intubation (11/2020 [place]), currently treated for pneumonia presents from nursing home for abdominal pain and vomiting, found to have multifocal PNA, and high-grade SBO with TP LUQ, c/f internal hernia on CT. Complicated by Afib RVR. Oropharyngeal dysphagia c/b prolonged mastication however eventually effective. Suspect mild delay, however functional swallow; no s/s with thin liquids or regular solids.

## 2021-02-24 NOTE — PROGRESS NOTE ADULT - PROBLEM SELECTOR PLAN 3
Cr 0.68 Jan 2021, upon admission SCr 3. Now downtrending s/p 3L NS. Likely prerenal.   - 2/14: Not retaining on bladder scan, incontinent making I&Os, not fully reliable; SARAH continues to improve. Trend UOP and SCr. If UOP truly dropping, give IVF  -d/c LR 2/20 as pt now on PO diet Cr 0.68 Jan 2021, upon admission SCr 3. Now downtrending s/p 3L NS. Likely prerenal.   - 2/14: Not retaining on bladder scan, incontinent making I&Os, not fully reliable; SARAH continues to improve. Trend UOP and SCr. If UOP truly dropping, give IVF  -d/c LR 2/20 as pt now on PO diet  Resolved

## 2021-02-24 NOTE — PROGRESS NOTE ADULT - SUBJECTIVE AND OBJECTIVE BOX
Patient is a 75y old  Male who presents with a chief complaint of abdominal pain (23 Feb 2021 10:49)      SUBJECTIVE / OVERNIGHT EVENTS:    Tele reviewed:       ADDITIONAL REVIEW OF SYSTEMS: Negative except for above    MEDICATIONS  (STANDING):  acetaminophen   Tablet .. 1000 milliGRAM(s) Oral every 8 hours  apixaban 5 milliGRAM(s) Oral every 12 hours  artificial  tears Solution 1 Drop(s) Both EYES three times a day  budesonide 160 MICROgram(s)/formoterol 4.5 MICROgram(s) Inhaler 2 Puff(s) Inhalation two times a day  dextrose 40% Gel 15 Gram(s) Oral once  dextrose 50% Injectable 25 Gram(s) IV Push once  dextrose 50% Injectable 12.5 Gram(s) IV Push once  dextrose 50% Injectable 25 Gram(s) IV Push once  fluticasone propionate 50 MICROgram(s)/spray Nasal Spray 1 Spray(s) Both Nostrils two times a day  glucagon  Injectable 1 milliGRAM(s) IntraMuscular once  insulin lispro (ADMELOG) corrective regimen sliding scale   SubCutaneous Before meals and at bedtime  melatonin 3 milliGRAM(s) Oral at bedtime  metoprolol tartrate 25 milliGRAM(s) Oral two times a day  polyethylene glycol 3350 17 Gram(s) Oral daily    MEDICATIONS  (PRN):  ALBUTerol    90 MICROgram(s) HFA Inhaler 1 Puff(s) Inhalation every 4 hours PRN Shortness of Breath and/or Wheezing  traMADol 25 milliGRAM(s) Oral every 8 hours PRN Moderate Pain (4 - 6)      CAPILLARY BLOOD GLUCOSE      POCT Blood Glucose.: 129 mg/dL (24 Feb 2021 12:57)  POCT Blood Glucose.: 94 mg/dL (24 Feb 2021 09:05)  POCT Blood Glucose.: 106 mg/dL (23 Feb 2021 21:17)  POCT Blood Glucose.: 116 mg/dL (23 Feb 2021 17:49)    I&O's Summary    23 Feb 2021 07:01  -  24 Feb 2021 07:00  --------------------------------------------------------  IN: 480 mL / OUT: 400 mL / NET: 80 mL    24 Feb 2021 07:01  -  24 Feb 2021 15:18  --------------------------------------------------------  IN: 240 mL / OUT: 150 mL / NET: 90 mL        PHYSICAL EXAM:  Vital Signs Last 24 Hrs  T(C): 36.8 (24 Feb 2021 12:22), Max: 36.8 (24 Feb 2021 12:22)  T(F): 98.3 (24 Feb 2021 12:22), Max: 98.3 (24 Feb 2021 12:22)  HR: 70 (24 Feb 2021 12:22) (61 - 70)  BP: 130/87 (24 Feb 2021 12:22) (129/76 - 157/70)  BP(mean): --  RR: 18 (24 Feb 2021 12:22) (18 - 18)  SpO2: 97% (24 Feb 2021 12:22) (96% - 99%)    PHYSICAL EXAM:  GENERAL: NAD, well-developed  HEAD:  Atraumatic, Normocephalic  EYES:  conjunctiva and sclera clear  NECK: Supple, No JVD  CHEST/LUNG: Clear to auscultation bilaterally; No wheeze  HEART: Regular rate and rhythm; No murmurs, rubs, or gallops  ABDOMEN: Soft, Nontender, Nondistended; Bowel sounds present  EXTREMITIES:  2+ Peripheral Pulses, No clubbing, cyanosis, or edema  PSYCH: AAOx3  NEUROLOGY: non-focal  SKIN: No rashes or lesions      LABS:                        9.6    7.33  )-----------( 336      ( 24 Feb 2021 07:24 )             32.6     02-24    138  |  108  |  10  ----------------------------<  83  4.9   |  19<L>  |  0.68    Ca    8.4      24 Feb 2021 07:25  Phos  3.0     02-24  Mg     1.7     02-24    TPro  6.0  /  Alb  2.2<L>  /  TBili  0.2  /  DBili  x   /  AST  16  /  ALT  8<L>  /  AlkPhos  74  02-24                RADIOLOGY & ADDITIONAL TESTS:    Imaging Personally Reviewed:    Electrocardiogram Personally Reviewed:    COORDINATION OF CARE:  Care Discussed with Consultants/Other Providers [Y/N]:  Prior or Outpatient Records Reviewed [Y/N]:     Patient is a 75y old  Male who presents with a chief complaint of abdominal pain (23 Feb 2021 10:49)      SUBJECTIVE / OVERNIGHT EVENTS:   NO issues presented       ADDITIONAL REVIEW OF SYSTEMS: unable to obtain     MEDICATIONS  (STANDING):  acetaminophen   Tablet .. 1000 milliGRAM(s) Oral every 8 hours  apixaban 5 milliGRAM(s) Oral every 12 hours  artificial  tears Solution 1 Drop(s) Both EYES three times a day  budesonide 160 MICROgram(s)/formoterol 4.5 MICROgram(s) Inhaler 2 Puff(s) Inhalation two times a day  dextrose 40% Gel 15 Gram(s) Oral once  dextrose 50% Injectable 25 Gram(s) IV Push once  dextrose 50% Injectable 12.5 Gram(s) IV Push once  dextrose 50% Injectable 25 Gram(s) IV Push once  fluticasone propionate 50 MICROgram(s)/spray Nasal Spray 1 Spray(s) Both Nostrils two times a day  glucagon  Injectable 1 milliGRAM(s) IntraMuscular once  insulin lispro (ADMELOG) corrective regimen sliding scale   SubCutaneous Before meals and at bedtime  melatonin 3 milliGRAM(s) Oral at bedtime  metoprolol tartrate 25 milliGRAM(s) Oral two times a day  polyethylene glycol 3350 17 Gram(s) Oral daily    MEDICATIONS  (PRN):  ALBUTerol    90 MICROgram(s) HFA Inhaler 1 Puff(s) Inhalation every 4 hours PRN Shortness of Breath and/or Wheezing  traMADol 25 milliGRAM(s) Oral every 8 hours PRN Moderate Pain (4 - 6)      CAPILLARY BLOOD GLUCOSE      POCT Blood Glucose.: 129 mg/dL (24 Feb 2021 12:57)  POCT Blood Glucose.: 94 mg/dL (24 Feb 2021 09:05)  POCT Blood Glucose.: 106 mg/dL (23 Feb 2021 21:17)  POCT Blood Glucose.: 116 mg/dL (23 Feb 2021 17:49)    I&O's Summary    23 Feb 2021 07:01  -  24 Feb 2021 07:00  --------------------------------------------------------  IN: 480 mL / OUT: 400 mL / NET: 80 mL    24 Feb 2021 07:01  -  24 Feb 2021 15:18  --------------------------------------------------------  IN: 240 mL / OUT: 150 mL / NET: 90 mL        PHYSICAL EXAM:  Vital Signs Last 24 Hrs  T(C): 36.8 (24 Feb 2021 12:22), Max: 36.8 (24 Feb 2021 12:22)  T(F): 98.3 (24 Feb 2021 12:22), Max: 98.3 (24 Feb 2021 12:22)  HR: 70 (24 Feb 2021 12:22) (61 - 70)  BP: 130/87 (24 Feb 2021 12:22) (129/76 - 157/70)  BP(mean): --  RR: 18 (24 Feb 2021 12:22) (18 - 18)  SpO2: 97% (24 Feb 2021 12:22) (96% - 99%)    PHYSICAL EXAM:    CONSTITUTIONAL: NAD  RESPIRATORY: Normal respiratory effort; CTABL posteriorly  CARDIOVASCULAR: Regular rate and rhythm, normal S1 and S2, no murmur/rub/gallop  ABDOMEN: Soft, nondistended, nontender to palpation, normoactive bowel sounds, no rebound/guarding  MUSCLOSKELETAL: no joint swelling or tenderness to palpation, FROM all extremities  NEURO: AAOx2-3  EXTREMITIES: no pedal edema      LABS:                        9.6    7.33  )-----------( 336      ( 24 Feb 2021 07:24 )             32.6     02-24    138  |  108  |  10  ----------------------------<  83  4.9   |  19<L>  |  0.68    Ca    8.4      24 Feb 2021 07:25  Phos  3.0     02-24  Mg     1.7     02-24    TPro  6.0  /  Alb  2.2<L>  /  TBili  0.2  /  DBili  x   /  AST  16  /  ALT  8<L>  /  AlkPhos  74  02-24                RADIOLOGY & ADDITIONAL TESTS:    Imaging Personally Reviewed:    Electrocardiogram Personally Reviewed:    COORDINATION OF CARE:  Care Discussed with Consultants/Other Providers [Y/N]:  Prior or Outpatient Records Reviewed [Y/N]:

## 2021-02-24 NOTE — PROGRESS NOTE ADULT - SUBJECTIVE AND OBJECTIVE BOX
Subjective: Patient seen and examined. No new events except as noted.     REVIEW OF SYSTEMS:    CONSTITUTIONAL: No weakness, fevers or chills  EYES/ENT: No visual changes;  No vertigo or throat pain   NECK: No pain or stiffness  RESPIRATORY: No cough, wheezing, hemoptysis; No shortness of breath  CARDIOVASCULAR: No chest pain or palpitations  GASTROINTESTINAL: No abdominal or epigastric pain. No nausea, vomiting, or hematemesis; No diarrhea or constipation. No melena or hematochezia.  GENITOURINARY: No dysuria, frequency or hematuria  NEUROLOGICAL: No numbness or weakness  SKIN: No itching, burning, rashes, or lesions   All other review of systems is negative unless indicated above.    MEDICATIONS:  MEDICATIONS  (STANDING):  acetaminophen   Tablet .. 1000 milliGRAM(s) Oral every 8 hours  apixaban 5 milliGRAM(s) Oral every 12 hours  artificial  tears Solution 1 Drop(s) Both EYES three times a day  budesonide 160 MICROgram(s)/formoterol 4.5 MICROgram(s) Inhaler 2 Puff(s) Inhalation two times a day  dextrose 40% Gel 15 Gram(s) Oral once  dextrose 50% Injectable 25 Gram(s) IV Push once  dextrose 50% Injectable 12.5 Gram(s) IV Push once  dextrose 50% Injectable 25 Gram(s) IV Push once  fluticasone propionate 50 MICROgram(s)/spray Nasal Spray 1 Spray(s) Both Nostrils two times a day  glucagon  Injectable 1 milliGRAM(s) IntraMuscular once  insulin lispro (ADMELOG) corrective regimen sliding scale   SubCutaneous Before meals and at bedtime  melatonin 3 milliGRAM(s) Oral at bedtime  metoprolol tartrate 25 milliGRAM(s) Oral two times a day  polyethylene glycol 3350 17 Gram(s) Oral daily      PHYSICAL EXAM:  T(C): 36.9 (02-24-21 @ 18:47), Max: 36.9 (02-24-21 @ 18:47)  HR: 72 (02-24-21 @ 18:47) (61 - 72)  BP: 147/77 (02-24-21 @ 18:47) (129/76 - 157/70)  RR: 18 (02-24-21 @ 18:47) (18 - 18)  SpO2: 99% (02-24-21 @ 18:47) (96% - 99%)  Wt(kg): --  I&O's Summary    23 Feb 2021 07:01  -  24 Feb 2021 07:00  --------------------------------------------------------  IN: 480 mL / OUT: 400 mL / NET: 80 mL    24 Feb 2021 07:01  -  24 Feb 2021 19:14  --------------------------------------------------------  IN: 240 mL / OUT: 150 mL / NET: 90 mL          Appearance: Normal	  HEENT:   Normal oral mucosa, PERRL, EOMI	  Lymphatic: No lymphadenopathy , no edema  Cardiovascular: Normal S1 S2, No JVD, No murmurs , Peripheral pulses palpable 2+ bilaterally  Respiratory: Lungs clear to auscultation, normal effort 	  Gastrointestinal:  Soft, Non-tender, + BS	  Skin: No rashes, No ecchymoses, No cyanosis, warm to touch  Musculoskeletal: Normal range of motion, normal strength  Psychiatry:  Mood & affect appropriate  Ext: No edema      LABS:    CARDIAC MARKERS:                                9.6    7.33  )-----------( 336      ( 24 Feb 2021 07:24 )             32.6     02-24    138  |  108  |  10  ----------------------------<  83  4.9   |  19<L>  |  0.68    Ca    8.4      24 Feb 2021 07:25  Phos  3.0     02-24  Mg     1.7     02-24    TPro  6.0  /  Alb  2.2<L>  /  TBili  0.2  /  DBili  x   /  AST  16  /  ALT  8<L>  /  AlkPhos  74  02-24    proBNP:   Lipid Profile:   HgA1c:   TSH:             TELEMETRY: 	SR     ECG:  	  RADIOLOGY:   DIAGNOSTIC TESTING:  [ ] Echocardiogram:  [ ]  Catheterization:  [ ] Stress Test:    OTHER:

## 2021-02-24 NOTE — SWALLOW BEDSIDE ASSESSMENT ADULT - SLP PERTINENT HISTORY OF CURRENT PROBLEM
75 year old male with hx of dementia, DM2, HTN, ?HF, HLD, recent admission for COVID requiring intubation (11/2020 [place]), currently treated for pneumonia presents from nursing home for abdominal pain x 1 week and vomiting x 1 day. History obtained from chart review and son over the phone, due to patient's mental status (AAOx1 at baseline since intubation in Nov 2020 due COPD with superimposed pneumonia), which son confirms. States patient has been having abdominal pain over last week or so, and then had nausea and vomiting. Unsure about fevers, but not mentioned by nursing home staff. Used 2 L at nursing most days, has been getting rehab at the nursing home.

## 2021-02-24 NOTE — PROGRESS NOTE ADULT - ASSESSMENT
75M PMH dementia, DM2, HTN, ?HF, HLD, recent admission for COPD pneumonia requiring intubation (11/2020 [place]), COVID in January 2021 (not intubated) currently being treated for pneumonia presents from nursing home for abdominal pain and vomiting, found to have multifocal PNA (unclear if sequela of prior COVID) and high-grade SBO. Course c/b new afib with RVR s/p amiodarone gtt. SBO now resolved.

## 2021-02-24 NOTE — SWALLOW BEDSIDE ASSESSMENT ADULT - NS SPL SWALLOW CLINIC TRIAL FT
x1 Cough response during meal 2/2 pt's verbosity with po in oral cavity; pt acknowledged that he 'should not talk while eating'  No other overt s/s of aspiration with liquids or solids.   SLP modified solids; chopped/cut into smaller pieces as Pt unable to self present.

## 2021-02-24 NOTE — SWALLOW BEDSIDE ASSESSMENT ADULT - ORAL PHASE
able to generate negative pressure for bolus extraction, prolonged however eventually effective mastication and manipulation; suspect premature spillage behind the BOt due to prolonged mastication/oral stage

## 2021-02-24 NOTE — PROGRESS NOTE ADULT - PROBLEM SELECTOR PLAN 10
- Diet: regular diet  - DVT ppx: eliquis  - PT eval - recs subacute rehab, pt does not want to go to nursing home, wants to go back home. Daughter also does not want pt to go back to [facility]  - DM2 - home regimen 10u Basaglar qHS and SSI at nursing home - will give SSI here given poor po intake, uptitrate prn  - full code per daughter  - palliative consulted, appreciate recs - Diet: regular diet  - DVT ppx: eliquis  - PT eval - recs subacute rehab, pt does not want to go to nursing home, wants to go back home. Daughter also does not want pt to go back to [facility]  - DM2 - home regimen 10u Basaglar qHS and SSI at nursing home - will give SSI here given poor po intake, uptitrate prn  - full code per daughter  - palliative consulted, appreciate recs  - DC time 45mns.  Pt is to f/up with provider outpaient once dc

## 2021-02-24 NOTE — CHART NOTE - NSCHARTNOTEFT_GEN_A_CORE
Nutrition Follow Up Note  Patient seen for: malnutrition follow up on 3DSU    · Reason for Admission	abdominal pain   chart reviewed, events noted     as per care note on : Patient is still Covid positive, discharge plan discussed with Medical Team 7  inquiring  if patient can be off IV  Zyprexa to facilitate rehabs acceptance, also discussed with son Broderick  at [phone]  rehab choices accepting Covid positive patients, list emailed to son;  He will review list and discuss choices with  his sister Sienna, also inquiring for dad to have a repeat swab. pt followed by Palliative.     Source: pt, PCA, EMR    Diet : Diet, Regular:   Consistent Carbohydrate {Evening Snack} (CSTCHOSN)  Fiber/Residue Restricted (LOWFIBER)  Low Sodium  Supplement Feeding Modality:  Oral  Glucerna Shake Cans or Servings Per Day:  1       Frequency:  Two Times a day (21 @ 11:00) [Active]          Patient reports: consuming >75% of meals. requesting a cheese burger for lunch-RD made exception for lunch today and explained to pt that we do not have low sodium cheese available-pt also requesting other preferences-RD placed call to Diet Office for pt to select the rest of his lunch for today-pt is legally blind-RD notified Diet Office of pt need of assistance when filling out his menu. pt accepted diet ed for his girlfriend, but was not confident that she would follow it. pt agrees to continue Glucerna.   pt likes gelatin-RD provided diet high protein gelatin preferences.       Daily Weight in k.5 (-), Weight in k.1 (), Weight in k (), Weight in k.1 (), Weight in k ()  % Weight Change: 21% loss since previous assessment on -questionable     Pertinent Medications: MEDICATIONS  (STANDING):  acetaminophen   Tablet .. 1000 milliGRAM(s) Oral every 8 hours  apixaban 5 milliGRAM(s) Oral every 12 hours  artificial  tears Solution 1 Drop(s) Both EYES three times a day  budesonide 160 MICROgram(s)/formoterol 4.5 MICROgram(s) Inhaler 2 Puff(s) Inhalation two times a day  dextrose 40% Gel 15 Gram(s) Oral once  dextrose 50% Injectable 25 Gram(s) IV Push once  dextrose 50% Injectable 12.5 Gram(s) IV Push once  dextrose 50% Injectable 25 Gram(s) IV Push once  fluticasone propionate 50 MICROgram(s)/spray Nasal Spray 1 Spray(s) Both Nostrils two times a day  glucagon  Injectable 1 milliGRAM(s) IntraMuscular once  insulin lispro (ADMELOG) corrective regimen sliding scale   SubCutaneous Before meals and at bedtime  melatonin 3 milliGRAM(s) Oral at bedtime  metoprolol tartrate 25 milliGRAM(s) Oral two times a day  polyethylene glycol 3350 17 Gram(s) Oral daily    MEDICATIONS  (PRN):  ALBUTerol    90 MICROgram(s) HFA Inhaler 1 Puff(s) Inhalation every 4 hours PRN Shortness of Breath and/or Wheezing  traMADol 25 milliGRAM(s) Oral every 8 hours PRN Moderate Pain (4 - 6)    Pertinent Labs:  @ 07:25: Na 138, BUN 10, Cr 0.68, BG 83, K+ 4.9, Phos 3.0, Mg 1.7, Alk Phos 74, ALT/SGPT 8<L>, AST/SGOT 16    Finger Sticks:  POCT Blood Glucose.: 106 mg/dL ( @ 21:17)  POCT Blood Glucose.: 116 mg/dL ( @ 17:49)  POCT Blood Glucose.: 101 mg/dL ( @ 12:34)  POCT Blood Glucose.: 95 mg/dL ( @ 08:45)      Skin per nursing documentation: no pressure injury  Edema: none    Estimated Needs:   [x ] no change since previous assessment  [ ] recalculated:      Estimated Energy Needs:  · Weight (lbs)	136 lb  · Weight (kg)	61.6 kg  · Enter From (cathryn/kg)	30   · Enter To (cathryn/kg)	35   · Calculated From (cathryn/kg)	1848   · Calculated To (cathryn/kg)	2156      Estimated Protein Needs:  · Weight (lbs)	136 lb  · Weight (kg)	61.6 kg  · Enter From (g/kg)	1.2   · Enter To (g/kg)	1.4   · Calculated From (g/kg)	73.92   · Calculated To (g/kg)	86.24      Estimated Fluid Needs:  · Weight (lbs)	136 lb  · Weight (kg)	61.6 kg  · Enter From (ml/kg)	25   · Enter To (ml/kg)	30   · Calculated From (ml/kg)	1540   · Calculated To (ml/kg)	1848       · Other Calculations	Pt's nursing home wt was used for calculations. Needs consider pt's medical condition COPD and malnourished status.      Previous Nutrition Diagnosis: mild malnutrition   Nutrition Diagnosis is: resolving -pt consuming % of meals as per flow sheet    Recommend  swallow eval for hx of dysphagia (pt was on chopped diet at NH). continue low sodium, Consistent Carbohydrate diet restriction.   continue Glucerna BID    Monitoring and Evaluation:     Continue to monitor Nutritional intake, Tolerance to diet prescription, weights, labs, skin integrity    RD remains available upon request and will follow up per protocol  Sade Larsen MA, RD, CDN #[id number] Nutrition Follow Up Note  Patient seen for: malnutrition follow up on 3DSU    · Reason for Admission	abdominal pain   chart reviewed, events noted     as per care note on : Patient is still Covid positive, discharge plan discussed with Medical Team 7  inquiring  if patient can be off IV  Zyprexa to facilitate rehabs acceptance, also discussed with son Broderick  at [phone]  rehab choices accepting Covid positive patients, list emailed to son;  He will review list and discuss choices with  his sister Sienna, also inquiring for dad to have a repeat swab. pt followed by Palliative.     Source: pt, PCA, EMR    Diet : Diet, Regular:   Consistent Carbohydrate {Evening Snack} (CSTCHOSN)  Fiber/Residue Restricted (LOWFIBER)  Low Sodium  Supplement Feeding Modality:  Oral  Glucerna Shake Cans or Servings Per Day:  1       Frequency:  Two Times a day (21 @ 11:00) [Active]          Patient reports: pt disoriented-in enhanced room. consuming >75% of meals. requesting a cheese burger for lunch-RD made exception for lunch today and explained to pt that we do not have low sodium cheese available-pt also requesting other preferences-RD placed call to Diet Office for pt to select the rest of his lunch for today-pt is legally blind-RD notified Diet Office of pt need of assistance when filling out his menu. pt accepted diet ed for his girlfriend, but was not confident that she would follow it. pt agrees to continue Glucerna.   pt likes gelatin-RD provided diet high protein gelatin preferences.       Daily Weight in k.5 (), Weight in k.1 (), Weight in k (-), Weight in k.1 (-), Weight in k ()  % Weight Change: 21% loss since previous assessment on -questionable     Pertinent Medications: MEDICATIONS  (STANDING):  acetaminophen   Tablet .. 1000 milliGRAM(s) Oral every 8 hours  apixaban 5 milliGRAM(s) Oral every 12 hours  artificial  tears Solution 1 Drop(s) Both EYES three times a day  budesonide 160 MICROgram(s)/formoterol 4.5 MICROgram(s) Inhaler 2 Puff(s) Inhalation two times a day  dextrose 40% Gel 15 Gram(s) Oral once  dextrose 50% Injectable 25 Gram(s) IV Push once  dextrose 50% Injectable 12.5 Gram(s) IV Push once  dextrose 50% Injectable 25 Gram(s) IV Push once  fluticasone propionate 50 MICROgram(s)/spray Nasal Spray 1 Spray(s) Both Nostrils two times a day  glucagon  Injectable 1 milliGRAM(s) IntraMuscular once  insulin lispro (ADMELOG) corrective regimen sliding scale   SubCutaneous Before meals and at bedtime  melatonin 3 milliGRAM(s) Oral at bedtime  metoprolol tartrate 25 milliGRAM(s) Oral two times a day  polyethylene glycol 3350 17 Gram(s) Oral daily    MEDICATIONS  (PRN):  ALBUTerol    90 MICROgram(s) HFA Inhaler 1 Puff(s) Inhalation every 4 hours PRN Shortness of Breath and/or Wheezing  traMADol 25 milliGRAM(s) Oral every 8 hours PRN Moderate Pain (4 - 6)    Pertinent Labs:  @ 07:25: Na 138, BUN 10, Cr 0.68, BG 83, K+ 4.9, Phos 3.0, Mg 1.7, Alk Phos 74, ALT/SGPT 8<L>, AST/SGOT 16    Finger Sticks:  POCT Blood Glucose.: 106 mg/dL ( @ 21:17)  POCT Blood Glucose.: 116 mg/dL ( @ 17:49)  POCT Blood Glucose.: 101 mg/dL ( @ 12:34)  POCT Blood Glucose.: 95 mg/dL ( @ 08:45)      Skin per nursing documentation: no pressure injury  Edema: none    Estimated Needs:   [x ] no change since previous assessment  [ ] recalculated:      Estimated Energy Needs:  · Weight (lbs)	136 lb  · Weight (kg)	61.6 kg  · Enter From (cathryn/kg)	30   · Enter To (cathryn/kg)	35   · Calculated From (cathryn/kg)	1848   · Calculated To (cathryn/kg)	2156      Estimated Protein Needs:  · Weight (lbs)	136 lb  · Weight (kg)	61.6 kg  · Enter From (g/kg)	1.2   · Enter To (g/kg)	1.4   · Calculated From (g/kg)	73.92   · Calculated To (g/kg)	86.24      Estimated Fluid Needs:  · Weight (lbs)	136 lb  · Weight (kg)	61.6 kg  · Enter From (ml/kg)	25   · Enter To (ml/kg)	30   · Calculated From (ml/kg)	1540   · Calculated To (ml/kg)	1848       · Other Calculations	Pt's nursing home wt was used for calculations. Needs consider pt's medical condition COPD and malnourished status.      Previous Nutrition Diagnosis: mild malnutrition   Nutrition Diagnosis is: resolving -pt consuming % of meals as per flow sheet    Recommend  swallow eval for hx of dysphagia (pt was on chopped diet at NH). continue low sodium, Consistent Carbohydrate diet restriction.   continue Glucerna BID    Monitoring and Evaluation:     Continue to monitor Nutritional intake, Tolerance to diet prescription, weights, labs, skin integrity    RD remains available upon request and will follow up per protocol  Sade Larsen MA, RD, CDN #[id number]

## 2021-02-24 NOTE — PROGRESS NOTE ADULT - ASSESSMENT
75M hx of dementia, DM2, HTN, ?HF, HLD, recent admission for COVID requiring intubation (11/2020 [place]), currently treated for pneumonia presents from nursing home for abdominal pain and vomiting, found to have multifocal PNA, and high-grade SBO with TP LUQ, c/f internal hernia on CT. Complicated by Afib RVR

## 2021-02-24 NOTE — SWALLOW BEDSIDE ASSESSMENT ADULT - SLP GENERAL OBSERVATIONS
Encountered awake/alert in bed. Noted lunch tray at bedside. Assisted pt with lunch. RA. NAD. +Legally blind

## 2021-02-24 NOTE — PROGRESS NOTE ADULT - PROBLEM SELECTOR PLAN 1
Per surgery, conservative management, precipitant ?hernia - no known prior abdominal surgeries  - pt now with BMs, NGT dc'd 2/20, monitor off NGT. tolerating regular diet  - trend i/o  - GI ppx with Protonix  - s/p 7d course Zosyn (2/12 - 2/19)  - repeat CTAP 2/16 still with persistent SBO but less dilated, resolution of internal hernia

## 2021-02-25 ENCOUNTER — INPATIENT (INPATIENT)
Facility: HOSPITAL | Age: 76
LOS: 11 days | Discharge: NURSING, REHAB OR LONG-TERM CARE | End: 2021-03-09
Attending: INTERNAL MEDICINE | Admitting: INTERNAL MEDICINE
Payer: MEDICARE

## 2021-02-25 VITALS
TEMPERATURE: 98 F | DIASTOLIC BLOOD PRESSURE: 73 MMHG | RESPIRATION RATE: 18 BRPM | OXYGEN SATURATION: 98 % | HEART RATE: 66 BPM | SYSTOLIC BLOOD PRESSURE: 153 MMHG

## 2021-02-25 PROCEDURE — 99239 HOSP IP/OBS DSCHRG MGMT >30: CPT

## 2021-02-25 RX ADMIN — Medication 1000 MILLIGRAM(S): at 05:44

## 2021-02-25 RX ADMIN — BUDESONIDE AND FORMOTEROL FUMARATE DIHYDRATE 2 PUFF(S): 160; 4.5 AEROSOL RESPIRATORY (INHALATION) at 05:45

## 2021-02-25 RX ADMIN — Medication 1 SPRAY(S): at 05:45

## 2021-02-25 RX ADMIN — Medication 25 MILLIGRAM(S): at 05:45

## 2021-02-25 RX ADMIN — Medication 1 DROP(S): at 05:45

## 2021-02-25 NOTE — PROGRESS NOTE ADULT - ASSESSMENT
75M hx of dementia, DM2, HTN, ?HF, HLD, recent admission for COVID requiring intubation (11/2020 [place]), currently treated for pneumonia presents from nursing home for abdominal pain and vomiting, found to have multifocal PNA, and high-grade SBO with TP LUQ, c/f internal hernia on CT. Complicated by Afib RVR

## 2021-02-25 NOTE — PROGRESS NOTE ADULT - PROBLEM SELECTOR PROBLEM 9
HTN (hypertension)

## 2021-02-25 NOTE — PROGRESS NOTE ADULT - PROVIDER SPECIALTY LIST ADULT
Surgery
Internal Medicine
Surgery
Cardiology
Surgery
Cardiology
Hospitalist
Cardiology
Internal Medicine
Internal Medicine
Cardiology
Internal Medicine
Palliative Care
Palliative Care
Internal Medicine
Hospitalist
Internal Medicine

## 2021-02-25 NOTE — PROGRESS NOTE ADULT - NSHPATTENDINGPLANDISCUSS_GEN_ALL_CORE
3 DSU ACP
3 DSU ACP
resident team
Team 7 resident
Team 7 resident
resident team
Team 7 resident
Team 7 residents
resident team

## 2021-02-25 NOTE — PROGRESS NOTE ADULT - PROBLEM SELECTOR PROBLEM 6
Anemia
Palliative care encounter
Palliative care encounter
Anemia

## 2021-02-25 NOTE — PROGRESS NOTE ADULT - PROBLEM SELECTOR PLAN 9
- hold meds - restart as indicated

## 2021-02-25 NOTE — PROGRESS NOTE ADULT - SUBJECTIVE AND OBJECTIVE BOX
Subjective: Patient seen and examined. No new events except as noted.     REVIEW OF SYSTEMS:    CONSTITUTIONAL: No weakness, fevers or chills  EYES/ENT: No visual changes;  No vertigo or throat pain   NECK: No pain or stiffness  RESPIRATORY: No cough, wheezing, hemoptysis; No shortness of breath  CARDIOVASCULAR: No chest pain or palpitations  GASTROINTESTINAL: No abdominal or epigastric pain. No nausea, vomiting, or hematemesis; No diarrhea or constipation. No melena or hematochezia.  GENITOURINARY: No dysuria, frequency or hematuria  NEUROLOGICAL: No numbness or weakness  SKIN: No itching, burning, rashes, or lesions   All other review of systems is negative unless indicated above.    MEDICATIONS:  MEDICATIONS  (STANDING):  acetaminophen   Tablet .. 1000 milliGRAM(s) Oral every 8 hours  apixaban 5 milliGRAM(s) Oral every 12 hours  artificial  tears Solution 1 Drop(s) Both EYES three times a day  budesonide 160 MICROgram(s)/formoterol 4.5 MICROgram(s) Inhaler 2 Puff(s) Inhalation two times a day  dextrose 40% Gel 15 Gram(s) Oral once  dextrose 50% Injectable 25 Gram(s) IV Push once  dextrose 50% Injectable 12.5 Gram(s) IV Push once  dextrose 50% Injectable 25 Gram(s) IV Push once  fluticasone propionate 50 MICROgram(s)/spray Nasal Spray 1 Spray(s) Both Nostrils two times a day  glucagon  Injectable 1 milliGRAM(s) IntraMuscular once  insulin lispro (ADMELOG) corrective regimen sliding scale   SubCutaneous Before meals and at bedtime  melatonin 3 milliGRAM(s) Oral at bedtime  metoprolol tartrate 25 milliGRAM(s) Oral two times a day  polyethylene glycol 3350 17 Gram(s) Oral daily      PHYSICAL EXAM:  T(C): 36.8 (02-25-21 @ 05:10), Max: 36.9 (02-24-21 @ 18:47)  HR: 69 (02-25-21 @ 05:10) (68 - 72)  BP: 158/69 (02-25-21 @ 05:10) (130/60 - 158/69)  RR: 18 (02-25-21 @ 05:10) (18 - 18)  SpO2: 99% (02-25-21 @ 05:10) (97% - 100%)  Wt(kg): --  I&O's Summary    24 Feb 2021 07:01  -  25 Feb 2021 07:00  --------------------------------------------------------  IN: 600 mL / OUT: 350 mL / NET: 250 mL    25 Feb 2021 07:01  -  25 Feb 2021 11:22  --------------------------------------------------------  IN: 0 mL / OUT: 200 mL / NET: -200 mL          Appearance: NAD	  HEENT:   Normal oral mucosa, PERRL, EOMI	  Lymphatic: No lymphadenopathy , no edema  Cardiovascular: Normal S1 S2, No JVD, No murmurs , Peripheral pulses palpable 2+ bilaterally  Respiratory: Lungs clear to auscultation, normal effort 	  Gastrointestinal:  Soft, Non-tender, + BS	  Skin: No rashes, No ecchymoses, No cyanosis, warm to touch  Musculoskeletal: Normal range of motion, normal strength  Psychiatry:  Mood & affect appropriate  Ext: No edema      LABS:    CARDIAC MARKERS:                                9.6    7.33  )-----------( 336      ( 24 Feb 2021 07:24 )             32.6     02-24    138  |  108  |  10  ----------------------------<  83  4.9   |  19<L>  |  0.68    Ca    8.4      24 Feb 2021 07:25  Phos  3.0     02-24  Mg     1.7     02-24    TPro  6.0  /  Alb  2.2<L>  /  TBili  0.2  /  DBili  x   /  AST  16  /  ALT  8<L>  /  AlkPhos  74  02-24    proBNP:   Lipid Profile:   HgA1c:   TSH:             TELEMETRY: 	  SR   ECG:  	  RADIOLOGY:   DIAGNOSTIC TESTING:  [ ] Echocardiogram:  [ ]  Catheterization:  [ ] Stress Test:    OTHER:

## 2021-02-25 NOTE — PROGRESS NOTE ADULT - PROBLEM SELECTOR PROBLEM 4
T2DM (type 2 diabetes mellitus)
UTI (urinary tract infection)
T2DM (type 2 diabetes mellitus)
UTI (urinary tract infection)
T2DM (type 2 diabetes mellitus)
UTI (urinary tract infection)
Functional quadriplegia
Functional quadriplegia
T2DM (type 2 diabetes mellitus)
UTI (urinary tract infection)

## 2021-02-25 NOTE — CHART NOTE - NSCHARTNOTEFT_GEN_A_CORE
d/w the patient's daughter, Sienna, about the patient's desire to go home after [place]. His daughter indicated though he may want to be home, she was agains that option since he has not been responsible with his medications/health, and that he has limited help at home. I indicated that if going home he was going to need at least 12 h HHA and probably house calls (info for contacting house call was given). His daughter recognize that going home may benefit the patient as it will facilitate him seen his family. I also provided her with the [facility] office phone number.     Finally we discussed about the GOC discussion I had with the patient, where he expressed that under a situation where there is not hope for recovery, he may not have wanted to have heroic measures as he "did with his wife."         Wesley Castelan MD   Geriatrics and Palliative Care (GAP) Consult Service    of Geriatric and Palliative Medicine  [facility]      Please page the following number for clinical matters between the hours of 9 am and 5 pm from Monday through Friday : [phone]    After 5pm and on weekends, please see the contact information below:    In the event of newly developing, evolving, or worsening symptoms, please contact the Palliative Medicine team via pager (if the patient is at [facility] #[phone] or if the patient is at [facility] #[phone]) The Geriatric and Palliative Medicine service has coverage 24 hours a day/ 7 days a week to provide medical recommendations regarding symptom management needs via telephone

## 2021-02-25 NOTE — PROGRESS NOTE ADULT - PROBLEM SELECTOR PROBLEM 3
COVID-19
COVID-19
SARAH (acute kidney injury)
SARAH (acute kidney injury)
COVID-19
COPD (chronic obstructive pulmonary disease)
COPD (chronic obstructive pulmonary disease)
COVID-19
SARAH (acute kidney injury)
COVID-19
SARAH (acute kidney injury)

## 2021-02-25 NOTE — PROGRESS NOTE ADULT - NUTRITIONAL ASSESSMENT
This patient has been assessed with a concern for Malnutrition and has been determined to have a diagnosis/diagnoses of Severe protein-calorie malnutrition.    This patient is being managed with:   Diet Clear Liquid-  Consistent Carbohydrate {Evening Snack} (CSTCHOSN)  Low Sodium  Supplement Feeding Modality:  Oral  Glucerna Shake Cans or Servings Per Day:  1       Frequency:  Two Times a day  Entered: Feb 20 2021 11:55AM    
This patient has been assessed with a concern for Malnutrition and has been determined to have a diagnosis/diagnoses of Severe protein-calorie malnutrition.    This patient is being managed with:   Diet Regular-  Consistent Carbohydrate {Evening Snack} (CSTCHOSN)  Fiber/Residue Restricted (LOWFIBER)  Low Sodium  Supplement Feeding Modality:  Oral  Glucerna Shake Cans or Servings Per Day:  1       Frequency:  Two Times a day  Entered: Feb 21 2021 10:59AM    
This patient has been assessed with a concern for Malnutrition and has been determined to have a diagnosis/diagnoses of Severe protein-calorie malnutrition.    This patient is being managed with:   Diet NPO-  Except Medications  Entered: Feb 12 2021  8:14PM    
This patient has been assessed with a concern for Malnutrition and has been determined to have a diagnosis/diagnoses of Severe protein-calorie malnutrition.    This patient is being managed with:   Diet Clear Liquid-  Consistent Carbohydrate {Evening Snack} (CSTCHOSN)  Low Sodium  Supplement Feeding Modality:  Oral  Glucerna Shake Cans or Servings Per Day:  1       Frequency:  Two Times a day  Entered: Feb 20 2021 11:55AM    
This patient has been assessed with a concern for Malnutrition and has been determined to have a diagnosis/diagnoses of Severe protein-calorie malnutrition.    This patient is being managed with:   Diet NPO-  Except Medications  Entered: Feb 12 2021  8:14PM    
This patient has been assessed with a concern for Malnutrition and has been determined to have a diagnosis/diagnoses of Severe protein-calorie malnutrition.    This patient is being managed with:   Diet Regular-  Consistent Carbohydrate {Evening Snack} (CSTCHOSN)  Fiber/Residue Restricted (LOWFIBER)  Low Sodium  Supplement Feeding Modality:  Oral  Glucerna Shake Cans or Servings Per Day:  1       Frequency:  Two Times a day  Entered: Feb 21 2021 10:59AM    

## 2021-02-25 NOTE — PROGRESS NOTE ADULT - PROBLEM SELECTOR PLAN 7
- A&Ox1 since Nov 2020 after intubation at [facility]  - at baseline, h/o prior CVA
- A&Ox1 since Nov 2020 after intubation at [facility]  - at baseline, h/o prior CVA
- A&Ox1 since Nov 2020 after intubation at [facility]  - at baseline, h/o prior CVA
- A&Ox1 since Nov 2020 after intubation at [facility]  - at baseline, h/o prior CVA
- A&Ox1 since Nov 2020 after intubation at [facility]  - at baseline, h/o prior CVA
- A&Ox1 since Nov 2020 after intubation at [facility]  - at baseline, h/o prior CVA
- A&Ox1 since Nov 2020 after intubation at [facility]  - at baseline, h/o prior CVA
- A&Ox1 since Nov 2020 after intubation at [facility]  - at baseline, h/o prior CVA
- A&Ox1 since Nov 2020 after intubation at [facility]  - at baseline, h/o prior CVA
- A&Ox1 since Nov 2020 after intubation at [facility]  - at baseline, h/o prior CVA
- A&Ox1 since Nov 2020 after intubation at [facility]  - at baseline, h/o prior CVA
- A&Ox1 since Nov 2020 after intubation at [facility]  - at baseline, h/o prior CVA
- A&Ox1 since Nov 2020 after intubation at [facility]  - at baseline, h/o prior CVA

## 2021-02-25 NOTE — PROGRESS NOTE ADULT - SUBJECTIVE AND OBJECTIVE BOX
Patient is a 75y old  Male who presents with a chief complaint of abdominal pain (25 Feb 2021 11:22)      SUBJECTIVE / OVERNIGHT EVENTS:   NO complaints today       ADDITIONAL REVIEW OF SYSTEMS:  unable to obtain     MEDICATIONS  (STANDING):  acetaminophen   Tablet .. 1000 milliGRAM(s) Oral every 8 hours  apixaban 5 milliGRAM(s) Oral every 12 hours  artificial  tears Solution 1 Drop(s) Both EYES three times a day  budesonide 160 MICROgram(s)/formoterol 4.5 MICROgram(s) Inhaler 2 Puff(s) Inhalation two times a day  dextrose 40% Gel 15 Gram(s) Oral once  dextrose 50% Injectable 25 Gram(s) IV Push once  dextrose 50% Injectable 12.5 Gram(s) IV Push once  dextrose 50% Injectable 25 Gram(s) IV Push once  fluticasone propionate 50 MICROgram(s)/spray Nasal Spray 1 Spray(s) Both Nostrils two times a day  glucagon  Injectable 1 milliGRAM(s) IntraMuscular once  insulin lispro (ADMELOG) corrective regimen sliding scale   SubCutaneous Before meals and at bedtime  melatonin 3 milliGRAM(s) Oral at bedtime  metoprolol tartrate 25 milliGRAM(s) Oral two times a day  polyethylene glycol 3350 17 Gram(s) Oral daily    MEDICATIONS  (PRN):  ALBUTerol    90 MICROgram(s) HFA Inhaler 1 Puff(s) Inhalation every 4 hours PRN Shortness of Breath and/or Wheezing  traMADol 25 milliGRAM(s) Oral every 8 hours PRN Moderate Pain (4 - 6)      CAPILLARY BLOOD GLUCOSE      POCT Blood Glucose.: 105 mg/dL (25 Feb 2021 08:50)  POCT Blood Glucose.: 141 mg/dL (24 Feb 2021 21:31)    I&O's Summary    24 Feb 2021 07:01  -  25 Feb 2021 07:00  --------------------------------------------------------  IN: 600 mL / OUT: 350 mL / NET: 250 mL    25 Feb 2021 07:01  -  25 Feb 2021 20:34  --------------------------------------------------------  IN: 240 mL / OUT: 200 mL / NET: 40 mL        PHYSICAL EXAM:  Vital Signs Last 24 Hrs  T(C): 36.7 (25 Feb 2021 11:55), Max: 36.9 (24 Feb 2021 23:51)  T(F): 98 (25 Feb 2021 11:55), Max: 98.5 (24 Feb 2021 23:51)  HR: 66 (25 Feb 2021 11:55) (66 - 69)  BP: 153/73 (25 Feb 2021 11:55) (134/71 - 158/69)  BP(mean): --  RR: 18 (25 Feb 2021 11:55) (18 - 18)  SpO2: 98% (25 Feb 2021 11:55) (98% - 99%)    PHYSICAL EXAM:  CONSTITUTIONAL: NAD  RESPIRATORY: Normal respiratory effort; CTABL posteriorly  CARDIOVASCULAR: Regular rate and rhythm, normal S1 and S2, no murmur/rub/gallop  ABDOMEN: Soft, nondistended, nontender to palpation, normoactive bowel sounds, no rebound/guarding  MUSCLOSKELETAL: no joint swelling or tenderness to palpation, FROM all extremities  NEURO: AAOx2-3  EXTREMITIES: no pedal edema      LABS:                        9.6    7.33  )-----------( 336      ( 24 Feb 2021 07:24 )             32.6     02-24    138  |  108  |  10  ----------------------------<  83  4.9   |  19<L>  |  0.68    Ca    8.4      24 Feb 2021 07:25  Phos  3.0     02-24  Mg     1.7     02-24    TPro  6.0  /  Alb  2.2<L>  /  TBili  0.2  /  DBili  x   /  AST  16  /  ALT  8<L>  /  AlkPhos  74  02-24                RADIOLOGY & ADDITIONAL TESTS:    Imaging Personally Reviewed:    Electrocardiogram Personally Reviewed:    COORDINATION OF CARE:  Care Discussed with Consultants/Other Providers [Y/N]:  Prior or Outpatient Records Reviewed [Y/N]:

## 2021-02-25 NOTE — PROGRESS NOTE ADULT - PROBLEM SELECTOR PLAN 10
- Diet: regular diet  - DVT ppx: eliquis  - PT eval - recs subacute rehab, pt does not want to go to nursing home, wants to go back home. Daughter also does not want pt to go back to [facility]  - DM2 - home regimen 10u Basaglar qHS and SSI at nursing home - will give SSI here given poor po intake, uptitrate prn  - full code per daughter  - palliative consulted, appreciate recs  - DC time 45mns.  Pt is to f/up with provider outpaient once dc

## 2021-02-25 NOTE — CHART NOTE - NSCHARTNOTESELECT_GEN_ALL_CORE
Nutrition Services
Nutrition Services
Palliative Care/Event Note
Surgery
red sx f/u note
red sx f/u note

## 2021-02-25 NOTE — PROGRESS NOTE ADULT - PROBLEM SELECTOR PROBLEM 5
Hypoxia
Advanced care planning/counseling discussion
Advanced care planning/counseling discussion
Hypoxia

## 2021-02-25 NOTE — PROGRESS NOTE ADULT - ATTENDING COMMENTS
Advanced care planning was discussed with patient and family.  Advanced care planning forms were reviewed and discussed as appropriate.  Differential diagnosis and plan of care discussed with patient after the evaluation.   Pain assessed and judicious use of narcotics when appropriate was discussed.  Importance of Fall prevention discussed.  Counseling on Smoking and Alcohol cessation was offered when appropriate.  Counseling on Diet, exercise, and medication compliance was done.
Advanced care planning was discussed with patient and family.  Advanced care planning forms were reviewed and discussed as appropriate.  Differential diagnosis and plan of care discussed with patient after the evaluation.   Pain assessed and judicious use of narcotics when appropriate was discussed.  Importance of Fall prevention discussed.  Counseling on Smoking and Alcohol cessation was offered when appropriate.  Counseling on Diet, exercise, and medication compliance was done.
Plan is to DC to [facility] and cont current care       Mei MckeonGerman Hospitalist   [phone]
Advanced care planning was discussed with patient and family.  Advanced care planning forms were reviewed and discussed as appropriate.  Differential diagnosis and plan of care discussed with patient after the evaluation.   Pain assessed and judicious use of narcotics when appropriate was discussed.  Importance of Fall prevention discussed.  Counseling on Smoking and Alcohol cessation was offered when appropriate.  Counseling on Diet, exercise, and medication compliance was done.
Plan is to DC to [facility] and cont current care       Mei Mc[facility] Hospitalist   [phone]
Advanced care planning was discussed with patient and family.  Advanced care planning forms were reviewed and discussed as appropriate.  Differential diagnosis and plan of care discussed with patient after the evaluation.   Pain assessed and judicious use of narcotics when appropriate was discussed.  Importance of Fall prevention discussed.  Counseling on Smoking and Alcohol cessation was offered when appropriate.  Counseling on Diet, exercise, and medication compliance was done.
Patient seen and examined  No nausea or vomiting  Tolerating PO intake  Passing flatus and stool    Abd is soft, not tender and not distended  No rebound, no guarding    75 year old man with resolved SBO  - no general surgery intervention required at this time  - diet as tolerated  - will sign off, please reconsult as needed
Advanced care planning was discussed with patient and family.  Advanced care planning forms were reviewed and discussed as appropriate.  Differential diagnosis and plan of care discussed with patient after the evaluation.   Pain assessed and judicious use of narcotics when appropriate was discussed.  Importance of Fall prevention discussed.  Counseling on Smoking and Alcohol cessation was offered when appropriate.  Counseling on Diet, exercise, and medication compliance was done.
pt seen and examined  agree with above  repeat ct reviewed  persistent sbo but no longer describing concern for internal hernia and bowel loops less dilated  would continue non-operative rx for ngt, npo, ivf strict i/os in light of tenuous respiratory status to avoid intubation if possible   consider rpt CT next week if no further improvement  consider palliative care consult for GOC if obstruction does not resolve  consider parenteral nutrition evaluation for possible TPN for prolonged npo status  continue supportive care per med/pulm/id  will follow with you
Advanced care planning was discussed with patient and family.  Advanced care planning forms were reviewed and discussed as appropriate.  Differential diagnosis and plan of care discussed with patient after the evaluation.   Pain assessed and judicious use of narcotics when appropriate was discussed.  Importance of Fall prevention discussed.  Counseling on Smoking and Alcohol cessation was offered when appropriate.  Counseling on Diet, exercise, and medication compliance was done.
Wesley Castelan MD   Geriatrics and Palliative Care (GAP) Consult Service    of Geriatric and Palliative Medicine  [facility]      Please page the following number for clinical matters between the hours of 9 am and 5 pm from Monday through Friday : [phone]    After 5pm and on weekends, please see the contact information below:    In the event of newly developing, evolving, or worsening symptoms, please contact the Palliative Medicine team via pager (if the patient is at [facility] #[phone] or if the patient is at [facility] #[phone]) The Geriatric and Palliative Medicine service has coverage 24 hours a day/ 7 days a week to provide medical recommendations regarding symptom management needs via telephone
Presented from SNF w/ nausea and vomiting, found to have SBO  S/p NGT decompression w/ improvement in symptoms  NPO, bowel rest  On zosyn for HCAP ?aspiration given vomiting
pt seen and examined.  above plan discussed on rounds today.  In addition,    75M PMH dementia, DM2, HTN, ?HF, HLD, recent admission for COPD pneumonia requiring intubation (11/2020 [place]), COVID in January 2021 (not intubated) currently being treated for pneumonia presents from nursing home for abdominal pain and vomiting, found to have multifocal PNA (unclear if sequela of prior COVID) and high-grade SBO. Course c/b new afib with RVR s/p amiodarone gtt. SBO now resolving.     pt now tolerating regular diet  Completed 5 day course of Zosyn for HCAP    dispo: pt does not want to return to the facilty he was at prior to his admission here. CM team aware and are working with family to find a location for a safe discharge.
DOS 2/15  Presented from SNF w/ nausea and vomiting, found to have SBO  S/p NGT decompression w/ improvement in symptoms but still no BM and still w/ high NGT output  NPO, bowel rest  On zosyn for HCAP ?aspiration given vomiting. Plan for 5 day course.  Consider repeat CT in a few days if still w/ high outputs.  Appreciate surgery assistance    Gabriel Sher MD  Division of Hospital Medicine  Pager: [phone]  Office: [phone]
Patient presented from SNF w/ nausea and vomiting, found to have SBO  S/p NGT decompression w/ improvement in symptoms   NGT output decreased 250cc/24hrs.  Reportedly had 3 bms since yesterday. Passing gas  NPO, bowel rest  On Zosyn for HCAP ?aspiration given vomiting - finish 5 day course today  Repeat CT abd: Decrease in small bowel dilatation with persistent small bowel obstruction  Appreciate surgery team recs. Plan to clamp NGT today and monitor.    New onset Afib: Continue Lopressor IV Q6H, on Heparin gtt for AC  Hypernatremia - resolved. Na 142. Monitor BMP  F/U Palliative care consult  PT OT
Patient presented from SNF w/ nausea and vomiting, found to have SBO  S/p NGT decompression w/ improvement in symptoms but still no BM and still w/ high NGT output  NPO, bowel rest  On Zosyn for HCAP ?aspiration given vomiting. Continue for 5 day course.    Repeat CT abd: Decrease in small bowel dilatation with persistent small bowel obstruction  F/U surgery for further recs given persistent SBO    New onset Afib: Continue Lopressor IV Q6H, on Heparin gtt for AC  Hypernatremia: Continue IVF D5. Monitor Na Q8H .
pt seen and examined.  above plan discussed on rounds today.  In addition,    75M PMH dementia, DM2, HTN, ?HF, HLD, recent admission for COPD pneumonia requiring intubation (11/2020 [place]), COVID in January 2021 (not intubated) currently being treated for pneumonia presents from nursing home for abdominal pain and vomiting, found to have multifocal PNA (unclear if sequela of prior COVID) and high-grade SBO. Course c/b new afib with RVR s/p amiodarone gtt. SBO now resolving.     pt now tolerating regular diet  Completed 5 day course of Zosyn for HCAP
pt seen and examined.  above plan discussed on rounds today.  In addition,    Patient presented from SNF w/ nausea and vomiting, found to have SBO  S/p NGT decompression w/ improvement in symptoms   start clears and advance to low residual diet if she tolerates  On Zosyn for HCAP ?aspiration given vomiting - finish 5 day course today  Repeat CT abd: Decrease in small bowel dilatation with persistent small bowel obstruction  Appreciate surgery team recs, NGtube removed yesterday, he tolerated clear--> will advance to a regular diet today    New onset Afib: Continue Lopressor IV Q6H, on Heparin gtt for AC  Hypernatremia - resolved. Na 142. Monitor BMP  F/U Palliative care consult  PT OT
Patient presented from SNF w/ nausea and vomiting, found to have SBO  S/p NGT decompression w/ improvement in symptoms   start clears and advance to low residual diet if she tolerates  On Zosyn for HCAP ?aspiration given vomiting - finish 5 day course today  Repeat CT abd: Decrease in small bowel dilatation with persistent small bowel obstruction  Appreciate surgery team recs, NGtube removed today.    New onset Afib: Continue Lopressor IV Q6H, on Heparin gtt for AC  Hypernatremia - resolved. Na 142. Monitor BMP  F/U Palliative care consult  PT OT
Presented from SNF w/ nausea and vomiting, found to have SBO  S/p NGT decompression w/ improvement in symptoms but still no BM and still w/ high NGT output  NPO, bowel rest  On zosyn for HCAP ?aspiration given vomiting. Plan for 5 day course.  Appreciate surgery assistance    Gabriel Sher MD  Division of Hospital Medicine  Pager: [phone]  Office: [phone]
Patient presented from SNF w/ nausea and vomiting, found to have SBO  S/p NGT decompression w/ improvement in symptoms   Continues to have high NGT output  Reportedly had 1 bm this afternoon  NPO, bowel rest  On Zosyn for HCAP ?aspiration given vomiting. Continue for 5 day course.    Repeat CT abd: Decrease in small bowel dilatation with persistent small bowel obstruction  Surgery team rec: continue non operative management, bowel rest, NGT  Palliative care consult  TPN evaluation    New onset Afib: Continue Lopressor IV Q6H, on Heparin gtt for AC  Hypernatremia - improving: Continue IVF D5. Monitor Na Q8H .
Patient presented from SNF w/ nausea and vomiting, found to have SBO  S/p NGT decompression w/ improvement in symptoms but still no BM and still w/ high NGT output  NPO, bowel rest  On Zosyn for HCAP ?aspiration given vomiting. Continue for 5 day course.  Appreciate surgery assistance  Repeat CT tomorrow  New onset Afib: Continue Lopressor IV Q6H, on Heparin gtt for AC  Hypernatremia: Start IVF dextrose. Monitor Na Q8H

## 2021-02-25 NOTE — PROGRESS NOTE ADULT - REASON FOR ADMISSION
abdominal pain

## 2021-02-25 NOTE — PROGRESS NOTE ADULT - PROBLEM SELECTOR PROBLEM 8
COPD (chronic obstructive pulmonary disease)

## 2021-02-25 NOTE — PROGRESS NOTE ADULT - ASSESSMENT
75M PMH dementia, DM2, HTN, ?HF, HLD, recent admission for COPD pneumonia requiring intubation (11/2020 [place]), COVID in January 2021 (not intubated) currently being treated for pneumonia presents from nursing home for abdominal pain and vomiting, found to have multifocal PNA (unclear if sequela of prior COVID) and high-grade SBO. Course c/b new afib with RVR s/p amiodarone gtt. SBO now resolved.

## 2021-03-03 PROCEDURE — 71045 X-RAY EXAM CHEST 1 VIEW: CPT | Mod: 26

## 2021-03-16 PROCEDURE — 82435 ASSAY OF BLOOD CHLORIDE: CPT

## 2021-03-16 PROCEDURE — 83935 ASSAY OF URINE OSMOLALITY: CPT

## 2021-03-16 PROCEDURE — 97116 GAIT TRAINING THERAPY: CPT

## 2021-03-16 PROCEDURE — 82330 ASSAY OF CALCIUM: CPT

## 2021-03-16 PROCEDURE — 84295 ASSAY OF SERUM SODIUM: CPT

## 2021-03-16 PROCEDURE — 84132 ASSAY OF SERUM POTASSIUM: CPT

## 2021-03-16 PROCEDURE — 87086 URINE CULTURE/COLONY COUNT: CPT

## 2021-03-16 PROCEDURE — 84300 ASSAY OF URINE SODIUM: CPT

## 2021-03-16 PROCEDURE — 86769 SARS-COV-2 COVID-19 ANTIBODY: CPT

## 2021-03-16 PROCEDURE — 85610 PROTHROMBIN TIME: CPT

## 2021-03-16 PROCEDURE — 80048 BASIC METABOLIC PNL TOTAL CA: CPT

## 2021-03-16 PROCEDURE — 82947 ASSAY GLUCOSE BLOOD QUANT: CPT

## 2021-03-16 PROCEDURE — 86901 BLOOD TYPING SEROLOGIC RH(D): CPT

## 2021-03-16 PROCEDURE — 84133 ASSAY OF URINE POTASSIUM: CPT

## 2021-03-16 PROCEDURE — 85014 HEMATOCRIT: CPT

## 2021-03-16 PROCEDURE — 74176 CT ABD & PELVIS W/O CONTRAST: CPT

## 2021-03-16 PROCEDURE — 96374 THER/PROPH/DIAG INJ IV PUSH: CPT | Mod: XU

## 2021-03-16 PROCEDURE — 85025 COMPLETE CBC W/AUTO DIFF WBC: CPT

## 2021-03-16 PROCEDURE — 71275 CT ANGIOGRAPHY CHEST: CPT

## 2021-03-16 PROCEDURE — 85018 HEMOGLOBIN: CPT

## 2021-03-16 PROCEDURE — 83036 HEMOGLOBIN GLYCOSYLATED A1C: CPT

## 2021-03-16 PROCEDURE — 83540 ASSAY OF IRON: CPT

## 2021-03-16 PROCEDURE — 82436 ASSAY OF URINE CHLORIDE: CPT

## 2021-03-16 PROCEDURE — 82962 GLUCOSE BLOOD TEST: CPT

## 2021-03-16 PROCEDURE — 83605 ASSAY OF LACTIC ACID: CPT

## 2021-03-16 PROCEDURE — 86850 RBC ANTIBODY SCREEN: CPT

## 2021-03-16 PROCEDURE — 85730 THROMBOPLASTIN TIME PARTIAL: CPT

## 2021-03-16 PROCEDURE — 71045 X-RAY EXAM CHEST 1 VIEW: CPT

## 2021-03-16 PROCEDURE — 92610 EVALUATE SWALLOWING FUNCTION: CPT

## 2021-03-16 PROCEDURE — 82272 OCCULT BLD FECES 1-3 TESTS: CPT

## 2021-03-16 PROCEDURE — 81001 URINALYSIS AUTO W/SCOPE: CPT

## 2021-03-16 PROCEDURE — 43753 TX GASTRO INTUB W/ASP: CPT

## 2021-03-16 PROCEDURE — 84105 ASSAY OF URINE PHOSPHORUS: CPT

## 2021-03-16 PROCEDURE — 82570 ASSAY OF URINE CREATININE: CPT

## 2021-03-16 PROCEDURE — 84540 ASSAY OF URINE/UREA-N: CPT

## 2021-03-16 PROCEDURE — 97110 THERAPEUTIC EXERCISES: CPT

## 2021-03-16 PROCEDURE — 94640 AIRWAY INHALATION TREATMENT: CPT

## 2021-03-16 PROCEDURE — 97161 PT EVAL LOW COMPLEX 20 MIN: CPT

## 2021-03-16 PROCEDURE — 86803 HEPATITIS C AB TEST: CPT

## 2021-03-16 PROCEDURE — 84145 PROCALCITONIN (PCT): CPT

## 2021-03-16 PROCEDURE — 93005 ELECTROCARDIOGRAM TRACING: CPT

## 2021-03-16 PROCEDURE — 84484 ASSAY OF TROPONIN QUANT: CPT

## 2021-03-16 PROCEDURE — 82803 BLOOD GASES ANY COMBINATION: CPT

## 2021-03-16 PROCEDURE — 74177 CT ABD & PELVIS W/CONTRAST: CPT

## 2021-03-16 PROCEDURE — 85027 COMPLETE CBC AUTOMATED: CPT

## 2021-03-16 PROCEDURE — 86900 BLOOD TYPING SEROLOGIC ABO: CPT

## 2021-03-16 PROCEDURE — 99285 EMERGENCY DEPT VISIT HI MDM: CPT | Mod: 25

## 2021-03-16 PROCEDURE — U0003: CPT

## 2021-03-16 PROCEDURE — 93306 TTE W/DOPPLER COMPLETE: CPT

## 2021-03-16 PROCEDURE — 84100 ASSAY OF PHOSPHORUS: CPT

## 2021-03-16 PROCEDURE — 82728 ASSAY OF FERRITIN: CPT

## 2021-03-16 PROCEDURE — 83735 ASSAY OF MAGNESIUM: CPT

## 2021-03-16 PROCEDURE — U0005: CPT

## 2021-03-16 PROCEDURE — 87040 BLOOD CULTURE FOR BACTERIA: CPT

## 2021-03-16 PROCEDURE — 85379 FIBRIN DEGRADATION QUANT: CPT

## 2021-03-16 PROCEDURE — 83550 IRON BINDING TEST: CPT

## 2021-03-16 PROCEDURE — 80053 COMPREHEN METABOLIC PANEL: CPT

## 2021-03-16 PROCEDURE — 51701 INSERT BLADDER CATHETER: CPT | Mod: XU

## 2022-01-24 PROBLEM — I63.9 CEREBRAL INFARCTION, UNSPECIFIED: Chronic | Status: ACTIVE | Noted: 2021-02-12

## 2022-01-24 PROBLEM — E11.9 TYPE 2 DIABETES MELLITUS WITHOUT COMPLICATIONS: Chronic | Status: ACTIVE | Noted: 2021-02-12

## 2022-01-24 PROBLEM — I10 ESSENTIAL (PRIMARY) HYPERTENSION: Chronic | Status: ACTIVE | Noted: 2021-02-12

## 2022-01-24 PROBLEM — N39.0 URINARY TRACT INFECTION, SITE NOT SPECIFIED: Chronic | Status: ACTIVE | Noted: 2021-02-12

## 2022-01-24 PROBLEM — F03.90 UNSPECIFIED DEMENTIA WITHOUT BEHAVIORAL DISTURBANCE: Chronic | Status: ACTIVE | Noted: 2021-02-12

## 2022-01-24 PROBLEM — E78.5 HYPERLIPIDEMIA, UNSPECIFIED: Chronic | Status: ACTIVE | Noted: 2021-02-12

## 2022-01-24 PROBLEM — J44.9 CHRONIC OBSTRUCTIVE PULMONARY DISEASE, UNSPECIFIED: Chronic | Status: ACTIVE | Noted: 2021-02-12

## 2022-01-25 NOTE — PROVIDER CONTACT NOTE (OTHER) - DATE AND TIME:
20-Feb-2021 03:18
14-Feb-2021 13:47
15-Feb-2021 01:10
12-Feb-2021 17:37
13-Feb-2021 08:40
21-Feb-2021 04:01
Oriented - self; Oriented - place; Oriented - time

## 2022-04-26 NOTE — DISCHARGE NOTE NURSING/CASE MANAGEMENT/SOCIAL WORK - SOCIAL WORKER'S NAME
Lia Chapa LMSW  Clindamycin Pregnancy And Lactation Text: This medication can be used in pregnancy if certain situations. Clindamycin is also present in breast milk.

## 2022-09-06 NOTE — PROGRESS NOTE ADULT - PROBLEM SELECTOR PLAN 3
Cr 0.68 Jan 2021, upon admission SCr 3. Now downtrending s/p 3L NS. Likely prerenal.   - 2/14: Not retaining on bladder scan, incontinent making I&Os, not fully reliable; SARAH continues to improve. Trend UOP and SCr. If UOP truly dropping, give IVF  -d/c LR 2/20 as pt now on PO diet  Resolved No

## 2023-07-05 NOTE — H&P ADULT - NSICDXPASTMEDICALHX_GEN_ALL_CORE_FT
PAST MEDICAL HISTORY:  COPD (chronic obstructive pulmonary disease)     CVA (cerebral vascular accident)     Dementia     HLD (hyperlipidemia)     HTN (hypertension)     T2DM (type 2 diabetes mellitus)     UTI (urinary tract infection)      Odomzo Counseling- I discussed with the patient the risks of Odomzo including but not limited to nausea, vomiting, diarrhea, constipation, weight loss, changes in the sense of taste, decreased appetite, muscle spasms, and hair loss.  The patient verbalized understanding of the proper use and possible adverse effects of Odomzo.  All of the patient's questions and concerns were addressed.

## 2023-08-21 NOTE — PROGRESS NOTE ADULT - PROBLEM SELECTOR PROBLEM 6
Health Maintenance Due   Topic Date Due   • Hepatitis B Vaccine (1 of 3 - 3-dose series) Never done   • COVID-19 Vaccine (4 - Pfizer series) 01/29/2022   • Shingles Vaccine (1 of 2) Never done   • Depression Screening  11/22/2023       Patient is due for topics as listed above but is not proceeding with Immunization(s) COVID-19, Hep B and Shingles at this time.    COPD (chronic obstructive pulmonary disease)

## 2024-03-07 NOTE — PROGRESS NOTE ADULT - PROBLEM SELECTOR PLAN 6
Problem: Discharge Planning  Goal: Discharge to home or other facility with appropriate resources  Outcome: Progressing     Problem: Safety - Adult  Goal: Free from fall injury  Outcome: Progressing     Problem: ABCDS Injury Assessment  Goal: Absence of physical injury  Outcome: Progressing     Problem: Pain  Goal: Verbalizes/displays adequate comfort level or baseline comfort level  Outcome: Progressing     Problem: Skin/Tissue Integrity  Goal: Absence of new skin breakdown  Description: 1.  Monitor for areas of redness and/or skin breakdown  2.  Assess vascular access sites hourly  3.  Every 4-6 hours minimum:  Change oxygen saturation probe site  4.  Every 4-6 hours:  If on nasal continuous positive airway pressure, respiratory therapy assess nares and determine need for appliance change or resting period.  Outcome: Progressing      acceptable  continue home meds with hold parameters